# Patient Record
Sex: MALE | Race: WHITE | NOT HISPANIC OR LATINO | Employment: STUDENT | URBAN - METROPOLITAN AREA
[De-identification: names, ages, dates, MRNs, and addresses within clinical notes are randomized per-mention and may not be internally consistent; named-entity substitution may affect disease eponyms.]

---
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
## 2018-01-09 ENCOUNTER — ALLSCRIPTS OFFICE VISIT (OUTPATIENT)
Dept: OTHER | Facility: OTHER | Age: 18
End: 2018-01-09

## 2018-01-09 DIAGNOSIS — E83.110 HEREDITARY HEMOCHROMATOSIS (HCC): ICD-10-CM

## 2018-01-09 DIAGNOSIS — F98.8 OTHER SPECIFIED BEHAVIORAL AND EMOTIONAL DISORDERS WITH ONSET USUALLY OCCURRING IN CHILDHOOD AND ADOLESCENCE: ICD-10-CM

## 2018-01-09 DIAGNOSIS — E66.01 MORBID (SEVERE) OBESITY DUE TO EXCESS CALORIES (HCC): ICD-10-CM

## 2018-01-09 DIAGNOSIS — R53.83 OTHER FATIGUE: ICD-10-CM

## 2018-01-09 DIAGNOSIS — J06.9 ACUTE UPPER RESPIRATORY INFECTION: ICD-10-CM

## 2018-01-10 NOTE — PROGRESS NOTES
Assessment   1  Morbid obesity due to excess calories (278 01) (E66 01)   2  Acute upper respiratory infection (465 9) (J06 9)   3  Attention deficit disorder (ADD) in adult (314 00) (F98 8)   4  Fatigue, unspecified type (780 79) (R53 83)   5  Hereditary hemochromatosis (275 01) (E83 110)    Plan   Acute upper respiratory infection, Attention deficit disorder (ADD) in adult, Fatigue,    unspecified type, Hereditary hemochromatosis, Morbid obesity due to excess calories    · (1) CBC/PLT/DIFF; Status:Active; Requested LXI:[id number];    · (1) COMPREHENSIVE METABOLIC PANEL; Status:Active; Requested XFF:64DZU9869;    · (1) IRON PANEL; Status:Active; Requested YKZ:55RKE9793;    · (1) LIPID PANEL FASTING W DIRECT LDL REFLEX; Status:Active; Requested    JBD:08PVT8703;    · (1) MUMPS  ANTIBODIES, IGG; Status:Active; Requested MCW:17VBV4126; Attention deficit disorder (ADD) in adult    · Methylphenidate HCl ER 18 MG Oral Tablet Extended Release (Concerta); TAKE    1 TABLET DAILY  Attention deficit disorder (ADD) in adult, Fatigue, unspecified type    · (1) TSH WITH FT4 REFLEX; Status:Active; Requested DAF:48LSD3360; Discussion/Summary      1  URI appearing to be viral in nature discussed supportive measures ADD will start Concerta and will call with an update of his symptoms in the next two weeks discussed how to take medication and common side effects  Fatigue and hemochromatosis in family will update labs to evaluate  Possible side effects of new medications were reviewed with the patient/guardian today  The treatment plan was reviewed with the patient/guardian  The patient/guardian understands and agrees with the treatment plan      Chief Complaint   Check up      History of Present Illness   Patient here today for his check up and going to school and doing well with school and here for his check up and a having some cold symptoms for the past week taking some OTC medications and feeling improvement   Patient has troubles with focus and has fatigue in the afternoon  Hard to focus and concentration hard to keep on one train of thought and making excuses prior to doing his work  Patient feeling that the Bryan Liu is not working this has been an issue for him his whole life  Focus is not improving even though his sleep has improved  Patient also requesting to have his iron studies checked as he was told that he has a family history of excess iron in the blood  Review of Systems        Constitutional: as noted in HPI  Eyes: No complaints of eye pain, no red eyes, no discharge from eyes, no itchy eyes  ENT: as noted in HPI  Cardiovascular: No complaints of slow heart rate, no fast heart rate, no chest pain, no palpitations, no leg claudication, no lower extremity  Respiratory: as noted in HPI  Gastrointestinal: No complaints of abdominal pain, no constipation, no nausea or vomiting, no diarrhea or bloody stools  Genitourinary: No complaints of dysuria, no incontinence, no hesitancy, no nocturia, no genital lesion, no testicular pain  Musculoskeletal: No complaints of arthralgia, no myalgias, no joint swelling or stiffness, no limb pain or swelling  Integumentary: No complaints of skin rash or skin lesions, no itching, no skin wound, no dry skin  Neurological: No compliants of headache, no confusion, no convulsions, no numbness or tingling, no dizziness or fainting, no limb weakness, no difficulty walking  Psychiatric: Is not suicidal, no sleep disturbances, no anxiety or depression, no change in personality, no emotional problems  Endocrine: No complaints of proptosis, no hot flashes, no muscle weakness, no erectile dysfunction, no deepening of the voice, no feelings of weakness  Hematologic/Lymphatic: No complaints of swollen glands, no swollen glands in the neck, does not bleed easily, no easy bruising  ROS reviewed  Active Problems   1   Morbid obesity due to excess calories (278 01) (E66 01)    Past Medical History   1  History of Flu-like symptoms (780 99) (R68 89)   2  History of backache (V13 59) (Z87 39)   3  History of influenza (V12 09) (Z87 09)   4  History of influenza vaccination (V49 89) (Z92 29)   5  History of viral infection (V12 09) (Z86 19)   6  History of Hypersomnia (780 54) (G47 10)   7  History of Lightheadedness (780 4) (R42)   8  History of Need for HPV vaccination (V04 89) (Z23)   9  Positive depression screening (796 4) (Z13 89)     The active problems and past medical history were reviewed and updated today  Surgical History   1  History of Inguinal Hernia Repair   2  History of Tonsillectomy     The surgical history was reviewed and updated today  Family History   Mother    1  Family history of chronic obstructive pulmonary disease (V17 6) (Z82 5)   2  Family history of Hypertension, benign  Father    3  Family history of Hypertension, benign     The family history was reviewed and updated today  Social History    · Never a smoker  The social history was reviewed and updated today  Current Meds    1  Armodafinil 150 MG Oral Tablet; TAKE 1 TABLET EVERY MORNING; Therapy: 04ANP1700 to ([id number])  Requested for: 07GXH1736; Last     Rx:14Bbk0955 Ordered   2  Sulfamethoxazole-Trimethoprim 800-160 MG Oral Tablet; TAKE 1 TABLET TWICE DAILY     UNTIL FINISHED; Therapy: 32GCB8015 to (Evaluate:90Cya1891)  Requested for: 52Xdw3554; Last     Rx:47Lqh6194 Ordered     The medication list was reviewed and updated today  Allergies   1  No Known Drug Allergies    Vitals   Vital Signs    Recorded: 59NQJ4468 11:43AM   Temperature 98 2 F   Heart Rate 78   Systolic 064   Diastolic 84   Height 6 ft 3 in   Weight 309 lb 2 08 oz   BMI Calculated 38 64   BSA Calculated 2 65   O2 Saturation 98     Physical Exam        Constitutional      General appearance: No acute distress, well appearing and well nourished         Eyes Conjunctiva and lids: No swelling, erythema, or discharge  -- wearing glasses  Pupils and irises: Equal, round and reactive to light  Ears, Nose, Mouth, and Throat      External inspection of ears and nose: Normal        Otoscopic examination: Tympanic membrance translucent with normal light reflex  Canals patent without erythema  Nasal mucosa, septum, and turbinates: Normal without edema or erythema  Oropharynx: Normal with no erythema, edema, exudate or lesions  Pulmonary      Respiratory effort: No increased work of breathing or signs of respiratory distress  Auscultation of lungs: Clear to auscultation, equal breath sounds bilaterally, no wheezes, no rales, no rhonci  Cardiovascular      Auscultation of heart: Normal rate and rhythm, normal S1 and S2, without murmurs  Examination of extremities for edema and/or varicosities: Normal        Abdomen      Abdomen: Non-tender, no masses  Liver and spleen: No hepatomegaly or splenomegaly  Lymphatic      Palpation of lymph nodes in neck: No lymphadenopathy  Musculoskeletal      Gait and station: Normal        Skin      Skin and subcutaneous tissue: Normal without rashes or lesions  Psychiatric      Orientation to person, place and time: Normal        Mood and affect: Normal           Results/Data   PHQ-9 Adult Depression Screening 17MFQ4057 12:07PM User, [employer]      Test Name Result Flag Reference   PHQ-9 Adult Depression Score 3     Over the last two weeks, how often have you been bothered by any of the following problems?      Little interest or pleasure in doing things: Not at all - 0     Feeling down, depressed, or hopeless: Not at all - 0     Trouble falling or staying asleep, or sleeping too much: Several days - 1     Feeling tired or having little energy: Several days - 1     Poor appetite or over eating: Several days - 1     Feeling bad about yourself - or that you are a failure or have let yourself or your family down: Not at all - 0     Trouble concentrating on things, such as reading the newspaper or watching television: Not at all - 0     Moving or speaking so slowly that other people could have noticed   Or the opposite -  being so fidgety or restless that you have been moving around a lot more than usual: Not at all - 0     Thoughts that you would be better off dead, or of hurting yourself in some way: Not at all - 0   PHQ-9 Adult Depression Screening Negative     PHQ-9 Difficulty Level Somewhat difficult     PHQ-9 Severity Minimal Depression          Signatures    Electronically signed by : YAO Mueller; Jan 9 2018 12:39PM EST                       (Author)     Electronically signed by : Salma Gunter MD; Jan 9 2018  2:42PM EST                       (Co-author)

## 2018-01-12 NOTE — PROGRESS NOTES
Assessment    1  UTI symptoms (788 99) (R39 9)    Plan  UTI symptoms    · (1) URINE CULTURE; Source:Urine, Clean Catch; Status:Active; Requested  for:71Quq5472;    · (Q) URINALYSIS, SCREEN; Status:Active; Requested for:21Yqb3574;    · Urine Dip Automated- POC; Status:Complete;   Done: 81WBX6855 03:14PM    Discussion/Summary    Urinalysis indicate dehydration  Please increase water conumption   During really hot weather days, you should be drinking at least 1 liter a day of water  History of Present Illness  HPI: Pt reports that over the past few days, he felt symptoms of urgency  He was getting up during the night to use the restroom  Some back pain post void  Today, he has had no symptms  The weather was very hot and humid over the past few days  Review of Systems    Constitutional: no fever or chills, feels well, no tiredness, no recent weight loss or weight gain  Genitourinary: urgency, but as noted in HPI  Active Problems    1  Depression (311) (F32 9)   2  Morbid obesity due to excess calories (278 01) (E66 01)    Past Medical History    1  History of Flu-like symptoms (780 99) (R68 89)   2  History of backache (V13 59) (Z87 39)   3  History of fatigue (V13 89) (Z87 898)   4  History of influenza (V12 09) (Z87 09)   5  History of influenza vaccination (V49 89) (Z92 29)   6  History of viral infection (V12 09) (Z86 19)   7  History of Hypersomnia (780 54) (G47 10)   8  History of Lightheadedness (780 4) (R42)   9  History of Need for HPV vaccination (V04 89) (Z23)   10  Positive depression screening (796 4) (Z13 89)  Active Problems And Past Medical History Reviewed: The active problems and past medical history were reviewed and updated today  Family History  Mother    1  Family history of chronic obstructive pulmonary disease (V17 6) (Z82 5)   2  Family history of Hypertension, benign  Father    3  Family history of Hypertension, benign  Family History Reviewed:    The family history was reviewed and updated today  Social History    · Never a smoker  The social history was reviewed and updated today  The social history was reviewed and is unchanged  Surgical History    1  History of Inguinal Hernia Repair   2  History of Tonsillectomy  Surgical History Reviewed: The surgical history was reviewed and updated today  Current Meds   1  Armodafinil 150 MG Oral Tablet; TAKE 1 TABLET EVERY MORNING; Therapy: 16ODE0896 to (Evaluate:08Jun2017)  Requested for: 54AKN9846; Last   KD:39LLC9753; Status: ACTIVE - Renewal Denied Ordered    Allergies    1  No Known Drug Allergies    Vitals   Recorded: 00Twm3571 03:11PM   Temperature 98 5 F   Heart Rate 64   Systolic 689   Diastolic 94   Height 6 ft 3 in   Weight 295 lb    BMI Calculated 36 87   BSA Calculated 2 59   O2 Saturation 98     Physical Exam    Constitutional   General appearance: No acute distress, well appearing and well nourished  Eyes   Conjunctiva and lids: No swelling, erythema, or discharge  Pupils and irises: Equal, round and reactive to light  Abdomen   Abdomen: Non-tender, no masses  Liver and spleen: No hepatomegaly or splenomegaly  Lymphatic   Palpation of lymph nodes in neck: No lymphadenopathy      Musculoskeletal   Gait and station: Normal     Psychiatric   Orientation to person, place and time: Normal     Mood and affect: Normal          Results/Data  Urine Dip Automated- POC 13VVF8929 03:14PM Steve Leonardony     Test Name Result Flag Reference   Leukocytes NEG     Nitrite NEG     Blood NEG     Bilirubin NEG     Urobilinogen 1 0     Protein 100 A    Ph 6 0     Specific Gravity 1 025     Ketone TRACE A    Glucose NEG         Signatures   Electronically signed by : Sunshine Fishman NP; Jul 24 2017  3:37PM EST                       (Author)    Electronically signed by : Sue Marti MD; Jul 24 2017  5:06PM EST                       (Co-author)

## 2018-01-12 NOTE — PROGRESS NOTES
Assessment    1  Depression (311) (F32 9)   2  Positive depression screening (796 4) (R68 89)   3  Acute bronchitis (466 0) (J20 9)   4  Cough (786 2) (R05)    Plan  Acute bronchitis    · Give your child 4 glasses of clear liquid a day ; Status:Complete;   Done: 10LSS6802   · There are several ways to treat your child's fever:; Status:Complete;   Done: 56JKI8625  Acute bronchitis, Cough    · Azithromycin 250 MG Oral Tablet; TAKE 2 TABLETS ON DAY 1 THEN TAKE 1  TABLET A DAY FOR 4 DAYS  Cough    · MethylPREDNISolone (Yahir) 4 MG Oral Tablet (Medrol (Yahir)); Take as directed on  pack  Depression    · Sertraline HCl - 50 MG Oral Tablet; Take 1 tablet daily   · Continue with our present treatment plan ; Status:Complete;   Done: 11FUI3437   · Decreasing the stress in your life may help your condition improve ; Status:Complete;    Done: 22Jan2016   · Please bring all medicines, vitamins, and herbal supplements with you when you come  to the office ; Status:Complete;   Done: 92RNV7082   · Regular aerobic exercise can help reduce stress ; Status:Complete;   Done: 57GOZ9630   · Call [phone] if: The symptoms are not better in 7 days ; Status:Complete;   Done:  70GQC6909   · Call [phone] if: The symptoms seem worse ; Status:Complete;   Done:  17TGB6659   · Call [phone] if: You are having difficulty sleeping (insomnia) ; Status:Complete;    Done: 47NXK0700   · Call [phone] if: You are unable to eat a normal amount of food and you do not  feel hungry ; Status:Complete;   Done: 66DSC4885   · Call [phone] if: You or your family members notice any confusion or difficulty with  memory ; Status:Complete;   Done: 89VFR0202   · Call [phone] if: Your depression is worse ; Status:Complete;   Done: 77APV8758   · Call [phone] if: Your moods often change suddenly for no reason ;  Status:Complete;   Done: 09HCG3082   · Call [phone] if: Your symptoms return during treatment  ; Status:Complete;   Done:  90UTH4001  Need for HPV vaccination    · Gardasil Intramuscular Suspension  Positive depression screening    · *VB-Depression Screening; Status:Complete;   Done: 77JFS6745 02:37PM    Discussion/Summary    1  Cough with the pertussis going around school will cover with the azithromycin with the length of time symptoms present and the barking hacking cough he is having   2  Depression will start the Zoloft and will come back in three weeks for a recheck on his symptoms  Patient aware of ER precautions for any SI/HI  Possible side effects of new medications were reviewed with the patient/guardian today  The treatment plan was reviewed with the patient/guardian  The patient/guardian understands and agrees with the treatment plan      Chief Complaint  Patient is here for a cough  He also fell behind on the Gardasil Series  History of Present Illness  HPI: Patient is here today with complaints that started with a head cold about two weeks ago and as that started getting better he started with a constant cough barking dry hacking cough  Symptoms present for the past week and having lots of soreness in his chest and can only take shallow breaths  Patient is also very stressed with school and trying to manage hectic schedule having fatigue hard to complete his work  Patient is sleeping on the weekends for at least 20 hours and he is putting lots of pressure on himself  Lots of stress with learning to drive  Review of Systems    Constitutional: as noted in HPI  Eyes: No complaints of eye pain, no discharge from eyes, no eyesight problems, eyes do not itch, no red or dry eyes  ENT: as noted in HPI  Cardiovascular: No complaints of chest pain, no palpitations, normal heart rate, no leg claudication or lower leg edema  Respiratory: as noted in HPI  Gastrointestinal: No complaints of abdominal pain, no nausea or vomiting, no constipation, no diarrhea or bloody stools  Musculoskeletal: No complaints of joint stiffness or swelling, no myalgias, no limb pain or swelling  Integumentary: No complaints of skin rash, no skin lesions or wounds, no itching, no dry skin  Neurological: No complaints of headache, no numbness or tingling, no dizziness or fainting, no confusion, no convulsions, no limb weakness or difficulty walking  Psychiatric: as noted in HPI  ROS reported by the patient  Active Problems    1  Fatigue (780 79) (R53 83)   2  Morbid obesity due to excess calories (278 01) (E66 01)   3  Need for influenza vaccination (V04 81) (Z23)   4  Snoring (786 09) (R06 83)    Past Medical History    1  History of backache (V13 59) (Z87 39)   2  History of influenza (V12 09) (Z87 09)  Active Problems And Past Medical History Reviewed: The active problems and past medical history were reviewed and updated today  Family History    1  Family history of chronic obstructive pulmonary disease (V17 6) (Z82 5)   2  Family history of Hypertension, benign    3  Family history of Hypertension, benign  Family History Reviewed: The family history was reviewed and updated today  Social History    · Never A Smoker  The social history was reviewed and updated today  Surgical History    1  History of Inguinal Hernia Repair   2  History of Tonsillectomy  Surgical History Reviewed: The surgical history was reviewed and updated today  Current Meds   1  No Reported Medications Recorded    The medication list was reviewed and updated today  Allergies    1  No Known Drug Allergies    Vitals   Recorded: 37NRD0508 02:26PM   Temperature 97 5 F   Heart Rate 64   Systolic 034   Diastolic 887   Height 6 ft 4 in   Weight 296 lb    BMI Calculated 36 03   BSA Calculated 2 61   O2 Saturation 98     Physical Exam    Constitutional - General appearance: No acute distress, well appearing and well nourished  acutely ill and morbidly obese     Head and Face - Face and sinuses: Normal, no sinus tenderness  Eyes - Conjunctiva and lids: No injection, edema or discharge  wearing glasses  Pupils and irises: Equal, round, reactive to light bilaterally  Ears, Nose, Mouth, and Throat - External inspection of ears and nose: Normal without deformities or discharge  Otoscopic examination: Tympanic membranes gray, translucent with good bony landmarks and light reflex  Canals patent without erythema  Nasal mucosa, septum, and turbinates: Normal, no edema or discharge  Oropharynx: Moist mucosa, normal tongue and tonsils without lesions  Neck - Neck: Supple, symmetric, no masses  Pulmonary - Respiratory effort: Normal respiratory rate and rhythm, no increased work of breathing  Auscultation of lungs: Abnormal  scattered rhonchi increasing constant barking cough  Cardiovascular - Auscultation of heart: Regular rate and rhythm, normal S1 and S2, no murmur  Abdomen - Abdomen: Normal bowel sounds, soft, non-tender, no masses  Liver and spleen: No hepatomegaly or splenomegaly  Lymphatic - Palpation of lymph nodes in neck: No anterior or posterior cervical lymphadenopathy     Psychiatric - Orientation to person, place, and time: Normal  Mood and affect: Normal       Results/Data  *VB-Depression Screening 50DMG9641 02:37PM Lucian Edwards     Test Name Result Flag Reference   Depression Scale Result      Depression Screen - Positive Findings     PHQ-9 Adult Depression Screening 22Jan2016 02:36PM Savannah Cast     Test Name Result Flag Reference   PHQ-9 Adult Depression Score 13     Q1: 3, Q2: 2, Q3: 3, Q4: 3, Q5: 0, Q6: 1, Q7: 1, Q8: 0, Q9: 0   PHQ-9 Adult Depression Screening Positive     PHQ-9 Difficulty Level Very difficult     PHQ-9 Severity Moderate Depression         Future Appointments    Date/Time Provider Specialty Site   02/08/2016 02:40 PM Lucian Edwards, [address]     Signatures   Electronically signed by : YAO Reed; Jan 22 2016 3:06PM EST                       (Author)    Electronically signed by : Aletha Mccain MD; Jan 22 2016  3:39PM EST                       (Co-author)

## 2018-01-13 NOTE — RESULT NOTES
Verified Results  * XR CHEST PA & LATERAL 81IZO0394 04:29PM Johan Apt Order Number: [id number]     Test Name Result Flag Reference   XR CHEST PA & LATERAL (Report)     CHEST      INDICATION: Shortness of breath for one week     COMPARISON: None     VIEWS: Frontal and lateral projections; 3 images     FINDINGS:        Cardiomediastinal silhouette appears unremarkable  The lungs are clear  No pneumothorax or pleural effusion  Visualized osseous structures appear within normal limits for the patient's age  IMPRESSION:     No active pulmonary disease       Signed by:   Joan Angeles MD   1/27/16       Discussion/Summary   normal chest x-ray suspect that he has a bronchitis

## 2018-01-14 VITALS
SYSTOLIC BLOOD PRESSURE: 142 MMHG | HEIGHT: 75 IN | WEIGHT: 295 LBS | TEMPERATURE: 98.5 F | BODY MASS INDEX: 36.68 KG/M2 | OXYGEN SATURATION: 98 % | DIASTOLIC BLOOD PRESSURE: 94 MMHG | HEART RATE: 64 BPM

## 2018-01-14 VITALS
HEIGHT: 75 IN | DIASTOLIC BLOOD PRESSURE: 86 MMHG | BODY MASS INDEX: 35.84 KG/M2 | SYSTOLIC BLOOD PRESSURE: 130 MMHG | HEART RATE: 91 BPM | OXYGEN SATURATION: 97 % | TEMPERATURE: 100 F | WEIGHT: 288.25 LBS

## 2018-01-18 NOTE — MISCELLANEOUS
Adele Smallwood is a patient of [address]  He is being currently evaluated for his extreme fatigue         Electronically signed by:Monica Elils  May 16 2016  3:06PM EST

## 2018-01-18 NOTE — RESULT NOTES
Verified Results  * MRI KNEE LEFT  WO CONTRAST 94ETY3543 12:28PM Jimmey Bumpers     Test Name Result Flag Reference   MRI KNEE LEFT  WO CONTRAST (Report)     MRI LEFT KNEE     INDICATION: Left knee pain  Patient injured 2 weeks ago  COMPARISON: None  TECHNIQUE:  MR sequences were obtained of the left knee including: Localizer, axial T2 fat sat, coronal T1/T2 fat sat, sagittal PD/T2 fat sat  Images were acquired on a 1 5 Laura unit  Gadolinium was not used  FINDINGS:     SUBCUTANEOUS TISSUES: Normal     JOINT EFFUSION: None  BAKER'S CYST: None  MENISCI: Intact  CRUCIATE LIGAMENTS: Intact  EXTENSOR APPARATUS: Intact  COLLATERAL LIGAMENTS: Intact  ARTICULAR SURFACES: There is a small nondisplaced osteochondral fracture in the posterior most aspect of the medial tibial plateau with surrounding marrow edema  The fracture measures 1 1 cm left to right, and 0 7 cm anterior posterior  (Series 4    images 16 and series 6 image 7 )     BONE MARROW: No additional marrow abnormalities  MUSCULATURE: Intact  IMPRESSION:     There is a small nondisplaced osteochondral fracture in the posterior most aspect of the medial tibial plateau  (Series 4 images 16 and series 6 image 7 )       ##imslh##imslh       Workstation performed: [id number]     Signed by:   Magi Kelly MD   2/12/16       Discussion/Summary   Please call patient and let him know that he has a small nondisplaced fracture of his knee   Will need f/u with orthopedics I will put in the order

## 2018-01-23 VITALS
BODY MASS INDEX: 38.44 KG/M2 | DIASTOLIC BLOOD PRESSURE: 84 MMHG | WEIGHT: 309.13 LBS | OXYGEN SATURATION: 98 % | SYSTOLIC BLOOD PRESSURE: 128 MMHG | HEIGHT: 75 IN | TEMPERATURE: 98.2 F | HEART RATE: 78 BPM

## 2018-12-20 ENCOUNTER — OFFICE VISIT (OUTPATIENT)
Dept: FAMILY MEDICINE CLINIC | Facility: CLINIC | Age: 18
End: 2018-12-20
Payer: COMMERCIAL

## 2018-12-20 VITALS
WEIGHT: 270.8 LBS | DIASTOLIC BLOOD PRESSURE: 78 MMHG | OXYGEN SATURATION: 97 % | HEART RATE: 75 BPM | TEMPERATURE: 97.1 F | SYSTOLIC BLOOD PRESSURE: 130 MMHG | HEIGHT: 76 IN | BODY MASS INDEX: 32.98 KG/M2

## 2018-12-20 DIAGNOSIS — F33.2 SEVERE EPISODE OF RECURRENT MAJOR DEPRESSIVE DISORDER, WITHOUT PSYCHOTIC FEATURES (HCC): Primary | ICD-10-CM

## 2018-12-20 DIAGNOSIS — F98.8 ATTENTION DEFICIT DISORDER (ADD) IN ADULT: ICD-10-CM

## 2018-12-20 DIAGNOSIS — Z23 FLU VACCINE NEED: ICD-10-CM

## 2018-12-20 PROBLEM — E83.110 HEREDITARY HEMOCHROMATOSIS (HCC): Status: ACTIVE | Noted: 2018-01-09

## 2018-12-20 PROCEDURE — 1036F TOBACCO NON-USER: CPT | Performed by: NURSE PRACTITIONER

## 2018-12-20 PROCEDURE — 99214 OFFICE O/P EST MOD 30 MIN: CPT | Performed by: NURSE PRACTITIONER

## 2018-12-20 PROCEDURE — 90686 IIV4 VACC NO PRSV 0.5 ML IM: CPT

## 2018-12-20 PROCEDURE — 3008F BODY MASS INDEX DOCD: CPT | Performed by: NURSE PRACTITIONER

## 2018-12-20 PROCEDURE — 90471 IMMUNIZATION ADMIN: CPT

## 2018-12-20 RX ORDER — VENLAFAXINE HYDROCHLORIDE 37.5 MG/1
37.5 CAPSULE, EXTENDED RELEASE ORAL DAILY
Qty: 14 CAPSULE | Refills: 0 | Status: SHIPPED | OUTPATIENT
Start: 2018-12-20 | End: 2018-12-27 | Stop reason: SDUPTHER

## 2018-12-20 RX ORDER — METHYLPHENIDATE HYDROCHLORIDE 27 MG/1
27 TABLET ORAL DAILY
Qty: 15 TABLET | Refills: 0 | Status: SHIPPED | OUTPATIENT
Start: 2018-12-20 | End: 2019-01-02 | Stop reason: SDUPTHER

## 2018-12-20 NOTE — PROGRESS NOTES
Assessment/Plan:    No problem-specific Assessment & Plan notes found for this encounter  Diagnoses and all orders for this visit:    Severe episode of recurrent major depressive disorder, without psychotic features ([facility] [address] )  Comments:  ED precautions reviewed with patient will start Effexor daily and Concerta and follow up in 1 week also therpist scheduled with patient   Orders:  -     venlafaxine (EFFEXOR-XR) 37 5 mg 24 hr capsule; Take 1 capsule (37 5 mg total) by mouth daily for 14 days    Flu vaccine need  -     SYRINGE/SINGLE-DOSE VIAL: influenza vaccine, [id number], quadrivalent, 0 5 mL, preservative-free (AFLURIA, FLUARIX, FLULAVAL, FLUZONE)    Attention deficit disorder (ADD) in adult  -     venlafaxine (EFFEXOR-XR) 37 5 mg 24 hr capsule; Take 1 capsule (37 5 mg total) by mouth daily for 14 days  -     methylphenidate (CONCERTA) 27 MG ER tablet; Take 1 tablet (27 mg total) by mouth daily for 15 days Max Daily Amount: 27 mg          Subjective:      Patient ID: Cece Fofana is a 19 y o  male  Patient here and reports that he is having problems at college and has problems with depression and being unmotivated and procrastinate and broke up with his girlfriend last summer  Patient at times is sleep all day and lack of motivation and much worse than ever before  Patient is feeling very defeated and just difficult to get motivated to do his work  Patient very low mood and depressed  Patient is currently in [place], [place]  Patient realizes being away from home realizes how much he is isolating himself  Patient deals with his parents who are hoarders and having problems with being isolated and feeling like he is falling behind  Patient feeling like he is the same person he was and not feeling like he doesn't want to keep going and feeling lots of anger and sadness patient saw a counselor for a few weeks and saw a few times   Patient struggles with having problems with his focus and concentration and was helping with the Concerta for the ADHD symptoms  Patient in the past had tried the sertraline in the past and was only mildly helpful  Patient is open to following up with therapist and getting back on medication for his depression and anxiety and ADHD  Patient is aware of ED precautions for any plans for harming himself or others  The following portions of the patient's history were reviewed and updated as appropriate:   He  has no past medical history on file  He   Patient Active Problem List    Diagnosis Date Noted    Attention deficit disorder (ADD) in adult 01/09/2018    Hereditary hemochromatosis ([facility] [address] ) 01/09/2018    Depression 01/22/2016    Morbid obesity due to excess calories ([facility] ) 09/14/2015    Fatigue 12/18/2014     He  has a past surgical history that includes Hernia repair and Tonsillectomy  His family history includes COPD in his mother; Hypertension in his father and mother  He  reports that he has never smoked  He has never used smokeless tobacco  His alcohol and drug histories are not on file  He has No Known Allergies       Review of Systems   Constitutional: Positive for fatigue  HENT: Negative  Eyes: Negative  Respiratory: Negative  Cardiovascular: Negative  Gastrointestinal: Negative  Endocrine: Negative  Genitourinary: Negative  Musculoskeletal: Negative  Skin: Negative  Allergic/Immunologic: Negative  Neurological: Negative  Psychiatric/Behavioral: Positive for decreased concentration, dysphoric mood, sleep disturbance and suicidal ideas  The patient is nervous/anxious  Objective:      /78 (Cuff Size: Adult)   Pulse 75   Temp (!) 97 1 °F (36 2 °C) (Tympanic)   Ht 6' 3 5" (1 918 m)   Wt 123 kg (270 lb 12 8 oz)   SpO2 97%   BMI 33 40 kg/m²          Physical Exam   Constitutional: He is oriented to person, place, and time  Vital signs are normal  He appears well-developed and well-nourished  No distress     HENT:   Head: Normocephalic and atraumatic  Eyes: Pupils are equal, round, and reactive to light  Neck: Normal range of motion  No thyromegaly present  Cardiovascular: Normal rate, regular rhythm, normal heart sounds and intact distal pulses  No murmur heard  Pulmonary/Chest: Effort normal and breath sounds normal  No respiratory distress  He has no wheezes  Abdominal: Soft  Bowel sounds are normal    Musculoskeletal: Normal range of motion  Neurological: He is alert and oriented to person, place, and time  Skin: Skin is warm and dry  Psychiatric: His behavior is normal  Judgment and thought content normal  His mood appears anxious  His speech is rapid and/or pressured  Cognition and memory are normal  He exhibits a depressed mood  PHQ-9 score is 23   Nursing note and vitals reviewed

## 2018-12-27 ENCOUNTER — OFFICE VISIT (OUTPATIENT)
Dept: BEHAVIORAL/MENTAL HEALTH CLINIC | Facility: CLINIC | Age: 18
End: 2018-12-27
Payer: COMMERCIAL

## 2018-12-27 ENCOUNTER — OFFICE VISIT (OUTPATIENT)
Dept: FAMILY MEDICINE CLINIC | Facility: CLINIC | Age: 18
End: 2018-12-27
Payer: COMMERCIAL

## 2018-12-27 VITALS
TEMPERATURE: 97.9 F | HEART RATE: 82 BPM | SYSTOLIC BLOOD PRESSURE: 130 MMHG | HEIGHT: 76 IN | DIASTOLIC BLOOD PRESSURE: 74 MMHG | BODY MASS INDEX: 32.98 KG/M2 | WEIGHT: 270.8 LBS | OXYGEN SATURATION: 97 %

## 2018-12-27 DIAGNOSIS — F98.8 ATTENTION DEFICIT DISORDER (ADD) IN ADULT: ICD-10-CM

## 2018-12-27 DIAGNOSIS — F33.2 SEVERE EPISODE OF RECURRENT MAJOR DEPRESSIVE DISORDER, WITHOUT PSYCHOTIC FEATURES (HCC): Primary | ICD-10-CM

## 2018-12-27 DIAGNOSIS — F33.2 SEVERE EPISODE OF RECURRENT MAJOR DEPRESSIVE DISORDER, WITHOUT PSYCHOTIC FEATURES (HCC): ICD-10-CM

## 2018-12-27 DIAGNOSIS — E66.01 MORBID OBESITY DUE TO EXCESS CALORIES (HCC): ICD-10-CM

## 2018-12-27 PROCEDURE — 1036F TOBACCO NON-USER: CPT | Performed by: NURSE PRACTITIONER

## 2018-12-27 PROCEDURE — 99213 OFFICE O/P EST LOW 20 MIN: CPT | Performed by: NURSE PRACTITIONER

## 2018-12-27 PROCEDURE — 90834 PSYTX W PT 45 MINUTES: CPT | Performed by: SOCIAL WORKER

## 2018-12-27 RX ORDER — VENLAFAXINE HYDROCHLORIDE 75 MG/1
75 CAPSULE, EXTENDED RELEASE ORAL DAILY
Qty: 30 CAPSULE | Refills: 2 | Status: SHIPPED | OUTPATIENT
Start: 2018-12-27 | End: 2019-01-10 | Stop reason: SDUPTHER

## 2018-12-27 NOTE — PROGRESS NOTES
Assessment/Plan:    No problem-specific Assessment & Plan notes found for this encounter  Diagnoses and all orders for this visit:    Severe episode of recurrent major depressive disorder, without psychotic features (HCC)  -     venlafaxine (EFFEXOR-XR) 75 mg 24 hr capsule; Take 1 capsule (75 mg total) by mouth daily for 90 days    Attention deficit disorder (ADD) in adult  -     venlafaxine (EFFEXOR-XR) 75 mg 24 hr capsule; Take 1 capsule (75 mg total) by mouth daily for 90 days    Morbid obesity due to excess calories (HCC)    Severe episode of recurrent major depressive disorder, without psychotic features ([facility] [address] )  Comments:  ED precautions reviewed with patient will increase  Effexor 75 mg  daily and Concerta and follow up in 1 week also therpist scheduled with patient   Orders:  -     venlafaxine (EFFEXOR-XR) 75 mg 24 hr capsule; Take 1 capsule (75 mg total) by mouth daily for 90 days          Subjective:      Patient ID: Juvenal Lowe is a 19 y o  male  Last OV  Patient here and reports that he is having problems at college and has problems with depression and being unmotivated and procrastinate and broke up with his girlfriend last summer  Patient at times is sleep all day and lack of motivation and much worse than ever before  Patient is feeling very defeated and just difficult to get motivated to do his work  Patient very low mood and depressed  Patient is currently in [place], [place]  Patient realizes being away from home realizes how much he is isolating himself  Patient deals with his parents who are hoarders and having problems with being isolated and feeling like he is falling behind  Patient feeling like he is the same person he was and not feeling like he doesn't want to keep going and feeling lots of anger and sadness patient saw a counselor for a few weeks and saw a few times   Patient struggles with having problems with his focus and concentration and was helping with the Concerta for the ADHD symptoms  Patient in the past had tried the sertraline in the past and was only mildly helpful  Patient is open to following up with therapist and getting back on medication for his depression and anxiety and ADHD  Patient is aware of ED precautions for any plans for harming himself or others       Patient here today for recheck on his symptoms and reports that he is feeling well on the Effexor 37 5 daily and the Concerta 27 mg and feeling that he is having more energy and his mood is improving and he has been motivated with his depression Patient denies any side effects from the medication        The following portions of the patient's history were reviewed and updated as appropriate:   He  has no past medical history on file  He   Patient Active Problem List    Diagnosis Date Noted    Attention deficit disorder (ADD) in adult 01/09/2018    Hereditary hemochromatosis ([facility] ) 01/09/2018    Depression 01/22/2016    Morbid obesity due to excess calories ([facility] ) 09/14/2015     He  has a past surgical history that includes Hernia repair and Tonsillectomy  His family history includes COPD in his mother; Hypertension in his father and mother  He  reports that he has never smoked  He has never used smokeless tobacco  His alcohol and drug histories are not on file  He is allergic to scopolamine       Review of Systems   Constitutional: Negative for activity change, appetite change, chills, diaphoresis, fatigue, fever and unexpected weight change  HENT: Negative for congestion, ear pain, hearing loss, postnasal drip, sinus pain, sinus pressure, sneezing and sore throat  Eyes: Negative for pain, redness and visual disturbance  Respiratory: Negative for cough and shortness of breath  Cardiovascular: Negative for chest pain and leg swelling  Gastrointestinal: Negative for abdominal pain, diarrhea, nausea and vomiting  Musculoskeletal: Negative for arthralgias     Neurological: Negative for dizziness and light-headedness  Psychiatric/Behavioral: Negative for behavioral problems and dysphoric mood  The patient is not nervous/anxious  Objective:      /74   Pulse 82   Temp 97 9 °F (36 6 °C) (Tympanic)   Ht 6' 3 5" (1 918 m)   Wt 123 kg (270 lb 12 8 oz)   SpO2 97%   BMI 33 40 kg/m²          Physical Exam   Constitutional: He is oriented to person, place, and time  Vital signs are normal  He appears well-developed and well-nourished  No distress  HENT:   Head: Normocephalic and atraumatic  Eyes: Pupils are equal, round, and reactive to light  Neck: Normal range of motion  No thyromegaly present  Cardiovascular: Normal rate, regular rhythm, normal heart sounds and intact distal pulses  No murmur heard  Pulmonary/Chest: Effort normal and breath sounds normal  No respiratory distress  He has no wheezes  Abdominal: Soft  Bowel sounds are normal    Musculoskeletal: Normal range of motion  Neurological: He is alert and oriented to person, place, and time  Skin: Skin is warm and dry  Psychiatric: He has a normal mood and affect  Nursing note and vitals reviewed

## 2018-12-31 NOTE — PSYCH
Assessment/Plan:      There are no diagnoses linked to this encounter  Subjective:     Patient ID: Rachael Barreto is a 19 y o  male  Provider met with patient for an initial appointment  Provider discussed confidentiality with patient  Patient shared that he has been struggling with depression  Patient shared that he and his girlfriend recently broke up and he has been struggling at college  Patient shared that he did not do well his last semester before break  Patient shared about his family  Patient shared that his mother and father are hoarders and that they recently lost their home and he had to help them find another place to live and move them  Patient shared that he does have an older brother but he lives on his own and he does not help with his parents  Patient shared that he has been helping his parents since he was a kid and he would make sure that they had the things they needed and he would try to keep the house as clean as he could  Patient shared that he has no support  Patient shared that he does not have any friends because he had been with his girlfriend for the past 6 years and her friends had been his friends  Patient shared that when he was at college he did not socialize with anyone except his girlfriend  Patient shared that he was going to classes and returning from classes and would go to sleep for hours and not do his work  Patient shared that this is one of the reasons he did not do well this past semester  Patient shared that he is feeling somewhat better since he started an anti-depressant  Discussed with patient possible ways that he could connect with people at school and develop some friendships and a support system  Patient shared that there are a few study groups he may be able to look in to  Encouraged patient to do this in order to develop a support system and not isolate  Patient is agreeable  Patient will follow up with provider until he returns to college   Provider encouraged patient to follow up with a therapist at college as well  Review of Systems   Psychiatric/Behavioral: Positive for dysphoric mood  Objective:     Physical Exam   Psychiatric: He has a normal mood and affect  His behavior is normal  Judgment and thought content normal    Patient reports that he has been having thoughts but no plan or means, SI and no HI  Patient reports decrease in these thoughts since starting an antidepressant medication

## 2019-01-02 DIAGNOSIS — F98.8 ATTENTION DEFICIT DISORDER (ADD) IN ADULT: ICD-10-CM

## 2019-01-02 RX ORDER — METHYLPHENIDATE HYDROCHLORIDE 27 MG/1
27 TABLET ORAL DAILY
Qty: 15 TABLET | Refills: 0 | Status: SHIPPED | OUTPATIENT
Start: 2019-01-02 | End: 2019-01-10 | Stop reason: SDUPTHER

## 2019-01-10 ENCOUNTER — OFFICE VISIT (OUTPATIENT)
Dept: FAMILY MEDICINE CLINIC | Facility: CLINIC | Age: 19
End: 2019-01-10
Payer: COMMERCIAL

## 2019-01-10 ENCOUNTER — OFFICE VISIT (OUTPATIENT)
Dept: BEHAVIORAL/MENTAL HEALTH CLINIC | Facility: CLINIC | Age: 19
End: 2019-01-10
Payer: COMMERCIAL

## 2019-01-10 VITALS
HEART RATE: 82 BPM | DIASTOLIC BLOOD PRESSURE: 84 MMHG | SYSTOLIC BLOOD PRESSURE: 142 MMHG | BODY MASS INDEX: 34.46 KG/M2 | WEIGHT: 283 LBS | OXYGEN SATURATION: 98 % | TEMPERATURE: 98.8 F | HEIGHT: 76 IN

## 2019-01-10 DIAGNOSIS — F33.2 SEVERE EPISODE OF RECURRENT MAJOR DEPRESSIVE DISORDER, WITHOUT PSYCHOTIC FEATURES (HCC): ICD-10-CM

## 2019-01-10 DIAGNOSIS — F98.8 ATTENTION DEFICIT DISORDER (ADD) IN ADULT: Primary | ICD-10-CM

## 2019-01-10 DIAGNOSIS — F98.8 ATTENTION DEFICIT DISORDER (ADD) IN ADULT: ICD-10-CM

## 2019-01-10 DIAGNOSIS — F33.2 SEVERE EPISODE OF RECURRENT MAJOR DEPRESSIVE DISORDER, WITHOUT PSYCHOTIC FEATURES (HCC): Primary | ICD-10-CM

## 2019-01-10 PROCEDURE — 99213 OFFICE O/P EST LOW 20 MIN: CPT | Performed by: NURSE PRACTITIONER

## 2019-01-10 PROCEDURE — 90834 PSYTX W PT 45 MINUTES: CPT | Performed by: SOCIAL WORKER

## 2019-01-10 RX ORDER — VENLAFAXINE HYDROCHLORIDE 150 MG/1
150 CAPSULE, EXTENDED RELEASE ORAL DAILY
Qty: 30 CAPSULE | Refills: 2 | Status: SHIPPED | OUTPATIENT
Start: 2019-01-10 | End: 2019-01-17 | Stop reason: SDUPTHER

## 2019-01-10 RX ORDER — METHYLPHENIDATE HYDROCHLORIDE 36 MG/1
36 TABLET ORAL DAILY
Qty: 15 TABLET | Refills: 0 | Status: SHIPPED | OUTPATIENT
Start: 2019-01-10 | End: 2019-01-17 | Stop reason: SDUPTHER

## 2019-01-10 NOTE — PROGRESS NOTES
Assessment/Plan:    No problem-specific Assessment & Plan notes found for this encounter  Diagnoses and all orders for this visit:    Attention deficit disorder (ADD) in adult  -     venlafaxine (EFFEXOR-XR) 150 mg 24 hr capsule; Take 1 capsule (150 mg total) by mouth daily for 90 days  -     methylphenidate (CONCERTA) 36 MG ER tablet; Take 1 tablet (36 mg total) by mouth daily for 15 days Max Daily Amount: 36 mg    Severe episode of recurrent major depressive disorder, without psychotic features ([facility])  -     venlafaxine (EFFEXOR-XR) 150 mg 24 hr capsule; Take 1 capsule (150 mg total) by mouth daily for 90 days    Severe episode of recurrent major depressive disorder, without psychotic features ([facility] [address] )  Comments:  ED precautions reviewed with patient will increase  Effexor 75 mg  daily and Concerta and follow up in 1 week also therpist scheduled with patient   Orders:  -     venlafaxine (EFFEXOR-XR) 150 mg 24 hr capsule; Take 1 capsule (150 mg total) by mouth daily for 90 days          Subjective:      Patient ID: Patsy Amato is a 19 y o  male  Last OV  Patient here and reports that he is having problems at college and has problems with depression and being unmotivated and procrastinate and broke up with his girlfriend last summer  Patient at times is sleep all day and lack of motivation and much worse than ever before  Patient is feeling very defeated and just difficult to get motivated to do his work  Patient very low mood and depressed  Patient is currently in [place], [place]  Patient realizes being away from home realizes how much he is isolating himself  Patient deals with his parents who are hoarders and having problems with being isolated and feeling like he is falling behind  Patient feeling like he is the same person he was and not feeling like he doesn't want to keep going and feeling lots of anger and sadness patient saw a counselor for a few weeks and saw a few times   Patient struggles with having problems with his focus and concentration and was helping with the Concerta for the ADHD symptoms  Patient in the past had tried the sertraline in the past and was only mildly helpful  Patient is open to following up with therapist and getting back on medication for his depression and anxiety and ADHD  Patient is aware of ED precautions for any plans for harming himself or others       Patient here today for recheck on his symptoms and reports that he is feeling well on the Effexor 37 5 daily and the Concerta 27 mg and feeling that he is having more energy and his mood is improving and he has been motivated with his depression Patient denies any side effects from the medication     Patient here for recheck on his symptoms and is taking the Effexor at 75 mg daily and reports that he is feeling fatigue at the end of the day and his concentration is less later in the day             The following portions of the patient's history were reviewed and updated as appropriate:   He  has no past medical history on file  He   Patient Active Problem List    Diagnosis Date Noted    Attention deficit disorder (ADD) in adult 01/09/2018    Hereditary hemochromatosis ([facility] ) 01/09/2018    Depression 01/22/2016    Morbid obesity due to excess calories ([facility] ) 09/14/2015     He  has a past surgical history that includes Hernia repair and Tonsillectomy  His family history includes COPD in his mother; Hypertension in his father and mother  He  reports that he has never smoked  He has never used smokeless tobacco  His alcohol and drug histories are not on file  He is allergic to scopolamine       Review of Systems   Constitutional: Negative for activity change, appetite change, chills, diaphoresis, fatigue, fever and unexpected weight change  HENT: Negative for congestion, ear pain, hearing loss, postnasal drip, sinus pain, sinus pressure, sneezing and sore throat  Eyes: Negative for pain, redness and visual disturbance  Respiratory: Negative for cough and shortness of breath  Cardiovascular: Negative for chest pain and leg swelling  Gastrointestinal: Negative for abdominal pain, diarrhea, nausea and vomiting  Musculoskeletal: Negative for arthralgias  Neurological: Negative for dizziness and light-headedness  Psychiatric/Behavioral: Negative for behavioral problems and dysphoric mood  Objective:      /84   Pulse 82   Temp 98 8 °F (37 1 °C)   Ht 6' 3 5" (1 918 m)   Wt 128 kg (283 lb)   SpO2 98%   BMI 34 91 kg/m²          Physical Exam   Constitutional: He is oriented to person, place, and time  Vital signs are normal  He appears well-developed and well-nourished  No distress  HENT:   Head: Normocephalic and atraumatic  Eyes: Pupils are equal, round, and reactive to light  Neck: Normal range of motion  No thyromegaly present  Cardiovascular: Normal rate, regular rhythm, normal heart sounds and intact distal pulses  No murmur heard  Pulmonary/Chest: Effort normal and breath sounds normal  No respiratory distress  He has no wheezes  Abdominal: Soft  Bowel sounds are normal    Musculoskeletal: Normal range of motion  Neurological: He is alert and oriented to person, place, and time  Skin: Skin is warm and dry  Psychiatric: He has a normal mood and affect     PHQ-9 6 and KORY-7 scoring 2

## 2019-01-14 NOTE — PSYCH
Assessment/Plan:      There are no diagnoses linked to this encounter  Subjective:     Patient ID: Mireille Echevarria is a 22 y o  male  Provider met with patient for a follow up appointment on this day  Patient shared that he has been "feeling a little better with the medication and he thinks that it has started working more for him"  Patient shared that he is trying to do more but with limited income it is difficult  Patient shared that he does sleep up to 15 hours a day on some days  Discussed patient sleeping as well as patient excessive sleep, patient does report feeling less depressed but does continue to struggle with some motivation  Patient shared that he picked up the last few things he had at his ex-girlfriends and shared that it was difficult but he does feel as if he has some closure now  Patient also shared that he is looking forward to going back to college and that he is going to look into some study clubs  Discussed with patient the importance of him following up with counseling while at school and he agreed that he would  Patient shared that his birthday is in a few days and that he is going out with his family and he is also planning a trip to [place] with his parents  Patient will follow up with provider in 1 week  Review of Systems   Psychiatric/Behavioral: Positive for dysphoric mood  Objective:     Physical Exam   Psychiatric: He has a normal mood and affect  His behavior is normal  Judgment and thought content normal    Patient reports no SI or HI

## 2019-01-17 ENCOUNTER — OFFICE VISIT (OUTPATIENT)
Dept: BEHAVIORAL/MENTAL HEALTH CLINIC | Facility: CLINIC | Age: 19
End: 2019-01-17
Payer: COMMERCIAL

## 2019-01-17 ENCOUNTER — OFFICE VISIT (OUTPATIENT)
Dept: FAMILY MEDICINE CLINIC | Facility: CLINIC | Age: 19
End: 2019-01-17
Payer: COMMERCIAL

## 2019-01-17 VITALS
OXYGEN SATURATION: 98 % | SYSTOLIC BLOOD PRESSURE: 146 MMHG | WEIGHT: 279 LBS | BODY MASS INDEX: 33.97 KG/M2 | HEART RATE: 72 BPM | DIASTOLIC BLOOD PRESSURE: 92 MMHG | TEMPERATURE: 98.3 F | HEIGHT: 76 IN

## 2019-01-17 DIAGNOSIS — F33.2 SEVERE EPISODE OF RECURRENT MAJOR DEPRESSIVE DISORDER, WITHOUT PSYCHOTIC FEATURES (HCC): ICD-10-CM

## 2019-01-17 DIAGNOSIS — F98.8 ATTENTION DEFICIT DISORDER (ADD) IN ADULT: ICD-10-CM

## 2019-01-17 DIAGNOSIS — F33.2 SEVERE EPISODE OF RECURRENT MAJOR DEPRESSIVE DISORDER, WITHOUT PSYCHOTIC FEATURES (HCC): Primary | ICD-10-CM

## 2019-01-17 DIAGNOSIS — F98.8 ATTENTION DEFICIT DISORDER (ADD) IN ADULT: Primary | ICD-10-CM

## 2019-01-17 PROCEDURE — 1036F TOBACCO NON-USER: CPT | Performed by: NURSE PRACTITIONER

## 2019-01-17 PROCEDURE — 99214 OFFICE O/P EST MOD 30 MIN: CPT | Performed by: NURSE PRACTITIONER

## 2019-01-17 PROCEDURE — 3008F BODY MASS INDEX DOCD: CPT | Performed by: NURSE PRACTITIONER

## 2019-01-17 PROCEDURE — 90834 PSYTX W PT 45 MINUTES: CPT | Performed by: SOCIAL WORKER

## 2019-01-17 RX ORDER — METHYLPHENIDATE HYDROCHLORIDE 36 MG/1
36 TABLET ORAL DAILY
Qty: 30 TABLET | Refills: 0 | Status: SHIPPED | OUTPATIENT
Start: 2019-01-17 | End: 2019-01-29 | Stop reason: SDUPTHER

## 2019-01-17 RX ORDER — VENLAFAXINE HYDROCHLORIDE 150 MG/1
150 CAPSULE, EXTENDED RELEASE ORAL DAILY
Qty: 30 CAPSULE | Refills: 5 | Status: SHIPPED | OUTPATIENT
Start: 2019-01-17 | End: 2019-03-12 | Stop reason: SDUPTHER

## 2019-01-17 NOTE — PROGRESS NOTES
Assessment/Plan:    No problem-specific Assessment & Plan notes found for this encounter  Diagnoses and all orders for this visit:    Attention deficit disorder (ADD) in adult  Comments:  DW patient will continue with Concerta daily   Orders:  -     venlafaxine (EFFEXOR-XR) 150 mg 24 hr capsule; Take 1 capsule (150 mg total) by mouth daily for 180 days  -     methylphenidate (CONCERTA) 36 MG ER tablet; Take 1 tablet (36 mg total) by mouth daily for 30 days Max Daily Amount: 36 mg    Severe episode of recurrent major depressive disorder, without psychotic features ([facility] [address] )  Comments:  Effexor 150 mg will continue with current medications   Orders:  -     venlafaxine (EFFEXOR-XR) 150 mg 24 hr capsule; Take 1 capsule (150 mg total) by mouth daily for 180 days    Attention deficit disorder (ADD) in adult  -     venlafaxine (EFFEXOR-XR) 150 mg 24 hr capsule; Take 1 capsule (150 mg total) by mouth daily for 180 days  -     methylphenidate (CONCERTA) 36 MG ER tablet; Take 1 tablet (36 mg total) by mouth daily for 30 days Max Daily Amount: 36 mg    Severe episode of recurrent major depressive disorder, without psychotic features ([facility])  -     venlafaxine (EFFEXOR-XR) 150 mg 24 hr capsule; Take 1 capsule (150 mg total) by mouth daily for 180 days          Subjective:      Patient ID: Kaley Wright is a 22 y o  male  Last OV  Patient here and reports that he is having problems at college and has problems with depression and being unmotivated and procrastinate and broke up with his girlfriend last summer  Patient at times is sleep all day and lack of motivation and much worse than ever before  Patient is feeling very defeated and just difficult to get motivated to do his work  Patient very low mood and depressed  Patient is currently in [place], [place]  Patient realizes being away from home realizes how much he is isolating himself   Patient deals with his parents who are hoarders and having problems with being isolated and feeling like he is falling behind  Patient feeling like he is the same person he was and not feeling like he doesn't want to keep going and feeling lots of anger and sadness patient saw a counselor for a few weeks and saw a few times  Patient struggles with having problems with his focus and concentration and was helping with the Concerta for the ADHD symptoms  Patient in the past had tried the sertraline in the past and was only mildly helpful  Patient is open to following up with therapist and getting back on medication for his depression and anxiety and ADHD  Patient is aware of ED precautions for any plans for harming himself or others       Patient here today for recheck on his symptoms and reports that he is feeling well on the Effexor 37 5 daily and the Concerta 27 mg and feeling that he is having more energy and his mood is improving and he has been motivated with his depression Patient denies any side effects from the medication     Patient here for recheck on his symptoms and is taking the Effexor at 75 mg daily and reports that he is feeling fatigue at the end of the day and his concentration is less later in the day  Patient here today for follow up and resorts that he is having more vivid dreams and lasting the whole night and remembering things like when he was awake  Ast times dreams are nightmare type dreams  Patient having dreams about being shot at or home being broken into and dreams about getting back with girlfriend and is frustrating  Patient is taking his medications later in the day and waking up about 3 PM and taking his medication  Patient plans to set his alarm and take his medications before 12 PM          The following portions of the patient's history were reviewed and updated as appropriate:   He  has no past medical history on file    He   Patient Active Problem List    Diagnosis Date Noted    Attention deficit disorder (ADD) in adult 01/09/2018    Hereditary hemochromatosis ([facility] ) 01/09/2018    Depression 01/22/2016    Morbid obesity due to excess calories ([facility] ) 09/14/2015     He  has a past surgical history that includes Hernia repair and Tonsillectomy  His family history includes COPD in his mother; Hypertension in his father and mother  He  reports that he has never smoked  He has never used smokeless tobacco  His alcohol and drug histories are not on file  He is allergic to scopolamine       Review of Systems   Constitutional: Negative for activity change, appetite change, chills, diaphoresis, fatigue, fever and unexpected weight change  HENT: Negative for congestion, ear pain, hearing loss, postnasal drip, sinus pain, sinus pressure, sneezing and sore throat  Eyes: Negative for pain, redness and visual disturbance  Respiratory: Negative for cough and shortness of breath  Cardiovascular: Negative for chest pain and leg swelling  Gastrointestinal: Negative for abdominal pain, diarrhea, nausea and vomiting  Musculoskeletal: Negative for arthralgias  Neurological: Negative for dizziness and light-headedness  Psychiatric/Behavioral: Negative for behavioral problems and dysphoric mood  Having vivid dreams          Objective:      /92   Pulse 72   Temp 98 3 °F (36 8 °C)   Ht 6' 3 5" (1 918 m)   Wt 127 kg (279 lb)   SpO2 98%   BMI 34 41 kg/m²          Physical Exam   Constitutional: He is oriented to person, place, and time  Vital signs are normal  He appears well-developed and well-nourished  No distress  HENT:   Head: Normocephalic and atraumatic  Eyes: Pupils are equal, round, and reactive to light  Neck: Normal range of motion  No thyromegaly present  Cardiovascular: Normal rate, regular rhythm, normal heart sounds and intact distal pulses  No murmur heard  Pulmonary/Chest: Effort normal and breath sounds normal  No respiratory distress  He has no wheezes  Abdominal: Soft   Bowel sounds are normal    Musculoskeletal: Normal range of motion  Neurological: He is alert and oriented to person, place, and time  Skin: Skin is warm and dry  Psychiatric: He has a normal mood and affect  His speech is normal and behavior is normal  Judgment and thought content normal  Cognition and memory are normal    Nursing note and vitals reviewed

## 2019-01-21 NOTE — PSYCH
Assessment/Plan:      There are no diagnoses linked to this encounter  Subjective:     Patient ID: Wanetta Severs is a 22 y o  male  Provider met with patient for a follow up appointment on this day  Patient shared that he has "been feeling better"  He shared that he had a nice birthday and that his family took him out to eat for his birthday  Patient also shared that he was able to get the last of his things from his ex-girlfriends house  He shared that he wanted to talk to her because he wanted closure but she had her new boyfriend with her and he just took his things and left  He shared that he realized that the break up has caused a lot of grief for him  He shared that he had been a part of her family for many years so when they broke up he not only lost her but he lost her family as well  Patient shared that understanding this has helped him to begin healing and moving on with his life  Patient shared that he is looking forward to returning to classes next week and he is planning on pulling his grades up and focusing on what he needs to do for himself  Patient shared that he is having some issues with his financial aid but he feels it will work out  Discussed patient following up with a therapist at college as well as continuing his medications  Patient agreed he would do this  Provider gave patient information on Mindfulness to assist him with reducing his depression  Patient will follow up as needed  Review of Systems   Psychiatric/Behavioral: Positive for dysphoric mood  Objective:     Physical Exam   Psychiatric: He has a normal mood and affect  His behavior is normal  Judgment and thought content normal    Patient reports no SI or HI

## 2019-01-29 DIAGNOSIS — F98.8 ATTENTION DEFICIT DISORDER (ADD) IN ADULT: ICD-10-CM

## 2019-01-29 RX ORDER — METHYLPHENIDATE HYDROCHLORIDE 36 MG/1
36 TABLET ORAL DAILY
Qty: 30 TABLET | Refills: 0 | Status: SHIPPED | OUTPATIENT
Start: 2019-01-29 | End: 2019-03-19 | Stop reason: SDUPTHER

## 2019-03-12 DIAGNOSIS — F33.2 SEVERE EPISODE OF RECURRENT MAJOR DEPRESSIVE DISORDER, WITHOUT PSYCHOTIC FEATURES (HCC): ICD-10-CM

## 2019-03-12 DIAGNOSIS — F98.8 ATTENTION DEFICIT DISORDER (ADD) IN ADULT: ICD-10-CM

## 2019-03-12 RX ORDER — VENLAFAXINE HYDROCHLORIDE 150 MG/1
150 CAPSULE, EXTENDED RELEASE ORAL DAILY
Qty: 5 CAPSULE | Refills: 0 | Status: SHIPPED | OUTPATIENT
Start: 2019-03-12 | End: 2019-03-21 | Stop reason: SDUPTHER

## 2019-03-19 DIAGNOSIS — F98.8 ATTENTION DEFICIT DISORDER (ADD) IN ADULT: ICD-10-CM

## 2019-03-19 RX ORDER — METHYLPHENIDATE HYDROCHLORIDE 36 MG/1
36 TABLET ORAL DAILY
Qty: 30 TABLET | Refills: 0 | Status: SHIPPED | OUTPATIENT
Start: 2019-03-19 | End: 2019-05-24 | Stop reason: SDUPTHER

## 2019-03-21 ENCOUNTER — OFFICE VISIT (OUTPATIENT)
Dept: FAMILY MEDICINE CLINIC | Facility: CLINIC | Age: 19
End: 2019-03-21
Payer: COMMERCIAL

## 2019-03-21 VITALS
HEART RATE: 94 BPM | BODY MASS INDEX: 34.41 KG/M2 | SYSTOLIC BLOOD PRESSURE: 134 MMHG | OXYGEN SATURATION: 97 % | TEMPERATURE: 98.3 F | DIASTOLIC BLOOD PRESSURE: 88 MMHG | WEIGHT: 282.6 LBS | HEIGHT: 76 IN

## 2019-03-21 DIAGNOSIS — E66.01 MORBID OBESITY DUE TO EXCESS CALORIES (HCC): ICD-10-CM

## 2019-03-21 DIAGNOSIS — F33.2 SEVERE EPISODE OF RECURRENT MAJOR DEPRESSIVE DISORDER, WITHOUT PSYCHOTIC FEATURES (HCC): ICD-10-CM

## 2019-03-21 DIAGNOSIS — F98.8 ATTENTION DEFICIT DISORDER (ADD) IN ADULT: ICD-10-CM

## 2019-03-21 DIAGNOSIS — G25.81 RESTLESS LEG SYNDROME: ICD-10-CM

## 2019-03-21 DIAGNOSIS — E83.110 HEREDITARY HEMOCHROMATOSIS (HCC): Primary | ICD-10-CM

## 2019-03-21 PROCEDURE — 99214 OFFICE O/P EST MOD 30 MIN: CPT | Performed by: NURSE PRACTITIONER

## 2019-03-21 PROCEDURE — 3008F BODY MASS INDEX DOCD: CPT | Performed by: NURSE PRACTITIONER

## 2019-03-21 PROCEDURE — 1036F TOBACCO NON-USER: CPT | Performed by: NURSE PRACTITIONER

## 2019-03-21 RX ORDER — VENLAFAXINE HYDROCHLORIDE 75 MG/1
225 CAPSULE, EXTENDED RELEASE ORAL DAILY
Qty: 90 CAPSULE | Refills: 2 | Status: SHIPPED | OUTPATIENT
Start: 2019-03-21 | End: 2019-04-15 | Stop reason: SDUPTHER

## 2019-03-21 NOTE — PROGRESS NOTES
Assessment/Plan:    No problem-specific Assessment & Plan notes found for this encounter  Diagnoses and all orders for this visit:    Hereditary hemochromatosis [facility])  Comments:  will update labs   Orders:  -     Comprehensive metabolic panel; Future  -     CBC and differential; Future  -     Iron; Future  -     TSH, 3rd generation with Free T4 reflex; Future    Attention deficit disorder (ADD) in adult  Comments:  will continue with Concerta and follow up with psychiatry for follow up   Orders:  -     venlafaxine (EFFEXOR-XR) 75 mg 24 hr capsule; Take 3 capsules (225 mg total) by mouth daily for 90 days  -     Comprehensive metabolic panel; Future  -     CBC and differential; Future  -     Iron; Future  -     TSH, 3rd generation with Free T4 reflex; Future    Severe episode of recurrent major depressive disorder, without psychotic features ([email or web address] )  Comments: Will increase the Effexor to 225 mg and also follow up with psychiatry   Orders:  -     venlafaxine (EFFEXOR-XR) 75 mg 24 hr capsule; Take 3 capsules (225 mg total) by mouth daily for 90 days  -     Comprehensive metabolic panel; Future  -     CBC and differential; Future  -     Iron; Future  -     TSH, 3rd generation with Free T4 reflex; Future    Morbid obesity due to excess calories ([facility])    Restless leg syndrome  -     Comprehensive metabolic panel; Future  -     CBC and differential; Future  -     Iron; Future  -     TSH, 3rd generation with Free T4 reflex; Future          Subjective:      Patient ID: Anny Candelaria is a 22 y o  male  Last OV  Patient here and reports that he is having problems at college and has problems with depression and being unmotivated and procrastinate and broke up with his girlfriend last summer  Patient at times is sleep all day and lack of motivation and much worse than ever before  Patient is feeling very defeated and just difficult to get motivated to do his work  Patient very low mood and depressed   Patient is currently in [place], [place]  Patient realizes being away from home realizes how much he is isolating himself  Patient deals with his parents who are hoarders and having problems with being isolated and feeling like he is falling behind  Patient feeling like he is the same person he was and not feeling like he doesn't want to keep going and feeling lots of anger and sadness patient saw a counselor for a few weeks and saw a few times  Patient struggles with having problems with his focus and concentration and was helping with the Concerta for the ADHD symptoms  Patient in the past had tried the sertraline in the past and was only mildly helpful  Patient is open to following up with therapist and getting back on medication for his depression and anxiety and ADHD  Patient is aware of ED precautions for any plans for harming himself or others       Patient here today for recheck on his symptoms and reports that he is feeling well on the Effexor 37 5 daily and the Concerta 27 mg and feeling that he is having more energy and his mood is improving and he has been motivated with his depression Patient denies any side effects from the medication     Patient here for recheck on his symptoms and is taking the Effexor at 75 mg daily and reports that he is feeling fatigue at the end of the day and his concentration is less later in the day       Patient here today for follow up and resorts that he is having more vivid dreams and lasting the whole night and remembering things like when he was awake  Ast times dreams are nightmare type dreams  Patient having dreams about being shot at or home being broken into and dreams about getting back with girlfriend and is frustrating  Patient is taking his medications later in the day and waking up about 3 PM and taking his medication   Patient plans to set his alarm and take his medications before 12 PM       3/21/19  Patient here today for follow up and patient reports that he slept for 20 hours yesterday and reports that he just feels exhausted and not getting good rest  Patient has had sleep studies in the past showing in 2015 mild sleep apnea and periodic limb movement and moderate sleep fragmentation  Patient has returned from college had to drop out the semester in relation to his problems with his sleep and his emotional state  Patient has not had a psychiatric evaluation and really wants to have an evaluation  Patient had seen Justyna Laurent a few times and has upcoming appointment scheduled with therapist  Patient had seen neurology in the past as well  Patient KORY-7 score 15 and PHQ-9 score 19  Patient is currently taking the Concerta 36 mg  and Effexor 150 mg  The following portions of the patient's history were reviewed and updated as appropriate:   He  has no past medical history on file  He   Patient Active Problem List    Diagnosis Date Noted    Restless leg syndrome 03/21/2019    Attention deficit disorder (ADD) in adult 01/09/2018    Hereditary hemochromatosis ([facility] [address] ) 01/09/2018    Depression 01/22/2016    Morbid obesity due to excess calories ([facility] ) 09/14/2015     He  has a past surgical history that includes Hernia repair and Tonsillectomy  His family history includes COPD in his mother; Hypertension in his father and mother  He  reports that he has never smoked  He has never used smokeless tobacco  His alcohol and drug histories are not on file  He is allergic to scopolamine       Review of Systems   Constitutional: Positive for fatigue  Negative for activity change, appetite change, chills, diaphoresis, fever and unexpected weight change  HENT: Negative for congestion, ear pain, hearing loss, postnasal drip, sinus pressure, sinus pain, sneezing and sore throat  Eyes: Negative for pain, redness and visual disturbance  Respiratory: Negative for cough and shortness of breath  Cardiovascular: Negative for chest pain and leg swelling     Gastrointestinal: Negative for abdominal pain, diarrhea, nausea and vomiting  Endocrine: Negative  Genitourinary: Negative  Musculoskeletal: Negative for arthralgias  Skin: Negative  Allergic/Immunologic: Negative  Neurological: Negative for dizziness and light-headedness  Psychiatric/Behavioral: Positive for decreased concentration and sleep disturbance  Negative for behavioral problems  Objective:      /88   Pulse 94   Temp 98 3 °F (36 8 °C) (Tympanic)   Ht 6' 3 5" (1 918 m)   Wt 128 kg (282 lb 9 6 oz)   SpO2 97%   BMI 34 86 kg/m²          Physical Exam   Constitutional: He is oriented to person, place, and time  Vital signs are normal  He appears well-developed and well-nourished  No distress  HENT:   Head: Normocephalic and atraumatic  Eyes: Pupils are equal, round, and reactive to light  Neck: Normal range of motion  No thyromegaly present  Cardiovascular: Normal rate, regular rhythm, normal heart sounds and intact distal pulses  No murmur heard  Pulmonary/Chest: Effort normal and breath sounds normal  No respiratory distress  He has no wheezes  Abdominal: Soft  Bowel sounds are normal    Musculoskeletal: Normal range of motion  Neurological: He is alert and oriented to person, place, and time  Skin: Skin is warm and dry  Psychiatric: His speech is normal and behavior is normal  Judgment and thought content normal  His mood appears anxious  Cognition and memory are normal    Nursing note and vitals reviewed

## 2019-04-04 ENCOUNTER — OFFICE VISIT (OUTPATIENT)
Dept: BEHAVIORAL/MENTAL HEALTH CLINIC | Facility: CLINIC | Age: 19
End: 2019-04-04
Payer: COMMERCIAL

## 2019-04-04 DIAGNOSIS — F41.1 GENERALIZED ANXIETY DISORDER: ICD-10-CM

## 2019-04-04 DIAGNOSIS — F98.8 ATTENTION DEFICIT DISORDER (ADD) IN ADULT: ICD-10-CM

## 2019-04-04 DIAGNOSIS — F32.3 CURRENT SEVERE EPISODE OF MAJOR DEPRESSIVE DISORDER WITH PSYCHOTIC FEATURES, UNSPECIFIED WHETHER RECURRENT (HCC): Primary | ICD-10-CM

## 2019-04-04 PROCEDURE — 90834 PSYTX W PT 45 MINUTES: CPT | Performed by: SOCIAL WORKER

## 2019-04-11 ENCOUNTER — TELEPHONE (OUTPATIENT)
Dept: PSYCHIATRY | Facility: CLINIC | Age: 19
End: 2019-04-11

## 2019-04-15 DIAGNOSIS — F98.8 ATTENTION DEFICIT DISORDER (ADD) IN ADULT: ICD-10-CM

## 2019-04-15 DIAGNOSIS — F33.2 SEVERE EPISODE OF RECURRENT MAJOR DEPRESSIVE DISORDER, WITHOUT PSYCHOTIC FEATURES (HCC): ICD-10-CM

## 2019-04-15 RX ORDER — VENLAFAXINE HYDROCHLORIDE 75 MG/1
225 CAPSULE, EXTENDED RELEASE ORAL DAILY
Qty: 90 CAPSULE | Refills: 3 | Status: SHIPPED | OUTPATIENT
Start: 2019-04-15 | End: 2019-07-18 | Stop reason: SDUPTHER

## 2019-04-16 ENCOUNTER — APPOINTMENT (OUTPATIENT)
Dept: LAB | Facility: CLINIC | Age: 19
End: 2019-04-16
Payer: COMMERCIAL

## 2019-04-16 DIAGNOSIS — E83.110 HEREDITARY HEMOCHROMATOSIS (HCC): ICD-10-CM

## 2019-04-16 DIAGNOSIS — F33.2 SEVERE EPISODE OF RECURRENT MAJOR DEPRESSIVE DISORDER, WITHOUT PSYCHOTIC FEATURES (HCC): ICD-10-CM

## 2019-04-16 DIAGNOSIS — F98.8 ATTENTION DEFICIT DISORDER (ADD) IN ADULT: ICD-10-CM

## 2019-04-16 DIAGNOSIS — G25.81 RESTLESS LEG SYNDROME: ICD-10-CM

## 2019-04-16 LAB
ALBUMIN SERPL BCP-MCNC: 4.1 G/DL (ref 3.5–5)
ALP SERPL-CCNC: 109 U/L (ref 46–116)
ALT SERPL W P-5'-P-CCNC: 36 U/L (ref 12–78)
ANION GAP SERPL CALCULATED.3IONS-SCNC: 7 MMOL/L (ref 4–13)
AST SERPL W P-5'-P-CCNC: 13 U/L (ref 5–45)
BASOPHILS # BLD AUTO: 0.05 THOUSANDS/ΜL (ref 0–0.1)
BASOPHILS NFR BLD AUTO: 1 % (ref 0–1)
BILIRUB SERPL-MCNC: 0.66 MG/DL (ref 0.2–1)
BUN SERPL-MCNC: 15 MG/DL (ref 5–25)
CALCIUM SERPL-MCNC: 9.3 MG/DL (ref 8.3–10.1)
CHLORIDE SERPL-SCNC: 106 MMOL/L (ref 100–108)
CO2 SERPL-SCNC: 28 MMOL/L (ref 21–32)
CREAT SERPL-MCNC: 0.89 MG/DL (ref 0.6–1.3)
EOSINOPHIL # BLD AUTO: 0.16 THOUSAND/ΜL (ref 0–0.61)
EOSINOPHIL NFR BLD AUTO: 2 % (ref 0–6)
ERYTHROCYTE [DISTWIDTH] IN BLOOD BY AUTOMATED COUNT: 12.9 % (ref 11.6–15.1)
GFR SERPL CREATININE-BSD FRML MDRD: 122 ML/MIN/1.73SQ M
GLUCOSE P FAST SERPL-MCNC: 78 MG/DL (ref 65–99)
HCT VFR BLD AUTO: 46.7 % (ref 36.5–49.3)
HGB BLD-MCNC: 16.1 G/DL (ref 12–17)
IMM GRANULOCYTES # BLD AUTO: 0.03 THOUSAND/UL (ref 0–0.2)
IMM GRANULOCYTES NFR BLD AUTO: 0 % (ref 0–2)
IRON SERPL-MCNC: 119 UG/DL (ref 65–175)
LYMPHOCYTES # BLD AUTO: 2.62 THOUSANDS/ΜL (ref 0.6–4.47)
LYMPHOCYTES NFR BLD AUTO: 30 % (ref 14–44)
MCH RBC QN AUTO: 29.7 PG (ref 26.8–34.3)
MCHC RBC AUTO-ENTMCNC: 34.5 G/DL (ref 31.4–37.4)
MCV RBC AUTO: 86 FL (ref 82–98)
MONOCYTES # BLD AUTO: 0.77 THOUSAND/ΜL (ref 0.17–1.22)
MONOCYTES NFR BLD AUTO: 9 % (ref 4–12)
NEUTROPHILS # BLD AUTO: 5.06 THOUSANDS/ΜL (ref 1.85–7.62)
NEUTS SEG NFR BLD AUTO: 58 % (ref 43–75)
NRBC BLD AUTO-RTO: 0 /100 WBCS
PLATELET # BLD AUTO: 231 THOUSANDS/UL (ref 149–390)
PMV BLD AUTO: 10.5 FL (ref 8.9–12.7)
POTASSIUM SERPL-SCNC: 4.2 MMOL/L (ref 3.5–5.3)
PROT SERPL-MCNC: 7.1 G/DL (ref 6.4–8.2)
RBC # BLD AUTO: 5.43 MILLION/UL (ref 3.88–5.62)
SODIUM SERPL-SCNC: 141 MMOL/L (ref 136–145)
TSH SERPL DL<=0.05 MIU/L-ACNC: 1.65 UIU/ML (ref 0.36–3.74)
WBC # BLD AUTO: 8.69 THOUSAND/UL (ref 4.31–10.16)

## 2019-04-16 PROCEDURE — 80053 COMPREHEN METABOLIC PANEL: CPT

## 2019-04-16 PROCEDURE — 83540 ASSAY OF IRON: CPT

## 2019-04-16 PROCEDURE — 36415 COLL VENOUS BLD VENIPUNCTURE: CPT

## 2019-04-16 PROCEDURE — 85025 COMPLETE CBC W/AUTO DIFF WBC: CPT

## 2019-04-16 PROCEDURE — 84443 ASSAY THYROID STIM HORMONE: CPT

## 2019-05-23 ENCOUNTER — OFFICE VISIT (OUTPATIENT)
Dept: URGENT CARE | Facility: CLINIC | Age: 19
End: 2019-05-23
Payer: COMMERCIAL

## 2019-05-23 ENCOUNTER — APPOINTMENT (OUTPATIENT)
Dept: RADIOLOGY | Facility: CLINIC | Age: 19
End: 2019-05-23
Payer: COMMERCIAL

## 2019-05-23 VITALS
HEART RATE: 65 BPM | RESPIRATION RATE: 18 BRPM | SYSTOLIC BLOOD PRESSURE: 150 MMHG | OXYGEN SATURATION: 100 % | TEMPERATURE: 96.7 F | DIASTOLIC BLOOD PRESSURE: 100 MMHG | HEIGHT: 76 IN | BODY MASS INDEX: 32.88 KG/M2 | WEIGHT: 270 LBS

## 2019-05-23 DIAGNOSIS — M79.89 SOFT TISSUE MASS: Primary | ICD-10-CM

## 2019-05-23 DIAGNOSIS — M79.89 SOFT TISSUE MASS: ICD-10-CM

## 2019-05-23 PROCEDURE — 72110 X-RAY EXAM L-2 SPINE 4/>VWS: CPT

## 2019-05-23 PROCEDURE — 99213 OFFICE O/P EST LOW 20 MIN: CPT | Performed by: PHYSICIAN ASSISTANT

## 2019-05-24 ENCOUNTER — OFFICE VISIT (OUTPATIENT)
Dept: FAMILY MEDICINE CLINIC | Facility: CLINIC | Age: 19
End: 2019-05-24
Payer: COMMERCIAL

## 2019-05-24 VITALS
HEART RATE: 86 BPM | OXYGEN SATURATION: 99 % | WEIGHT: 271 LBS | DIASTOLIC BLOOD PRESSURE: 98 MMHG | HEIGHT: 76 IN | SYSTOLIC BLOOD PRESSURE: 136 MMHG | BODY MASS INDEX: 33 KG/M2 | TEMPERATURE: 98.1 F

## 2019-05-24 DIAGNOSIS — I10 ESSENTIAL HYPERTENSION: ICD-10-CM

## 2019-05-24 DIAGNOSIS — F98.8 ATTENTION DEFICIT DISORDER (ADD) IN ADULT: ICD-10-CM

## 2019-05-24 DIAGNOSIS — M79.89 SOFT TISSUE MASS: Primary | ICD-10-CM

## 2019-05-24 PROCEDURE — 99214 OFFICE O/P EST MOD 30 MIN: CPT | Performed by: FAMILY MEDICINE

## 2019-05-24 RX ORDER — METHYLPHENIDATE HYDROCHLORIDE 36 MG/1
36 TABLET ORAL DAILY
Qty: 30 TABLET | Refills: 0 | Status: SHIPPED | OUTPATIENT
Start: 2019-05-24 | End: 2019-06-17 | Stop reason: SDUPTHER

## 2019-05-29 ENCOUNTER — HOSPITAL ENCOUNTER (OUTPATIENT)
Dept: ULTRASOUND IMAGING | Facility: HOSPITAL | Age: 19
Discharge: HOME | End: 2019-05-29
Payer: COMMERCIAL

## 2019-05-29 DIAGNOSIS — M79.89 SOFT TISSUE MASS: ICD-10-CM

## 2019-05-29 PROCEDURE — 76705 ECHO EXAM OF ABDOMEN: CPT

## 2019-06-03 DIAGNOSIS — M79.89 SOFT TISSUE MASS: Primary | ICD-10-CM

## 2019-06-04 ENCOUNTER — OFFICE VISIT (OUTPATIENT)
Dept: FAMILY MEDICINE CLINIC | Facility: CLINIC | Age: 19
End: 2019-06-04
Payer: COMMERCIAL

## 2019-06-04 VITALS
WEIGHT: 278.4 LBS | BODY MASS INDEX: 33.9 KG/M2 | TEMPERATURE: 97.5 F | SYSTOLIC BLOOD PRESSURE: 142 MMHG | DIASTOLIC BLOOD PRESSURE: 88 MMHG | HEART RATE: 74 BPM | OXYGEN SATURATION: 98 % | RESPIRATION RATE: 18 BRPM | HEIGHT: 76 IN

## 2019-06-04 DIAGNOSIS — F98.8 ATTENTION DEFICIT DISORDER (ADD) IN ADULT: ICD-10-CM

## 2019-06-04 DIAGNOSIS — M79.89 SOFT TISSUE MASS: ICD-10-CM

## 2019-06-04 DIAGNOSIS — G47.11 HYPERSOMNIA WITH LONG SLEEP TIME, IDIOPATHIC: Primary | ICD-10-CM

## 2019-06-04 DIAGNOSIS — F32.3 CURRENT SEVERE EPISODE OF MAJOR DEPRESSIVE DISORDER WITH PSYCHOTIC FEATURES, UNSPECIFIED WHETHER RECURRENT (HCC): ICD-10-CM

## 2019-06-04 PROCEDURE — 99214 OFFICE O/P EST MOD 30 MIN: CPT | Performed by: NURSE PRACTITIONER

## 2019-06-05 ENCOUNTER — OFFICE VISIT (OUTPATIENT)
Dept: SLEEP CENTER | Facility: CLINIC | Age: 19
End: 2019-06-05
Payer: COMMERCIAL

## 2019-06-05 VITALS
HEART RATE: 80 BPM | HEIGHT: 76 IN | DIASTOLIC BLOOD PRESSURE: 88 MMHG | SYSTOLIC BLOOD PRESSURE: 138 MMHG | WEIGHT: 281.6 LBS | BODY MASS INDEX: 34.29 KG/M2

## 2019-06-05 DIAGNOSIS — G25.81 RESTLESS LEG SYNDROME: ICD-10-CM

## 2019-06-05 DIAGNOSIS — R44.2 HYPNOPOMPIC HALLUCINATION: ICD-10-CM

## 2019-06-05 DIAGNOSIS — E66.9 OBESITY (BMI 30.0-34.9): ICD-10-CM

## 2019-06-05 DIAGNOSIS — G47.11 HYPERSOMNIA WITH LONG SLEEP TIME, IDIOPATHIC: Primary | ICD-10-CM

## 2019-06-05 DIAGNOSIS — G47.8 SLEEP PARALYSIS: ICD-10-CM

## 2019-06-05 PROCEDURE — 99204 OFFICE O/P NEW MOD 45 MIN: CPT | Performed by: NURSE PRACTITIONER

## 2019-06-13 ENCOUNTER — HOSPITAL ENCOUNTER (OUTPATIENT)
Dept: SLEEP CENTER | Facility: CLINIC | Age: 19
Discharge: HOME | End: 2019-06-13
Payer: COMMERCIAL

## 2019-06-13 DIAGNOSIS — G47.8 SLEEP PARALYSIS: ICD-10-CM

## 2019-06-13 DIAGNOSIS — R44.2 HYPNOPOMPIC HALLUCINATION: ICD-10-CM

## 2019-06-13 DIAGNOSIS — G47.11 HYPERSOMNIA WITH LONG SLEEP TIME, IDIOPATHIC: ICD-10-CM

## 2019-06-13 PROCEDURE — 95810 POLYSOM 6/> YRS 4/> PARAM: CPT | Performed by: PSYCHIATRY & NEUROLOGY

## 2019-06-13 PROCEDURE — 95810 POLYSOM 6/> YRS 4/> PARAM: CPT

## 2019-06-17 ENCOUNTER — OFFICE VISIT (OUTPATIENT)
Dept: PSYCHIATRY | Facility: CLINIC | Age: 19
End: 2019-06-17
Payer: COMMERCIAL

## 2019-06-17 VITALS — BODY MASS INDEX: 33.96 KG/M2 | WEIGHT: 279 LBS

## 2019-06-17 DIAGNOSIS — F41.1 GAD (GENERALIZED ANXIETY DISORDER): ICD-10-CM

## 2019-06-17 DIAGNOSIS — F98.8 ATTENTION DEFICIT DISORDER (ADD) IN ADULT: ICD-10-CM

## 2019-06-17 DIAGNOSIS — F32.2 CURRENT SEVERE EPISODE OF MAJOR DEPRESSIVE DISORDER WITHOUT PSYCHOTIC FEATURES WITHOUT PRIOR EPISODE (HCC): Primary | ICD-10-CM

## 2019-06-17 PROCEDURE — 90792 PSYCH DIAG EVAL W/MED SRVCS: CPT | Performed by: PSYCHIATRY & NEUROLOGY

## 2019-06-17 RX ORDER — BUSPIRONE HYDROCHLORIDE 10 MG/1
TABLET ORAL
Qty: 90 TABLET | Refills: 1 | Status: SHIPPED | OUTPATIENT
Start: 2019-06-17 | End: 2019-07-18 | Stop reason: SDUPTHER

## 2019-06-17 RX ORDER — METHYLPHENIDATE HYDROCHLORIDE 36 MG/1
36 TABLET ORAL DAILY
Qty: 30 TABLET | Refills: 0 | Status: SHIPPED | OUTPATIENT
Start: 2019-06-17 | End: 2019-07-18 | Stop reason: SDUPTHER

## 2019-06-18 ENCOUNTER — TELEPHONE (OUTPATIENT)
Dept: SLEEP CENTER | Facility: CLINIC | Age: 19
End: 2019-06-18

## 2019-06-20 ENCOUNTER — HOSPITAL ENCOUNTER (OUTPATIENT)
Dept: MRI IMAGING | Facility: HOSPITAL | Age: 19
Discharge: HOME | End: 2019-06-20
Payer: COMMERCIAL

## 2019-06-20 DIAGNOSIS — M79.89 SOFT TISSUE MASS: ICD-10-CM

## 2019-06-20 PROCEDURE — 72158 MRI LUMBAR SPINE W/O & W/DYE: CPT

## 2019-06-20 PROCEDURE — A9585 GADOBUTROL INJECTION: HCPCS | Performed by: FAMILY MEDICINE

## 2019-06-20 RX ADMIN — GADOBUTROL 12 ML: 604.72 INJECTION INTRAVENOUS at 17:37

## 2019-06-24 ENCOUNTER — OFFICE VISIT (OUTPATIENT)
Dept: SLEEP CENTER | Facility: CLINIC | Age: 19
End: 2019-06-24
Payer: COMMERCIAL

## 2019-06-24 VITALS
SYSTOLIC BLOOD PRESSURE: 144 MMHG | BODY MASS INDEX: 33.61 KG/M2 | HEIGHT: 76 IN | DIASTOLIC BLOOD PRESSURE: 90 MMHG | WEIGHT: 276 LBS

## 2019-06-24 DIAGNOSIS — G25.81 RESTLESS LEG SYNDROME: ICD-10-CM

## 2019-06-24 DIAGNOSIS — G47.8 NON-RESTORATIVE SLEEP: ICD-10-CM

## 2019-06-24 DIAGNOSIS — E66.9 OBESITY (BMI 30.0-34.9): ICD-10-CM

## 2019-06-24 DIAGNOSIS — G47.11 HYPERSOMNIA WITH LONG SLEEP TIME, IDIOPATHIC: Primary | ICD-10-CM

## 2019-06-24 PROCEDURE — 99214 OFFICE O/P EST MOD 30 MIN: CPT | Performed by: NURSE PRACTITIONER

## 2019-06-24 RX ORDER — GABAPENTIN 100 MG/1
CAPSULE ORAL
Qty: 90 CAPSULE | Refills: 5 | Status: SHIPPED | OUTPATIENT
Start: 2019-06-24 | End: 2019-07-30 | Stop reason: SDUPTHER

## 2019-06-25 DIAGNOSIS — M79.89 SOFT TISSUE MASS: Primary | ICD-10-CM

## 2019-06-25 DIAGNOSIS — M54.5 LOW BACK PAIN, UNSPECIFIED BACK PAIN LATERALITY, UNSPECIFIED CHRONICITY, WITH SCIATICA PRESENCE UNSPECIFIED: ICD-10-CM

## 2019-07-02 ENCOUNTER — OFFICE VISIT (OUTPATIENT)
Dept: FAMILY MEDICINE CLINIC | Facility: CLINIC | Age: 19
End: 2019-07-02
Payer: COMMERCIAL

## 2019-07-02 VITALS
RESPIRATION RATE: 18 BRPM | HEIGHT: 76 IN | WEIGHT: 278.8 LBS | SYSTOLIC BLOOD PRESSURE: 150 MMHG | HEART RATE: 76 BPM | TEMPERATURE: 98.6 F | DIASTOLIC BLOOD PRESSURE: 90 MMHG | BODY MASS INDEX: 33.95 KG/M2 | OXYGEN SATURATION: 100 %

## 2019-07-02 DIAGNOSIS — G25.81 RESTLESS LEG SYNDROME: ICD-10-CM

## 2019-07-02 DIAGNOSIS — E66.9 OBESITY (BMI 30.0-34.9): ICD-10-CM

## 2019-07-02 DIAGNOSIS — F98.8 ATTENTION DEFICIT DISORDER (ADD) IN ADULT: Primary | ICD-10-CM

## 2019-07-02 DIAGNOSIS — F41.1 GAD (GENERALIZED ANXIETY DISORDER): ICD-10-CM

## 2019-07-02 DIAGNOSIS — G47.11 HYPERSOMNIA WITH LONG SLEEP TIME, IDIOPATHIC: ICD-10-CM

## 2019-07-02 DIAGNOSIS — F32.2 CURRENT SEVERE EPISODE OF MAJOR DEPRESSIVE DISORDER WITHOUT PSYCHOTIC FEATURES WITHOUT PRIOR EPISODE (HCC): ICD-10-CM

## 2019-07-02 DIAGNOSIS — I10 ESSENTIAL HYPERTENSION: ICD-10-CM

## 2019-07-02 PROCEDURE — 99213 OFFICE O/P EST LOW 20 MIN: CPT | Performed by: NURSE PRACTITIONER

## 2019-07-02 PROCEDURE — 1036F TOBACCO NON-USER: CPT | Performed by: NURSE PRACTITIONER

## 2019-07-02 RX ORDER — HYDROCHLOROTHIAZIDE 25 MG/1
25 TABLET ORAL DAILY
Qty: 30 TABLET | Refills: 5 | Status: SHIPPED | OUTPATIENT
Start: 2019-07-02 | End: 2019-07-16 | Stop reason: ALTCHOICE

## 2019-07-02 NOTE — PROGRESS NOTES
Assessment/Plan:    No problem-specific Assessment & Plan notes found for this encounter  Diagnoses and all orders for this visit:    Attention deficit disorder (ADD) in adult  Comments:  Concerta, states he is not having trouble with concentration at this time    Current severe episode of major depressive disorder without psychotic features without prior episode ([address] )  Comments:  Doing well on effexor     Obesity (BMI 30 0-34  9)    Restless leg syndrome    KORY (generalized anxiety disorder)  Comments:  effexor, buspar  States he is doing well  Main stressor is care for parents     Hypersomnia with long sleep time, idiopathic  Comments: Following with sleep specialist for possible narcolepsy  On stimulant concerta     Essential hypertension  Comments:  150/90 today  Start HCTZ 25mg   Discuss diet and exercise to lower BP  Pt with high anxiety on stimulant medication   Orders:  -     hydrochlorothiazide (HYDRODIURIL) 25 mg tablet; Take 1 tablet (25 mg total) by mouth daily for 30 days  -     Lipid Panel with Direct LDL reflex; Future          Subjective:      Patient ID: Karl Benavidez is a 22 y o  male  Pt here for follow up on his health  Pt has a preliminary hx of narcolepsy  Pt was started on gapapentin for RLS as well  Pt keeps sleep journal      Pt with hx of depression, on effexor  Doing well  Follows with psychiatry  Was started on Buspar, and he notes some positive effect from this  PT with hx of ADD, on concerta  The following portions of the patient's history were reviewed and updated as appropriate:   He  has no past medical history on file    He   Patient Active Problem List    Diagnosis Date Noted    KORY (generalized anxiety disorder) 06/17/2019    Sleep paralysis 06/05/2019    Hypnopompic hallucination 06/05/2019    Hypersomnia with long sleep time, idiopathic 06/04/2019    Essential hypertension 05/24/2019    Soft tissue mass 05/24/2019    Restless leg syndrome 03/21/2019  Attention deficit disorder (ADD) in adult 01/09/2018    Hereditary hemochromatosis ([email or web address] ) 01/09/2018    Current severe episode of major depressive disorder without psychotic features without prior episode ([email or web address] ) 01/22/2016    Obesity (BMI 30 0-34 9) 09/14/2015     He  has a past surgical history that includes Hernia repair and Tonsillectomy  His family history includes COPD in his mother; Hypertension in his father and mother  He  reports that he has never smoked  He has never used smokeless tobacco  He reports that he drinks alcohol  He reports that he has current or past drug history  Drug: Marijuana  He is allergic to scopolamine       Review of Systems   Constitutional: Negative for activity change, appetite change, chills, diaphoresis, fatigue, fever and unexpected weight change  HENT: Negative for congestion, ear pain, hearing loss, postnasal drip, sinus pressure, sinus pain, sneezing and sore throat  Eyes: Negative for pain, redness and visual disturbance  Respiratory: Negative for cough and shortness of breath  Cardiovascular: Negative for chest pain and leg swelling  Gastrointestinal: Negative for abdominal pain, diarrhea, nausea and vomiting  Endocrine: Negative  Genitourinary: Negative  Musculoskeletal: Negative for arthralgias  Skin: Negative  Neurological: Negative for dizziness and light-headedness  Psychiatric/Behavioral: Negative for behavioral problems and dysphoric mood  Objective:      /90 (BP Location: Left arm, Patient Position: Sitting, Cuff Size: Adult)   Pulse 76   Temp 98 6 °F (37 °C) (Tympanic)   Resp 18   Ht 6' 4" (1 93 m)   Wt 126 kg (278 lb 12 8 oz)   SpO2 100%   BMI 33 94 kg/m²          Physical Exam   Constitutional: He is oriented to person, place, and time  Vital signs are normal  He appears well-developed and well-nourished  No distress  HENT:   Head: Normocephalic and atraumatic     Eyes: Pupils are equal, round, and reactive to light  Neck: Normal range of motion  No thyromegaly present  Cardiovascular: Normal rate, regular rhythm, normal heart sounds and intact distal pulses  No murmur heard  Pulmonary/Chest: Effort normal and breath sounds normal  No respiratory distress  He has no wheezes  Abdominal: Soft  Bowel sounds are normal    Musculoskeletal: Normal range of motion  Neurological: He is alert and oriented to person, place, and time  Skin: Skin is warm and dry  Psychiatric: He has a normal mood and affect  Nursing note and vitals reviewed

## 2019-07-16 ENCOUNTER — OFFICE VISIT (OUTPATIENT)
Dept: FAMILY MEDICINE CLINIC | Facility: CLINIC | Age: 19
End: 2019-07-16
Payer: COMMERCIAL

## 2019-07-16 VITALS
RESPIRATION RATE: 18 BRPM | BODY MASS INDEX: 33.42 KG/M2 | DIASTOLIC BLOOD PRESSURE: 88 MMHG | SYSTOLIC BLOOD PRESSURE: 138 MMHG | HEIGHT: 76 IN | HEART RATE: 106 BPM | WEIGHT: 274.4 LBS | TEMPERATURE: 98.2 F | OXYGEN SATURATION: 95 %

## 2019-07-16 DIAGNOSIS — I10 ESSENTIAL HYPERTENSION: Primary | ICD-10-CM

## 2019-07-16 PROCEDURE — 99213 OFFICE O/P EST LOW 20 MIN: CPT | Performed by: NURSE PRACTITIONER

## 2019-07-16 RX ORDER — LOSARTAN POTASSIUM AND HYDROCHLOROTHIAZIDE 25; 100 MG/1; MG/1
1 TABLET ORAL DAILY
Qty: 90 TABLET | Refills: 1 | Status: SHIPPED | OUTPATIENT
Start: 2019-07-16 | End: 2019-08-28 | Stop reason: SDUPTHER

## 2019-07-16 NOTE — PROGRESS NOTES
Assessment/Plan:    No problem-specific Assessment & Plan notes found for this encounter  Diagnoses and all orders for this visit:    Essential hypertension  Comments: Will start losartan-HCTZ 100-25mg  DC hctz  Orders:  -     losartan-hydrochlorothiazide (HYZAAR) 100-25 MG per tablet; Take 1 tablet by mouth daily for 180 days          Subjective:      Patient ID: Ashley Alonzo is a 22 y o  male  Pt here for blood pressure check  States his pressures have been in the high 140's/80's at home  He reports intermittent headaches  Today his BP is 138/88 in the office  States over the past few days he has been having some abdominal cramping and an episode of diarrhea  He states it seems to be resolving  There has been no vomiting or fevers  He has a more regular sleeping schedule and has been feeling more awake since our last visit  He states the heat has been getting to him recently and that his showers have been bothering him and having to lay down  This is often coupled with the headaches  The following portions of the patient's history were reviewed and updated as appropriate:   He  has no past medical history on file  He   Patient Active Problem List    Diagnosis Date Noted    KORY (generalized anxiety disorder) 06/17/2019    Sleep paralysis 06/05/2019    Hypnopompic hallucination 06/05/2019    Hypersomnia with long sleep time, idiopathic 06/04/2019    Essential hypertension 05/24/2019    Soft tissue mass 05/24/2019    Restless leg syndrome 03/21/2019    Attention deficit disorder (ADD) in adult 01/09/2018    Hereditary hemochromatosis ([facility] [address] ) 01/09/2018    Current severe episode of major depressive disorder without psychotic features without prior episode ([facility] [address] ) 01/22/2016    Obesity (BMI 30 0-34 9) 09/14/2015     He  has a past surgical history that includes Hernia repair and Tonsillectomy    His family history includes COPD in his mother; Hypertension in his father and mother  He  reports that he has never smoked  He has never used smokeless tobacco  He reports that he drinks alcohol  He reports that he has current or past drug history  Drug: Marijuana  Current Outpatient Medications on File Prior to Visit   Medication Sig    busPIRone (BUSPAR) 10 mg tablet Take 5mg TID  Increase total daily dose by 5mg every 4 days, until taking 10mg TID   gabapentin (NEURONTIN) 100 mg capsule Begin taking one capsule at bedtime for 3 days, then take 2 caps for 3 days then take 3 caps at bedtime   methylphenidate (CONCERTA) 36 MG ER tablet Take 1 tablet (36 mg total) by mouth daily for 30 daysMax Daily Amount: 36 mg    venlafaxine (EFFEXOR-XR) 75 mg 24 hr capsule Take 3 capsules (225 mg total) by mouth daily for 90 days    [DISCONTINUED] hydrochlorothiazide (HYDRODIURIL) 25 mg tablet Take 1 tablet (25 mg total) by mouth daily for 30 days     No current facility-administered medications on file prior to visit  He is allergic to scopolamine       Review of Systems   Constitutional: Positive for fatigue  Negative for activity change, appetite change, chills, diaphoresis, fever and unexpected weight change  HENT: Negative for congestion, ear pain, hearing loss, postnasal drip, sinus pressure, sinus pain, sneezing and sore throat  Eyes: Negative for pain, redness and visual disturbance  Respiratory: Negative for cough and shortness of breath  Cardiovascular: Negative for chest pain and leg swelling  Gastrointestinal: Negative for abdominal pain, diarrhea, nausea and vomiting  Endocrine: Positive for heat intolerance  Genitourinary: Negative  Musculoskeletal: Negative for arthralgias  Neurological: Positive for headaches  Negative for dizziness and light-headedness  Psychiatric/Behavioral: Negative for behavioral problems and dysphoric mood  The patient is nervous/anxious            Objective:      /88 (BP Location: Left arm, Patient Position: Sitting, Cuff Size: Adult)   Pulse (!) 106   Temp 98 2 °F (36 8 °C) (Tympanic)   Resp 18   Ht 6' 4" (1 93 m)   Wt 124 kg (274 lb 6 4 oz)   SpO2 95%   BMI 33 40 kg/m²          Physical Exam   Constitutional: He is oriented to person, place, and time  Vital signs are normal  He appears well-developed and well-nourished  No distress  HENT:   Head: Normocephalic and atraumatic  Eyes: Pupils are equal, round, and reactive to light  Neck: Normal range of motion  No thyromegaly present  Cardiovascular: Normal rate, regular rhythm, normal heart sounds and intact distal pulses  No murmur heard  Pulmonary/Chest: Effort normal and breath sounds normal  No respiratory distress  He has no wheezes  Abdominal: Soft  Bowel sounds are normal    Musculoskeletal: Normal range of motion  Neurological: He is alert and oriented to person, place, and time  Skin: Skin is warm and dry  Psychiatric: He has a normal mood and affect  Nursing note and vitals reviewed

## 2019-07-17 NOTE — PSYCH
MEDICATION MANAGEMENT NOTE        [facility]      Name and Date of Birth:  Nany Davis 19 y o  2000    Date of Visit: July 18, 2019    SUBJECTIVE:  CC: Stu Recinos presents today for follow up on ADD, Depression, anxiety, "it is going"  CHRISTINA arrives 23min late  Discussed showing up on time  Stu Recinos still lays in bed, is amotivated  BP has been high at times (has been a historic issue as well), and had BP med increase recently  Will get dizziness, some chest pressure at times  He notes he is not physically active  No CP, SOB  Has had some issues like this in the past ([facility])  He understands potential risks with concerta and effexor  He denies any acute issue, denies other cardiovascular history  Noticed buspar helped with his anxiety, "and things I did not realize I had an issue with", worry less  "In a rut", but feels medications have helped  Just a lot of amotivation  Stu Recinos denies any side effects from medications unless noted above    Since our last visit, overall symptoms have been unchanged (he says meds help, but numbers suggest slightly worse)       HPI ROS:             ('was' notes: recent => remote)  Medication Side Effects:  no     Depression (10 worst): 8 (Was 6-7)   Anxiety (10 worst): 3-4 (Was 1-2)   Safety concerns (SI, HI, etc): No SI, HI, but thoughts that "I am not worth it" in a long term sense  (Was passive better off dead thoughts)   Sleep: (NM = Nightmares) hypersomnia (Was hypersomnia ("escape"))   Energy: low (Was low)   Appetite: low (Was lower lately)   Weight Change: No change      Stu Recinos denies any side effects from medications unless noted above    Review Of Systems as noted above   In addition:     Constitutional negative   ENT negative   Cardiovascular negative   Respiratory negative   Gastrointestinal negative   Genitourinary negative   Musculoskeletal negative   Integumentary negative   Neurological negative Endocrine negative   Other Symptoms none     Pain none   Pain Scale 0     History Review: The following portions of the patient's history were reviewed and updated as appropriate: allergies, current medications, past family history, past medical history, past social history, past surgical history and problem list      Lab Review: No new labs or no relevant labs needing review with patient today      OBJECTIVE:     MENTAL STATUS EXAM  Appearance:  age appropriate   Behavior:  pleasant, cooperative, with good eye contact   Speech:  Normal volume, regular rate and rhythm   Mood:  depressed and anxious   Affect:  constricted   Language: intact and appropriate for age   Thought Process:  Linear and goal directed   Associations: intact associations   Thought Content:  normal and appropriate   Perceptual Disturbances: no auditory or visual hallcunations   Risk Potential / Abnormal Thoughts: Suicidal ideation - Yes, deathwish but would not hasten death or pursue suicidal behavior  Homicidal ideation - None  Potential for aggression - No       Consciousness:  Alert & Awake   Sensorium:  Fully oriented to person, place, time/date   Attention: attention span and concentration are age appropriate       Fund of Knowledge:  Memory: awareness of current events: yes  recent and remote memory grossly intact   Insight:  fair   Judgment: fair   Muscle Strength Muscle Tone: normal  normal   Gait/Station: normal gait/station with good balance   Motor Activity: no abnormal movements       Risks, Benefits And Possible Side Effects Of Medications:    AGREE: Risks, benefits, and possible side effects of medications explained to Padilla Mackay and he (or legal representative) verbalizes understanding and agreement for treatment      Controlled Medication Discussion:     Padilal Mackay has been filling controlled prescriptions on time as prescribed according to [facility] program      Recent labs:  No visits with results within 1 Month(s) from this visit  Latest known visit with results is:   Appointment on 04/16/2019   Component Date Value    Sodium 04/16/2019 141     Potassium 04/16/2019 4 2     Chloride 04/16/2019 106     CO2 04/16/2019 28     ANION GAP 04/16/2019 7     BUN 04/16/2019 15     Creatinine 04/16/2019 0 89     Glucose, Fasting 04/16/2019 78     Calcium 04/16/2019 9 3     AST 04/16/2019 13     ALT 04/16/2019 36     Alkaline Phosphatase 04/16/2019 109     Total Protein 04/16/2019 7 1     Albumin 04/16/2019 4 1     Total Bilirubin 04/16/2019 0 66     eGFR 04/16/2019 122     WBC 04/16/2019 8 69     RBC 04/16/2019 5 43     Hemoglobin 04/16/2019 16 1     Hematocrit 04/16/2019 46 7     MCV 04/16/2019 86     MCH 04/16/2019 29 7     MCHC 04/16/2019 34 5     RDW 04/16/2019 12 9     MPV 04/16/2019 10 5     Platelets 97/61/3464 231     nRBC 04/16/2019 0     Neutrophils Relative 04/16/2019 58     Immat GRANS % 04/16/2019 0     Lymphocytes Relative 04/16/2019 30     Monocytes Relative 04/16/2019 9     Eosinophils Relative 04/16/2019 2     Basophils Relative 04/16/2019 1     Neutrophils Absolute 04/16/2019 5 06     Immature Grans Absolute 04/16/2019 0 03     Lymphocytes Absolute 04/16/2019 2 62     Monocytes Absolute 04/16/2019 0 77     Eosinophils Absolute 04/16/2019 0 16     Basophils Absolute 04/16/2019 0 05     Iron 04/16/2019 119     TSH 3RD GENERATON 04/16/2019 1 650      Past Psychiatric History  Previous diagnoses include depression, anxiety, ADD  Prior outpatient psychiatric treatment: no  Prior therapy: yes  Prior inpatient psychiatric treatment: no  Prior suicide attempts: no  Prior self harm: hit self at times (side of head)  Prior violence or aggression: no    Social History:  The patient grew up in [place], [place]  Childhood was described as challenging      During childhood, parents were together  They have 0 sister(s) and 1 brother(s)   Patient is younger in birth order     Abuse/neglect: none     As far as the patient (or present family member) is aware, overall childhood development: Patient does ascribe to normal developmental milestones such as walking, talking, potty training and making childhood friends      Education level: good grades, HS Cum Laude  Repeat 6th grade ("I had a mental break down"   Kidney stone lodged in ureter at 13yo  "Instead of just acknowledging that I had anxiety I decided I had some neurologic issue", he had a lot of work ups, all negative  Mom , 'a hypochondraic' fed into)   Current occupation: no  Marital status: single  Children: no  Current Living Situation: the patient currently lives with parents   Social support: some with family     Yarsanism Affiliation: [facility]   experience: no  Legal history: no  Access to Guns: yes (father's)     Substance use and treatment:  Tobacco use: no  Caffeine Use: yes  ETOH use: beers most days  Other substance use: marijuana off and on ('messes with anxiety')  Endorses previous experimentation with: LSD, mushrooms, marijuana     Longest clean time: not applicable  History of Inpatient/Outpatient rehabilitation program: no        Traumatic History:   Abuse: none  Other Traumatic Events: none      Family Psychiatric History:      Psychiatric Illness:      Hoarding (parents), Depression - parents, aunts, uncles, anxiety - parents  Substance Abuse: EtOH- Dad  Suicide Attempts:        no family history of suicide attempts     Denies bipolar disorder, schizophrenia    Family Psychiatric History:   Family History   Problem Relation Age of Onset   [place] COPD Mother     Hypertension Mother     Hypertension Father          Medical / Surgical History:    No past medical history on file    Past Surgical History:   Procedure Laterality Date    HERNIA REPAIR      TONSILLECTOMY         Assessment/Plan:        Diagnoses and all orders for this visit:    KORY (generalized anxiety disorder)  -     busPIRone (BUSPAR) 10 mg tablet; Take 10mg TID  Increase total daily dose by 5mg every week, until taking 15mg TID  Attention deficit disorder (ADD) in adult  -     methylphenidate (CONCERTA) 36 MG ER tablet; Take 1 tablet (36 mg total) by mouth daily for 30 daysMax Daily Amount: 36 mg  -     venlafaxine (EFFEXOR-XR) 75 mg 24 hr capsule; Take 3 capsules (225 mg total) by mouth daily for 92 days    Attention deficit disorder (ADD) in adult  Comments:  will continue with Concerta and follow up with psychiatry for follow up   Orders:  -     methylphenidate (CONCERTA) 36 MG ER tablet; Take 1 tablet (36 mg total) by mouth daily for 30 daysMax Daily Amount: 36 mg  -     venlafaxine (EFFEXOR-XR) 75 mg 24 hr capsule; Take 3 capsules (225 mg total) by mouth daily for 92 days    Severe episode of recurrent major depressive disorder, without psychotic features ([facility] [address] )  Comments: Will increase the Effexor to 225 mg and also follow up with psychiatry   Orders:  -     venlafaxine (EFFEXOR-XR) 75 mg 24 hr capsule; Take 3 capsules (225 mg total) by mouth daily for 92 days        ______________________________________________________________________      MDD  KORY  ADD  ---  MDD - not at goal  KORY - not at goal  ADD - not at goal    I do not believe the patient has bipolar disorder or a psychotic process or hoarding       Blood pressure better today, is working on this with other providers, and saw cardiologist in the past  May need to go away from effexor (and stimulants?) if BP or BP symptoms an ongoing concern  BP med change recently may be helping  Depending upon BP and cardiac symptoms, will weight effexor change, increase/change (or decrease if BP concerns) of stimulant, increase buspar   Likely if no improvement on increased buspar (or if BP concerns) will need to go away from effexor toward another (he has not been on many, so SSRI could be good starting place)       ADD likely, but he is not helped much by concerta (too low dose vs ineffective), and since he has depression and anxiety too, will send for neuropsych evaluation for ADD  CHARLES signed  Gave him neuropsych contact information today     Prior self harm: hit self at times (side of head)  Access to Guns: yes (father's) "But I never would use that  I never think ever about that or ways to kill myself"  ETOH use: beers most days "But I don't get drunk almost never"  Other substance use: marijuana off and on ('messes with anxiety') USES CBD Now (helps some with anxiety)  - Break up 1yr ago was major trigger for depression (6yr relationship)  - DEYSI (obstructive sleep apnea) - Sleep study results pending (had h/o sleep study that did not suggest need for CPAP)  - Medication Compliant? Misses 1-2x/wk (due to oversleeping)     Suicide / Homicide / Safety risk assessment: see above  safety risk low overall upon consideration of protective and risk factors  Confidential Assessment:  Previous psychotropic medication trials:   effexor (mid 9142)  Concerta (~1426)  armodafinil (helped a little bit)      History of Seizures: no  History of Head injury-LOC-Concussion: 4th grade, minor concussion (Basketball to head), no PCS      Scales:  ADD (without hyperactivity) scale - Positive           Treatment Plan:        Patient has been educated about their diagnosis and treatment modalities  They voiced understanding and agreement with the following plan:    1) MEDS:        Discussed medications and if treatment adjustment was needed/desired  - Concerta 92RS daily     - Effexor XR 225mg    - INCREASE Buspar 10mg TID to 15mg TID (5mg per day q week)  PARQ revisited, especially serotonin syndrome and drug interactions       2) Labs: PRN     3) Therapy:    - [facility]) - undefined term     - Will consider other therapists closer to where he lives (closer to [facility])    => will have staff call to discuss our new office, therapy potential in [facility] area (August LOYA notified)      4) Medical:    - Pt will f/u with other providers as needed     5) Other: Support as needed   - End of August back to [employer] in [address] [place] [place]   - lives at home   - had 6yr relationship that ended 2018, break up traumatizing   - 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

## 2019-07-18 ENCOUNTER — OFFICE VISIT (OUTPATIENT)
Dept: PSYCHIATRY | Facility: CLINIC | Age: 19
End: 2019-07-18
Payer: COMMERCIAL

## 2019-07-18 VITALS — HEART RATE: 66 BPM | SYSTOLIC BLOOD PRESSURE: 100 MMHG | DIASTOLIC BLOOD PRESSURE: 69 MMHG

## 2019-07-18 DIAGNOSIS — F33.2 SEVERE EPISODE OF RECURRENT MAJOR DEPRESSIVE DISORDER, WITHOUT PSYCHOTIC FEATURES (HCC): ICD-10-CM

## 2019-07-18 DIAGNOSIS — F98.8 ATTENTION DEFICIT DISORDER (ADD) IN ADULT: ICD-10-CM

## 2019-07-18 DIAGNOSIS — F41.1 GAD (GENERALIZED ANXIETY DISORDER): ICD-10-CM

## 2019-07-18 PROCEDURE — 99214 OFFICE O/P EST MOD 30 MIN: CPT | Performed by: PSYCHIATRY & NEUROLOGY

## 2019-07-18 RX ORDER — BUSPIRONE HYDROCHLORIDE 10 MG/1
TABLET ORAL
Qty: 135 TABLET | Refills: 1 | Status: SHIPPED | OUTPATIENT
Start: 2019-07-18 | End: 2019-12-30 | Stop reason: SDUPTHER

## 2019-07-18 RX ORDER — VENLAFAXINE HYDROCHLORIDE 75 MG/1
225 CAPSULE, EXTENDED RELEASE ORAL DAILY
Qty: 90 CAPSULE | Refills: 3 | Status: SHIPPED | OUTPATIENT
Start: 2019-07-18 | End: 2020-01-13

## 2019-07-18 RX ORDER — METHYLPHENIDATE HYDROCHLORIDE 36 MG/1
36 TABLET ORAL DAILY
Qty: 30 TABLET | Refills: 0 | Status: SHIPPED | OUTPATIENT
Start: 2019-07-18 | End: 2019-07-30

## 2019-07-30 ENCOUNTER — TELEPHONE (OUTPATIENT)
Dept: FAMILY MEDICINE CLINIC | Facility: CLINIC | Age: 19
End: 2019-07-30

## 2019-07-30 ENCOUNTER — TELEPHONE (OUTPATIENT)
Dept: PSYCHIATRY | Facility: CLINIC | Age: 19
End: 2019-07-30

## 2019-07-30 ENCOUNTER — OFFICE VISIT (OUTPATIENT)
Dept: FAMILY MEDICINE CLINIC | Facility: CLINIC | Age: 19
End: 2019-07-30
Payer: COMMERCIAL

## 2019-07-30 ENCOUNTER — TELEPHONE (OUTPATIENT)
Dept: SLEEP CENTER | Facility: CLINIC | Age: 19
End: 2019-07-30

## 2019-07-30 VITALS
HEIGHT: 76 IN | HEART RATE: 112 BPM | RESPIRATION RATE: 18 BRPM | OXYGEN SATURATION: 97 % | BODY MASS INDEX: 34.07 KG/M2 | SYSTOLIC BLOOD PRESSURE: 132 MMHG | WEIGHT: 279.8 LBS | TEMPERATURE: 99.1 F | DIASTOLIC BLOOD PRESSURE: 78 MMHG

## 2019-07-30 DIAGNOSIS — F41.1 GAD (GENERALIZED ANXIETY DISORDER): ICD-10-CM

## 2019-07-30 DIAGNOSIS — F32.2 CURRENT SEVERE EPISODE OF MAJOR DEPRESSIVE DISORDER WITHOUT PSYCHOTIC FEATURES WITHOUT PRIOR EPISODE (HCC): ICD-10-CM

## 2019-07-30 DIAGNOSIS — F98.8 ATTENTION DEFICIT DISORDER (ADD) IN ADULT: Primary | ICD-10-CM

## 2019-07-30 DIAGNOSIS — G47.11 HYPERSOMNIA WITH LONG SLEEP TIME, IDIOPATHIC: ICD-10-CM

## 2019-07-30 DIAGNOSIS — G47.10 HYPERSOMNIA: Primary | ICD-10-CM

## 2019-07-30 DIAGNOSIS — I10 ESSENTIAL HYPERTENSION: Primary | ICD-10-CM

## 2019-07-30 DIAGNOSIS — F98.8 ATTENTION DEFICIT DISORDER (ADD) IN ADULT: ICD-10-CM

## 2019-07-30 DIAGNOSIS — G25.81 RESTLESS LEG SYNDROME: ICD-10-CM

## 2019-07-30 DIAGNOSIS — G47.8 SLEEP PARALYSIS: ICD-10-CM

## 2019-07-30 DIAGNOSIS — G47.8 NON-RESTORATIVE SLEEP: ICD-10-CM

## 2019-07-30 PROCEDURE — 3075F SYST BP GE 130 - 139MM HG: CPT | Performed by: NURSE PRACTITIONER

## 2019-07-30 PROCEDURE — 99213 OFFICE O/P EST LOW 20 MIN: CPT | Performed by: NURSE PRACTITIONER

## 2019-07-30 RX ORDER — GABAPENTIN 100 MG/1
CAPSULE ORAL
Qty: 30 CAPSULE | Refills: 3 | Status: SHIPPED | OUTPATIENT
Start: 2019-07-30 | End: 2019-08-19 | Stop reason: DRUGHIGH

## 2019-07-30 RX ORDER — METHYLPHENIDATE HYDROCHLORIDE 10 MG/1
5-10 TABLET ORAL
Qty: 60 TABLET | Refills: 0 | Status: SHIPPED | OUTPATIENT
Start: 2019-07-30 | End: 2019-08-19

## 2019-07-30 RX ORDER — GABAPENTIN 300 MG/1
CAPSULE ORAL
Qty: 30 CAPSULE | Refills: 3 | Status: SHIPPED | OUTPATIENT
Start: 2019-07-30 | End: 2020-05-14 | Stop reason: ALTCHOICE

## 2019-07-30 NOTE — TELEPHONE ENCOUNTER
Patient called with an update- he is taking 300 mg gabapentin nightly now and does not see any improvement in his sleep quality  He still feels like he can sleep for 20 hours straight  He also recently saw his psychiatrist who increased his Buspar to 15 mg 3x/day  He did ask the psychiatrist about instant release stimulant medication but per the patient, the psychiatrist isn't sure about mixing  instant release and extended release stimulants with the possible diagnosis of narcolepsy  The patient is wondering if you have any recommendations for which instant release stimulant might be best with  possible narcolepsy, or should he just leave that up to the psychiatrist?  He's also wondering about an MSLT, as he would like to get better sleep quality with college starting up again    Please advise

## 2019-07-30 NOTE — TELEPHONE ENCOUNTER
His insurance needs PCP referral for neurocognitive testing  He talked to neuropsychologist, and they told him to see a neurologist (and need PCP referral for that)  He would like Immediate acting methylphenidate (see notes)  PARQ for ritalin      P:   stop concerta 32RY  START Ritalin 10mg in AM and 5mg mid day (Will give 5-10mg BID as script so he can adjust up/down as needed)   He will f/u on neurocog testing pursuit  He will call if issues or concerns, understands I will be out of office a few days, but office still open if he needs us

## 2019-07-30 NOTE — PROGRESS NOTES
Assessment/Plan:    No problem-specific Assessment & Plan notes found for this encounter  Diagnoses and all orders for this visit:    Essential hypertension  Comments:  Better today 132/78  Continue hyzaar    Attention deficit disorder (ADD) in adult  Comments: On concerta, will continue     Hypersomnia with long sleep time, idiopathic  Comments: Follows with sleep medicine  To contact neurology for neurocognitive eval      KORY (generalized anxiety disorder)  Comments:  continue effexor and buspar     Current severe episode of major depressive disorder without psychotic features without prior episode ([facility] [address] )  Comments:  continue buspar and effexor   Will continue with psychiatry   Encourage therapy sessions           Subjective:      Patient ID: Ashley Alonzo is a 22 y o  male  Pt here today for follow up  States he is doing well  Is not experiencing as much fatigue, headaches or heat intolerance  His pressures are better today  It is 132/78  His mood has improved, he follows with psychiatry monthly  His buspar was increased to 15mg TID  He is still sleeping excessively and follows with sleep medicine  The following portions of the patient's history were reviewed and updated as appropriate:   He  has no past medical history on file    He   Patient Active Problem List    Diagnosis Date Noted    KORY (generalized anxiety disorder) 06/17/2019    Sleep paralysis 06/05/2019    Hypnopompic hallucination 06/05/2019    Hypersomnia with long sleep time, idiopathic 06/04/2019    Essential hypertension 05/24/2019    Soft tissue mass 05/24/2019    Restless leg syndrome 03/21/2019    Attention deficit disorder (ADD) in adult 01/09/2018    Hereditary hemochromatosis ([facility] [address] ) 01/09/2018    Current severe episode of major depressive disorder without psychotic features without prior episode ([facility] [address] ) 01/22/2016    Obesity (BMI 30 0-34 9) 09/14/2015     He  has a past surgical history that includes Hernia repair and Tonsillectomy  His family history includes COPD in his mother; Hypertension in his father and mother  He  reports that he has never smoked  He has never used smokeless tobacco  He reports that he drinks alcohol  He reports that he has current or past drug history  Drug: Marijuana  Current Outpatient Medications   Medication Sig Dispense Refill    busPIRone (BUSPAR) 10 mg tablet Take 10mg TID  Increase total daily dose by 5mg every week, until taking 15mg TID  135 tablet 1    gabapentin (NEURONTIN) 100 mg capsule Begin taking one capsule at bedtime for 3 days, then take 2 caps for 3 days then take 3 caps at bedtime  90 capsule 5    losartan-hydrochlorothiazide (HYZAAR) 100-25 MG per tablet Take 1 tablet by mouth daily for 180 days 90 tablet 1    methylphenidate (CONCERTA) 36 MG ER tablet Take 1 tablet (36 mg total) by mouth daily for 30 daysMax Daily Amount: 36 mg 30 tablet 0    venlafaxine (EFFEXOR-XR) 75 mg 24 hr capsule Take 3 capsules (225 mg total) by mouth daily for 92 days 90 capsule 3     No current facility-administered medications for this visit  He is allergic to scopolamine       Review of Systems   Constitutional: Negative for activity change, appetite change, chills, diaphoresis, fatigue, fever and unexpected weight change  HENT: Negative for congestion, ear pain, hearing loss, postnasal drip, sinus pressure, sinus pain, sneezing and sore throat  Eyes: Negative for pain, redness and visual disturbance  Respiratory: Negative for cough and shortness of breath  Cardiovascular: Negative for chest pain and leg swelling  Gastrointestinal: Negative for abdominal pain, diarrhea, nausea and vomiting  Endocrine: Negative  Genitourinary: Negative  Musculoskeletal: Negative for arthralgias  Neurological: Negative for dizziness and light-headedness  Psychiatric/Behavioral: Negative for behavioral problems and dysphoric mood           Objective:      /78 (BP Location: Left arm, Patient Position: Sitting, Cuff Size: Adult)   Pulse (!) 112   Temp 99 1 °F (37 3 °C) (Tympanic)   Resp 18   Ht 6' 4" (1 93 m)   Wt 127 kg (279 lb 12 8 oz)   SpO2 97%   BMI 34 06 kg/m²          Physical Exam   Constitutional: He is oriented to person, place, and time  Vital signs are normal  He appears well-developed and well-nourished  No distress  HENT:   Head: Normocephalic and atraumatic  Eyes: Pupils are equal, round, and reactive to light  Neck: Normal range of motion  No thyromegaly present  Cardiovascular: Normal rate, regular rhythm, normal heart sounds and intact distal pulses  No murmur heard  Pulmonary/Chest: Effort normal and breath sounds normal  No respiratory distress  He has no wheezes  Abdominal: Soft  Bowel sounds are normal    Musculoskeletal: Normal range of motion  Neurological: He is alert and oriented to person, place, and time  Skin: Skin is warm and dry  Psychiatric: He has a normal mood and affect

## 2019-07-30 NOTE — TELEPHONE ENCOUNTER
Pt requesting a neurology referral in [facility] dx  Hypersomnia/sleeps far to much  please call pt when done  [phone]

## 2019-07-30 NOTE — TELEPHONE ENCOUNTER
Increase gabapentin to 400mg at bedtime  New prescription for 300mg capsules to add to 100mg has been sent to his pharmacy  Did he complete the sleep diary and if not, complete and bring to upcoming visit  We discussed getting out of bed at the same time daily and working to change his wake up time to the time that will work with classes  Has he been successful with that? If his sleep is completely unchanged, we can't do the diagnostic with MSLT  If he has noticed some improvement in leg movements or restlessness, we can repeat the testing  He could use immediate release methylphenidate with the extended release methylphenidate - Concerta if concerta is not providing wakefulness  We use this combination or Adderall XR and immediate release frequently with narcolepsy  Other stimulants, such as modafinil or armodafinil will not be covered by insurance without the narcolepsy diagnosis

## 2019-07-30 NOTE — TELEPHONE ENCOUNTER
Initial message received with follow up call made  Jody Ahumada III, DO 23 minutes ago (2:42 PM)      I received a call at our [facility] office from Brielle Cárdenas  He would like to speak with you regarding some concerns and questions regarding his medications  He can be reached at [phone]  His last appt was on 7/18/2019 and is scheduled for an appt on 8/19/2019 with Marva Dumont  Thank you    Routing comment       Rufus Albrightton [phone]  Darren Lopez 29 minutes ago (2:36 PM)        I spoke with Brielle Cárdenas  He said YAO Giron with [facility] has talked with him about changing Concerta to an immediate release medication to take twice a day to help with hypersomnia (possible Narcolepsy as per Jose Raymond)  He said initially it took him about a month to adjust to Concerta and he's concerned if he waits until his appointment with Brayan Lee on 08/19/19, he will be going back to school soon after and he wouldn't have adjusted to a medication change  He said he's doing good and offered thanks for the care he is getting  He doesn't feel this is urgent, he just doesn't want to wait too long to try and address this  He is okay talking about this via phone, but would come to see Marva Dumont sooner - whatever Dr Nell Vazquez would like  Will call him with direction as needed

## 2019-08-01 NOTE — TELEPHONE ENCOUNTER
Asked mom to have patient call me- she states he has been sleeping constantly and that's probaly why he hasn't returned my calls  She will attempt to wake him & have him call when she gets back home today

## 2019-08-02 NOTE — TELEPHONE ENCOUNTER
Reviewed with patient increased dosing instructions for Gabapentin  Patient demonstrates verbal understanding  Also discussed immediate release methylphenidate with extended release  Also Adderall immediate release and XR  Patient will call his psychiatrist with this information  Patient reports he is filling out his sleep diary  He is not very successful at waking at the same time every am but he is trying  Still sleeping 30-40 hours    Patient will bring his diary to his appointment on 8/19/2019

## 2019-08-05 NOTE — TELEPHONE ENCOUNTER
LM for patient to see if he is able to tell if gabapentin is improving leg movements - ie - sheets/blankets not pulled out  Requested that he call back with this update  If leg movements seem to be improved, we could get diagnostic with MSLT scheduled ASAP

## 2019-08-08 NOTE — TELEPHONE ENCOUNTER
Patient returned my call  States the Gabapentin does help him fall asleep and does help with the leg movements when he is falling asleep  He is not sure if it is helping during the night  But states the quality of sleep is still the same, he is still sleeping for 20 hours and wakes up not feeling refreshed  I advised Kena Kahn was thinking about diagnostic with MSLT, patient would be willing to come home from school if we are not able to get him in this month, if that is how Kena Kahn would like to proceed  Advised I will discuss with Kena Kahn and call back

## 2019-08-09 DIAGNOSIS — F41.1 GAD (GENERALIZED ANXIETY DISORDER): ICD-10-CM

## 2019-08-09 RX ORDER — BUSPIRONE HYDROCHLORIDE 10 MG/1
TABLET ORAL
Qty: 90 TABLET | Refills: 1 | OUTPATIENT
Start: 2019-08-09

## 2019-08-09 NOTE — TELEPHONE ENCOUNTER
I spoke with patient, advised above  Advised patient scheduled for diagnostic/MSLT 9/23/19 into 9/24/19   Will place on wait list     He is keeping his appointment with Esha Bauman for 8/19/19

## 2019-08-09 NOTE — TELEPHONE ENCOUNTER
I placed an order for a repeat diagnostic with MSLT  He should take gabapentin on the night of the sleep study  I spoke with Dmitriy Locke and she is going to try to get him in ASAP  He may need to be willing to go to any available location

## 2019-08-12 ENCOUNTER — HOSPITAL ENCOUNTER (OUTPATIENT)
Dept: SLEEP CENTER | Facility: CLINIC | Age: 19
Discharge: HOME | End: 2019-08-12
Payer: COMMERCIAL

## 2019-08-12 DIAGNOSIS — G47.11 HYPERSOMNIA WITH LONG SLEEP TIME, IDIOPATHIC: ICD-10-CM

## 2019-08-12 DIAGNOSIS — G47.8 NON-RESTORATIVE SLEEP: ICD-10-CM

## 2019-08-12 DIAGNOSIS — G47.8 SLEEP PARALYSIS: ICD-10-CM

## 2019-08-12 PROCEDURE — 95810 POLYSOM 6/> YRS 4/> PARAM: CPT

## 2019-08-12 PROCEDURE — 95810 POLYSOM 6/> YRS 4/> PARAM: CPT | Performed by: PSYCHIATRY & NEUROLOGY

## 2019-08-13 ENCOUNTER — HOSPITAL ENCOUNTER (OUTPATIENT)
Dept: SLEEP CENTER | Facility: CLINIC | Age: 19
Discharge: HOME | End: 2019-08-13
Payer: COMMERCIAL

## 2019-08-13 PROCEDURE — 95805 MULTIPLE SLEEP LATENCY TEST: CPT | Performed by: PSYCHIATRY & NEUROLOGY

## 2019-08-13 PROCEDURE — 80307 DRUG TEST PRSMV CHEM ANLYZR: CPT | Performed by: PSYCHIATRY & NEUROLOGY

## 2019-08-13 PROCEDURE — 95805 MULTIPLE SLEEP LATENCY TEST: CPT

## 2019-08-13 NOTE — PROGRESS NOTES
Pre-Sleep Study       Sleep testing procedure explained to patient:YES    Urine drug screen performed: Yes    Study Documentation    Patient reports:    Nap: 1: Sleep yes Dream no    Nap 2:  Sleep no Dream no    Nap 3:  Sleep no Dream no    Nap 4: Sleep yes Dream no    Nap 5:  Sleep no Dream no    EKG abnormalities: no     EEG abnormalities: no    Naps completed: 5     A two week diary was not completed

## 2019-08-13 NOTE — PROGRESS NOTES
Sleep Study Documentation    Pre-Sleep Study       Sleep testing procedure explained to patient:YES    Patient napped prior to study:NO    Caffeine:Dayshift worker after 12PM   Caffeine use:NO    Alcohol:Dayshift workers after 5PM: Alcohol use:NO    Typical day for patient:no       Study Documentation    Sleep Study Indications: ECG WAS UNREMARKABLE, UNEXPLAINED AROUSALS, ALL STAGES OF SLEEP ACHIEVED EXCEPT FOR REM     Sleep Study: Diagnostic   Snore: Moderate  Supplemental O2: no    Minimum SaO2 92  Baseline SaO2 96            EKG abnormalities: no     EEG abnormalities: no    Sleep Study Recorded < 2 hours: N/A    Sleep Study Recorded > 2 hours but incomplete study: N/A    Sleep Study Recorded 6 hours but no sleep obtained: NO    Patient classification: student       Post-Sleep Study    Medication used at bedtime or during sleep study:YES prescription sleep aid    Patient reports time it took to fall asleep:30 to 60 minutes    Patient reports waking up during study:3 or more times  Patient reports returning to sleep in 10 to 30 minutes  Patient reports sleeping 2 to 4 hours without dreaming  Patient reports sleep during study:worse than usual    Patient rated sleepiness: Somewhat sleepy or tired    PAP treatment:no

## 2019-08-16 ENCOUNTER — TELEPHONE (OUTPATIENT)
Dept: SLEEP CENTER | Facility: CLINIC | Age: 19
End: 2019-08-16

## 2019-08-19 ENCOUNTER — OFFICE VISIT (OUTPATIENT)
Dept: PSYCHIATRY | Facility: CLINIC | Age: 19
End: 2019-08-19
Payer: COMMERCIAL

## 2019-08-19 ENCOUNTER — OFFICE VISIT (OUTPATIENT)
Dept: SLEEP CENTER | Facility: CLINIC | Age: 19
End: 2019-08-19
Payer: COMMERCIAL

## 2019-08-19 VITALS
SYSTOLIC BLOOD PRESSURE: 116 MMHG | WEIGHT: 288.4 LBS | HEART RATE: 72 BPM | BODY MASS INDEX: 35.12 KG/M2 | DIASTOLIC BLOOD PRESSURE: 68 MMHG | HEIGHT: 76 IN

## 2019-08-19 VITALS
BODY MASS INDEX: 35.39 KG/M2 | WEIGHT: 290.6 LBS | HEIGHT: 76 IN | RESPIRATION RATE: 16 BRPM | DIASTOLIC BLOOD PRESSURE: 75 MMHG | HEART RATE: 94 BPM | SYSTOLIC BLOOD PRESSURE: 124 MMHG

## 2019-08-19 DIAGNOSIS — G25.81 RESTLESS LEG SYNDROME: ICD-10-CM

## 2019-08-19 DIAGNOSIS — G47.11 HYPERSOMNIA WITH LONG SLEEP TIME, IDIOPATHIC: Primary | ICD-10-CM

## 2019-08-19 DIAGNOSIS — G47.11 HYPERSOMNIA WITH LONG SLEEP TIME, IDIOPATHIC: ICD-10-CM

## 2019-08-19 DIAGNOSIS — G47.8 NON-RESTORATIVE SLEEP: ICD-10-CM

## 2019-08-19 DIAGNOSIS — F41.1 GAD (GENERALIZED ANXIETY DISORDER): ICD-10-CM

## 2019-08-19 DIAGNOSIS — G47.419 NARCOLEPSY WITHOUT CATAPLEXY: ICD-10-CM

## 2019-08-19 DIAGNOSIS — F32.2 CURRENT SEVERE EPISODE OF MAJOR DEPRESSIVE DISORDER WITHOUT PSYCHOTIC FEATURES WITHOUT PRIOR EPISODE (HCC): ICD-10-CM

## 2019-08-19 DIAGNOSIS — F98.8 ATTENTION DEFICIT DISORDER (ADD) IN ADULT: Primary | ICD-10-CM

## 2019-08-19 PROCEDURE — 3725F SCREEN DEPRESSION PERFORMED: CPT | Performed by: NURSE PRACTITIONER

## 2019-08-19 PROCEDURE — 96127 BRIEF EMOTIONAL/BEHAV ASSMT: CPT | Performed by: NURSE PRACTITIONER

## 2019-08-19 PROCEDURE — 99214 OFFICE O/P EST MOD 30 MIN: CPT | Performed by: NURSE PRACTITIONER

## 2019-08-19 RX ORDER — GABAPENTIN 100 MG/1
CAPSULE ORAL
Qty: 180 CAPSULE | Refills: 1 | Status: SHIPPED | OUTPATIENT
Start: 2019-08-19 | End: 2019-08-19 | Stop reason: DRUGHIGH

## 2019-08-19 RX ORDER — GABAPENTIN 100 MG/1
CAPSULE ORAL
Qty: 180 CAPSULE | Refills: 1 | Status: SHIPPED | OUTPATIENT
Start: 2019-08-19 | End: 2020-05-14 | Stop reason: ALTCHOICE

## 2019-08-19 RX ORDER — METHYLPHENIDATE HYDROCHLORIDE 10 MG/1
TABLET ORAL
Qty: 30 TABLET | Refills: 0
Start: 2019-08-19 | End: 2019-12-30 | Stop reason: SDUPTHER

## 2019-08-19 RX ORDER — METHYLPHENIDATE HYDROCHLORIDE 36 MG/1
36 TABLET ORAL DAILY
Qty: 30 TABLET | Refills: 0 | Status: SHIPPED | OUTPATIENT
Start: 2019-08-19 | End: 2019-12-30 | Stop reason: SDUPTHER

## 2019-08-19 NOTE — PATIENT INSTRUCTIONS
1   Increase gabapentin to 300mg and 2 capsules of 100mg for a total of 500mg at bedtime  2  Begin melatonin 5mg tablet 2-3 hours before planned bedtime  May increase by 1/2 tablet over a week, up to a max of 15mg  3  Set a planned wake up time and keep the wake up time consistent every day  4   Continue Concerta, as prescribed by psychiatry as well as methylphenidate immediate release, using sparingly and avoid taking it late in the day or you will have difficulty falling asleep  5   Schedule follow up visit over your break  Nursing Support:  When: Monday through Friday 7A-5PM except holidays  Where: Our direct line is [phone]  If you are having a true emergency please call 911  In the event that the line is busy or it is after hours please leave a voice message and we will return your call  Please speak clearly, leaving your full name, birth date, best number to reach you and the reason for your call  Medication refills: We will need the name of the medication, the dosage, the ordering provider, whether you get a 30 or 90 day refill, and the pharmacy name and address  Medications will be ordered by the provider only  Nurses cannot call in prescriptions  Please allow 7 days for medication refills  Physician requested updates: If your provider requested that you call with an update after starting medication, please be ready to provide us the medication and dosage, what time you take your medication, the time you attempt to fall asleep, time you fall asleep, when you wake up, and what time you get out of bed  Sleep Study Results: We will contact you with sleep study results and/or next steps after the physician has reviewed your testing

## 2019-08-19 NOTE — BH TREATMENT PLAN
TREATMENT PLAN (Medication Management Only)        [facility]    Name and Date of Birth:  Yaya Hawley 19 y o  2000  Date of Treatment Plan: August 19, 2019  Diagnosis/Diagnoses:    1  Attention deficit disorder (ADD) in adult    2  Current severe episode of major depressive disorder without psychotic features without prior episode ([facility] [address] )    3  KORY (generalized anxiety disorder)    4  Hypersomnia with long sleep time, idiopathic    5  Narcolepsy without cataplexy      Strengths/Personal Resources for Self-Care: ability to reason, ability to understand psychiatric illness, motivation for treatment  Area/Areas of need (in own words): "follow through with treatment plans"  1  Long Term Goal: decreas number of hours of sleep  Target Date: 2 months - 10/19/2019  Person/Persons responsible for completion of goal: Marko  2  Short Term Objective (s) - How will we reach this goal?:   A  Provider new recommended medication/dosage changes and/or continue medication(s): Medication changes: I have changed Marko WADDELL  Weiler's methylphenidate  I am also having him start on methylphenidate  Additionally, I am having him maintain his losartan-hydrochlorothiazide, busPIRone, venlafaxine, gabapentin, and gabapentin     B  setting alarm at same time every day to maintain sleep pattern  C  clean up room 1 X weekly and orgranize daily   Target Date: 3 months - 11/19/2019  Person/Persons Responsible for Completion of Goal: Marko  Progress Towards Goals: continuing treatment  Treatment Modality: medication management every 2 months  Review due 90 to 120 days from date of this plan: 3 months - 11/19/2019  Expected length of service: ongoing treatment  My Physician/PA/NP and I have developed this plan together and I agree to work on the goals and objectives  I understand the treatment goals that were developed for my treatment

## 2019-08-19 NOTE — PSYCH
MEDICATION MANAGEMENT NOTE        [facility]      Name and Date of Birth:  Vamsi Lucas 19 y o  2000 MRN: [id number]    Date of Visit: August 19, 2019    SUBJECTIVE:    Delbert Curtis is seen today for a follow up for Major Depressive Disorder, Generalized Anxiety Disorder and ADHD  Patient states he had to take leave from school last semester due to getting his mental health in order but he does not feel this worked to his advantage because living in his home environment is not good for him  He states when he returns to [employer] [[email or web address]] this semester he will be living in a house with 3 roommates, he is going to be "realistic" with his schedule so he can be on time for his classes  He did not schedule classes for early morning, he is only taking 3 classes this semester (possibly 4)  He is concerned about leaving his parents at home as he states his mother is no longer able to car for herself and she counts on him for cooking, shopping and appointments  He states his 32year old brother moved back into the family home so he is hoping he will take over caring for his parents while he is at school  Parents are hoarders and he states he can not live in this environment any longer  He denies auditory hallucinations, denies visual hallucinations, denies delusional thinking      HPI ROS:           ('was' notes: recent => remote)  Medication Side Effects:  denies  Denies   Depression (10 worst): 8 (Was 8)   Anxiety (10 worst): 4 (Was 3-4)   Safety concerns (SI, HI, etc): Passive SI without plan or intent "sometimes I think I would be better off dead" (Was no SI, HI but thoughts that I am not worth it and a long term sense)   Sleep: Hypersomnia-10-12 hrs per night (Was hypersomnia)   Energy: Energy low, motivation low (Was low)   Appetite: normal (Was low)   Weight Change: 290 6 lbs  No change       Review Of Systems:      Constitutional feeling tired, low energy and as noted in HPI   ENT negative   Cardiovascular negative   Respiratory negative   Gastrointestinal negative   Genitourinary negative   Musculoskeletal back pain   Integumentary negative   Neurological negative   Endocrine negative   Other Symptoms none       The following portions of the patient's history were reviewed and updated as appropriate: past family history, past medical history, past social history, past surgical history and problem list     Past psychiatric history, family psychiatric history, traumatic history, social history, substance abuse history copied from Dr Flori Young note dated July 18, 2019  Past Psychiatric History  Previous diagnoses include depression, anxiety, ADD  Prior outpatient psychiatric treatment: no  Prior therapy: yes  Prior inpatient psychiatric treatment: no  Prior suicide attempts: no  Prior self harm: hit self at times (side of head)  Prior violence or aggression: no     Social History:  The patient grew up in [place], [place]   Childhood was described as challenging      During childhood, parents were together  They have 0 sister(s) and 1 brother(s)  Patient is younger in birth order     Abuse/neglect: none     As far as the patient (or present family member) is aware, overall childhood development: Patient does ascribe to normal developmental milestones such as walking, talking, potty training and making childhood friends      Education level: good grades, HS Cum Laude  Repeat 6th grade ("I had a mental break down"   Kidney stone lodged in ureter at 13yo  "Instead of just acknowledging that I had anxiety I decided I had some neurologic issue", he had a lot of work ups, all negative   Mom , 'a hypochondraic' fed into)   Current occupation: no  Marital status: single  Children: no  Current Living Situation: the patient currently lives with parents    Social support: some with family     Hoahaoism Affiliation: Atheist   experience: no  Legal history: no  Access to Guns: yes (father's)     Substance use and treatment:  Tobacco use: no  Caffeine Use: yes  ETOH use: beers most days  Other substance use: marijuana off and on ('messes with anxiety')     Endorses previous experimentation with: LSD, mushrooms, marijuana     Longest clean time: not applicable  History of Inpatient/Outpatient rehabilitation program: no        Traumatic History:   Abuse: none  Other Traumatic Events: none      Family Psychiatric History:      Psychiatric Illness:      Hoarding (parents), Depression - parents, aunts, uncles, anxiety - parents  Substance Abuse:       EtOH- Dad  Suicide Attempts:        no family history of suicide attempts     Denies bipolar disorder, schizophrenia    Past Medical History:    Past Medical History:   Diagnosis Date    ADHD (attention deficit hyperactivity disorder)     Anxiety     Chronic kidney disease     kidney stones    Concussion     Depression     Self-injurious behavior     Sleep difficulties     history of sleep apnea  and getting workup for narcolepsy as of 8/19     Past Medical History Pertinent Negatives:   Diagnosis Date Noted    Disease of thyroid gland 08/19/2019    Eating disorder 08/19/2019    Liver disease 08/19/2019    Obsessive-compulsive disorder 08/19/2019    Violence, history of 08/19/2019     Past Surgical History:   Procedure Laterality Date    HERNIA REPAIR      TONSILLECTOMY       Allergies   Allergen Reactions    Scopolamine        Substance Abuse History:    Social History     Substance and Sexual Activity   Alcohol Use Yes    Frequency: 2-4 times a month    Drinks per session: 1 or 2    Comment: socially     Social History     Substance and Sexual Activity   Drug Use Yes    Types: Marijuana    Comment: ocasionally       Social History:    Social History     Socioeconomic History    Marital status: Single     Spouse name: Not on file    Number of children: 0    Years of education: Not on file    Highest education level: Some college, no degree   Occupational History    Occupation: student    Social Needs    Financial resource strain: Not on file    Food insecurity:     Worry: Not on file     Inability: Not on file   Mayi Zhaopin needs:     Medical: Not on file     Non-medical: Not on file   Tobacco Use    Smoking status: Never Smoker    Smokeless tobacco: Never Used   Substance and Sexual Activity    Alcohol use: Yes     Frequency: 2-4 times a month     Drinks per session: 1 or 2     Comment: socially    Drug use: Yes     Types: Marijuana     Comment: ocasionally    Sexual activity: Not on file   Lifestyle    Physical activity:     Days per week: Not on file     Minutes per session: Not on file    Stress: Not on file   Relationships    Social connections:     Talks on phone: Not on file     Gets together: Not on file     Attends Oriental orthodox service: Not on file     Active member of club or organization: Not on file     Attends meetings of clubs or organizations: Not on file     Relationship status: Not on file    Intimate partner violence:     Fear of current or ex partner: Not on file     Emotionally abused: Not on file     Physically abused: Not on file     Forced sexual activity: Not on file   Other Topics Concern    Not on file   Social History Narrative    Not on file       Family Psychiatric History:     Family History   Problem Relation Age of Onset    COPD Mother     Hypertension Mother     Depression Mother     Hypertension Father     Depression Father     Anxiety disorder Father     Alcohol abuse Father     Anxiety disorder Brother     Depression Brother     Hypertension Brother     Alcohol abuse Paternal Grandfather        History Review:  The following portions of the patient's history were reviewed and updated as appropriate: allergies, current medications, past family history, past medical history, past social history, past surgical history and problem list          OBJECTIVE:     Vital signs in last 24 hours:    Vitals:    08/19/19 1216   BP: 124/75   BP Location: Left arm   Patient Position: Sitting   Pulse: 94   Resp: 16   Weight: 132 kg (290 lb 9 6 oz)   Height: 6' 4" (1 93 m)       Mental Status Evaluation:    Appearance age appropriate, casually dressed   Behavior cooperative, calm, good eye contact   Speech normal rate, normal volume, normal pitch   Mood depressed, anxious   Affect constricted   Thought Processes organized, goal directed, linear   Associations intact associations   Thought Content no overt delusions   Perceptual Disturbances: no auditory hallucinations, no visual hallucinations   Abnormal Thoughts  Risk Potential Suicidal ideation - Yes, fleeting suicidal thoughts, contracts for safety, would not harm self, would got to Emergency Room if feeling unsafe, would seek inpatient admission if not feeling safe, No plan and no intent  Homicidal ideation - None  Potential for aggression - No   Orientation oriented to person, place, time/date and situation   Memory recent and remote memory grossly intact   Consciousness alert and awake   Attention Span Concentration Span attention span and concentration are age appropriate   Insight fair   Judgement fair   Muscle Strength and  Gait normal muscle strength and normal muscle tone, normal gait and normal balance   Motor activity no abnormal movements   Pain mild   Pain Scale 2       Laboratory Results:   I have personally reviewed all pertinent laboratory/tests results    Most Recent Labs:   Lab Results   Component Value Date    WBC 8 69 04/16/2019    RBC 5 43 04/16/2019    HGB 16 1 04/16/2019    HCT 46 7 04/16/2019     04/16/2019    RDW 12 9 04/16/2019    NEUTROABS 5 06 04/16/2019     12/30/2014    K 4 2 04/16/2019     04/16/2019    CO2 28 04/16/2019    BUN 15 04/16/2019    CREATININE 0 89 04/16/2019    GLUCOSE 86 12/30/2014    CALCIUM 9 3 04/16/2019    AST 13 04/16/2019    ALT 36 04/16/2019    ALKPHOS 109 04/16/2019    PROT 7 0 12/30/2014    BILITOT 0 40 12/30/2014    [id number] 1 650 04/16/2019     CBC:   Lab Results   Component Value Date    WBC 8 69 04/16/2019    RBC 5 43 04/16/2019    HGB 16 1 04/16/2019    HCT 46 7 04/16/2019    MCV 86 04/16/2019     04/16/2019    MCH 29 7 04/16/2019    MCHC 34 5 04/16/2019    RDW 12 9 04/16/2019    MPV 10 5 04/16/2019    NRBC 0 04/16/2019    NEUTROABS 5 06 04/16/2019     BMP:   Lab Results   Component Value Date     12/30/2014    K 4 2 04/16/2019     04/16/2019    CO2 28 04/16/2019    ANIONGAP 5 12/30/2014    BUN 15 04/16/2019    CREATININE 0 89 04/16/2019    GLUCOSE 86 12/30/2014    CALCIUM 9 3 04/16/2019    EGFR 122 04/16/2019     CMP:   Lab Results   Component Value Date     12/30/2014    K 4 2 04/16/2019     04/16/2019    CO2 28 04/16/2019    ANIONGAP 5 12/30/2014    BUN 15 04/16/2019    CREATININE 0 89 04/16/2019    GLUCOSE 86 12/30/2014    CALCIUM 9 3 04/16/2019    AST 13 04/16/2019    ALT 36 04/16/2019    ALKPHOS 109 04/16/2019    PROT 7 0 12/30/2014    BILITOT 0 40 12/30/2014    EGFR 122 04/16/2019     Lipid Profile: No results found for: CHOL, HDL, TRIG, LDLCALC  Thyroid Studies:   Lab Results   Component Value Date    [id number] 1 650 04/16/2019     RPR: No results found for: RPR  Hemoglobin A1C/EST AVG Glucose No results found for: HGBA1C, EAG  EKG No results found for: VENTRATE, ATRIALRATE, PRINT, QRSDINT, QTINT, PAXIS, QRSAXIS, TWAVEAXIS    No results found for this or any previous visit        Confidential Assessment:    Confidential assessment copied from Dr Katharine Mauricio note July 18, 2019    Previous psychotropic medication trials:   effexor (mid 5420)  Concerta (~1722)  armodafinil (helped a little bit)        History of Seizures: no  History of Head injury-LOC-Concussion: 4th grade, minor concussion (Basketball to head), no PCS    ADD without hyperactivity scale positive    Scales:    PHQ-9=18    KORY-7=6         Assessment/Plan:       Diagnoses and all orders for this visit:    Attention deficit disorder (ADD) in adult  -     methylphenidate (CONCERTA) 36 MG ER tablet; Take 1 tablet (36 mg total) by mouth daily for 30 daysMax Daily Amount: 36 mg  -     methylphenidate (RITALIN) 10 mg tablet; Take 0 5 to 1 tablet (5 mg or 10 mg) after lunch daily as needed for a maximum daily dose of 10 mg (patient is taking Concerta ER 36 mg in AM)    Current severe episode of major depressive disorder without psychotic features without prior episode (HCC)    KORY (generalized anxiety disorder)    Hypersomnia with long sleep time, idiopathic  -     methylphenidate (CONCERTA) 36 MG ER tablet; Take 1 tablet (36 mg total) by mouth daily for 30 daysMax Daily Amount: 36 mg    Narcolepsy without cataplexy          Treatment Recommendations/Precautions/Plan:    Patient has been educated about their diagnosis and treatment modalities  They voiced understanding and agreement with the following plan:    -Continue Effexor  mg p o  Daily to Improve depression and anxiety symptoms     -Continue Buspar 15 mg p o  T i d  to improve anxiety symptoms    -Decrease Ritalin To 5-10 mg in the afternoon only for a maximum dose of 10 mg (discontinue a m  Ritalin dose completely as he will now be on Concerta in a m )    -Restart Concerta 36 mg p o  Q a m  to improve attention and concentration-patient was struggling with regular Ritalin dosing in a m  And afternoon, was doing better with Concerta in a m  And will keep a regular Ritalin 5-10mg dose in afternoon while at college    -Medication management every 2 months as patient is returning to [employer] for college    -Follows with family physician for medical issues, blood pressure monitoring p r n     -Referral for neuropsychological assessment-spoke with [facility] for patient while he was in my office as he was originally told that he needs a neurology consult prior to seeing them    This was incorrect information, the patient is allowed to call the office to set up an appointment  Patient will be getting testing prior to next visit in our office     -Medication regimen discussed in detail today for 15 minutes with Marko  Dosing schedule reviewed    -patient has not yet started psychotherapy, was originally going to start at [facility] but his father's car broke down, he is now returning back to college at [address] and will not be able to initiate therapy at this time  Encouraged patient to see therapist on campus     -Patient following sleep medicine for review of potential narcolepsy/sleep apnea    -Discussed self monitoring of symptoms, and symptom monitoring tools     -Patient has been informed of 24 hours and weekend coverage for urgent situations accessed by calling the main clinic phone number      -Completed and signed during the session: Yes - with Marko    Risks/Benefits      Risks, Benefits And Possible Side Effects Of Medications:    Risks, benefits, and possible side effects of medications explained to [facility] and he verbalizes understanding and agreement for treatment  Patient education on Concerta and Ritalin completed including elevated heart rate, elevated bp, seizures, anxiety/irritability, activation/induction of christina, abuse potential, interactions with other medications, risk of sudden death, appetite suppression/weight loss discussed  Controlled Medication Discussion:     [facility] has been filling controlled prescriptions on time as prescribed according to Gunnar Escobedo 17      Psychotherapy Provided:     Individual psychotherapy provided: Yes  Counseling was provided during the session today for 15 minutes  Medications, treatment progress and treatment plan reviewed with [facility]  Medication changes discussed with Marko  Medication education provided to [facility]    Goals discussed during in session: decrease anxiety, decrease depression, continue improvement in ADHD symptoms and improve self-care  Recent stressor including family issues, medical illness in family, mother's illness, school stress, health issues, limited support, everyday stressors, ongoing anxiety, chronic mental illness and Family hoarding issues discussed with Fouzia Robles  Coping strategies including contacting a therapist, cooking, deep/slow breathing, getting into a good routine, increasing energy, increasing motivation, maintain heathy sleeping hygiene, organizing tasks at home and Setting multiple alarms reviewed with Fouzai Robles  Educated on importance of medication and treatment compliance  Importance of follow up with family physician for medical issues reviewed with Fouzia Robles  Reassurance and supportive therapy provided  Crisis/safety plan discussed with YAO Nelson 08/19/19

## 2019-08-19 NOTE — PROGRESS NOTES
Progress Note - Avril Saldana [age] y o  male   :2000, MRN: [id number]  2019          Follow Up Evaluation / Problem:     Idiopathic hypersomnia with long sleep time  Restless leg syndrome  Major depression      Thank you for the opportunity of participating in the evaluation and care of this patient in the Sleep Clinic at [facility]  HPI: Karl Benavidez is a 22y o  year old male  He presents for follow up to review results of his recent diagnostic sleep study with multiple sleep latency testing  He complained of hypersomnia with long periods of sleep, up to 30 hours at a time  He was also having excessive dreaming and sleep paralysis  He has a long history of excessive sleep, due to depression  He has begun medications for depression and feels as though they are helping  He is also treated with methylphenidate for ADHD, which improves wakefulness  He completed a diagnostic sleep study in  with a planned MSLT to follow, however, he was found to have a significant number of periodic limb movements that interrupted his sleep  He began treatment with gabapentin at bedtime  He did not notice improvement in daytime wakefulness, however, he felt that symptoms had improved  A repeat diagnostic with MSLT was completed on 19  Review of Systems      Genitourinary need to urinate more than twice a night   Cardiology none   Gastrointestinal none   Neurology awaken with headache   Constitutional none   Integumentary none   Psychiatry anxiety, depression and aggressiveness or irritability   Musculoskeletal back pain   Pulmonary none   ENT none   Endocrine none   Hematological none       Current Outpatient Medications:     busPIRone (BUSPAR) 10 mg tablet, Take 10mg TID   Increase total daily dose by 5mg every week, until taking 15mg TID , Disp: 135 tablet, Rfl: 1    gabapentin (NEURONTIN) 100 mg capsule, Take two 100mg capsules at bedtime, in addition to 300mg capsule, Disp: 180 capsule, Rfl: 1    gabapentin (NEURONTIN) 300 mg capsule, Take 300mg capsule in addition to 100mg capsule for a total of 400mg at bedtime, Disp: 30 capsule, Rfl: 3    losartan-hydrochlorothiazide (HYZAAR) 100-25 MG per tablet, Take 1 tablet by mouth daily for 180 days, Disp: 90 tablet, Rfl: 1    methylphenidate (CONCERTA) 36 MG ER tablet, Take 1 tablet (36 mg total) by mouth daily for 30 daysMax Daily Amount: 36 mg, Disp: 30 tablet, Rfl: 0    methylphenidate (RITALIN) 10 mg tablet, Take 0 5 to 1 tablet (5 mg or 10 mg) after lunch daily as needed for a maximum daily dose of 10 mg (patient is taking Concerta ER 36 mg in AM), Disp: 30 tablet, Rfl: 0    venlafaxine (EFFEXOR-XR) 75 mg 24 hr capsule, Take 3 capsules (225 mg total) by mouth daily for 92 days, Disp: 90 capsule, Rfl: 3    [place] Sleepiness Scale  Sitting and reading: Moderate chance of dozing  Watching TV: Moderate chance of dozing  Sitting, inactive in a public place (e g  a theatre or a meeting): Would never doze  As a passenger in a car for an hour without a break: Slight chance of dozing  Lying down to rest in the afternoon when circumstances permit: High chance of dozing  Sitting and talking to someone: Would never doze  Sitting quietly after a lunch without alcohol: Slight chance of dozing  In a car, while stopped for a few minutes in traffic: Would never doze  Total score: 9              Vitals:    08/19/19 1400   BP: 116/68   Pulse: 72   Weight: 131 kg (288 lb 6 4 oz)   Height: 6' 4" (1 93 m)       Body mass index is 35 11 kg/m²  Neck Circumference: 17 5       [place]WORTH SLEEPINESS SCORE  Total score: 9      Past History Since Last Sleep Center Visit:   He began the use of gabapentin 300mg at bedtime  Medication was increased to 400mg when he did not notice improvement in daytime wakefulness    He reports that it is difficult to assess his leg movements because he is not aware that he was having them  He felt that they may have improved, due to bed covers not being as displaced  He continues to have a difficult time awakening in the morning and after briefly awakening, he returns to sleep and can sleep through the entire day  Results of diagnostic sleep study, completed on 8/12/19, are as follows:  Sleep latency was 12 minutes and 30 seconds  REM sleep was not identified  Sleep efficiency was 92 7%  Only 3  abnormal breathing events were identified  The AHI was 0 5 events per hour  Lowest measured oxygen saturation  was 92%  The PLMI was 2 3 events per hour  The arousal index was 8 3 events per hour  Sleep architecture was  mildly to moderately fragmented throughout  No other abnormalities were seen  This diagnostic study will serve as  an adequate baseline prior to multiple sleep latency testing  Results of the MSLT, completed on 8/13/19, are as follows:   MSLT DATA:  Sleep Latency REM Latency  Nap 1 16:30 N/A  Nap 2 20:00 N/A  Nap 3 20:00 N/A  Nap 4 13:30 N/A  Nap 5 20:00 N/A  Average across all Naps 18:00 N/A  *Time formats are in min:sec  Note: report will return default time = 20 min for Sleep Latency if no sleep occurs during nap  IMPRESSION:  The patient slept for to of 5 naps  Sleep was identified on nap 1 and nap 4  No REM sleep was seen on either of  those naps  The remainder of nap attempts demonstrated no evidence of sleep  Mean sleep latency was 18 minutes  These findings are not consistent with a diagnosis of narcolepsy  Clinical correlation is strongly recommended in  interpreting these results      The review of systems and following portions of the patient's history were reviewed and updated as appropriate: allergies, current medications, past  family history, past medical history, past social history, past surgical history, and problem list         OBJECTIVE    Physical Exam:     General Appearance:   Alert, cooperative, no distress, appears stated age, obese     Head:   Normocephalic, without obvious abnormality, atraumatic     Eyes:   PERRL, conjunctiva/corneas clear, EOM's intact          Nose:  Nares normal, septum midline, no drainage or sinus tenderness           Throat:  Lips, teeth and gums normal; tongue normal size and  shape and midline      Neck:  Supple, symmetrical, trachea midline, no adenopathy; Thyroid: No enlargement, tenderness or nodules; no carotid bruit or JVD     Lungs:      Clear to auscultation bilaterally, respirations unlabored     Heart:   Regular rate and rhythm, S1 and S2 normal, no murmur, rub or gallop       Extremities:  Extremities normal, atraumatic, no cyanosis or edema       Skin:  Skin color, texture, turgor normal, no rashes or lesions       Neurologic:  No focal deficits noted  Normal strength, sensation throughout       ASSESSMENT / PLAN    1  Restless leg syndrome  gabapentin (NEURONTIN) 100 mg capsule    DISCONTINUED: gabapentin (NEURONTIN) 100 mg capsule   2  Non-restorative sleep  gabapentin (NEURONTIN) 100 mg capsule    DISCONTINUED: gabapentin (NEURONTIN) 100 mg capsule           Counseling / Coordination of Care  Total clinic time spent today 30 minutes  Greater than 50% of total time was spent with the patient and / or family counseling and / or coordination of care  A description of the counseling / coordination of care:     Impressions, Diagnostic results, Prognosis, Instructions for management, Risks and benefits of treatment, Patient and family education, Risk factor reductions and Importance of compliance with treatment    Today, we discussed the results of his recent diagnostic sleep study with multiple sleep latency testing  His diagnostic study showed an improvement in periodic limb movements in sleep  Prior periodic limb movement index was 32 9  He will continue gabapentin and increase dose to 500mg at bedtime    Sleep was less fragmented, however, he did not have any REM sleep at all throughout the diagnostic portion of the testing, as well as during the MSLT portion  This may be explained by his large dose of SSRI, which can inhibit REM sleep  Neither hypersomnia nor narcolepsy were  demonstrated during the MSLT portion of the testing  We discussed that his long history of long sleep time and depression are likely the cause for his current patterns of sleep  His depression has improved with the use of medications  An antidepressant such as wellbutrin may be effective in improving wakefulness and may be considered in the future  He has noticed an improvement in daytime sleepiness with the use of long acting methylphenidate supplemented with immediate release methylphenidate, using lowest effective dose and only when needed  This will be helpful when he returns to college in the upcoming week  We discussed the importance of setting a consistent wake up time and getting up each day at that time  He will begin melatonin 5mg 2-3 hours prior to anticipated bedtime, which may help to improve his circadian rhythm  He may slowly increase by 1/2 tablet up to a total of 15mg max  He will schedule a follow up visit over his holiday college break  He was encouraged to call with any questions or concerns prior to his next visit, as needed  The following instructions have been given to the patient today:    Patient Instructions   1  Increase gabapentin to 300mg and 2 capsules of 100mg for a total of 500mg at bedtime  2  Begin melatonin 5mg tablet 2-3 hours before planned bedtime  May increase by 1/2 tablet over a week, up to a max of 15mg  3  Set a planned wake up time and keep the wake up time consistent every day  4   Continue Concerta, as prescribed by psychiatry as well as methylphenidate immediate release, using sparingly and avoid taking it late in the day or you will have difficulty falling asleep  5   Schedule follow up visit over your break      Nursing Support:  When: Monday through Friday 7A-5PM except holidays  Where: Our direct line is [phone]  If you are having a true emergency please call 911  In the event that the line is busy or it is after hours please leave a voice message and we will return your call  Please speak clearly, leaving your full name, birth date, best number to reach you and the reason for your call  Medication refills: We will need the name of the medication, the dosage, the ordering provider, whether you get a 30 or 90 day refill, and the pharmacy name and address  Medications will be ordered by the provider only  Nurses cannot call in prescriptions  Please allow 7 days for medication refills  Physician requested updates: If your provider requested that you call with an update after starting medication, please be ready to provide us the medication and dosage, what time you take your medication, the time you attempt to fall asleep, time you fall asleep, when you wake up, and what time you get out of bed  Sleep Study Results: We will contact you with sleep study results and/or next steps after the physician has reviewed your testing          Krishna Gudino, [address] [place] [address]

## 2019-08-23 LAB
AMPHETAMINES UR QL SCN: NEGATIVE NG/ML
BARBITURATES UR QL SCN: NEGATIVE NG/ML
BENZODIAZ UR QL: NEGATIVE NG/ML
BZE UR QL: NEGATIVE NG/ML
CANNABINOIDS UR QL SCN: POSITIVE
METHADONE UR QL SCN: NEGATIVE NG/ML
OPIATES UR QL: NEGATIVE NG/ML
PCP UR QL: NEGATIVE NG/ML
PROPOXYPH UR QL SCN: NEGATIVE NG/ML

## 2019-08-28 DIAGNOSIS — I10 ESSENTIAL HYPERTENSION: ICD-10-CM

## 2019-08-28 RX ORDER — LOSARTAN POTASSIUM AND HYDROCHLOROTHIAZIDE 25; 100 MG/1; MG/1
1 TABLET ORAL DAILY
Qty: 90 TABLET | Refills: 3 | Status: SHIPPED | OUTPATIENT
Start: 2019-08-28 | End: 2021-01-05 | Stop reason: ALTCHOICE

## 2019-12-30 DIAGNOSIS — G47.8 NON-RESTORATIVE SLEEP: ICD-10-CM

## 2019-12-30 DIAGNOSIS — F98.8 ATTENTION DEFICIT DISORDER (ADD) IN ADULT: ICD-10-CM

## 2019-12-30 DIAGNOSIS — F41.1 GAD (GENERALIZED ANXIETY DISORDER): ICD-10-CM

## 2019-12-30 DIAGNOSIS — G25.81 RESTLESS LEG SYNDROME: ICD-10-CM

## 2019-12-30 DIAGNOSIS — G47.11 HYPERSOMNIA WITH LONG SLEEP TIME, IDIOPATHIC: ICD-10-CM

## 2019-12-30 RX ORDER — METHYLPHENIDATE HYDROCHLORIDE 36 MG/1
36 TABLET ORAL DAILY
Qty: 30 TABLET | Refills: 0 | Status: SHIPPED | OUTPATIENT
Start: 2019-12-30 | End: 2020-01-13 | Stop reason: SDUPTHER

## 2019-12-30 RX ORDER — METHYLPHENIDATE HYDROCHLORIDE 10 MG/1
TABLET ORAL
Qty: 30 TABLET | Refills: 0 | Status: SHIPPED | OUTPATIENT
Start: 2019-12-30 | End: 2020-01-13 | Stop reason: SDUPTHER

## 2019-12-30 RX ORDER — BUSPIRONE HYDROCHLORIDE 10 MG/1
TABLET ORAL
Qty: 135 TABLET | Refills: 0 | Status: SHIPPED | OUTPATIENT
Start: 2019-12-30 | End: 2020-01-13 | Stop reason: SDUPTHER

## 2019-12-31 DIAGNOSIS — F41.1 GAD (GENERALIZED ANXIETY DISORDER): ICD-10-CM

## 2020-01-06 ENCOUNTER — TELEPHONE (OUTPATIENT)
Dept: PSYCHIATRY | Facility: CLINIC | Age: 20
End: 2020-01-06

## 2020-01-06 NOTE — TELEPHONE ENCOUNTER
Received a fax from [address] for a renewal of buspirone 10 mg tabs, 90 day supply  Buspirone was renewed 12/30/19 with retail pharmacy  GLORIA for [place]:  Mail order/ [address] requested a medication refill; is this a current pharmacy for him and does he need a refill with mail order; gave my number to return the call

## 2020-01-10 NOTE — TELEPHONE ENCOUNTER
Received a faxed request from [address] [place], [place] [address] for a 90 day supply of buspirone 10 mg tabs

## 2020-01-10 NOTE — TELEPHONE ENCOUNTER
This is the same request as noted in Encounter on 01/06/20  Left an additional message for Marko:  [facility] has requested a 90 day supply of a medicaiton; this is not a current pharmacy in his preferred list; gave nursing number to call if he needs assistance

## 2020-01-13 ENCOUNTER — OFFICE VISIT (OUTPATIENT)
Dept: PSYCHIATRY | Facility: CLINIC | Age: 20
End: 2020-01-13
Payer: COMMERCIAL

## 2020-01-13 DIAGNOSIS — Z79.899 HIGH RISK MEDICATION USE: ICD-10-CM

## 2020-01-13 DIAGNOSIS — F32.2 CURRENT SEVERE EPISODE OF MAJOR DEPRESSIVE DISORDER WITHOUT PSYCHOTIC FEATURES WITHOUT PRIOR EPISODE (HCC): Primary | ICD-10-CM

## 2020-01-13 DIAGNOSIS — F98.8 ATTENTION DEFICIT DISORDER (ADD) IN ADULT: ICD-10-CM

## 2020-01-13 DIAGNOSIS — F41.1 GAD (GENERALIZED ANXIETY DISORDER): ICD-10-CM

## 2020-01-13 DIAGNOSIS — G47.11 HYPERSOMNIA WITH LONG SLEEP TIME, IDIOPATHIC: ICD-10-CM

## 2020-01-13 PROCEDURE — 99214 OFFICE O/P EST MOD 30 MIN: CPT | Performed by: PSYCHIATRY & NEUROLOGY

## 2020-01-13 PROCEDURE — 90833 PSYTX W PT W E/M 30 MIN: CPT | Performed by: PSYCHIATRY & NEUROLOGY

## 2020-01-13 RX ORDER — SERTRALINE HYDROCHLORIDE 100 MG/1
TABLET, FILM COATED ORAL
Qty: 30 TABLET | Refills: 3 | Status: SHIPPED | OUTPATIENT
Start: 2020-01-13 | End: 2020-04-06 | Stop reason: SDUPTHER

## 2020-01-13 RX ORDER — BUSPIRONE HYDROCHLORIDE 10 MG/1
TABLET ORAL
Qty: 135 TABLET | Refills: 3 | Status: SHIPPED | OUTPATIENT
Start: 2020-01-13 | End: 2020-02-13 | Stop reason: SDUPTHER

## 2020-01-13 RX ORDER — ARIPIPRAZOLE 5 MG/1
5 TABLET ORAL DAILY
Qty: 30 TABLET | Refills: 3 | Status: SHIPPED | OUTPATIENT
Start: 2020-01-13 | End: 2020-01-14 | Stop reason: SDUPTHER

## 2020-01-13 RX ORDER — METHYLPHENIDATE HYDROCHLORIDE 10 MG/1
TABLET ORAL
Qty: 30 TABLET | Refills: 0 | Status: SHIPPED | OUTPATIENT
Start: 2020-01-27 | End: 2020-02-13 | Stop reason: SDUPTHER

## 2020-01-13 RX ORDER — METHYLPHENIDATE HYDROCHLORIDE 36 MG/1
36 TABLET ORAL DAILY
Qty: 30 TABLET | Refills: 0 | Status: SHIPPED | OUTPATIENT
Start: 2020-01-27 | End: 2020-02-13 | Stop reason: SDUPTHER

## 2020-01-13 NOTE — BH TREATMENT PLAN
TREATMENT PLAN (Medication Management Only)        [facility]    Name/Date of Birth/MRN:  Mora Dunn 23 y o  2000 MRN: [id number]  Date of Treatment Plan: January 13, 2020  Diagnosis/Diagnoses:   No diagnosis found  Strengths/Personal Resources for Self-Care: logical, critical analsis, motivated  Area/Areas of need (in own words): I am rather numb emotionally, and I need help processing the trauma resulting from a poor childhood growing up in a hoarders house, isolate  1  Long Term Goal: "I want to get myself on a track where I am beginning to learn how to be more active"   Target Date: 180 days from treatment plan  Person/Persons responsible for completion of goal: Dr Natasha Osuna and Self  2  Short Term Objective (s) - How will we reach this goal?:   A  Provider new recommended medication/dosage changes and/or continue medication(s): as noted  B  Follow up with therapy  C  Take meds as prescrbed, work on healthy lifestyle to improve blood pressure  Target Date: 6 months from treatment plan unless noted otherwise  Person/Persons Responsible for Completion of Goal: Dr Natasha Osuna and Self   Progress Towards Goals: starting treatment   Treatment Modality: Medication management and therapy PRN  Review due 180 days from date of this plan: Approximately 6 months from today    Expected length of service: ongoing treatment  My Physician/PA/NP and I have developed this plan together and I agree to work on the goals and objectives  I understand the treatment goals that were developed for my treatment    Signature:       Date and time:  Signature of parent/guardian if under age of 15 years: Date and time:  Signature of provider:      Date and time:  Signature of Supervising Physician:    Date and time: 1/13/2020      Zaid Black III,

## 2020-01-13 NOTE — PSYCH
MEDICATION MANAGEMENT NOTE        [place] [id number] [facility]      Name and Date of Birth:  Ling Moralez 20 y o  2000    Date of Visit: January 13, 2020    SUBJECTIVE:  CC: Sheryn Fabry presents today for follow up on ADD, Depression, anxiety, "really not well"  Sheryn Fabry noted things got more tough  Had stopped going to classes, sleeing more  And "I am really bad at helping myself"  Did not find doctors, therapists out at school  "I know where I learned it, my parents  They are complacent", but he is trying to change his behavior  He was off his medications for 2months, back on  He is back on meds for a little under 1 mo  Depression much worse without meds, sleep worse too  Dad came of fentanyl, has had health concerns, mom had GI bleed, had to go to [facility]  "I don't know what to do, they are not helping themselves", feels he has to help them  Feels they rely on him signficiantly, so he can't rely on them  Another challenge is that they are Hoarders  Does have a good friend at school, good support  Procrastination, expectations, bad dreams, failing aspirations are triggering for bad days  Does not seem chemical or organic to have depression/mood changes    - Sleep med- neeeds to f/u  They feel something else going on, but no narcolepsy, DEYSI   - did not f/u neuropsych  Given number, he will f/u again  - will ge therapist at school when returns  -requested letter stating symptoms interfered with w/d from school (of which I agree)  I will compose and send  - Smoking marijuana still    - Drinking "Just about stopped" (had gotten almost black out drunk once, and really took him aback)     No CP, SOB or other physical issues unless noted today  Since our last visit, overall symptoms have been unchanged        Med Compliance: no    HPI ROS:             ('was' notes: recent => remote)  Medication Side Effects:  no     Depression (10 worst):  6-7 (now back on meds) (Was 8)   Anxiety (10 worst):  5 (Was 3-4)   Safety concerns (SI, HI, etc):  *better on meds, 3/10 deathwish, no plan or intent  Protective features, would not do anything and would seek help (Was no, but thoughts that "I am not worth it" in a long term sense)   Sleep: (NM = Nightmares)  increased (Was hypersomnia)   Energy:  low (Was low)   Appetite:  low (Was low)   Weight Change:  no      Ridge Arthur denies any side effects from medications unless noted above    Review Of Systems as noted above  In addition:     Constitutional negative   ENT negative   Cardiovascular negative   Respiratory negative   Gastrointestinal negative   Genitourinary negative   Musculoskeletal negative   Integumentary negative   Neurological negative   Endocrine negative   Other Symptoms all other systems are negative     Pain none   Pain Scale 0     History Review:  The following portions of the patient's history were reviewed and documented: allergies, current medications, past family history, past medical history, past social history and problem list      Lab Review: Labs were reviewed and discussed with patient      OBJECTIVE:     MENTAL STATUS EXAM  Appearance:  age appropriate   Behavior:  pleasant, cooperative, with good eye contact   Speech:  Normal volume, regular rate and rhythm, hyperverbal but not pressured   Mood:  depressed and anxious   Affect:  constricted   Language: intact and appropriate for age   Thought Process:  Linear and goal directed   Associations: intact associations   Thought Content:  normal and appropriate, negative thinking and cognitive distortions   Perceptual Disturbances: no auditory or visual hallcunations   Risk Potential / Abnormal Thoughts: Suicidal ideation - None  Homicidal ideation - None  Potential for aggression - No       Consciousness:  Alert & Awake   Sensorium:  Fully oriented to person, place, time/date   Attention: attention span and concentration are age appropriate       Fund of Knowledge:  Memory: awareness of current events: yes  recent and remote memory grossly intact   Insight:  good   Judgment: good   Muscle Strength Muscle Tone: normal  normal   Gait/Station: normal gait/station with good balance   Motor Activity: no abnormal movements       Risks, Benefits And Possible Side Effects Of Medications:    AGREE: Risks, benefits, and possible side effects of medications explained to Michael Salazar and he (or legal representative) verbalizes understanding and agreement for treatment  Controlled Medication Discussion:     Michael Salazar has been filling controlled prescriptions on time as prescribed according to [facility] program    ______________________________________________________________    Recent labs:  No visits with results within 1 Month(s) from this visit  Latest known visit with results is:   Hospital Outpatient Visit on 08/13/2019   Component Date Value    Amphetamine Screen, Ur 08/13/2019 Negative     Barbiturate Screen, Ur 08/13/2019 Negative     Cannabinoid Scrn, Ur 08/13/2019 Positive*    Methadone Screen, Urine 08/13/2019 Negative     Opiate Scrn, Ur 08/13/2019 Negative     Phencyclidine (PCP), Yao Phillip* 08/13/2019 Negative     Benzodiazepines 08/13/2019 Negative     Cocaine (Metab ) Urine 08/13/2019 Negative     Propoxyphene Screen, Julissa Amour* 08/13/2019 Negative      Past Psychiatric History  Previous diagnoses include depression, anxiety, ADD  Prior outpatient psychiatric treatment: no  Prior therapy: yes  Prior inpatient psychiatric treatment: no  Prior suicide attempts: no  Prior self harm: hit self at times (side of head)  Prior violence or aggression: no    Social History:  The patient grew up in [place], [place]  Childhood was described as challenging      During childhood, parents were together  They have 0 sister(s) and 1 brother(s)   Patient is younger in birth order     Abuse/neglect: none     As far as the patient (or present family member) is aware, overall childhood development: Patient does ascribe to normal developmental milestones such as walking, talking, potty training and making childhood friends      Education level: good grades, HS Cum Laude  Repeat 6th grade ("I had a mental break down"   Kidney stone lodged in ureter at 13yo  "Instead of just acknowledging that I had anxiety I decided I had some neurologic issue", he had a lot of work ups, all negative  Mom , 'a hypochondraic' fed into)   Current occupation: no  Marital status: single  Children: no  Current Living Situation: the patient currently lives with parents   Social support: some with family     Zoroastrianism Affiliation: [facility]   experience: no  Legal history: no  Access to Guns: yes (father's)     Substance use and treatment:  Tobacco use: no  Caffeine Use: yes  ETOH use: beers most days  Other substance use: marijuana off and on ('messes with anxiety')  Endorses previous experimentation with: LSD, mushrooms, marijuana     Longest clean time: not applicable  History of Inpatient/Outpatient rehabilitation program: no        Traumatic History:   Abuse: none  Other Traumatic Events: none      Family Psychiatric History:      Psychiatric Illness:      Hoarding (parents), Depression - parents, aunts, uncles, anxiety - parents  Substance Abuse:        EtOH- Dad  Suicide Attempts:        no family history of suicide attempts     Denies bipolar disorder, schizophrenia    Family Psychiatric History:   Family History   Problem Relation Age of Onset   Chaka Olinda COPD Mother     Hypertension Mother     Depression Mother     Hypertension Father     Depression Father     Anxiety disorder Father     Alcohol abuse Father     Anxiety disorder Brother     Depression Brother     Hypertension Brother     Alcohol abuse Paternal Grandfather          Medical / Surgical History:    Past Medical History:   Diagnosis Date    ADHD (attention deficit hyperactivity disorder)     Anxiety     Chronic kidney disease     kidney stones    Concussion     Depression     Self-injurious behavior     Sleep difficulties     history of sleep apnea  and getting workup for narcolepsy as of 8/19     Past Surgical History:   Procedure Laterality Date    HERNIA REPAIR      TONSILLECTOMY         Assessment/Plan:        Diagnoses and all orders for this visit:    Current severe episode of major depressive disorder without psychotic features without prior episode (HCC)  -     ARIPiprazole (ABILIFY) 5 mg tablet; Take 1 tablet (5 mg total) by mouth daily  -     sertraline (ZOLOFT) 100 mg tablet; Take 50mg daily (once effexor down to 75mg daily), then after 1 week increase to 100mg daily  High risk medication use  -     Lipid Panel with Direct LDL reflex; Future  -     HEMOGLOBIN A1C W/ EAG ESTIMATION; Future    KORY (generalized anxiety disorder)  -     sertraline (ZOLOFT) 100 mg tablet; Take 50mg daily (once effexor down to 75mg daily), then after 1 week increase to 100mg daily  -     busPIRone (BUSPAR) 10 mg tablet; Take 15mg TID    Attention deficit disorder (ADD) in adult  -     methylphenidate (RITALIN) 10 mg tablet; Take 0 5 to 1 tablet (5 mg or 10 mg) after lunch daily as needed, max daily dose of 10 mg (patient is taking Concerta ER 36 mg in AM)  -     methylphenidate (CONCERTA) 36 MG ER tablet; Take 1 tablet (36 mg total) by mouth dailyMax Daily Amount: 36 mg    Hypersomnia with long sleep time, idiopathic  -     methylphenidate (CONCERTA) 36 MG ER tablet;  Take 1 tablet (36 mg total) by mouth dailyMax Daily Amount: 36 mg        ______________________________________________________________________      MDD  KORY  ADD  ---  MDD - not at goal  KORY - not at goal  ADD - not at goal    - Does NOT have DEYSI (obstructive sleep apnea), HOWEVER, was told to come in and have f/u (has not done)- noted 1/13/2020  - ADD likely, but he is not helped much by concerta (too low dose vs ineffective), and since he has depression and anxiety too, he intends to f/u for neuropsych evaluation for ADD  CHARLES signed  Gave him neuropsych contact information today again  - Medication Compliance an issue     At our second visit noticed buspar helped with his anxiety, "and things I did not realize I had an issue with", worry less  I am concerned with HTN, stimulants are beneficial so will try to remove SNRI first, reassess  Asymptomatic  Due to severity of symptoms, he and I agreed increasing med load (and side effect risk) with abilify is warranted and appropriate        Prior self harm: hit self at times (side of head)  Access to Guns: yes (father's) "But I never would use that  I never think ever about that or ways to kill myself"  Other substance use: marijuana off and on ('messes with anxiety') USES CBD Now (helps some with anxiety), ETOH  - Break up 1yr ago was major trigger for depression (6yr relationship)       Suicide / Homicide / Safety risk assessment: see above  safety risk low overall upon consideration of protective and risk factors  Confidential Assessment:  Previous psychotropic medication trials:   effexor (mid 0442)  Concerta (~1929)  armodafinil (helped a little bit)  buspar  zoloft  abilify    History of Seizures: no  History of Head injury-LOC-Concussion: 4th grade, minor concussion (Basketball to head), no PCS      Scales:  ADD (without hyperactivity) scale - Positive           Treatment Plan:        Patient has been educated about their diagnosis and treatment modalities  They voiced understanding and agreement with the following plan:    1) MEDS:        Discussed medications and if treatment adjustment was needed/desired  - Concerta 27CS daily     - Ritalin 5-10mg mid day as needed   - STOP - Effexor XR 150mg daily x1wk, 75mg daily x1wk then stop (did fine overall with w/d and only back on for ~3-4wk)   - Buspar 15mg TID   - START Zoloft (when effexor 75mg) at 50mg daily x1wk, then 100mg daily   PARQ completed including serotonin syndrome, SIADH, worsening depression, suicidality, induction of christina (I don't think he has), GI upset, headaches, activation, sexual side effects, sedation, potential drug interactions, and others    - START Abilify 2 5mg daily x1wk, then 5mg daily  PARQ completed including dizziness, sedation, GI distress, metabolic syndrome, NMS, TD, EPS, Seizures, and others    2) Labs: PRN     3) Therapy:    - will establish at school  (was seeing Laure Peralta ([facility]))    => [facility] if therapist pursued local     4) Medical:    - Pt will f/u with other providers as needed     5) Other: Support as needed   - Jorge [place] in [id number]   -  - had 6yr relationship that ended 2018, break up traumatizing   - 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 [place]     6) Follow up:   - Follow up in 2mo  (at school)   - Patient will call if issues or concerns      7) Treatment Plan:    - Enacted 6/17/2019 (electronic), 1/13/2020       Discussed self monitoring of symptoms, and symptom monitoring tools  Patient has been informed of 24 hours and weekend coverage for urgent situations accessed by calling the main clinic phone number  Psychotherapy in session:  Time spent performing psychotherapy: 18 Minutes supportive therapy, self doubt, determination, 'no examples' in parents of 'success', overcoming amotivation, using self awarenss as strength, struggles in school, coping skills

## 2020-01-14 ENCOUNTER — APPOINTMENT (OUTPATIENT)
Dept: LAB | Facility: CLINIC | Age: 20
End: 2020-01-14
Payer: COMMERCIAL

## 2020-01-14 ENCOUNTER — OFFICE VISIT (OUTPATIENT)
Dept: FAMILY MEDICINE CLINIC | Facility: CLINIC | Age: 20
End: 2020-01-14
Payer: COMMERCIAL

## 2020-01-14 VITALS
HEIGHT: 76 IN | WEIGHT: 281.4 LBS | BODY MASS INDEX: 34.27 KG/M2 | RESPIRATION RATE: 18 BRPM | OXYGEN SATURATION: 98 % | DIASTOLIC BLOOD PRESSURE: 88 MMHG | HEART RATE: 74 BPM | TEMPERATURE: 97.6 F | SYSTOLIC BLOOD PRESSURE: 130 MMHG

## 2020-01-14 DIAGNOSIS — I10 ESSENTIAL HYPERTENSION: ICD-10-CM

## 2020-01-14 DIAGNOSIS — F32.2 CURRENT SEVERE EPISODE OF MAJOR DEPRESSIVE DISORDER WITHOUT PSYCHOTIC FEATURES WITHOUT PRIOR EPISODE (HCC): ICD-10-CM

## 2020-01-14 DIAGNOSIS — Z11.4 SCREENING FOR HIV (HUMAN IMMUNODEFICIENCY VIRUS): ICD-10-CM

## 2020-01-14 DIAGNOSIS — Z79.899 HIGH RISK MEDICATION USE: ICD-10-CM

## 2020-01-14 DIAGNOSIS — Z23 NEED FOR INFLUENZA VACCINATION: Primary | ICD-10-CM

## 2020-01-14 DIAGNOSIS — E83.110 HEREDITARY HEMOCHROMATOSIS (HCC): ICD-10-CM

## 2020-01-14 LAB
ALBUMIN SERPL BCP-MCNC: 3.9 G/DL (ref 3.5–5)
ALP SERPL-CCNC: 96 U/L (ref 46–116)
ALT SERPL W P-5'-P-CCNC: 33 U/L (ref 12–78)
ANION GAP SERPL CALCULATED.3IONS-SCNC: 3 MMOL/L (ref 4–13)
AST SERPL W P-5'-P-CCNC: 10 U/L (ref 5–45)
BASOPHILS # BLD AUTO: 0.06 THOUSANDS/ΜL (ref 0–0.1)
BASOPHILS NFR BLD AUTO: 1 % (ref 0–1)
BILIRUB SERPL-MCNC: 0.37 MG/DL (ref 0.2–1)
BUN SERPL-MCNC: 12 MG/DL (ref 5–25)
CALCIUM SERPL-MCNC: 9.4 MG/DL (ref 8.3–10.1)
CHLORIDE SERPL-SCNC: 109 MMOL/L (ref 100–108)
CHOLEST SERPL-MCNC: 161 MG/DL (ref 50–200)
CO2 SERPL-SCNC: 30 MMOL/L (ref 21–32)
CREAT SERPL-MCNC: 0.94 MG/DL (ref 0.6–1.3)
EOSINOPHIL # BLD AUTO: 0.28 THOUSAND/ΜL (ref 0–0.61)
EOSINOPHIL NFR BLD AUTO: 3 % (ref 0–6)
ERYTHROCYTE [DISTWIDTH] IN BLOOD BY AUTOMATED COUNT: 12.9 % (ref 11.6–15.1)
EST. AVERAGE GLUCOSE BLD GHB EST-MCNC: 94 MG/DL
GFR SERPL CREATININE-BSD FRML MDRD: 115 ML/MIN/1.73SQ M
GLUCOSE P FAST SERPL-MCNC: 96 MG/DL (ref 65–99)
HBA1C MFR BLD: 4.9 % (ref 4.2–6.3)
HCT VFR BLD AUTO: 49.2 % (ref 36.5–49.3)
HDLC SERPL-MCNC: 33 MG/DL
HGB BLD-MCNC: 16.4 G/DL (ref 12–17)
IMM GRANULOCYTES # BLD AUTO: 0.04 THOUSAND/UL (ref 0–0.2)
IMM GRANULOCYTES NFR BLD AUTO: 0 % (ref 0–2)
LDLC SERPL CALC-MCNC: 99 MG/DL (ref 0–100)
LYMPHOCYTES # BLD AUTO: 2.34 THOUSANDS/ΜL (ref 0.6–4.47)
LYMPHOCYTES NFR BLD AUTO: 25 % (ref 14–44)
MCH RBC QN AUTO: 29.5 PG (ref 26.8–34.3)
MCHC RBC AUTO-ENTMCNC: 33.3 G/DL (ref 31.4–37.4)
MCV RBC AUTO: 89 FL (ref 82–98)
MONOCYTES # BLD AUTO: 0.84 THOUSAND/ΜL (ref 0.17–1.22)
MONOCYTES NFR BLD AUTO: 9 % (ref 4–12)
NEUTROPHILS # BLD AUTO: 5.65 THOUSANDS/ΜL (ref 1.85–7.62)
NEUTS SEG NFR BLD AUTO: 62 % (ref 43–75)
NRBC BLD AUTO-RTO: 0 /100 WBCS
PLATELET # BLD AUTO: 223 THOUSANDS/UL (ref 149–390)
PMV BLD AUTO: 10.6 FL (ref 8.9–12.7)
POTASSIUM SERPL-SCNC: 4.7 MMOL/L (ref 3.5–5.3)
PROT SERPL-MCNC: 7.2 G/DL (ref 6.4–8.2)
RBC # BLD AUTO: 5.55 MILLION/UL (ref 3.88–5.62)
SODIUM SERPL-SCNC: 142 MMOL/L (ref 136–145)
TRIGL SERPL-MCNC: 144 MG/DL
TSH SERPL DL<=0.05 MIU/L-ACNC: 1.84 UIU/ML (ref 0.36–3.74)
WBC # BLD AUTO: 9.21 THOUSAND/UL (ref 4.31–10.16)

## 2020-01-14 PROCEDURE — 36415 COLL VENOUS BLD VENIPUNCTURE: CPT

## 2020-01-14 PROCEDURE — 90471 IMMUNIZATION ADMIN: CPT

## 2020-01-14 PROCEDURE — 3079F DIAST BP 80-89 MM HG: CPT | Performed by: NURSE PRACTITIONER

## 2020-01-14 PROCEDURE — 83036 HEMOGLOBIN GLYCOSYLATED A1C: CPT

## 2020-01-14 PROCEDURE — 80053 COMPREHEN METABOLIC PANEL: CPT

## 2020-01-14 PROCEDURE — 99214 OFFICE O/P EST MOD 30 MIN: CPT | Performed by: NURSE PRACTITIONER

## 2020-01-14 PROCEDURE — 87389 HIV-1 AG W/HIV-1&-2 AB AG IA: CPT

## 2020-01-14 PROCEDURE — 3075F SYST BP GE 130 - 139MM HG: CPT | Performed by: NURSE PRACTITIONER

## 2020-01-14 PROCEDURE — 80061 LIPID PANEL: CPT

## 2020-01-14 PROCEDURE — 3008F BODY MASS INDEX DOCD: CPT | Performed by: NURSE PRACTITIONER

## 2020-01-14 PROCEDURE — 84443 ASSAY THYROID STIM HORMONE: CPT

## 2020-01-14 PROCEDURE — 90686 IIV4 VACC NO PRSV 0.5 ML IM: CPT

## 2020-01-14 PROCEDURE — 1036F TOBACCO NON-USER: CPT | Performed by: NURSE PRACTITIONER

## 2020-01-14 PROCEDURE — 85025 COMPLETE CBC W/AUTO DIFF WBC: CPT

## 2020-01-14 RX ORDER — ARIPIPRAZOLE 5 MG/1
5 TABLET ORAL DAILY
Qty: 30 TABLET | Refills: 3
Start: 2020-01-14 | End: 2020-04-06 | Stop reason: SDUPTHER

## 2020-01-14 NOTE — PROGRESS NOTES
Assessment/Plan:    No problem-specific Assessment & Plan notes found for this encounter  Diagnoses and all orders for this visit:    Need for influenza vaccination  -     influenza vaccine, 3768-6296, quadrivalent, 0 5 mL, preservative-free, for adult and pediatric patients 6 mos+ (AFLURIA, FLUARIX, FLULAVAL, FLUZONE)    Current severe episode of major depressive disorder without psychotic features without prior episode (HCC)  -     ARIPiprazole (ABILIFY) 5 mg tablet; Take 1 tablet (5 mg total) by mouth daily  -     Comprehensive metabolic panel; Future  -     CBC and differential; Future  -     Lipid Panel with Direct LDL reflex; Future  -     TSH, 3rd generation with Free T4 reflex; Future    Essential hypertension  -     Comprehensive metabolic panel; Future  -     CBC and differential; Future  -     Lipid Panel with Direct LDL reflex; Future  -     TSH, 3rd generation with Free T4 reflex; Future    Hereditary hemochromatosis ([facility] [address] )  -     CBC and differential; Future    Screening for HIV (human immunodeficiency virus)  -     HIV 1/2 AG-AB combo; Future          Subjective:      Patient ID: Lokesh Le is a 23 y o  male  Patient here today for follow up and reports that he was having problems with his mental health at school and was not following with counseling and disability services and reports that he has hypersomnia and did withdrawal from school and doing retroactive withdrawal  Patient is following with Dr Aurelia Snow psychiatry and saw yesterday and his medications were changed to Zoloft and Abilify treating depression and anxiety  Patient has to schedule with sleep specialist  Patient also has follow up with Dr Aurelia Snow  Patient has to get his disability paperwork completed  Patient is returning to [place] and getting back to his schedule   Patient has no physical complaints       The following portions of the patient's history were reviewed and updated as appropriate: He  has a past medical history of ADHD (attention deficit hyperactivity disorder), Anxiety, Chronic kidney disease, Concussion, Depression, Self-injurious behavior, and Sleep difficulties  He   Patient Active Problem List    Diagnosis Date Noted    KORY (generalized anxiety disorder) 06/17/2019    Sleep paralysis 06/05/2019    Hypnopompic hallucination 06/05/2019    Hypersomnia with long sleep time, idiopathic 06/04/2019    Essential hypertension 05/24/2019    Soft tissue mass 05/24/2019    Restless leg syndrome 03/21/2019    Attention deficit disorder (ADD) in adult 01/09/2018    [facility]red[facility] hemochromatosis ([facility] ) 01/09/2018    Current severe episode of major depressive disorder without psychotic features without prior episode ([facility] ) 01/22/2016    Obesity (BMI 30 0-34 9) 09/14/2015     He  has a past surgical history that includes Hernia repair and Tonsillectomy  His family history includes Alcohol abuse in his father and paternal grandfather; Anxiety disorder in his brother and father; COPD in his mother; Depression in his brother, father, and mother; Hypertension in his brother, father, and mother  He  reports that he has never smoked  He has never used smokeless tobacco  He reports that he drinks alcohol  He reports that he has current or past drug history  Drug: Marijuana  He is allergic to scopolamine       Review of Systems   Constitutional: Negative for activity change, appetite change, chills, diaphoresis, fatigue, fever and unexpected weight change  HENT: Negative for congestion, ear pain, hearing loss, postnasal drip, sinus pressure, sinus pain, sneezing and sore throat  Eyes: Negative for pain, redness and visual disturbance  Respiratory: Negative for cough and shortness of breath  Cardiovascular: Negative for chest pain and leg swelling  Gastrointestinal: Negative for abdominal pain, diarrhea, nausea and vomiting  Musculoskeletal: Negative for arthralgias     Neurological: Negative for dizziness and light-headedness  Psychiatric/Behavioral: Negative for behavioral problems and dysphoric mood  Objective:      /88 (BP Location: Left arm, Patient Position: Sitting, Cuff Size: Adult)   Pulse 74   Temp 97 6 °F (36 4 °C) (Tympanic)   Resp 18   Ht 6' 4" (1 93 m)   Wt 128 kg (281 lb 6 4 oz)   SpO2 98%   BMI 34 25 kg/m²          Physical Exam   Constitutional: He is oriented to person, place, and time  Vital signs are normal  He appears well-developed and well-nourished  No distress  HENT:   Head: Normocephalic and atraumatic  Eyes: Pupils are equal, round, and reactive to light  Neck: Normal range of motion  No thyromegaly present  Cardiovascular: Normal rate, regular rhythm, normal heart sounds and intact distal pulses  No murmur heard  Pulmonary/Chest: Effort normal and breath sounds normal  No respiratory distress  He has no wheezes  Abdominal: Soft  Bowel sounds are normal    Musculoskeletal: Normal range of motion  Neurological: He is alert and oriented to person, place, and time  Skin: Skin is warm and dry  Psychiatric: He has a normal mood and affect  Nursing note and vitals reviewed

## 2020-01-15 LAB — HIV 1+2 AB+HIV1 P24 AG SERPL QL IA: NORMAL

## 2020-02-04 ENCOUNTER — TELEPHONE (OUTPATIENT)
Dept: SLEEP CENTER | Facility: CLINIC | Age: 20
End: 2020-02-04

## 2020-02-04 NOTE — TELEPHONE ENCOUNTER
Patient is struggling at school with grades, wakefulness  He failed classes last semester  He is hoping he may qualify for accommodations through disability services His psychiatrist states it is the hypersomnia that would allow him to qualify so sleep medicine should complete the forms  He will fax forms to office tomorrow

## 2020-02-06 NOTE — TELEPHONE ENCOUNTER
Received Documentation of disability form for patient  Left message for patient to call back to clarify exactly what accommodations he is looking for- will forward to Jyotsna Loredo after he responds

## 2020-02-12 NOTE — TELEPHONE ENCOUNTER
Patient left message, states he is looking for attendance accommodations, that missing class will not be held against him and that he thinks he qualifies for a   That is another student that is in the same class, that can share their notes

## 2020-02-13 DIAGNOSIS — G47.11 HYPERSOMNIA WITH LONG SLEEP TIME, IDIOPATHIC: ICD-10-CM

## 2020-02-13 DIAGNOSIS — F41.1 GAD (GENERALIZED ANXIETY DISORDER): ICD-10-CM

## 2020-02-13 DIAGNOSIS — F98.8 ATTENTION DEFICIT DISORDER (ADD) IN ADULT: ICD-10-CM

## 2020-02-13 RX ORDER — METHYLPHENIDATE HYDROCHLORIDE 10 MG/1
TABLET ORAL
Qty: 30 TABLET | Refills: 0 | Status: SHIPPED | OUTPATIENT
Start: 2020-02-13 | End: 2020-04-06 | Stop reason: SDUPTHER

## 2020-02-13 RX ORDER — METHYLPHENIDATE HYDROCHLORIDE 36 MG/1
36 TABLET ORAL DAILY
Qty: 30 TABLET | Refills: 0 | Status: SHIPPED | OUTPATIENT
Start: 2020-02-13 | End: 2020-04-06 | Stop reason: SDUPTHER

## 2020-02-13 RX ORDER — BUSPIRONE HYDROCHLORIDE 10 MG/1
TABLET ORAL
Qty: 135 TABLET | Refills: 3 | Status: SHIPPED | OUTPATIENT
Start: 2020-02-13 | End: 2020-08-27

## 2020-02-13 NOTE — TELEPHONE ENCOUNTER
Forms completed and will fax back to you  He needs a follow up visit  See if he can be scheduled during his spring break  If not, then in May after completing the semester  He did not actually show narcolepsy or even hypersomnia for his MSLT, but was being treated with gabapentin for severe restless legs and non-restorative sleep

## 2020-03-22 DIAGNOSIS — F98.8 ATTENTION DEFICIT DISORDER (ADD) IN ADULT: ICD-10-CM

## 2020-03-22 DIAGNOSIS — F33.2 SEVERE EPISODE OF RECURRENT MAJOR DEPRESSIVE DISORDER, WITHOUT PSYCHOTIC FEATURES (HCC): ICD-10-CM

## 2020-03-23 RX ORDER — VENLAFAXINE HYDROCHLORIDE 75 MG/1
CAPSULE, EXTENDED RELEASE ORAL
Qty: 270 CAPSULE | Refills: 3 | Status: SHIPPED | OUTPATIENT
Start: 2020-03-23 | End: 2020-05-14 | Stop reason: ALTCHOICE

## 2020-04-06 DIAGNOSIS — G47.11 HYPERSOMNIA WITH LONG SLEEP TIME, IDIOPATHIC: ICD-10-CM

## 2020-04-06 DIAGNOSIS — F32.2 CURRENT SEVERE EPISODE OF MAJOR DEPRESSIVE DISORDER WITHOUT PSYCHOTIC FEATURES WITHOUT PRIOR EPISODE (HCC): ICD-10-CM

## 2020-04-06 DIAGNOSIS — F98.8 ATTENTION DEFICIT DISORDER (ADD) IN ADULT: ICD-10-CM

## 2020-04-06 DIAGNOSIS — F41.1 GAD (GENERALIZED ANXIETY DISORDER): ICD-10-CM

## 2020-04-06 RX ORDER — METHYLPHENIDATE HYDROCHLORIDE 36 MG/1
36 TABLET ORAL DAILY
Qty: 30 TABLET | Refills: 0 | Status: SHIPPED | OUTPATIENT
Start: 2020-04-06 | End: 2020-08-27

## 2020-04-06 RX ORDER — SERTRALINE HYDROCHLORIDE 100 MG/1
TABLET, FILM COATED ORAL
Qty: 90 TABLET | Refills: 0 | Status: SHIPPED | OUTPATIENT
Start: 2020-04-06 | End: 2020-08-27 | Stop reason: SDUPTHER

## 2020-04-06 RX ORDER — ARIPIPRAZOLE 5 MG/1
5 TABLET ORAL DAILY
Qty: 90 TABLET | Refills: 0 | Status: SHIPPED | OUTPATIENT
Start: 2020-04-06 | End: 2020-08-27

## 2020-04-06 RX ORDER — METHYLPHENIDATE HYDROCHLORIDE 10 MG/1
TABLET ORAL
Qty: 30 TABLET | Refills: 0 | Status: SHIPPED | OUTPATIENT
Start: 2020-04-06 | End: 2020-08-27

## 2020-05-14 ENCOUNTER — OFFICE VISIT (OUTPATIENT)
Dept: SLEEP CENTER | Facility: CLINIC | Age: 20
End: 2020-05-14
Payer: COMMERCIAL

## 2020-05-14 VITALS
DIASTOLIC BLOOD PRESSURE: 78 MMHG | BODY MASS INDEX: 33.99 KG/M2 | WEIGHT: 273.4 LBS | HEIGHT: 75 IN | SYSTOLIC BLOOD PRESSURE: 114 MMHG | HEART RATE: 60 BPM

## 2020-05-14 DIAGNOSIS — R53.82 CHRONIC FATIGUE: ICD-10-CM

## 2020-05-14 DIAGNOSIS — G25.81 RESTLESS LEG SYNDROME: Primary | ICD-10-CM

## 2020-05-14 DIAGNOSIS — G47.8 NON-RESTORATIVE SLEEP: ICD-10-CM

## 2020-05-14 PROCEDURE — 3078F DIAST BP <80 MM HG: CPT | Performed by: NURSE PRACTITIONER

## 2020-05-14 PROCEDURE — 3008F BODY MASS INDEX DOCD: CPT | Performed by: NURSE PRACTITIONER

## 2020-05-14 PROCEDURE — 3074F SYST BP LT 130 MM HG: CPT | Performed by: NURSE PRACTITIONER

## 2020-05-14 PROCEDURE — 1036F TOBACCO NON-USER: CPT | Performed by: NURSE PRACTITIONER

## 2020-05-14 PROCEDURE — 99214 OFFICE O/P EST MOD 30 MIN: CPT | Performed by: NURSE PRACTITIONER

## 2020-05-14 RX ORDER — PRAMIPEXOLE DIHYDROCHLORIDE 0.5 MG/1
TABLET ORAL
Qty: 90 TABLET | Refills: 1 | Status: SHIPPED | OUTPATIENT
Start: 2020-05-14 | End: 2020-08-14 | Stop reason: ALTCHOICE

## 2020-06-01 ENCOUNTER — APPOINTMENT (OUTPATIENT)
Dept: LAB | Facility: CLINIC | Age: 20
End: 2020-06-01
Payer: COMMERCIAL

## 2020-06-01 DIAGNOSIS — R53.82 CHRONIC FATIGUE: ICD-10-CM

## 2020-06-01 LAB
FERRITIN SERPL-MCNC: 166 NG/ML (ref 8–388)
IRON SATN MFR SERPL: 23 %
IRON SERPL-MCNC: 80 UG/DL (ref 65–175)
MAGNESIUM SERPL-MCNC: 2.5 MG/DL (ref 1.6–2.6)
TIBC SERPL-MCNC: 343 UG/DL (ref 250–450)

## 2020-06-01 PROCEDURE — 82728 ASSAY OF FERRITIN: CPT

## 2020-06-01 PROCEDURE — 36415 COLL VENOUS BLD VENIPUNCTURE: CPT

## 2020-06-01 PROCEDURE — 83540 ASSAY OF IRON: CPT

## 2020-06-01 PROCEDURE — 83735 ASSAY OF MAGNESIUM: CPT

## 2020-06-01 PROCEDURE — 83550 IRON BINDING TEST: CPT

## 2020-06-12 ENCOUNTER — OFFICE VISIT (OUTPATIENT)
Dept: FAMILY MEDICINE CLINIC | Facility: CLINIC | Age: 20
End: 2020-06-12
Payer: COMMERCIAL

## 2020-06-12 VITALS
HEART RATE: 90 BPM | OXYGEN SATURATION: 98 % | BODY MASS INDEX: 34.54 KG/M2 | SYSTOLIC BLOOD PRESSURE: 118 MMHG | TEMPERATURE: 97.8 F | HEIGHT: 75 IN | WEIGHT: 277.8 LBS | DIASTOLIC BLOOD PRESSURE: 80 MMHG

## 2020-06-12 DIAGNOSIS — F98.8 ATTENTION DEFICIT DISORDER (ADD) IN ADULT: ICD-10-CM

## 2020-06-12 DIAGNOSIS — F32.A DEPRESSION, UNSPECIFIED DEPRESSION TYPE: Primary | ICD-10-CM

## 2020-06-12 PROCEDURE — 3074F SYST BP LT 130 MM HG: CPT | Performed by: NURSE PRACTITIONER

## 2020-06-12 PROCEDURE — 3008F BODY MASS INDEX DOCD: CPT | Performed by: NURSE PRACTITIONER

## 2020-06-12 PROCEDURE — 3079F DIAST BP 80-89 MM HG: CPT | Performed by: NURSE PRACTITIONER

## 2020-06-12 PROCEDURE — 1036F TOBACCO NON-USER: CPT | Performed by: NURSE PRACTITIONER

## 2020-06-12 PROCEDURE — 99213 OFFICE O/P EST LOW 20 MIN: CPT | Performed by: NURSE PRACTITIONER

## 2020-06-12 RX ORDER — BUPROPION HYDROCHLORIDE 150 MG/1
150 TABLET ORAL EVERY MORNING
Qty: 30 TABLET | Refills: 5 | Status: SHIPPED | OUTPATIENT
Start: 2020-06-12 | End: 2020-08-27 | Stop reason: SDUPTHER

## 2020-06-18 ENCOUNTER — SOCIAL WORK (OUTPATIENT)
Dept: BEHAVIORAL/MENTAL HEALTH CLINIC | Facility: CLINIC | Age: 20
End: 2020-06-18
Payer: COMMERCIAL

## 2020-06-18 DIAGNOSIS — F32.A ANXIETY AND DEPRESSION: Primary | ICD-10-CM

## 2020-06-18 DIAGNOSIS — F41.9 ANXIETY AND DEPRESSION: Primary | ICD-10-CM

## 2020-06-18 PROCEDURE — 1036F TOBACCO NON-USER: CPT | Performed by: SOCIAL WORKER

## 2020-06-18 PROCEDURE — 90834 PSYTX W PT 45 MINUTES: CPT | Performed by: SOCIAL WORKER

## 2020-08-14 ENCOUNTER — TELEMEDICINE (OUTPATIENT)
Dept: SLEEP CENTER | Facility: CLINIC | Age: 20
End: 2020-08-14
Payer: COMMERCIAL

## 2020-08-14 ENCOUNTER — DOCUMENTATION (OUTPATIENT)
Dept: PSYCHIATRY | Facility: CLINIC | Age: 20
End: 2020-08-14

## 2020-08-14 VITALS — BODY MASS INDEX: 34.44 KG/M2 | WEIGHT: 277 LBS | HEIGHT: 75 IN

## 2020-08-14 DIAGNOSIS — F32.2 CURRENT SEVERE EPISODE OF MAJOR DEPRESSIVE DISORDER WITHOUT PSYCHOTIC FEATURES WITHOUT PRIOR EPISODE (HCC): ICD-10-CM

## 2020-08-14 DIAGNOSIS — G25.81 RESTLESS LEG SYNDROME: ICD-10-CM

## 2020-08-14 DIAGNOSIS — G47.11 HYPERSOMNIA WITH LONG SLEEP TIME, IDIOPATHIC: Primary | ICD-10-CM

## 2020-08-14 PROCEDURE — 99214 OFFICE O/P EST MOD 30 MIN: CPT | Performed by: NURSE PRACTITIONER

## 2020-08-14 PROCEDURE — 1036F TOBACCO NON-USER: CPT | Performed by: NURSE PRACTITIONER

## 2020-08-14 NOTE — PROGRESS NOTES
Virtual Regular Visit      Assessment/Plan:    Problem List Items Addressed This Visit     Current severe episode of major depressive disorder without psychotic features without prior episode ([facility] [address] )    Hypersomnia with long sleep time, idiopathic - Primary               Reason for visit is hypersomnia with long sleep time, restless leg syndrome    Encounter provider YAO Case    Provider located at [address] [place] [address]  [phone]      Recent Visits  No visits were found meeting these conditions  Showing recent visits within past 7 days and meeting all other requirements     Today's Visits  Date Type Provider Dept   08/14/20 [facility] YAO [facility] [place]   Showing today's visits and meeting all other requirements     Future Appointments  No visits were found meeting these conditions  Showing future appointments within next 150 days and meeting all other requirements        The patient was identified by name and date of birth  Joshua Balderas was informed that this is a telemedicine visit and that the visit is being conducted [place] [facility] and patient was informed that this is a secure, HIPAA-compliant platform  He agrees to proceed     My office door was closed  No one else was in the room  He acknowledged consent and understanding of privacy and security of the video platform  The patient has agreed to participate and understands they can discontinue the visit at any time  Patient is aware this is a billable service  Darnell Huggins is a 23 y o  male presents for a video visit via telemedicine amid the Covid-19 pandemic emergency      He presents for follow up of restless legs and hypersomnia with long sleep time  He complained of hypersomnia with long periods of sleep, up to 30 hours at a time  He was also having excessive dreaming and sleep paralysis    He has a long history of excessive sleep, due to depression  He continues to see psychiatry and was taking several medications for major depression and is unsure if they are helping  He is also treated with methylphenidate for ADHD, which improves wakefulness  He completed a diagnostic sleep study in June 2019 with a planned MSLT to follow, however, he was found to have a significant number of periodic limb movements that interrupted his sleep  He began treatment with gabapentin at bedtime  He did not notice improvement in daytime wakefulness, however, he felt that symptoms had improved  A repeat diagnostic with MSLT was completed on 8/12/19  Testing was not confirmative for idiopathic hypersomnia or narcolepsy  Average sleep latency was 18 minutes           Past Medical History:   Diagnosis Date    ADHD (attention deficit hyperactivity disorder)     Anxiety     Chronic kidney disease     kidney stones    Concussion     Depression     Self-injurious behavior     Sleep difficulties     history of sleep apnea  and getting workup for narcolepsy as of 8/19       Past Surgical History:   Procedure Laterality Date    HERNIA REPAIR      TONSILLECTOMY         Current Outpatient Medications   Medication Sig Dispense Refill    ARIPiprazole (ABILIFY) 5 mg tablet Take 1 tablet (5 mg total) by mouth daily (Patient not taking: Reported on 8/14/2020) 90 tablet 0    buPROPion (WELLBUTRIN XL) 150 mg 24 hr tablet Take 1 tablet (150 mg total) by mouth every morning (Patient not taking: Reported on 8/14/2020) 30 tablet 5    busPIRone (BUSPAR) 10 mg tablet Take 15mg TID (Patient not taking: Reported on 8/14/2020) 135 tablet 3    losartan-hydrochlorothiazide (HYZAAR) 100-25 MG per tablet Take 1 tablet by mouth daily for 180 days (Patient not taking: Reported on 6/12/2020) 90 tablet 3    methylphenidate (CONCERTA) 36 MG ER tablet Take 1 tablet (36 mg total) by mouth dailyMax Daily Amount: 36 mg (Patient not taking: Reported on 6/12/2020) 30 tablet 0    methylphenidate (RITALIN) 10 mg tablet Take 0 5 to 1 tablet (5 mg or 10 mg) after lunch daily as needed, max daily dose of 10 mg (patient is taking Concerta ER 36 mg in AM) (Patient not taking: Reported on 6/12/2020) 30 tablet 0    sertraline (ZOLOFT) 100 mg tablet Take 50mg daily (once effexor down to 75mg daily), then after 1 week increase to 100mg daily  (Patient not taking: Reported on 6/12/2020) 90 tablet 0     No current facility-administered medications for this visit  Allergies   Allergen Reactions    Scopolamine        Video Exam    Vitals:    08/14/20 1604   Weight: 126 kg (277 lb)   Height: 6' 3" (1 905 m)       EPWORTH SLEEPINESS SCORE: 9      Past History Since Last Sleep Center Visit:   At his last visit, he continued to experience daytime sleepiness and excessive periods of sleep  He had stopped the use of gabapentin for periodic limb movements of sleep, as well as urges to move his legs when relaxing before going to bed  He felt that it wasn't helping him  Pramipexole was prescribed  He doesn't know if it helped because he decided to stop taking all of his medications  He doesn't feel like anything is helping and he just needs to wait out his unfortunate situation  He feels he "sleeps to escape from reality "  He feels that there is nothing for him to do all day, due to no transportation, no job and no relationships except for his parents and his brother      The review of systems and following portions of the patient's history were reviewed and updated as appropriate: allergies, current medications, past family history, past medical history, past social history, past surgical history, and problem list       Review of Systems      Genitourinary none   Cardiology none   Gastrointestinal none   Neurology need to move extremities, forgetfulness and poor concentration or confusion,    Constitutional fatigue   Integumentary none   Psychiatry anxiety and depression   Musculoskeletal none Pulmonary none   ENT none   Endocrine none   Hematological none       OBJECTIVE    Physical Exam:     General Appearance:   Alert, cooperative, no distress, appears stated age, obese     Head:   Normocephalic, without obvious abnormality     Eyes:   EOM's grossly intact          Nose:  Nares appear normal, septum appears midline, no mucosal lesions, drainage or sinus tenderness reported           Throat:  Lips, teeth and gums reportedly normal; no reported lesions of tongue     Neck:    Lungs:   Circumference appears to be normal, no obvious JVD     Respirations appear to be unlabored     Heart:      No reported palpitations or chest pain       Extremities:  Pain, cyanosis and edema denied     Skin:  Skin color appears to be normal, no rashes or lesions reported       Neurologic:  CNII-XII appear to be intact  Normal strength, sensation reportedly normal   throughout       ASSESSMENT / PLAN    1  Hypersomnia with long sleep time, idiopathic     2  Current severe episode of major depressive disorder without psychotic features without prior episode ([facility]             Counseling / Coordination of Care    A description of the counseling / coordination of care:     Impressions, Diagnostic results, Prognosis, Instructions for management, Risks and benefits of treatment, Patient and family education, Risk factor reductions and Importance of compliance with treatment    Today, we discussed his current sleep schedule  At this point in time, he is sleeping all day long  He reports that he falls asleep around 7:00am and wakes up around 10:00pm   He also may fall asleep between 1:00am and 3:00am   He states that he rarely leaves his room in the basement  There are no windows and he really doesn't see daylight to give him a sense of day and night  He was hiking earlier in the summer, but the family car no longer has inspection and needs some junk yard parts    He states that he is just waiting for his life to change and has nothing else to do but sleep  He has stopped taking all of his medications because he feels they aren't helping  He reports that he follows with psychiatry every 6 months  We have discussed the importance of maintaining a sleep wake cycle, however, he feels he has no motivation to work on this until he is able to get a job  He states that he is planning to have an interview in the future and is waiting for this  There is a component of restless legs syndrome, however, his excessive sleep is the result of his severe depression and social situation  He does not wish to take any medication regarding his restless legs and will not schedule a follow up visit at this time, but I have asked him to call with any questions or difficulty he may have regarding his sleep  The following instructions have been given to the patient today:    There are no Patient Instructions on file for this visit  Francesca Singh, [address] [place] [address]    I spent 20 minutes with patient today in which greater than 50% of the time was spent in counseling/coordination of care regarding restless legs and hypersomnia      VIRTUAL VISIT [address] acknowledges that he has consented to an online visit or consultation  He understands that the online visit is based solely on information provided by him, and that, in the absence of a face-to-face physical evaluation by the physician, the diagnosis he receives is both limited and provisional in terms of accuracy and completeness  This is not intended to replace a full medical face-to-face evaluation by the physician  Jeana Jaffe understands and accepts these terms

## 2020-08-14 NOTE — PATIENT INSTRUCTIONS
1   Try to go outside during daytime hours every day  Increase physical activity, which will help you feel better and sleep better  2   Continue to work with behavioral health regarding your depression, which is affecting your desire to sleep and sleep schedule  3  If depression is worsening or you feel you want to harm yourself, contact Katelyn Kimble 24 hour hotline [phone]  If number has changed, the [facility] is [phone] and they can help you reach someone locally  4   If you need any help with sleep, restless legs or working to adjust your sleep schedule, please call the nurse line below or send me a message on [employer]  We can schedule an appointment, if needed  Nursing Support:  When: Monday through Friday 7A-5PM except holidays  Where: Our direct line is [phone]  If you are having a true emergency please call 911  In the event that the line is busy or it is after hours please leave a voice message and we will return your call  Please speak clearly, leaving your full name, birth date, best number to reach you and the reason for your call  Medication refills: We will need the name of the medication, the dosage, the ordering provider, whether you get a 30 or 90 day refill, and the pharmacy name and address  Medications will be ordered by the provider only  Nurses cannot call in prescriptions  Please allow 7 days for medication refills  Physician requested updates: If your provider requested that you call with an update after starting medication, please be ready to provide us the medication and dosage, what time you take your medication, the time you attempt to fall asleep, time you fall asleep, when you wake up, and what time you get out of bed  Sleep Study Results: We will contact you with sleep study results and/or next steps after the physician has reviewed your testing

## 2020-08-24 ENCOUNTER — TELEPHONE (OUTPATIENT)
Dept: PSYCHIATRY | Facility: CLINIC | Age: 20
End: 2020-08-24

## 2020-08-24 NOTE — TELEPHONE ENCOUNTER
Evie Talley called and left a message  He was seeing someone in [facility] at the [facility]  He believes there names was Dr Crowe  He was having a log of issues getting medications sent to the right pharmacy so he left their practice  His PCP did prescribe him Wellbutrin but he is currently not taking any of his psychiatric medications

## 2020-08-24 NOTE — TELEPHONE ENCOUNTER
Matty Hernandez called and left a message that he got a call from Sarah Caldwell' nurse asking about his medications  He said that stopped taking all of his medications two months ago  He was seeing a different provider and he was prescribed Wellbutrin and Abilify  He says he isnt taking them currently

## 2020-08-24 NOTE — TELEPHONE ENCOUNTER
Is he talking about a different provider in this office Maricruz Kraft) or is he under care someplace else? I presume the former, remind him of upcoming appointment  They will need to discuss meds at that time       ((If he sees someone elsewhere, that is fine, but advise him to f/u with them))    Thanks,  DANIELE

## 2020-08-24 NOTE — TELEPHONE ENCOUNTER
Called and left another message for Roxanne Medina to call me back regarding his medications  LM that I wanted to make sure he is following up with prescribing doctor and requesting he call me back with this information  Also LM reminding him of upcoming appointment with Mark Perez

## 2020-08-25 NOTE — TELEPHONE ENCOUNTER
Good morning Daphney Bland,    Looks like a matter to discuss/clarify at your upcoming appointment       Didi Hardwick

## 2020-08-26 ENCOUNTER — DOCUMENTATION (OUTPATIENT)
Dept: PSYCHIATRY | Facility: CLINIC | Age: 20
End: 2020-08-26

## 2020-08-26 ENCOUNTER — TELEPHONE (OUTPATIENT)
Dept: PSYCHIATRY | Facility: CLINIC | Age: 20
End: 2020-08-26

## 2020-08-26 NOTE — TELEPHONE ENCOUNTER
Taryn Renteria called to confirm his appointment tomorrow morning at 8 am with Indiana Barragan       He would like the appointment to be virtual    [phone]

## 2020-08-27 ENCOUNTER — TELEMEDICINE (OUTPATIENT)
Dept: PSYCHIATRY | Facility: CLINIC | Age: 20
End: 2020-08-27
Payer: COMMERCIAL

## 2020-08-27 VITALS — HEIGHT: 75 IN | WEIGHT: 280 LBS | BODY MASS INDEX: 34.82 KG/M2

## 2020-08-27 DIAGNOSIS — I10 ESSENTIAL HYPERTENSION: ICD-10-CM

## 2020-08-27 DIAGNOSIS — G47.8 SLEEP PARALYSIS: ICD-10-CM

## 2020-08-27 DIAGNOSIS — R44.2 HYPNOPOMPIC HALLUCINATION: ICD-10-CM

## 2020-08-27 DIAGNOSIS — G47.11 HYPERSOMNIA WITH LONG SLEEP TIME, IDIOPATHIC: ICD-10-CM

## 2020-08-27 DIAGNOSIS — R53.82 CHRONIC FATIGUE: ICD-10-CM

## 2020-08-27 DIAGNOSIS — F98.8 ATTENTION DEFICIT DISORDER (ADD) IN ADULT: ICD-10-CM

## 2020-08-27 DIAGNOSIS — F32.A DEPRESSION, UNSPECIFIED DEPRESSION TYPE: ICD-10-CM

## 2020-08-27 DIAGNOSIS — F32.2 CURRENT SEVERE EPISODE OF MAJOR DEPRESSIVE DISORDER WITHOUT PSYCHOTIC FEATURES WITHOUT PRIOR EPISODE (HCC): Primary | ICD-10-CM

## 2020-08-27 DIAGNOSIS — F41.1 GAD (GENERALIZED ANXIETY DISORDER): ICD-10-CM

## 2020-08-27 DIAGNOSIS — G25.81 RESTLESS LEG SYNDROME: ICD-10-CM

## 2020-08-27 PROCEDURE — 90833 PSYTX W PT W E/M 30 MIN: CPT | Performed by: NURSE PRACTITIONER

## 2020-08-27 PROCEDURE — 99214 OFFICE O/P EST MOD 30 MIN: CPT | Performed by: NURSE PRACTITIONER

## 2020-08-27 PROCEDURE — 3008F BODY MASS INDEX DOCD: CPT | Performed by: NURSE PRACTITIONER

## 2020-08-27 RX ORDER — SERTRALINE HYDROCHLORIDE 100 MG/1
TABLET, FILM COATED ORAL
Qty: 26 TABLET | Refills: 1 | Status: SHIPPED | OUTPATIENT
Start: 2020-08-27 | End: 2020-09-24 | Stop reason: SDUPTHER

## 2020-08-27 RX ORDER — BUPROPION HYDROCHLORIDE 150 MG/1
150 TABLET ORAL EVERY MORNING
Qty: 30 TABLET | Refills: 1 | Status: SHIPPED | OUTPATIENT
Start: 2020-08-27 | End: 2020-10-15 | Stop reason: SDUPTHER

## 2020-08-27 NOTE — BH TREATMENT PLAN
TREATMENT PLAN (Medication Management Only)        [facility]    Name and Date of Birth:  Damian Fatima 20 y o  2000  Date of Treatment Plan: August 27, 2020  Diagnosis/Diagnoses:    1  Current severe episode of major depressive disorder without psychotic features without prior episode ([facility] [address] )    2  KORY (generalized anxiety disorder)    3  Attention deficit disorder (ADD) in adult    4  Chronic fatigue    5  Restless leg syndrome    6  Essential hypertension    7  Hypersomnia with long sleep time, idiopathic    8  Sleep paralysis    9  Hypnopompic hallucination      Strengths/Personal Resources for Self-Care: "my ability to see something through and resiliancy"  Area/Areas of need (in own words): "improve on my daily responsibility and self care"  1  Long Term Goal: improve self-care  Target Date: 2 months - 10/27/2020  Person/Persons responsible for completion of goal: Marko  2  Short Term Objective (s) - How will we reach this goal?:   A  Provider new recommended medication/dosage changes and/or continue medication(s): re start Wellbutrin and zoloft  B  create and maintain sleep schedule and daily schedule with white board in room (and phone calendar)  C  exercise 30 min 4 x per week  Target Date: 6 months - 2/27/2021  Person/Persons Responsible for Completion of Goal: Marko  Progress Towards Goals: continuing treatment  Treatment Modality: medication management every 4 weeks  Review due 90 to 120 days from date of this plan: 6 months  Expected length of service: ongoing treatment  My Physician/PA/NP and I have developed this plan together and I agree to work on the goals and objectives  I understand the treatment goals that were developed for my treatment    Treatment Plan done but not signed at time of office visit due to:  Plan reviewed by phone or in person  and verbal consent given due to Precious social ruben

## 2020-08-27 NOTE — PSYCH
Virtual Regular Visit    Name and Date of Birth:  Bharathi Butler 20 y o  2000 MRN: [id number]    Date of Visit: August 27, 2020    Assessment/Plan:    Problem List Items Addressed This Visit        Cardiovascular and Mediastinum    Essential hypertension       Other    Attention deficit disorder (ADD) in adult    Relevant Medications    buPROPion (WELLBUTRIN XL) 150 mg 24 hr tablet    sertraline (ZOLOFT) 100 mg tablet    Current severe episode of major depressive disorder without psychotic features without prior episode ([facility] [address] ) - Primary    Relevant Medications    buPROPion (WELLBUTRIN XL) 150 mg 24 hr tablet    sertraline (ZOLOFT) 100 mg tablet    Chronic fatigue    Restless leg syndrome    Hypersomnia with long sleep time, idiopathic    Sleep paralysis    Hypnopompic hallucination    KORY (generalized anxiety disorder)    Relevant Medications    buPROPion (WELLBUTRIN XL) 150 mg 24 hr tablet    sertraline (ZOLOFT) 100 mg tablet    Depression    Relevant Medications    buPROPion (WELLBUTRIN XL) 150 mg 24 hr tablet    sertraline (ZOLOFT) 100 mg tablet          Reason for visit is   Chief Complaint   Patient presents with    Virtual Regular Visit    Anxiety    Depression    ADHD    Follow-up        [facility]    Provider located at [address] [address]      Recent Visits  Date Type Provider Dept   08/26/20 Telephone Britany Aranda recent visits within past 7 days and meeting all other requirements     Today's Visits  Date Type Provider Dept   08/27/20 Telemedicine Britany Estrella today's visits and meeting all other requirements     Future Appointments  No visits were found meeting these conditions     Showing future appointments within next 150 days and meeting all other requirements        The patient was identified by name and date of birth  Lang Howard was informed that this is a telemedicine visit and that the visit is being conducted through [employer]  My office door was closed  No one else was in the room  He acknowledged consent and understanding of privacy and security of the video platform  The patient has agreed to participate and understands they can discontinue the visit at any time  Patient is aware this is a billable service  Past Medical History:   Diagnosis Date    ADHD (attention deficit hyperactivity disorder)     Anxiety     Chronic kidney disease     kidney stones    Concussion     Depression     Self-injurious behavior     Sleep difficulties     history of sleep apnea  and getting workup for narcolepsy as of 8/19       Past Surgical History:   Procedure Laterality Date    HERNIA REPAIR      TONSILLECTOMY         Current Outpatient Medications   Medication Sig Dispense Refill    buPROPion (WELLBUTRIN XL) 150 mg 24 hr tablet Take 1 tablet (150 mg total) by mouth every morning 30 tablet 1    losartan-hydrochlorothiazide (HYZAAR) 100-25 MG per tablet Take 1 tablet by mouth daily for 180 days (Patient not taking: Reported on 6/12/2020) 90 tablet 3    sertraline (ZOLOFT) 100 mg tablet Take 0 5 tablets (50 mg total) by mouth daily for 8 days, THEN 1 tablet (100 mg total) daily for 22 days  and continue 100mg daily thereafter until next appointment    26 tablet 1     No current facility-administered medications for this visit  Allergies   Allergen Reactions    Scopolamine            Vitals:    08/27/20 0918   Weight: 127 kg (280 lb)   Height: 6' 3" (1 905 m)       VIRTUAL VISIT DISCLAIMER    Lang Howard acknowledges that he has consented to an online visit or consultation   He understands that the online visit is based solely on information provided by him, and that, in the absence of a face-to-face physical evaluation by the physician, the diagnosis he receives is both limited and provisional in terms of accuracy and completeness  This is not intended to replace a full medical face-to-face evaluation by the physician  Garret Mcallister understands and accepts these terms  SUBJECTIVE:    Roxanne Medina is seen today for a follow up for Major Depressive Disorder, Generalized Anxiety Disorder and ADHD  Since Marko's last visit with Dr Jacky Lawton on January 13, 2020 he reports his overall symptoms have been gradually worsening  Roxanne Medina missed his appointment with this provider on 03/10/2020 and reports he stopped taking his psychiatric medications approximately 2 months ago  He states he was getting them from Dr Martha Menendez at [facility]  Roxanne Medina states Dr Martha Menendez continued the Abilify, Wellbutrin and she had increased the buspar however he developed nausea and body aches (stopped late May) "almost like flu like symptoms about 1/2 hour after I would take it "  I went on vacation about 2 months ago (Mid July) and I decided to stop all of my medications "I just couldn't tell If they were helping or not "  He states since I stopped the medications (when I was on I felt alright like I could go for a hike but I would have sad days, when I was on my energy and motivation was marginally better but I was sad)  Roxanne Medina states as his living situation changes his mood changes so he is currently living at home with his parents in [facility], he states he completed his 3rd year of college but has decided to stay home from school  He states he had to withdrawal from school as he was sleeping a lot and he owes the school money so he is unable to sign up for classes until he pays the money he owes  Roxanne Medina states he is in a better place than he was than 1 year ago however he states he hit a snag        States he smokes canabis 1 X monthly and 1 alcoholic beverage per week "if that "     HPI ROS:             ('was' notes: recent => remote)  Medication Side Effects: Buspar-dizzy, body aches  (Was no)   Depression (10 worst): 6 (Was 6-7 now back on meds)   Anxiety (10 worst): 3 (Was 5)   Safety concerns (SI, HI, etc): Denies, states he has not had any thoughts like this in a very long time  "I do have brief intrusive thoughts about death but not about my own demise or about harming anyone " Denies active SI/HI  (Was better on meds, 3/10 death wish, no plan or intent  Protective features, would not do anything and would seek help)   Hallucinations/Delusions denies (Was denies)   Sleep: 12-15 hours per night, denies NM (Was increased)   Energy: Energy and motivation "close to none" (Was low)   Appetite: "Normal for me-1 big meal per day" (Was low)   Height 6 ft 3 in (Was -)   Weight Change: 280 lbs (Was no)     Lilianshante Christina denies any side effects from medications unless noted above    Review Of Systems:      Constitutional negative   ENT negative   Cardiovascular negative   Respiratory negative   Gastrointestinal negative   Genitourinary negative   Musculoskeletal negative   Integumentary negative   Neurological negative   Endocrine negative   Other Symptoms none, all other systems are negative     History Review:  The following portions of the patient's history were reviewed and documented: allergies, current medications, past family history, past medical history, past social history and problem list      Lab Review: Labs were reviewed and discussed with patient  Laboratory Results:   Most Recent Labs:   Lab Results   Component Value Date    WBC 9 21 01/14/2020    RBC 5 55 01/14/2020    HGB 16 4 01/14/2020    HCT 49 2 01/14/2020     01/14/2020    RDW 12 9 01/14/2020    NEUTROABS 5 65 01/14/2020     12/30/2014    K 4 7 01/14/2020     (H) 01/14/2020    CO2 30 01/14/2020    BUN 12 01/14/2020    CREATININE 0 94 01/14/2020    GLUCOSE 86 12/30/2014    CALCIUM 9 4 01/14/2020    AST 10 01/14/2020    ALT 33 01/14/2020    ALKPHOS 96 01/14/2020    PROT 7 0 12/30/2014 BILITOT 0 40 12/30/2014    HDL 33 (L) 01/14/2020    TRIG 144 01/14/2020    LDLCALC 99 01/14/2020    MEC1MRBYALFL 1 840 01/14/2020     I have personally reviewed all pertinent laboratory/tests results        Video Exam    OBJECTIVE:     Vital signs in last 24 hours:    Vitals:    08/27/20 0918   Weight: 127 kg (280 lb)   Height: 6' 3" (1 905 m)       Mental Status Evaluation:    Appearance age appropriate, casually dressed   Behavior cooperative, appears anxious, good eye contact   Speech normal rate, normal volume, normal pitch, hypertalkative   Mood depressed, anxious   Affect constricted   Thought Processes goal directed, linear   Associations intact associations   Thought Content no overt delusions, negative thinking, intrusive thoughts   Perceptual Disturbances: no auditory hallucinations, no visual hallucinations   Abnormal Thoughts  Risk Potential Suicidal ideation - None  Homicidal ideation - None  Potential for aggression - No   Orientation oriented to person, place, time/date and situation   Memory recent and remote memory grossly intact   Consciousness alert and awake   Attention Span Concentration Span attention span and concentration are age appropriate   Intellect appears to be of average intelligence   Insight intact and good   Judgement intact and good   Muscle Strength and  Gait unable to assess today due to virtual visit   Motor activity unable to assess today due to virtual visit   Language no difficulty naming common objects, no difficulty repeating a phrase, unable to assess writing today due to virtual visit   Fund of Knowledge adequate knowledge of current events  adequate fund of knowledge regarding past history  adequate fund of knowledge regarding vocabulary    Pain none   Pain Scale 0       Risks, Benefits And Possible Side Effects Of Medications:    AGREE: Risks, benefits, and possible side effects of medications explained to [facility] and he (or legal representative) verbalizes understanding and agreement for treatment  Controlled Medication Discussion:     Not applicable  ______________________________________________________________    The following portions of the patient's history were reviewed and updated as appropriate: past family history, past medical history, past social history, past surgical history and problem list     Past psychiatric history, family psychiatric history, traumatic history, social history, substance abuse history copied from Dr Lzu Pierre note dated July 18, 2019  Past Psychiatric History  Previous diagnoses include depression, anxiety, ADD  Prior outpatient psychiatric treatment: no  Prior therapy: yes  Prior inpatient psychiatric treatment: no  Prior suicide attempts: no  Prior self harm: hit self at times (side of head)  Prior violence or aggression: no     Social History:  The patient grew up in [place], [place]   Childhood was described as challenging      During childhood, parents were together  They have 0 sister(s) and 1 brother(s)  Patient is younger in birth order     Abuse/neglect: none     As far as the patient (or present family member) is aware, overall childhood development: Patient does ascribe to normal developmental milestones such as walking, talking, potty training and making childhood friends      Education level: good grades, HS Cum Laude  Repeat 6th grade ("I had a mental break down"   Kidney stone lodged in ureter at 15yo  "Instead of just acknowledging that I had anxiety I decided I had some neurologic issue", he had a lot of work ups, all negative   Mom , 'a hypochondraic' fed into)   Current occupation: no  Marital status: single  Children: no  Current Living Situation: the patient currently lives with parents    Social support: some with family     Church Affiliation: Atheist   experience: no  Legal history: no  Access to Guns: yes (father's)     Substance use and treatment:  Tobacco use: no  Caffeine Use: yes  ETOH use: beers most days  Other substance use: marijuana off and on ('messes with anxiety')     Endorses previous experimentation with: LSD, mushrooms, marijuana     Longest clean time: not applicable  History of Inpatient/Outpatient rehabilitation program: no        Traumatic History:   Abuse: none  Other Traumatic Events: none      Family Psychiatric History:      Psychiatric Illness:      Hoarding (parents), Depression - parents, aunts, uncles, anxiety - parents  Substance Abuse:       EtOH- Dad  Suicide Attempts:        no family history of suicide attempts     Denies bipolar disorder, schizophrenia    Past Medical History:    Past Medical History:   Diagnosis Date    ADHD (attention deficit hyperactivity disorder)     Anxiety     Chronic kidney disease     kidney stones    Concussion     Depression     Self-injurious behavior     Sleep difficulties     history of sleep apnea  and getting workup for narcolepsy as of 8/19     Past Medical History Pertinent Negatives:   Diagnosis Date Noted    Disease of thyroid gland 08/19/2019    Eating disorder 08/19/2019    Liver disease 08/19/2019    Obsessive-compulsive disorder 08/19/2019    Violence, history of 08/19/2019     Past Surgical History:   Procedure Laterality Date    HERNIA REPAIR      TONSILLECTOMY       Allergies   Allergen Reactions    Scopolamine        Substance Abuse History:    Social History     Substance and Sexual Activity   Alcohol Use Yes    Frequency: Monthly or less    Drinks per session: 1 or 2    Comment: socially     Social History     Substance and Sexual Activity   Drug Use Yes    Types: Marijuana    Comment: ocasionally       Social History:    Social History     Socioeconomic History    Marital status: Single     Spouse name: Not on file    Number of children: 0    Years of education: Not on file    Highest education level: Some college, no degree   Occupational History    Occupation: student    Social Needs    Financial resource strain: Not on file    Food insecurity     Worry: Not on file     Inability: Not on file    Transportation needs     Medical: Not on file     Non-medical: Not on file   Tobacco Use    Smoking status: Never Smoker    Smokeless tobacco: Never Used   Substance and Sexual Activity    Alcohol use: Yes     Frequency: Monthly or less     Drinks per session: 1 or 2     Comment: socially    Drug use: Yes     Types: Marijuana     Comment: ocasionally    Sexual activity: Not on file   Lifestyle    Physical activity     Days per week: Not on file     Minutes per session: Not on file    Stress: Not on file   Relationships    Social connections     Talks on phone: Not on file     Gets together: Not on file     Attends Baptism service: Not on file     Active member of club or organization: Not on file     Attends meetings of clubs or organizations: Not on file     Relationship status: Not on file    Intimate partner violence     Fear of current or ex partner: Not on file     Emotionally abused: Not on file     Physically abused: Not on file     Forced sexual activity: Not on file   Other Topics Concern    Not on file   Social History Narrative    Not on file       Family Psychiatric History:     Family History   Problem Relation Age of Onset    COPD Mother     Hypertension Mother     Depression Mother     Hypertension Father     Depression Father     Anxiety disorder Father     Alcohol abuse Father     Anxiety disorder Brother     Depression Brother     Hypertension Brother     Alcohol abuse Paternal Grandfather        Confidential Assessment:    Confidential assessment copied from Dr Neena Devlin note July 18, 2019    Previous psychotropic medication trials:   effexor (mid 5031)  Concerta (~2467)  armodafinil (helped a little bit)        History of Seizures: no  History of Head injury-LOC-Concussion: 4th grade, minor concussion (Basketball to head), no PCS    ADD without hyperactivity scale positive    Scales:    PHQ-9= 14  KORY-7=8         Assessment/Plan:       Diagnoses and all orders for this visit:    Current severe episode of major depressive disorder without psychotic features without prior episode (HCC)  -     sertraline (ZOLOFT) 100 mg tablet; Take 0 5 tablets (50 mg total) by mouth daily for 8 days, THEN 1 tablet (100 mg total) daily for 22 days  and continue 100mg daily thereafter until next appointment  Gerhardt Sep KORY (generalized anxiety disorder)  -     sertraline (ZOLOFT) 100 mg tablet; Take 0 5 tablets (50 mg total) by mouth daily for 8 days, THEN 1 tablet (100 mg total) daily for 22 days  and continue 100mg daily thereafter until next appointment       Attention deficit disorder (ADD) in adult  -     buPROPion (WELLBUTRIN XL) 150 mg 24 hr tablet; Take 1 tablet (150 mg total) by mouth every morning    Chronic fatigue    Restless leg syndrome    Essential hypertension    Hypersomnia with long sleep time, idiopathic    Sleep paralysis    Hypnopompic hallucination    Depression, unspecified depression type  -     buPROPion (WELLBUTRIN XL) 150 mg 24 hr tablet; Take 1 tablet (150 mg total) by mouth every morning          Treatment Recommendations/Precautions/Plan:    Babs depression and anxiety are not at goal his sleep is still extremely disjointed and not at goal   He denies suicidal or homicidal ideation, denies delusional thinking  He has been off of his psychiatric medications for at least 2 months  We discussed multiple options however he is agreeable to restart sertraline/Zoloft and Wellbutrin  mg p o  Q a m     We did discuss sleep hygiene at length today  We are going to hold off on restarting stimulants as Wellbutrin will help with focus and attention as well as motivation and energy and we want to be cautious with too many agents that are interrupting sleep cycle    This provider spent a significant amount of time on education with Marko related to sleep cycle importance of adherence to medication, giving medication a chance and taking medication as prescribed at a set time each day  Aysha Bee admits he was taking medication sporadically and not on a regular basis prior to discontinuation 2 months ago  He reports that he was not taking his medication appropriately and he believes this is why he did not see good results  He will continue to follow with sleep medicine, Family Medicine for blood pressure monitoring, restless leg syndrome management and with Psychiatry for mental health medication management  Patient has been educated about their diagnosis and treatment modalities  They voiced understanding and agreement with the following plan:    -restart sertraline/Zoloft 50 mg p o  Daily x1 week then increase to 100 mg p o  Daily thereafter until seeing this provider again to improve symptoms of depression and anxiety  Patient Education for Zoloft completed including serotonin syndrome, SIADH, worsening depression, suicidality, induction of christina, GI upset, headaches, activation, sexual side effects, sedation, potential drug interactions, and others     -restart Wellbutrin/bupropion 150 mg p o  Daily in the morning to improve symptoms of depression, improve energy and motivation as well as improve focus and attention  Patient does not have seizure history  Patient Education for Wellbutrin completed including induction of christina, decreased seizure threshold and risk with alcohol or electrolyte disturbances, headaches, hypertension and cardiovascular effects, GI distress, weight loss, agitation/activation, dizziness, tremor, anxiety, potential for drug interactions, and others        -patient self discontinued BuSpar t i d , he reports psychiatrist in [place] increased and he started having flu-like symptoms, body aches dizziness with increased dosing  He is hesitant to restart this medication at this time    Will hold off for now       -patient self discontinued Ritalin approximately 2 months ago or maybe longer he is unsure  At this time do not tend to restart      -patient self discontinued Concerta 36 mg approximately 2 months ago, at this time will hold off on restarting  At this time patient is not returning to college  May consider restarting in the future     -Continue seeing private therapist from [place], [id number] (may consider changing to a therapist at [facility] however at this time he has been working with this therapist in [address] [facility], however he may need to change due to insurance when [place] pandemic rules change for insurance)    -followup with this provider on 09/24/2020 at 11:30 a m  And again on 10/15/2020 at 2:00 p m     -Follows with family physician Dr Zachary Mccall and YAO Leblanc for medical issues, routine lab work and blood pressure monitoring p r n      -Referral for neuropsychological assessment-spoke with [facility] for patient while he was in my office as he was originally told that he needs a neurology consult prior to seeing them  This was incorrect information, the patient is allowed to call the office to set up an appointment  Patient will be getting testing prior to next visit in our office     -Medication regimen discussed in detail today for 15 minutes with Marko  Dosing schedule reviewed    Linda Florian did see sleep specialist Denis Gudino at [address] [place] [address] (follow up restless legs) 2 weeks ago and he states they explained he has to get himself regulated back to a normal sleep cycle  From notes it appears that Mirapex/prior Paxil was ordered and gabapentin/Neurontin was stopped however Deena Stevenson states he did not initiate this treatment with pramipexole as his sleep schedule has been so disjointed  Today we discussed sleep hygiene at length    He was given web site to goal for sleep hygiene and follow specific wake up time and go to sleep time, he was educated on avoiding his bed during waking daytime hours and only uses bed for sleep no other activities such as playing on the computer or telephone etc   We did discuss Wellbutrin at length due to wakefulness properties however he was educated at length about taking early morning upon waking with alarm and again following sleep hygiene protocols carefully  We set specific treatment plan today with these goals in place he verbalizes understanding he is also going to increase his activity during the day and include exercise  At this time he appears and verbalizes motivation  -patient has not yet started psychotherapy, was originally going to start at [facility] but his father's car broke down, he is now [facility] [address] [facility] [facility] and will not be able to initiate therapy at this time  Encouraged patient to see therapist on campus     -Patient following sleep medicine for review of potential narcolepsy/sleep apnea    -Discussed self monitoring of symptoms, and symptom monitoring tools     -Patient has been informed of 24 hours and weekend coverage for urgent situations accessed by calling the main clinic phone number      -Completed and signed during the session: Yes - with Tarynkristyn Gouldjaida 08/27/2020 electronically and verbal consent obtained due to COVID-19 pandemic virtual visit  Risks/Benefits      Risks, Benefits And Possible Side Effects Of Medications:    Risks, benefits, and possible side effects of medications explained to Taryn Renteria and he verbalizes understanding and agreement for treatment  Patient education on Concerta and Ritalin completed including elevated heart rate, elevated bp, seizures, anxiety/irritability, activation/induction of christina, abuse potential, interactions with other medications, risk of sudden death, appetite suppression/weight loss discussed        Controlled Medication Discussion:     Tarynkristyn Renteria has been filling controlled prescriptions on time as prescribed according to [address]      Psychotherapy Provided:     Individual psychotherapy provided: Yes  Counseling was provided during the session today for 30 minutes  Medications, treatment progress and treatment plan reviewed with [facility]  Medication changes discussed with Marko  Medication education provided to [facility]  Goals discussed during in session: decrease anxiety, decrease depression, improve sleep and improve self-care  Recent stressor including COVID-19 issues, financial problems, school stress, recent move, everyday stressors, ongoing anxiety, chronic mental illness and noncompliance with treatment discussed with Marko  Coping strategies including compliance with medications, contacting a hot line, contacting a therapist, deep/slow breathing, eliminating avoidance, exercising, getting into a good routine, increasing energy, increasing interest in usual activities, increasing motivation, keeping busy at home, maintain healthy diet, maintain heathy sleeping hygiene, making a list of future goals, organizing tasks at home, reaching to other people for help if needed, reducing fatigue and taking walks reviewed with [facility]  Importance of medication and treatment compliance reviewed with Marko  Importance of follow up with family physician for medical issues reviewed with [facility]  Discussed with [facility] acceptance of mental illness diagnosis and need for ongoing psychiatric treatment  Reassurance and supportive therapy provided  Crisis/safety plan discussed with [facility]       I spent 60 minutes directly with the patient during this visit    Kathleen Bassett 08/27/20

## 2020-09-24 ENCOUNTER — TELEMEDICINE (OUTPATIENT)
Dept: PSYCHIATRY | Facility: CLINIC | Age: 20
End: 2020-09-24
Payer: COMMERCIAL

## 2020-09-24 VITALS — HEIGHT: 75 IN | BODY MASS INDEX: 34.82 KG/M2 | WEIGHT: 280 LBS

## 2020-09-24 DIAGNOSIS — F32.2 CURRENT SEVERE EPISODE OF MAJOR DEPRESSIVE DISORDER WITHOUT PSYCHOTIC FEATURES WITHOUT PRIOR EPISODE (HCC): Primary | ICD-10-CM

## 2020-09-24 DIAGNOSIS — F98.8 ATTENTION DEFICIT DISORDER (ADD) IN ADULT: ICD-10-CM

## 2020-09-24 DIAGNOSIS — R53.82 CHRONIC FATIGUE: ICD-10-CM

## 2020-09-24 DIAGNOSIS — G47.11 HYPERSOMNIA WITH LONG SLEEP TIME, IDIOPATHIC: ICD-10-CM

## 2020-09-24 DIAGNOSIS — F32.A DEPRESSION, UNSPECIFIED DEPRESSION TYPE: ICD-10-CM

## 2020-09-24 DIAGNOSIS — F41.1 GAD (GENERALIZED ANXIETY DISORDER): ICD-10-CM

## 2020-09-24 PROCEDURE — 90833 PSYTX W PT W E/M 30 MIN: CPT | Performed by: NURSE PRACTITIONER

## 2020-09-24 PROCEDURE — 99214 OFFICE O/P EST MOD 30 MIN: CPT | Performed by: NURSE PRACTITIONER

## 2020-09-24 RX ORDER — SERTRALINE HYDROCHLORIDE 100 MG/1
150 TABLET, FILM COATED ORAL DAILY
Qty: 45 TABLET | Refills: 0 | Status: SHIPPED | OUTPATIENT
Start: 2020-09-24 | End: 2020-10-15 | Stop reason: SDUPTHER

## 2020-09-24 RX ORDER — IBUPROFEN 600 MG/1
TABLET ORAL
COMMUNITY
Start: 2020-09-21 | End: 2020-10-19

## 2020-09-24 RX ORDER — AMOXICILLIN 500 MG/1
CAPSULE ORAL
COMMUNITY
Start: 2020-09-21 | End: 2021-01-05 | Stop reason: ALTCHOICE

## 2020-09-24 NOTE — PSYCH
Virtual Regular Visit    Name and Date of Birth:  Rachael Barreto 20 y o  2000 MRN: [id number]    Date of Visit:  September 24, 2020    Assessment/Plan:    Problem List Items Addressed This Visit        Other    Attention deficit disorder (ADD) in adult    Relevant Medications    sertraline (ZOLOFT) 100 mg tablet    Current severe episode of major depressive disorder without psychotic features without prior episode ([facility] [address] ) - Primary    Relevant Medications    sertraline (ZOLOFT) 100 mg tablet    Chronic fatigue    Hypersomnia with long sleep time, idiopathic    KROY (generalized anxiety disorder)    Relevant Medications    sertraline (ZOLOFT) 100 mg tablet    Depression    Relevant Medications    sertraline (ZOLOFT) 100 mg tablet          Reason for visit is   Chief Complaint   Patient presents with    Virtual Regular Visit    ADHD    Anxiety    Depression        Encounter provider Jack Lancaster, [facility]    Provider located at [address] [place] [id number]  [place] [address] [place] [address] [address] [id number]      Recent Visits  No visits were found meeting these conditions  Showing recent visits within past 7 days and meeting all other requirements     Today's Visits  Date Type Provider Dept   09/24/20 [address] [facility] 426 today's visits and meeting all other requirements     Future Appointments  No visits were found meeting these conditions  Showing future appointments within next 150 days and meeting all other requirements        The patient was identified by name and date of birth  Rachael Barreto was informed that this is a telemedicine visit and that the visit is being conducted through [employer]  My office door was closed  No one else was in the room  He acknowledged consent and understanding of privacy and security of the video platform   The patient has agreed to participate and understands they can discontinue the visit at any time  Patient is aware this is a billable service  Past Medical History:   Diagnosis Date    ADHD (attention deficit hyperactivity disorder)     Anxiety     Chronic kidney disease     kidney stones    Concussion     Depression     Self-injurious behavior     Sleep difficulties     history of sleep apnea  and getting workup for narcolepsy as of 8/19       Past Surgical History:   Procedure Laterality Date    HERNIA REPAIR      TONSILLECTOMY      TOOTH EXTRACTION Right 09/21/2020    R lower molar extraction       Current Outpatient Medications   Medication Sig Dispense Refill    amoxicillin (AMOXIL) 500 mg capsule       buPROPion (WELLBUTRIN XL) 150 mg 24 hr tablet Take 1 tablet (150 mg total) by mouth every morning 30 tablet 1    ibuprofen (MOTRIN) 600 mg tablet       sertraline (ZOLOFT) 100 mg tablet Take 1 5 tablets (150 mg total) by mouth daily 45 tablet 0    losartan-hydrochlorothiazide (HYZAAR) 100-25 MG per tablet Take 1 tablet by mouth daily for 180 days (Patient not taking: Reported on 6/12/2020) 90 tablet 3     No current facility-administered medications for this visit  Allergies   Allergen Reactions    Scopolamine        Vitals:    09/24/20 1141   Weight: 127 kg (280 lb)   Height: 6' 3" (1 905 m)       VIRTUAL VISIT DISCLAIMER    Brit Cedeno acknowledges that he has consented to an online visit or consultation  He understands that the online visit is based solely on information provided by him, and that, in the absence of a face-to-face physical evaluation by the physician, the diagnosis he receives is both limited and provisional in terms of accuracy and completeness  This is not intended to replace a full medical face-to-face evaluation by the physician  Brit Cedeno understands and accepts these terms  SUBJECTIVE:    Taryn Renteria is seen today for a follow up for Major Depressive Disorder, Generalized Anxiety Disorder and ADHD       Since our last visit, overall symptoms have been Improving in some areas slightly    At times a look forward to waking up in the morning or I am not feeling bad about myself all of the time  I will have times where I don't feel bad about doing things around the house "       Feeling like energy is still a struggle but not napping during the day  On a more organized sleep schedule sleeping through the night and waking by 9 or 10:00 a m  Every day  College currently on hold for the next 1-3 years Ana Ceballos I get my mental health in order    Full-time job at [employer] pending as  3-11 p m  Shift  States he is looking forward to this as he owes [employer] over 9000 dollars  Denies SI/HI, denies hallucinations, denies delusional thinking  Insight good, continue psychotherapy with [facility] therapist      HPI ROS:             ('was' notes: recent => remote)  Medication Side Effects:  decreased appetite  (Was Buspar-dizzy, body aches)   Depression (10 worst): 7 (Was 6)   Anxiety (10 worst): 3 (Was 3)   Safety concerns (SI, HI, etc): denies (Was Denies, states he has not had any thoughts like this in a very long time    "I do have brief intrusive thoughts about death but not about my own demise or about harming anyone " Denies active SI/HI  )   Hallucinations/Delusions denies (Was denies)   Sleep: 12 hours per night; has not been taking naps, now has better sleep pattern and wakes between 9 am and 11am  (Was 12-15 hours per night, denies NM   Energy: Slightly decreased energy level, low motivation (Was Energy and motivation "close to none")   Appetite: decreased (Was "Normal for me-1 big meal per day")   Height 6 ft 3 in  (Was 6 ft 3 in)   Weight Change: 280 lbs patient reported (Was 280 lbs)     Brielle Cárdenas denies any side effects from medications unless noted above    Review Of Systems:      Constitutional negative   ENT negative   Cardiovascular negative   Respiratory negative   Gastrointestinal negative Genitourinary negative   Musculoskeletal negative   Integumentary negative   Neurological negative   Endocrine negative   Other Symptoms none, all other systems are negative     History Review:  The following portions of the patient's history were reviewed and documented: allergies, current medications, past family history, past medical history, past social history and problem list      Lab Review: No new labs or no relevant labs needing review with patient today    Video Exam    OBJECTIVE:     Vital signs in last 24 hours:    Vitals:    09/24/20 1141   Weight: 127 kg (280 lb)   Height: 6' 3" (1 905 m)       Mental Status Evaluation:    Appearance age appropriate, casually dressed   Behavior cooperative, mildly anxious, good eye contact   Speech normal rate, normal volume, normal pitch   Mood slightly less anxious, slightly less depressed   Affect slightly less constricted   Thought Processes organized, goal directed, linear   Associations intact associations   Thought Content no overt delusions, negative thinking   Perceptual Disturbances: no auditory hallucinations, no visual hallucinations   Abnormal Thoughts  Risk Potential Suicidal ideation - None  Homicidal ideation - None  Potential for aggression - No   Orientation oriented to person, place, time/date and situation   Memory recent and remote memory grossly intact   Consciousness alert and awake   Attention Span Concentration Span attention span and concentration are age appropriate   Intellect appears to be of average intelligence   Insight intact and good   Judgement intact and good   Muscle Strength and  Gait unable to assess today due to virtual visit   Motor activity unable to assess today due to virtual visit   Language no difficulty naming common objects, no difficulty repeating a phrase, unable to assess writing today due to virtual visit   Fund of Knowledge adequate knowledge of current events  adequate fund of knowledge regarding past history  adequate fund of knowledge regarding vocabulary    Pain none   Pain Scale 0       Risks, Benefits And Possible Side Effects Of Medications:    AGREE: Risks, benefits, and possible side effects of medications explained to [facility] and he (or legal representative) verbalizes understanding and agreement for treatment  Controlled Medication Discussion:     Not applicable  ______________________________________________________________    The following portions of the patient's history were reviewed and updated as appropriate: past family history, past medical history, past social history, past surgical history and problem list     Past psychiatric history, family psychiatric history, traumatic history, social history, substance abuse history copied from Dr [facility] [place] note dated 2019  Past Psychiatric History  Previous diagnoses include depression, anxiety, ADD  Prior outpatient psychiatric treatment: no  Prior therapy: yes  Prior inpatient psychiatric treatment: no  Prior suicide attempts: no  Prior self harm: hit self at times (side of head)  Prior violence or aggression: no     Social History:  The patient grew up in [place], [place]   Childhood was described as challenging      During childhood, parents were together  They have 0 sister(s) and 1 brother(s)  Patient is younger in birth order     Abuse/neglect: none     As far as the patient (or present family member) is aware, overall childhood development: Patient does ascribe to normal developmental milestones such as walking, talking, potty training and making childhood friends      Education level: good grades, HS Cum Laude  Repeat 6th grade ("I had a mental break down"   Kidney stone lodged in ureter at 13yo  "Instead of just acknowledging that I had anxiety I decided I had some neurologic issue", he had a lot of work ups, all negative   Mom , 'a hypochondraic' fed into)   Current occupation: no  Marital status: single  Children: no  Current Living Situation: the patient currently lives with parents    Social support: some with family     Catholic Affiliation: [facility]   experience: no  Legal history: no  Access to Guns: yes (father's)     Substance use and treatment:  Tobacco use: no  Caffeine Use: yes  ETOH use: beers most days  Other substance use: marijuana off and on ('messes with anxiety')     Endorses previous experimentation with: LSD, mushrooms, marijuana     Longest clean time: not applicable  History of Inpatient/Outpatient rehabilitation program: no        Traumatic History:   Abuse: none  Other Traumatic Events: none      Family Psychiatric History:      Psychiatric Illness:      Hoarding (parents), Depression - parents, aunts, uncles, anxiety - parents  Substance Abuse:       EtOH- Dad  Suicide Attempts:        no family history of suicide attempts     Denies bipolar disorder, schizophrenia    Past Medical History:    Past Medical History:   Diagnosis Date    ADHD (attention deficit hyperactivity disorder)     Anxiety     Chronic kidney disease     kidney stones    Concussion     Depression     Self-injurious behavior     Sleep difficulties     history of sleep apnea  and getting workup for narcolepsy as of 8/19     Past Medical History Pertinent Negatives:   Diagnosis Date Noted    Disease of thyroid gland 08/19/2019    Eating disorder 08/19/2019    Liver disease 08/19/2019    Obsessive-compulsive disorder 08/19/2019    Violence, history of 08/19/2019     Past Surgical History:   Procedure Laterality Date    HERNIA REPAIR      TONSILLECTOMY      TOOTH EXTRACTION Right 09/21/2020    R lower molar extraction     Allergies   Allergen Reactions    Scopolamine        Substance Abuse History:    Social History     Substance and Sexual Activity   Alcohol Use Yes    Frequency: Monthly or less    Drinks per session: 1 or 2    Comment: socially     Social History     Substance and Sexual Activity   Drug Use Yes    Types: Marijuana    Comment: ocasionally       Social History:    Social History     Socioeconomic History    Marital status: Single     Spouse name: Not on file    Number of children: 0    Years of education: Not on file    Highest education level: Some college, no degree   Occupational History    Occupation: student     Occupation: [address]:  3p-11p   Social Needs    Financial resource strain: Not on file    Food insecurity     Worry: Not on file     Inability: Not on file   [employer] needs     Medical: Not on file     Non-medical: Not on file   Tobacco Use    Smoking status: Never Smoker    Smokeless tobacco: Never Used   Substance and Sexual Activity    Alcohol use: Yes     Frequency: Monthly or less     Drinks per session: 1 or 2     Comment: socially    Drug use: Yes     Types: Marijuana     Comment: ocasionally    Sexual activity: Not on file   Lifestyle    Physical activity     Days per week: Not on file     Minutes per session: Not on file    Stress: Not on file   Relationships    Social connections     Talks on phone: Not on file     Gets together: Not on file     Attends Christian service: Not on file     Active member of club or organization: Not on file     Attends meetings of clubs or organizations: Not on file     Relationship status: Not on file    Intimate partner violence     Fear of current or ex partner: Not on file     Emotionally abused: Not on file     Physically abused: Not on file     Forced sexual activity: Not on file   Other Topics Concern    Not on file   Social History Narrative    Not on file       Family Psychiatric History:     Family History   Problem Relation Age of Onset    COPD Mother     Hypertension Mother     Depression Mother     Hypertension Father     Depression Father     Anxiety disorder Father     Alcohol abuse Father     Anxiety disorder Brother     Depression Brother     Hypertension Brother    [facility] Alcohol abuse Paternal Grandfather        Confidential Assessment:    Confidential assessment copied from Dr Marion Alejandre note July 18, 2019    Previous psychotropic medication trials:   effexor (mid 0593)  Concerta (~0915)  armodafinil (helped a little bit)        History of Seizures: no  History of Head injury-LOC-Concussion: 4th grade, minor concussion (Basketball to head), no PCS    ADD without hyperactivity scale positive    Scales:    PHQ-9= 14  KORY-7=8         Assessment/Plan:       Diagnoses and all orders for this visit:    Current severe episode of major depressive disorder without psychotic features without prior episode (HCC)  -     sertraline (ZOLOFT) 100 mg tablet; Take 1 5 tablets (150 mg total) by mouth daily    KORY (generalized anxiety disorder)  -     sertraline (ZOLOFT) 100 mg tablet; Take 1 5 tablets (150 mg total) by mouth daily    Attention deficit disorder (ADD) in adult    Chronic fatigue    Depression, unspecified depression type    Hypersomnia with long sleep time, idiopathic    Other orders  -     amoxicillin (AMOXIL) 500 mg capsule  -     ibuprofen (MOTRIN) 600 mg tablet          Treatment Recommendations/Precautions/Plan:    Narda Tavera reports slight improvement in depressive symptoms however minimal   He states he will have days where he goes to sleep an may look forward to waking up in the morning which he states he has not done in a very long time  He states he is engaging in activities around the house more often, he has a sleep schedule more on track than previously and he is sleeping only 12 hours and during normal hours, I am getting out of bed between 9 and 10:00 a m  Every day    He states he is not having to set an alarm so he feels as though he is more motivated to get up and complete tasks  Narda Tavera is waiting for response back from [facility] as he will be starting a job with them on the 3:00 p m  To 11:00 p m   Shift as a  and he is looking forward to this  He states that college is currently on hold as he owes them 9000 dollars and finances are extremely tight  He states that he knows that he wants to work and obtain a job in the field of Science however he is not currently confident that he wants to do his previously chosen field  He states he wants to get his mental health in order prior to returning to college  I might be going back in 1-3 years but I am just not sure    He continues to struggle with energy however he is not napping during the day  He continues to struggle with depression however he states I do not feel quite as bad about myself, I am not doing as much negative self talk and I just do not hate myself anymore as much as I did before    He is continuing to see psychotherapist out at college however he states insurance may discontinue this by the end of the calendar year and he does have a list of therapists in our area as he wants to continue psychotherapy  His insight is good  His appetite remains somewhat low however he states he is not losing weight he remains steady 280 lb  He denies suicidal homicidal ideation, denies delusional thinking  Patient has been educated about their diagnosis and treatment modalities  They voiced understanding and agreement with the following plan:    -increase sertraline/Zoloft from 100 mg to 150 mg p o  Daily for improvement in symptoms of anxiety and depression  Patient re-education for Zoloft completed including serotonin syndrome, SIADH, worsening depression, suicidality, induction of christina, GI upset, headaches, activation, sexual side effects, sedation, potential drug interactions, and others     -continue bupropion/Wellbutrin 150 mg p o   Daily for improvement in energy, motivation, focus, attention as well as improvement in depression      -if patient continues to struggle significantly with motivation energy may consider restarting Concerta in the future however at this time will hold off and see how he does with the combination of sertraline and Wellbutrin     -Continue seeing private therapist from [place], [address] (may consider changing to a therapist at [facility] however at this time he has been working with this therapist in [address] [facility] [facility], however he may need to change due to insurance when [place] pandemic rules change for insurance)    -followup with this provider on 10/15/2020 at 2:00 p m and on December 10, 2020 11:30 a m     -Follows with family physician Dr Essie Zaldivar and YAO Barrera for medical issues, routine lab work and blood pressure monitoring p r n      -check with patient and next appointment if he had neuro psychological testing completed  -Patient following sleep medicine for review of potential narcolepsy/sleep apnea-patient continues to follow p r n     -Discussed self monitoring of symptoms, and symptom monitoring tools     -Patient has been informed of 24 hours and weekend coverage for urgent situations accessed by calling the main clinic phone number      -Completed and signed during the session: Yes - with Connie Pendleton 08/27/2020 electronically and verbal consent obtained due to COVID-19 pandemic virtual visit  Risks/Benefits      Risks, Benefits And Possible Side Effects Of Medications:    Risks, benefits, and possible side effects of medications explained to Connie Pendleton and he verbalizes understanding and agreement for treatment  Patient education on Concerta and Ritalin completed including elevated heart rate, elevated bp, seizures, anxiety/irritability, activation/induction of christina, abuse potential, interactions with other medications, risk of sudden death, appetite suppression/weight loss discussed        Controlled Medication Discussion:     Stamping Groundshante Pendleton has been filling controlled prescriptions on time as prescribed according to [address]      Psychotherapy Provided:     Individual psychotherapy provided: Yes  Counseling was provided during the session today for 18 minutes  Medications, treatment progress and treatment plan reviewed with Rik Nava  Medication changes discussed with Marko  Medication education provided to Rik Nava  Recent stressor including COVID-19 issues, family issues, chronic mental illness and Financial issues, no longer being in college and a wing the college over 9000 dollars, not sure when he can return to college, starting a new job at [employer] and stressors of living back in the home with his parents who are hoarders discussed with Rik Nava  Coping strategies reviewed with Marko  Importance of medication and treatment compliance reviewed with Marko  Importance of follow up with family physician for medical issues reviewed with Rik Nava  Reassurance and supportive therapy provided  Crisis/safety plan discussed with Rik Nava       I spent 30 minutes directly with the patient during this visit    Kathleen Estrella 09/24/20

## 2020-10-15 ENCOUNTER — TELEMEDICINE (OUTPATIENT)
Dept: PSYCHIATRY | Facility: CLINIC | Age: 20
End: 2020-10-15
Payer: COMMERCIAL

## 2020-10-15 VITALS — BODY MASS INDEX: 34.82 KG/M2 | WEIGHT: 280 LBS | HEIGHT: 75 IN

## 2020-10-15 DIAGNOSIS — G25.81 RESTLESS LEG SYNDROME: ICD-10-CM

## 2020-10-15 DIAGNOSIS — I10 ESSENTIAL HYPERTENSION: ICD-10-CM

## 2020-10-15 DIAGNOSIS — F98.8 ATTENTION DEFICIT DISORDER (ADD) IN ADULT: ICD-10-CM

## 2020-10-15 DIAGNOSIS — F41.1 GAD (GENERALIZED ANXIETY DISORDER): ICD-10-CM

## 2020-10-15 DIAGNOSIS — G47.11 HYPERSOMNIA WITH LONG SLEEP TIME, IDIOPATHIC: ICD-10-CM

## 2020-10-15 DIAGNOSIS — F32.2 CURRENT SEVERE EPISODE OF MAJOR DEPRESSIVE DISORDER WITHOUT PSYCHOTIC FEATURES WITHOUT PRIOR EPISODE (HCC): Primary | ICD-10-CM

## 2020-10-15 DIAGNOSIS — E66.9 OBESITY (BMI 30.0-34.9): ICD-10-CM

## 2020-10-15 DIAGNOSIS — R53.82 CHRONIC FATIGUE: ICD-10-CM

## 2020-10-15 DIAGNOSIS — F32.A DEPRESSION, UNSPECIFIED DEPRESSION TYPE: ICD-10-CM

## 2020-10-15 PROCEDURE — 99214 OFFICE O/P EST MOD 30 MIN: CPT | Performed by: NURSE PRACTITIONER

## 2020-10-15 PROCEDURE — 90833 PSYTX W PT W E/M 30 MIN: CPT | Performed by: NURSE PRACTITIONER

## 2020-10-15 RX ORDER — BUPROPION HYDROCHLORIDE 150 MG/1
150 TABLET ORAL EVERY MORNING
Qty: 90 TABLET | Refills: 0 | Status: SHIPPED | OUTPATIENT
Start: 2020-10-15 | End: 2020-12-28

## 2020-10-15 RX ORDER — SERTRALINE HYDROCHLORIDE 100 MG/1
150 TABLET, FILM COATED ORAL DAILY
Qty: 45 TABLET | Refills: 0 | Status: SHIPPED | OUTPATIENT
Start: 2020-10-15 | End: 2020-10-19

## 2020-10-19 ENCOUNTER — TELEPHONE (OUTPATIENT)
Dept: PSYCHIATRY | Facility: CLINIC | Age: 20
End: 2020-10-19

## 2020-10-19 DIAGNOSIS — F32.2 CURRENT SEVERE EPISODE OF MAJOR DEPRESSIVE DISORDER WITHOUT PSYCHOTIC FEATURES WITHOUT PRIOR EPISODE (HCC): Primary | ICD-10-CM

## 2020-10-19 DIAGNOSIS — F41.1 GAD (GENERALIZED ANXIETY DISORDER): ICD-10-CM

## 2020-10-19 DIAGNOSIS — R53.82 CHRONIC FATIGUE: ICD-10-CM

## 2020-10-19 DIAGNOSIS — G47.11 HYPERSOMNIA WITH LONG SLEEP TIME, IDIOPATHIC: ICD-10-CM

## 2020-10-19 RX ORDER — SERTRALINE HYDROCHLORIDE 25 MG/1
25 TABLET, FILM COATED ORAL DAILY
Qty: 7 TABLET | Refills: 0 | Status: SHIPPED | OUTPATIENT
Start: 2020-10-19 | End: 2020-12-10

## 2020-12-10 ENCOUNTER — APPOINTMENT (OUTPATIENT)
Dept: LAB | Age: 20
End: 2020-12-10
Payer: COMMERCIAL

## 2020-12-10 ENCOUNTER — TELEPHONE (OUTPATIENT)
Dept: PSYCHIATRY | Facility: CLINIC | Age: 20
End: 2020-12-10

## 2020-12-10 ENCOUNTER — OFFICE VISIT (OUTPATIENT)
Dept: LAB | Age: 20
End: 2020-12-10
Payer: COMMERCIAL

## 2020-12-10 ENCOUNTER — OFFICE VISIT (OUTPATIENT)
Dept: PSYCHIATRY | Facility: CLINIC | Age: 20
End: 2020-12-10
Payer: COMMERCIAL

## 2020-12-10 VITALS
HEART RATE: 68 BPM | HEIGHT: 75 IN | SYSTOLIC BLOOD PRESSURE: 134 MMHG | RESPIRATION RATE: 16 BRPM | DIASTOLIC BLOOD PRESSURE: 85 MMHG | BODY MASS INDEX: 36.06 KG/M2 | WEIGHT: 290 LBS

## 2020-12-10 DIAGNOSIS — F41.1 GAD (GENERALIZED ANXIETY DISORDER): ICD-10-CM

## 2020-12-10 DIAGNOSIS — G25.81 RESTLESS LEG SYNDROME: ICD-10-CM

## 2020-12-10 DIAGNOSIS — F98.8 ATTENTION DEFICIT DISORDER (ADD) IN ADULT: ICD-10-CM

## 2020-12-10 DIAGNOSIS — G47.11 HYPERSOMNIA WITH LONG SLEEP TIME, IDIOPATHIC: ICD-10-CM

## 2020-12-10 DIAGNOSIS — I10 ESSENTIAL HYPERTENSION: ICD-10-CM

## 2020-12-10 DIAGNOSIS — R53.82 CHRONIC FATIGUE: ICD-10-CM

## 2020-12-10 DIAGNOSIS — F32.2 CURRENT SEVERE EPISODE OF MAJOR DEPRESSIVE DISORDER WITHOUT PSYCHOTIC FEATURES WITHOUT PRIOR EPISODE (HCC): ICD-10-CM

## 2020-12-10 DIAGNOSIS — F32.2 CURRENT SEVERE EPISODE OF MAJOR DEPRESSIVE DISORDER WITHOUT PSYCHOTIC FEATURES WITHOUT PRIOR EPISODE (HCC): Primary | ICD-10-CM

## 2020-12-10 LAB
25(OH)D3 SERPL-MCNC: 28.8 NG/ML (ref 30–100)
ALBUMIN SERPL BCP-MCNC: 4.2 G/DL (ref 3.5–5)
ALP SERPL-CCNC: 109 U/L (ref 46–116)
ALT SERPL W P-5'-P-CCNC: 62 U/L (ref 12–78)
ANION GAP SERPL CALCULATED.3IONS-SCNC: 6 MMOL/L (ref 4–13)
AST SERPL W P-5'-P-CCNC: 20 U/L (ref 5–45)
ATRIAL RATE: 49 BPM
BASOPHILS # BLD AUTO: 0.04 THOUSANDS/ΜL (ref 0–0.1)
BASOPHILS NFR BLD AUTO: 1 % (ref 0–1)
BILIRUB SERPL-MCNC: 0.59 MG/DL (ref 0.2–1)
BUN SERPL-MCNC: 12 MG/DL (ref 5–25)
CALCIUM SERPL-MCNC: 10.3 MG/DL (ref 8.3–10.1)
CHLORIDE SERPL-SCNC: 106 MMOL/L (ref 100–108)
CO2 SERPL-SCNC: 29 MMOL/L (ref 21–32)
CREAT SERPL-MCNC: 0.88 MG/DL (ref 0.6–1.3)
EOSINOPHIL # BLD AUTO: 0.17 THOUSAND/ΜL (ref 0–0.61)
EOSINOPHIL NFR BLD AUTO: 2 % (ref 0–6)
ERYTHROCYTE [DISTWIDTH] IN BLOOD BY AUTOMATED COUNT: 13.1 % (ref 11.6–15.1)
GFR SERPL CREATININE-BSD FRML MDRD: 122 ML/MIN/1.73SQ M
GLUCOSE P FAST SERPL-MCNC: 91 MG/DL (ref 65–99)
HCT VFR BLD AUTO: 49.7 % (ref 36.5–49.3)
HGB BLD-MCNC: 16.3 G/DL (ref 12–17)
IMM GRANULOCYTES # BLD AUTO: 0.01 THOUSAND/UL (ref 0–0.2)
IMM GRANULOCYTES NFR BLD AUTO: 0 % (ref 0–2)
LYMPHOCYTES # BLD AUTO: 1.8 THOUSANDS/ΜL (ref 0.6–4.47)
LYMPHOCYTES NFR BLD AUTO: 24 % (ref 14–44)
MCH RBC QN AUTO: 28.4 PG (ref 26.8–34.3)
MCHC RBC AUTO-ENTMCNC: 32.8 G/DL (ref 31.4–37.4)
MCV RBC AUTO: 87 FL (ref 82–98)
MONOCYTES # BLD AUTO: 0.68 THOUSAND/ΜL (ref 0.17–1.22)
MONOCYTES NFR BLD AUTO: 9 % (ref 4–12)
NEUTROPHILS # BLD AUTO: 4.71 THOUSANDS/ΜL (ref 1.85–7.62)
NEUTS SEG NFR BLD AUTO: 64 % (ref 43–75)
NRBC BLD AUTO-RTO: 0 /100 WBCS
P AXIS: 31 DEGREES
PLATELET # BLD AUTO: 209 THOUSANDS/UL (ref 149–390)
PMV BLD AUTO: 10.7 FL (ref 8.9–12.7)
POTASSIUM SERPL-SCNC: 4.2 MMOL/L (ref 3.5–5.3)
PR INTERVAL: 150 MS
PROT SERPL-MCNC: 7.5 G/DL (ref 6.4–8.2)
QRS AXIS: 55 DEGREES
QRSD INTERVAL: 96 MS
QT INTERVAL: 436 MS
QTC INTERVAL: 393 MS
RBC # BLD AUTO: 5.73 MILLION/UL (ref 3.88–5.62)
SODIUM SERPL-SCNC: 141 MMOL/L (ref 136–145)
T WAVE AXIS: 26 DEGREES
TSH SERPL DL<=0.05 MIU/L-ACNC: 1.43 UIU/ML (ref 0.36–3.74)
VENTRICULAR RATE: 49 BPM
WBC # BLD AUTO: 7.41 THOUSAND/UL (ref 4.31–10.16)

## 2020-12-10 PROCEDURE — 82306 VITAMIN D 25 HYDROXY: CPT

## 2020-12-10 PROCEDURE — 93005 ELECTROCARDIOGRAM TRACING: CPT

## 2020-12-10 PROCEDURE — 85025 COMPLETE CBC W/AUTO DIFF WBC: CPT

## 2020-12-10 PROCEDURE — 80053 COMPREHEN METABOLIC PANEL: CPT

## 2020-12-10 PROCEDURE — 84443 ASSAY THYROID STIM HORMONE: CPT

## 2020-12-10 PROCEDURE — 93010 ELECTROCARDIOGRAM REPORT: CPT | Performed by: INTERNAL MEDICINE

## 2020-12-10 PROCEDURE — 36415 COLL VENOUS BLD VENIPUNCTURE: CPT

## 2020-12-10 PROCEDURE — 99214 OFFICE O/P EST MOD 30 MIN: CPT | Performed by: NURSE PRACTITIONER

## 2020-12-15 ENCOUNTER — TELEPHONE (OUTPATIENT)
Dept: PSYCHIATRY | Facility: CLINIC | Age: 20
End: 2020-12-15

## 2020-12-26 DIAGNOSIS — F98.8 ATTENTION DEFICIT DISORDER (ADD) IN ADULT: ICD-10-CM

## 2020-12-26 DIAGNOSIS — F32.A DEPRESSION, UNSPECIFIED DEPRESSION TYPE: ICD-10-CM

## 2020-12-28 RX ORDER — BUPROPION HYDROCHLORIDE 150 MG/1
TABLET ORAL
Qty: 90 TABLET | Refills: 3 | Status: SHIPPED | OUTPATIENT
Start: 2020-12-28 | End: 2022-02-03

## 2021-01-05 ENCOUNTER — OFFICE VISIT (OUTPATIENT)
Dept: FAMILY MEDICINE CLINIC | Facility: CLINIC | Age: 21
End: 2021-01-05
Payer: COMMERCIAL

## 2021-01-05 VITALS
TEMPERATURE: 97.5 F | DIASTOLIC BLOOD PRESSURE: 84 MMHG | BODY MASS INDEX: 37.3 KG/M2 | WEIGHT: 300 LBS | HEART RATE: 80 BPM | SYSTOLIC BLOOD PRESSURE: 132 MMHG | HEIGHT: 75 IN | OXYGEN SATURATION: 97 %

## 2021-01-05 DIAGNOSIS — E83.52 HYPERCALCEMIA: ICD-10-CM

## 2021-01-05 DIAGNOSIS — R00.1 SINUS BRADYCARDIA BY ELECTROCARDIOGRAM: ICD-10-CM

## 2021-01-05 DIAGNOSIS — E55.9 VITAMIN D DEFICIENCY: Primary | ICD-10-CM

## 2021-01-05 PROCEDURE — 3075F SYST BP GE 130 - 139MM HG: CPT | Performed by: FAMILY MEDICINE

## 2021-01-05 PROCEDURE — 99214 OFFICE O/P EST MOD 30 MIN: CPT | Performed by: FAMILY MEDICINE

## 2021-01-05 PROCEDURE — 1036F TOBACCO NON-USER: CPT | Performed by: FAMILY MEDICINE

## 2021-01-05 PROCEDURE — 3079F DIAST BP 80-89 MM HG: CPT | Performed by: FAMILY MEDICINE

## 2021-01-05 RX ORDER — ACETAMINOPHEN 160 MG
4000 TABLET,DISINTEGRATING ORAL DAILY
Qty: 180 CAPSULE | Refills: 1 | Status: SHIPPED | OUTPATIENT
Start: 2021-01-05 | End: 2022-03-10

## 2021-01-05 NOTE — PROGRESS NOTES
Assessment/Plan:    No problem-specific Assessment & Plan notes found for this encounter  Diagnoses and all orders for this visit:    Vitamin D deficiency  -     Cholecalciferol (Vitamin D3) 50 MCG (2000 UT) capsule; Take 2 capsules (4,000 Units total) by mouth daily    Sinus bradycardia by electrocardiogram  No symptoms  -     Ambulatory referral to Cardiology; Future    Hypercalcemia  -     Calcium, ionized; Future  -     Comprehensive metabolic panel; Future      Follow up as needed    Subjective:      Patient ID: Jones Segura is a 23 y o  male  Patient is here because he had blood work done by his psychiatrist which showed low Vitamin D  He has been taking OTC Vitamin D  Also he was found to have sinus bradycardia via EKG  He denies any SOB, palpitations or chest pain  He does have a history of heart disease in his family  He was found to have an elevated calcium per blood work denies any symptoms related to this  The following portions of the patient's history were reviewed and updated as appropriate:   He  has a past medical history of ADHD (attention deficit hyperactivity disorder), Anxiety, Chronic kidney disease, Concussion, Depression, Self-injurious behavior, and Sleep difficulties    He   Patient Active Problem List    Diagnosis Date Noted    Vitamin D deficiency 01/05/2021    Sinus bradycardia by electrocardiogram 01/05/2021    Hypercalcemia 01/05/2021    Non-restorative sleep 05/14/2020    KORY (generalized anxiety disorder) 06/17/2019    Sleep paralysis 06/05/2019    Hypnopompic hallucination 06/05/2019    Hypersomnia with long sleep time, idiopathic 06/04/2019    Essential hypertension 05/24/2019    Soft tissue mass 05/24/2019    Restless leg syndrome 03/21/2019    Attention deficit disorder (ADD) in adult 01/09/2018    [facility] 01/09/2018    Current severe episode of major depressive disorder without psychotic features without prior episode ([facility] ) 01/22/2016    Obesity (BMI 30 0-34 9) 09/14/2015    Chronic fatigue 12/18/2014     He  has a past surgical history that includes Hernia repair; Tonsillectomy; and Tooth extraction (Right, 09/21/2020)  His family history includes Alcohol abuse in his father and paternal grandfather; Anxiety disorder in his brother and father; COPD in his mother; Depression in his brother, father, and mother; Hypertension in his brother, father, and mother  He  reports that he has never smoked  He has never used smokeless tobacco  He reports current alcohol use  He reports current drug use  Drug: Marijuana  Current Outpatient Medications   Medication Sig Dispense Refill    buPROPion (WELLBUTRIN XL) 150 mg 24 hr tablet TAKE 1 TABLET EVERY MORNING 90 tablet 3    vortioxetine (TRINTELLIX) 10 MG tablet Take 1 tablet (10 mg total) by mouth daily 90 tablet 0    Cholecalciferol (Vitamin D3) 50 MCG (2000 UT) capsule Take 2 capsules (4,000 Units total) by mouth daily 180 capsule 1     No current facility-administered medications for this visit  Current Outpatient Medications on File Prior to Visit   Medication Sig    buPROPion (WELLBUTRIN XL) 150 mg 24 hr tablet TAKE 1 TABLET EVERY MORNING    vortioxetine (TRINTELLIX) 10 MG tablet Take 1 tablet (10 mg total) by mouth daily    [DISCONTINUED] amoxicillin (AMOXIL) 500 mg capsule     [DISCONTINUED] losartan-hydrochlorothiazide (HYZAAR) 100-25 MG per tablet Take 1 tablet by mouth daily for 180 days (Patient not taking: Reported on 6/12/2020)     No current facility-administered medications on file prior to visit  He is allergic to scopolamine       Review of Systems   Constitutional: Negative for activity change, appetite change, fatigue and fever  HENT: Negative for congestion and ear discharge  Respiratory: Negative for cough and shortness of breath  Cardiovascular: Negative for chest pain and palpitations  Gastrointestinal: Negative for diarrhea and nausea  Musculoskeletal: Negative for arthralgias and back pain  Skin: Negative for color change and rash  Neurological: Negative for dizziness and headaches  Psychiatric/Behavioral: Negative for agitation and behavioral problems  Objective:      /84   Pulse 80   Temp 97 5 °F (36 4 °C) (Temporal)   Ht 6' 3" (1 905 m)   Wt 136 kg (300 lb)   SpO2 97%   BMI 37 50 kg/m²          Physical Exam  Constitutional:       General: He is not in acute distress  Appearance: He is well-developed  He is not diaphoretic  Eyes:      General: No scleral icterus  Pupils: Pupils are equal, round, and reactive to light  Cardiovascular:      Rate and Rhythm: Normal rate and regular rhythm  Heart sounds: Normal heart sounds  No murmur  Pulmonary:      Effort: Pulmonary effort is normal  No respiratory distress  Breath sounds: Normal breath sounds  No wheezing  Abdominal:      General: Bowel sounds are normal  There is no distension  Palpations: Abdomen is soft  Tenderness: There is no abdominal tenderness  Skin:     General: Skin is warm and dry  Findings: No rash  Neurological:      Mental Status: He is alert and oriented to person, place, and time

## 2021-01-05 NOTE — PROGRESS NOTES
BMI Counseling: Body mass index is 37 5 kg/m²  The BMI is above normal  Nutrition recommendations include decreasing overall calorie intake, 3-5 servings of fruits/vegetables daily and reducing fast food intake  Exercise recommendations include exercising 3-5 times per week

## 2021-01-07 ENCOUNTER — TELEMEDICINE (OUTPATIENT)
Dept: PSYCHIATRY | Facility: CLINIC | Age: 21
End: 2021-01-07
Payer: COMMERCIAL

## 2021-01-07 VITALS — WEIGHT: 300 LBS | HEIGHT: 75 IN | BODY MASS INDEX: 37.3 KG/M2

## 2021-01-07 DIAGNOSIS — F32.2 CURRENT SEVERE EPISODE OF MAJOR DEPRESSIVE DISORDER WITHOUT PSYCHOTIC FEATURES WITHOUT PRIOR EPISODE (HCC): Primary | ICD-10-CM

## 2021-01-07 DIAGNOSIS — R53.82 CHRONIC FATIGUE: ICD-10-CM

## 2021-01-07 DIAGNOSIS — G47.11 HYPERSOMNIA WITH LONG SLEEP TIME, IDIOPATHIC: ICD-10-CM

## 2021-01-07 DIAGNOSIS — G25.81 RESTLESS LEG SYNDROME: ICD-10-CM

## 2021-01-07 DIAGNOSIS — F41.1 GAD (GENERALIZED ANXIETY DISORDER): ICD-10-CM

## 2021-01-07 DIAGNOSIS — F98.8 ATTENTION DEFICIT DISORDER (ADD) IN ADULT: ICD-10-CM

## 2021-01-07 DIAGNOSIS — I10 ESSENTIAL HYPERTENSION: ICD-10-CM

## 2021-01-07 PROCEDURE — 99214 OFFICE O/P EST MOD 30 MIN: CPT | Performed by: NURSE PRACTITIONER

## 2021-01-07 PROCEDURE — 3008F BODY MASS INDEX DOCD: CPT | Performed by: FAMILY MEDICINE

## 2021-01-07 PROCEDURE — 90833 PSYTX W PT W E/M 30 MIN: CPT | Performed by: NURSE PRACTITIONER

## 2021-01-07 RX ORDER — PHENOL 1.4 %
5 AEROSOL, SPRAY (ML) MUCOUS MEMBRANE
COMMUNITY

## 2021-01-07 RX ORDER — ATOMOXETINE 40 MG/1
40 CAPSULE ORAL DAILY
Qty: 30 CAPSULE | Refills: 2 | Status: SHIPPED | OUTPATIENT
Start: 2021-01-07 | End: 2021-02-10

## 2021-01-07 RX ORDER — SODIUM FLUORIDE 1.1 G/100G
GEL, DENTIFRICE ORAL
COMMUNITY
Start: 2020-12-30

## 2021-01-07 NOTE — PSYCH
Virtual Regular Visit    Name and Date of Birth:  Polly Harris 20 y o  2000 MRN: [id number]    Date of Visit: January 7, 2021    Assessment/Plan:    Problem List Items Addressed This Visit        Cardiovascular and Mediastinum    Essential hypertension       Other    Attention deficit disorder (ADD) in adult    Relevant Medications    atoMOXetine (STRATTERA) 40 mg capsule    vortioxetine (TRINTELLIX) 20 MG tablet    Current severe episode of major depressive disorder without psychotic features without prior episode ([facility] ) - Primary    Relevant Medications    atoMOXetine (STRATTERA) 40 mg capsule    vortioxetine (TRINTELLIX) 20 MG tablet    Chronic fatigue    Relevant Medications    atoMOXetine (STRATTERA) 40 mg capsule    Restless leg syndrome    Hypersomnia with long sleep time, idiopathic    Relevant Medications    atoMOXetine (STRATTERA) 40 mg capsule    vortioxetine (TRINTELLIX) 20 MG tablet    KORY (generalized anxiety disorder)    Relevant Medications    atoMOXetine (STRATTERA) 40 mg capsule    vortioxetine (TRINTELLIX) 20 MG tablet          Reason for visit is   Chief Complaint   Patient presents with    Virtual Regular Visit    Anxiety    Depression    ADHD    Follow-up    Sleeping Problem        Encounter provider Lorena Monge, [facility]    Provider located at [address] [address]      Recent Visits  No visits were found meeting these conditions  Showing recent visits within past 7 days and meeting all other requirements     Today's Visits  Date Type Provider Dept   01/07/21 [address] [place] [address] today's visits and meeting all other requirements     Future Appointments  No visits were found meeting these conditions  Showing future appointments within next 150 days and meeting all other requirements        The patient was identified by name and date of birth  Lexironey Silva was informed that this is a telemedicine visit and that the visit is being conducted through [employer] and patient was informed that this is a secure, HIPAA-compliant platform  He agrees to proceed     My office door was closed  No one else was in the room  He acknowledged consent and understanding of privacy and security of the video platform  The patient has agreed to participate and understands they can discontinue the visit at any time  Patient is aware this is a billable service  Past Medical History:   Diagnosis Date    ADHD (attention deficit hyperactivity disorder)     Anxiety     Chronic kidney disease     kidney stones    Concussion     Depression     Self-injurious behavior     Sleep difficulties     history of sleep apnea  and getting workup for narcolepsy as of 8/19       Past Surgical History:   Procedure Laterality Date    HERNIA REPAIR      TONSILLECTOMY      TOOTH EXTRACTION Right 09/21/2020    R lower molar extraction       Current Outpatient Medications   Medication Sig Dispense Refill    buPROPion (WELLBUTRIN XL) 150 mg 24 hr tablet TAKE 1 TABLET EVERY MORNING 90 tablet 3    Cholecalciferol (Vitamin D3) 50 MCG (2000 UT) capsule Take 2 capsules (4,000 Units total) by mouth daily 180 capsule 1    Denta 5000 Plus 1 1 % CREA       Melatonin 10 MG TABS Take by mouth      vortioxetine (TRINTELLIX) 20 MG tablet Take 1 tablet (20 mg total) by mouth daily 90 tablet 0    atoMOXetine (STRATTERA) 40 mg capsule Take 1 capsule (40 mg total) by mouth daily 30 capsule 2     No current facility-administered medications for this visit           Allergies   Allergen Reactions    Scopolamine          Video Exam    Vitals:    01/07/21 0901   Weight: 136 kg (300 lb)   Height: 6' 3" (1 905 m)         I spent 30 minutes directly with the patient during this visit      [address] [place] [address] Rachel Kimble acknowledges that he has consented to an online visit or consultation  He understands that the online visit is based solely on information provided by him, and that, in the absence of a face-to-face physical evaluation by the physician, the diagnosis he receives is both limited and provisional in terms of accuracy and completeness  This is not intended to replace a full medical face-to-face evaluation by the physician  Veto Rao understands and accepts these terms  SUBJECTIVE:    Ridge Arthur is seen today for a follow up for Major Depressive Disorder, Generalized Anxiety Disorder and ADHD  Since our last visit, overall symptoms have been "I am making progress in therapy so I have a better outlook but I don't feel a lot better on a day to day basis "  He states he feels ok but has no energy and motivation  He states he continues to struggle with figuring out how he feels "it is tough to say if my depression is better "   Continues to struggle with looking for job and struggles with sleep patterns  Denies SI/HI, denies auditory visual hallucinations, denies delusional thinking  HPI ROS:             ('was' notes: recent => remote)  Medication Side Effects: Some nausea lasting 2 hours in AM after meds/vitamins  (Was denies)   Depression (10 worst): 4 (Was 8)   Anxiety (10 worst): 2 (Was 4)   Safety concerns (SI, HI, etc): denies (Was Denies active SI/HI "I have the thought that my life is worthless at times, it is like a defense mechanism against the universe, it is like I am standing up to the universe letting it know I was born into an unfair situation  ")   Hallucinations/Delusions denies (Was denies)   Sleep: "par for the course, my sleep schedule is slipping about  Slowly getting better with figuring out sleep pattern "  Getting 10 hours per day   (Was variable between 10-15)   Energy: "in the toilet, a couple of days where I have had energy and other days exhausted" (Was no energy and motivation)   Appetite: "fine and I am overeating, I stopped caring about what I was eating " (Was "low but when I smoke cannabis it is high")   Height 6ft 3 in (Was 6 ft 3 in)   Weight Change: 300 lbs pt reported (Was 290 0 lbs)     Duane Lacrosse denies any side effects from medications unless noted above    Review Of Systems:      Constitutional low energy   ENT negative   Cardiovascular negative   Respiratory negative   Gastrointestinal nausea   Genitourinary negative   Musculoskeletal negative   Integumentary negative   Neurological negative   Endocrine negative   Other Symptoms none, all other systems are negative     History Review:  The following portions of the patient's history were reviewed and documented: allergies, current medications, past family history, past medical history, past social history and problem list      Lab Review: Labs were reviewed and discussed with patient  Laboratory Results:   Most Recent Labs:   Lab Results   Component Value Date    WBC 7 41 12/10/2020    RBC 5 73 (H) 12/10/2020    HGB 16 3 12/10/2020    HCT 49 7 (H) 12/10/2020     12/10/2020    RDW 13 1 12/10/2020    NEUTROABS 4 71 12/10/2020     12/30/2014    K 4 2 12/10/2020     12/10/2020    CO2 29 12/10/2020    BUN 12 12/10/2020    CREATININE 0 88 12/10/2020    GLUCOSE 86 12/30/2014    CALCIUM 10 3 (H) 12/10/2020    AST 20 12/10/2020    ALT 62 12/10/2020    ALKPHOS 109 12/10/2020    PROT 7 0 12/30/2014    BILITOT 0 40 12/30/2014    HDL 33 (L) 01/14/2020    TRIG 144 01/14/2020    LDLCALC 99 01/14/2020    DPO2GIATTPST 1 430 12/10/2020     CBC:   Lab Results   Component Value Date    WBC 7 41 12/10/2020    RBC 5 73 (H) 12/10/2020    HGB 16 3 12/10/2020    HCT 49 7 (H) 12/10/2020    MCV 87 12/10/2020     12/10/2020    MCH 28 4 12/10/2020    MCHC 32 8 12/10/2020    RDW 13 1 12/10/2020    MPV 10 7 12/10/2020    NRBC 0 12/10/2020    NEUTROABS 4 71 12/10/2020     CMP:   Lab Results   Component Value Date     12/30/2014    K 4 2 12/10/2020     12/10/2020    CO2 29 12/10/2020    ANIONGAP 5 12/30/2014    BUN 12 12/10/2020    CREATININE 0 88 12/10/2020    GLUCOSE 86 12/30/2014    CALCIUM 10 3 (H) 12/10/2020    AST 20 12/10/2020    ALT 62 12/10/2020    ALKPHOS 109 12/10/2020    PROT 7 0 12/30/2014    BILITOT 0 40 12/30/2014    EGFR 122 12/10/2020     Lipid Profile:   Lab Results   Component Value Date    HDL 33 (L) 01/14/2020    TRIG 144 01/14/2020    [address] 99 01/14/2020     Thyroid Studies:   Lab Results   Component Value Date    RGT8DPRIASOG 1 430 12/10/2020     Hemoglobin A1C/EST AVG Glucose   Lab Results   Component Value Date    HGBA1C 4 9 01/14/2020    EAG 94 01/14/2020     EKG   Lab Results   Component Value Date    VENTRATE 49 12/10/2020    ATRIALRATE 49 12/10/2020    PRINT 150 12/10/2020    QRSDINT 96 12/10/2020    QTINT 436 12/10/2020    PAXIS 31 12/10/2020    QRSAXIS 55 12/10/2020    TWAVEAXIS 26 12/10/2020     I have personally reviewed all pertinent laboratory/tests results      OBJECTIVE:     Vital signs in last 24 hours:    Vitals:    01/07/21 0901   Weight: 136 kg (300 lb)   Height: 6' 3" (1 905 m)       Mental Status Evaluation:    Appearance age appropriate, casually dressed   Behavior cooperative, calm   Speech normal rate, normal volume, normal pitch   Mood depressed, anxious   Affect blunted   Thought Processes organized, goal directed, linear   Associations intact associations   Thought Content no overt delusions   Perceptual Disturbances: no auditory hallucinations, no visual hallucinations   Abnormal Thoughts  Risk Potential Suicidal ideation - None  Homicidal ideation - None  Potential for aggression - No   Orientation oriented to person, place, time/date and situation   Memory recent and remote memory grossly intact   Consciousness alert and awake   Attention Span Concentration Span attention span and concentration are age appropriate   Intellect appears to be of average intelligence   Insight moderate   Judgement moderate   Muscle Strength and  Gait unable to assess today due to virtual visit   Motor activity unable to assess today due to virtual visit   Language no difficulty naming common objects, no difficulty repeating a phrase, unable to assess writing today due to virtual visit   Fund of Knowledge adequate knowledge of current events  adequate fund of knowledge regarding past history  adequate fund of knowledge regarding vocabulary    Pain none   Pain Scale 0       Risks, Benefits And Possible Side Effects Of Medications:    AGREE: Risks, benefits, and possible side effects of medications explained to [facility] and he (or legal representative) verbalizes understanding and agreement for treatment  Controlled Medication Discussion:     Not applicable  ______________________________________________________________          The following portions of the patient's history were reviewed and updated as appropriate: past family history, past medical history, past social history, past surgical history and problem list     Past psychiatric history, family psychiatric history, traumatic history, social history, substance abuse history copied from Dr Param Burciaga note dated July 18, 2019  Past Psychiatric History  Previous diagnoses include depression, anxiety, ADD  Prior outpatient psychiatric treatment: no  Prior therapy: yes  Prior inpatient psychiatric treatment: no  Prior suicide attempts: no  Prior self harm: hit self at times (side of head)  Prior violence or aggression: no     Social History:  The patient grew up in [place], [place]   Childhood was described as challenging      During childhood, parents were together  They have 0 sister(s) and 1 brother(s)   Patient is younger in birth order     Abuse/neglect: none     As far as the patient (or present family member) is aware, overall childhood development: Patient does ascribe to normal developmental milestones such as walking, talking, potty training and making childhood friends      Education level: good grades, HS Cum Laude  Repeat 6th grade ("I had a mental break down"   Kidney stone lodged in ureter at 13yo  "Instead of just acknowledging that I had anxiety I decided I had some neurologic issue", he had a lot of work ups, all negative   Mom , 'a hypochondraic' fed into)   Current occupation: no  Marital status: single  Children: no  Current Living Situation: the patient currently lives with parents    Social support: some with family     Yazdanism Affiliation: Atheist   experience: no  Legal history: no  Access to Guns: yes (father's)     Substance use and treatment:  Tobacco use: no  Caffeine Use: yes  ETOH use: beers most days  Other substance use: marijuana off and on ('messes with anxiety')     Endorses previous experimentation with: LSD, mushrooms, marijuana     Longest clean time: not applicable  History of Inpatient/Outpatient rehabilitation program: no        Traumatic History:   Abuse: none  Other Traumatic Events: none      Family Psychiatric History:      Psychiatric Illness:      Hoarding (parents), Depression - parents, aunts, uncles, anxiety - parents  Substance Abuse:       EtOH- Dad  Suicide Attempts:        no family history of suicide attempts     Denies bipolar disorder, schizophrenia    Past Medical History:    Past Medical History:   Diagnosis Date    ADHD (attention deficit hyperactivity disorder)     Anxiety     Chronic kidney disease     kidney stones    Concussion     Depression     Self-injurious behavior     Sleep difficulties     history of sleep apnea  and getting workup for narcolepsy as of 8/19     Past Medical History Pertinent Negatives:   Diagnosis Date Noted    Disease of thyroid gland 08/19/2019    Eating disorder 08/19/2019    Liver disease 08/19/2019    Obsessive-compulsive disorder 08/19/2019    Violence, history of 08/19/2019     Past Surgical History:   Procedure Laterality Date    HERNIA REPAIR      TONSILLECTOMY      TOOTH EXTRACTION Right 09/21/2020    R lower molar extraction     Allergies   Allergen Reactions    Scopolamine        Substance Abuse History:    Social History     Substance and Sexual Activity   Alcohol Use Yes    Frequency: Monthly or less    Drinks per session: 1 or 2    Comment: socially     Social History     Substance and Sexual Activity   Drug Use Yes    Types: Marijuana    Comment: ocasionally       Social History:    Social History     Socioeconomic History    Marital status: Single     Spouse name: Not on file    Number of children: 0    Years of education: Not on file    Highest education level: Some college, no degree   Occupational History    Occupation: unemployed   Social Needs    Financial resource strain: Not on file    Food insecurity     Worry: Not on file     Inability: Not on file   [employer] needs     Medical: Not on file     Non-medical: Not on file   Tobacco Use    Smoking status: Never Smoker    Smokeless tobacco: Never Used   Substance and Sexual Activity    Alcohol use: Yes     Frequency: Monthly or less     Drinks per session: 1 or 2     Comment: socially    Drug use: Yes     Types: Marijuana     Comment: ocasionally    Sexual activity: Not on file   Lifestyle    Physical activity     Days per week: Not on file     Minutes per session: Not on file    Stress: Not on file   Relationships    Social connections     Talks on phone: Not on file     Gets together: Not on file     Attends Jew service: Not on file     Active member of club or organization: Not on file     Attends meetings of clubs or organizations: Not on file     Relationship status: Not on file    Intimate partner violence     Fear of current or ex partner: Not on file     Emotionally abused: Not on file     Physically abused: Not on file     Forced sexual activity: Not on file   Other Topics Concern    Not on file   Social History Narrative    Not on file       Family Psychiatric History:     Family History Problem Relation Age of Onset    COPD Mother     Hypertension Mother     Depression Mother     Hypertension Father     Depression Father     Anxiety disorder Father     Alcohol abuse Father     Anxiety disorder Brother     Depression Brother     Hypertension Brother     Alcohol abuse Paternal Grandfather        History Review: The following portions of the patient's history were reviewed and updated as appropriate: allergies, current medications, past family history, past medical history, past social history, past surgical history and problem list          OBJECTIVE:     Vital signs in last 24 hours:    Vitals:    01/07/21 0901   Weight: 136 kg (300 lb)   Height: 6' 3" (1 905 m)             Confidential Assessment:    Confidential assessment copied from Dr Carmel Jerry note July 18, 2019    Previous psychotropic medication trials:   effexor xr (mid 0443)  Concerta ER 36 FP(~1069)--FMJLUUO pupils challenges with significant glare with driving  armodafinil (helped a little bit)  Ritalin-10 mg regular release in afternoon   Zoloft  Abilify  Buspar  Gabapentin  Pramipexole for restless legs        History of Seizures: no  History of Head injury-LOC-Concussion: 4th grade, minor concussion (Basketball to head), no PCS    ADD without hyperactivity scale positive    Scales:    PHQ-9 Follow-up    Little interest or pleasure in doing things: 3 - nearly every day  Feeling down, depressed, or hopeless: 3 - nearly every day  Trouble falling or staying asleep, or sleeping too much: 3 - nearly every day  Feeling tired or having little energy: 3 - nearly every day  Poor appetite or overeating: 3 - nearly every day  Feeling bad about yourself - or that you are a failure or have let yourself or your family down: 3 - nearly every day  Trouble concentrating on things, such as reading the newspaper or watching television: 3 - nearly every day  Moving or speaking so slowly that other people could have noticed   Or the opposite - being so fidgety or restless that you have been moving around a lot more than usual: 0 - not at all  Thoughts that you would be better off dead, or of hurting yourself in some way: 0 - not at all  PHQ-2 Score: 6  PHQ-9 Score: 21       KORY-7 Flowsheet Screening      Most Recent Value   Over the last two weeks, how often have you been bothered by the following problems? Feeling nervous, anxious, or on edge  2  (Patient-Rptd)    Not being able to stop or control worrying  0  (Patient-Rptd)    Worrying too much about different things  3  (Patient-Rptd)    Trouble relaxing   2  (Patient-Rptd)    Being so restless that it's hard to sit still  0  (Patient-Rptd)    Becoming easily annoyed or irritable   3  (Patient-Rptd)    Feeling afraid as if something awful might happen  0  (Patient-Rptd)    How difficult have these problems made it for you to do your work, take care of things at home, or get along with other people? Extremely difficult  (Patient-Rptd)    KORY Score   10          Assessment/Plan:       Diagnoses and all orders for this visit:    Current severe episode of major depressive disorder without psychotic features without prior episode ([facility] )  -     vortioxetine (TRINTELLIX) 20 MG tablet; Take 1 tablet (20 mg total) by mouth daily    Attention deficit disorder (ADD) in adult  -     atoMOXetine (STRATTERA) 40 mg capsule; Take 1 capsule (40 mg total) by mouth daily    KORY (generalized anxiety disorder)  -     vortioxetine (TRINTELLIX) 20 MG tablet; Take 1 tablet (20 mg total) by mouth daily    Chronic fatigue  -     atoMOXetine (STRATTERA) 40 mg capsule; Take 1 capsule (40 mg total) by mouth daily    Essential hypertension    Hypersomnia with long sleep time, idiopathic  -     atoMOXetine (STRATTERA) 40 mg capsule; Take 1 capsule (40 mg total) by mouth daily  -     vortioxetine (TRINTELLIX) 20 MG tablet;  Take 1 tablet (20 mg total) by mouth daily    Restless leg syndrome    Other orders  -     Denta 5000 Plus 1 1 % CREA  -     Melatonin 10 MG TABS; Take by mouth          Treatment Recommendations/Precautions/Plan:    Tonya Doyle continues to struggle with feelings of depression and anxiety however he states he does not feel quite as helpless or hopeless as he was previously  He no longer is feeling suicidal or having any passive death wish  He continues to struggle with motivation, attention and focus  He continues to struggle with finding a job and he continues to struggle with sleep patterns  Continues working with his psychotherapist 1 time weekly for approximately 45 minute session  He is seeing his primary care physician for labs were drawn by this provider for vitamin-D replacement and evaluation of elevated calcium level  Please see below regarding discussion for potential T MS in future if changes in medication do not improve symptoms of depression  Patient has been educated about their diagnosis and treatment modalities  They voiced understanding and agreement with the following plan:    -start Strattera 40 mg p o  Daily for improvement in symptoms of ADHD, lack of attention and concentration and this will also help with motivation and energy  Thirty day supply +2 refills sent to local pharmacy for patient to trial   Patient education for Strattera was completed and included the following: drug interaction, anticholinergic effects, depression, SI, hostility, mood swings, HTN, hallucinations, hypomania or induction of christina if susceptible, QT prolongation/arrhythmia (including sudden death in patients with preexisting cardiac structural abnormalities/cardiac issues), liver damage (rare), priapism for MALES, nausea, dizziness, libido effects, sweating, sedation/fatigue (mostly in children), insomnia, sexual dysfunction (decreased libido, abnormal orgasm, erectile dysfunction, ejaculatory dysfunction) and others        -increase Trintellix from 10 mg p o  Daily to 20 mg p o   Daily for improvement in symptoms of depression and anxiety  He feels as though Trintellix may be starting to work a little bit he is eat does not have any suicidal or homicidal ideation, he does not feel quite as helpless or hopeless as he did previously  He continues to feel depressed and anxious and he states that he is trying to figure out his depression  Ninety day supply sent to pharmacy 01/07/2021  Trintellix (Vortioxetine) including suicidality and worse depression, manic induction, serotonin syndrome and SIADH, visual affects, bleeding, N/V/C/D, xerostomia, sexual side effects, drug interactions and others      -continue Wellbutrin  mg p o  Daily for improvement in symptoms of attention, focus, lack of energy and as well as improvement in depressive symptoms  -patient reviewed gene site testing and would like to move forward with this he requested that I have nursing contact him  This provider sent a message to Marcelino Hutson in nursing to contact patient again       -discussed potential for treatment with or evaluation with Dr Benjy Arzola for possible [facility] in future on the neuro star if medication changes do not improve significant depressive and motivation symptoms  -follow-up with this provider J February 10th at 11:00 a m , March 10th 3:30 p m  And April 12th 11:30 a m     -EKG and labs reviewed with patient     -Etta Em is following with his primary care physician office, Dr Mike Pereira and Jose Carlos Mccullough, as his calcium level was slightly elevated and vitamin D3 level were low on most recent lab work  He is now on vitamin-D replacement and he was having additional calcium level and CMP reach checked in the near future  Continue to follow with primary care physician office for all other medical concerns and continue monitoring of blood pressure     -continue psychotherapy with private therapist Mirella Tomas from [place], [place]    Patient has been seeing 1 time weekly for approximately 45 minute session and patient feels has been helpful Armond Yost continues to work with his therapist on techniques for improvement in motivation energy etc       -continue work with sleep medicine    -Discussed self monitoring of symptoms, and symptom monitoring tools     -Patient has been informed of 24 hours and weekend coverage for urgent situations accessed by calling the main clinic phone number      -treatment plan completed August 2020 will be due at next visit    Risks/Benefits      Risks, Benefits And Possible Side Effects Of Medications:    Risks, benefits, and possible side effects of medications explained to Armond Yost and he verbalizes understanding and agreement for treatment  Patient education on Concerta and Ritalin completed including elevated heart rate, elevated bp, seizures, anxiety/irritability, activation/induction of christina, abuse potential, interactions with other medications, risk of sudden death, appetite suppression/weight loss discussed  Controlled Medication Discussion:     Armond Yost has been filling controlled prescriptions on time as prescribed according to [address]      Psychotherapy Provided:       Individual psychotherapy provided: Yes  Counseling was provided during the session today for 19 minutes  Medications, treatment progress and treatment plan reviewed with McIntosh Socks  Medication changes discussed with Marko  Medication education provided to Armond Yost  Recent stressors discussed with Armond Yost including COVID-19 issues, health issues, occasional anxiety and chronic mental illness  Coping strategies reviewed with Marko  Importance of medication and treatment compliance reviewed with Marko  Importance of follow up with family physician for medical issues reviewed with Armond Yost  Reassurance and supportive therapy provided  Crisis/safety plan discussed with Armond Yost  This note was shared with patient         YAO Tidwell 01/07/21

## 2021-02-01 DIAGNOSIS — G47.11 HYPERSOMNIA WITH LONG SLEEP TIME, IDIOPATHIC: ICD-10-CM

## 2021-02-01 DIAGNOSIS — F98.8 ATTENTION DEFICIT DISORDER (ADD) IN ADULT: ICD-10-CM

## 2021-02-01 DIAGNOSIS — R53.82 CHRONIC FATIGUE: ICD-10-CM

## 2021-02-01 RX ORDER — ATOMOXETINE 40 MG/1
CAPSULE ORAL
Qty: 30 CAPSULE | Refills: 2 | OUTPATIENT
Start: 2021-02-01

## 2021-02-10 ENCOUNTER — TELEMEDICINE (OUTPATIENT)
Dept: PSYCHIATRY | Facility: CLINIC | Age: 21
End: 2021-02-10
Payer: COMMERCIAL

## 2021-02-10 VITALS — HEIGHT: 75 IN | BODY MASS INDEX: 37.3 KG/M2 | WEIGHT: 300 LBS

## 2021-02-10 DIAGNOSIS — R53.82 CHRONIC FATIGUE: ICD-10-CM

## 2021-02-10 DIAGNOSIS — F98.8 ATTENTION DEFICIT DISORDER (ADD) IN ADULT: ICD-10-CM

## 2021-02-10 DIAGNOSIS — F32.2 CURRENT SEVERE EPISODE OF MAJOR DEPRESSIVE DISORDER WITHOUT PSYCHOTIC FEATURES WITHOUT PRIOR EPISODE (HCC): Primary | ICD-10-CM

## 2021-02-10 DIAGNOSIS — G25.81 RESTLESS LEG SYNDROME: ICD-10-CM

## 2021-02-10 DIAGNOSIS — F41.1 GAD (GENERALIZED ANXIETY DISORDER): ICD-10-CM

## 2021-02-10 PROCEDURE — 90833 PSYTX W PT W E/M 30 MIN: CPT | Performed by: NURSE PRACTITIONER

## 2021-02-10 PROCEDURE — 99214 OFFICE O/P EST MOD 30 MIN: CPT | Performed by: NURSE PRACTITIONER

## 2021-02-10 NOTE — BH TREATMENT PLAN
TREATMENT PLAN (Medication Management Only)        [facility]    Name and Date of Birth:  Tamia Christensen 19 y o  2000  Date of Treatment Plan: February 10, 2021  Diagnosis/Diagnoses:    1  Current severe episode of major depressive disorder without psychotic features without prior episode ([address] )    2  KORY (generalized anxiety disorder)    3  Attention deficit disorder (ADD) in adult    4  Chronic fatigue    5  Restless leg syndrome      Strengths/Personal Resources for Self-Care: ""I have a flexible perspective on things and my perserverence like my fight with my sleep schedule""  Area/Areas of need (in own words): ""discipline with my behavior-I am making progress in my head but I want to be more actively responsible""  1  Long Term Goal: improvesleep  Target Date:6 months - 8/10/2021  Person/Persons responsible for completion of goal: Marko  2  Short Term Objective (s) - How will we reach this goal?:   A  Provider new recommended medication/dosage changes and/or continue medication(s): Medication changes: I have discontinued Logan J Weiler's atoMOXetine  I am also having him start on lisdexamfetamine  Additionally, I am having him maintain his buPROPion, Vitamin D3, Denta 5000 Plus, Melatonin, and Vortioxetine HBr     B  have a daily routine or schedule in place  C  be mindful of my information consumption "stop social media scroll and use educational websites "    Target Date:6 months - 8/10/2021  Person/Persons Responsible for Completion of Goal: Marko  Progress Towards Goals: continuing treatment  Treatment Modality: medication management every 6 weeks  Review due 180 days from date of this plan: 6 months - 8/10/2021  Expected length of service: ongoing treatment  My Physician/PA/NP and I have developed this plan together and I agree to work on the goals and objectives  I understand the treatment goals that were developed for my treatment    Treatment Plan done but not signed at time of office visit due to:  Plan reviewed by phone or in person  and verbal consent given due to [place] social distancing

## 2021-02-10 NOTE — PSYCH
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 episode ([facility] [address] ) - Primary    Relevant Medications    lisdexamfetamine (VYVANSE) 30 MG capsule    Chronic fatigue    Relevant Medications    lisdexamfetamine (VYVANSE) 30 MG capsule    Restless leg syndrome    KORY (generalized anxiety disorder)    Relevant Medications    lisdexamfetamine (VYVANSE) 30 MG capsule          Reason for visit is   Chief Complaint   Patient presents with    Virtual Regular Visit    Anxiety    Depression    Follow-up    ADHD        Encounter provider Kathleen Sheehan [facility]    Provider located at [address] [id number] [phone]      Recent Visits  No visits were found meeting these conditions  Showing recent visits within past 7 days and meeting all other requirements     Today's Visits  Date Type Provider Dept   02/10/21 Telemedicine Sonia Sheehan [facility] [place] 2[place]on 426 today's visits and meeting all other requirements     Future Appointments  No visits were found meeting these conditions  Showing future appointments within next 150 days and meeting all other requirements        The patient was identified by name and date of birth  Manny Stahl was informed that this is a telemedicine visit and that the visit is being conducted through [employer] and patient was informed that this is a secure, HIPAA-compliant platform  He agrees to proceed     My office door was closed  No one else was in the room    He acknowledged consent and understanding of privacy and security of the video platform  The patient has agreed to participate and understands they can discontinue the visit at any time  Patient is aware this is a billable service  Past Medical History:   Diagnosis Date    ADHD (attention deficit hyperactivity disorder)     Anxiety     Chronic kidney disease     kidney stones    Concussion     Depression     Self-injurious behavior     Sleep difficulties     history of sleep apnea  and getting workup for narcolepsy as of 8/19       Past Surgical History:   Procedure Laterality Date    HERNIA REPAIR      TONSILLECTOMY      TOOTH EXTRACTION Right 09/21/2020    R lower molar extraction       Current Outpatient Medications   Medication Sig Dispense Refill    buPROPion (WELLBUTRIN XL) 150 mg 24 hr tablet TAKE 1 TABLET EVERY MORNING 90 tablet 3    Cholecalciferol (Vitamin D3) 50 MCG (2000 UT) capsule Take 2 capsules (4,000 Units total) by mouth daily 180 capsule 1    Denta 5000 Plus 1 1 % CREA       Melatonin 10 MG TABS Take by mouth      vortioxetine (TRINTELLIX) 20 MG tablet Take 1 tablet (20 mg total) by mouth daily 90 tablet 0    lisdexamfetamine (VYVANSE) 30 MG capsule Take 1 capsule (30 mg total) by mouth every morningMax Daily Amount: 30 mg 30 capsule 0     No current facility-administered medications for this visit  Allergies   Allergen Reactions    Scopolamine          Video Exam    Vitals:    02/10/21 1106   Weight: 136 kg (300 lb)   Height: 6' 3" (1 905 m)         I spent 30 minutes directly with the patient during this visit      [address] [place] [address] Rachel Kimble acknowledges that he has consented to an online visit or consultation  He understands that the online visit is based solely on information provided by him, and that, in the absence of a face-to-face physical evaluation by the physician, the diagnosis he receives is both limited and provisional in terms of accuracy and completeness   This is not intended to replace a full medical face-to-face evaluation by the physician  Eva Robles understands and accepts these terms  SUBJECTIVE:    Manuel Canela is seen today for a follow up for Major Depressive Disorder, Generalized Anxiety Disorder and ADHD  Since our last visit, overall symptoms have been gradually improving  Manuel Canela states his biggest struggle is his sleep schedule  He states he will get into cycles of feeling as though     Manuel Canela states he is now working to overcome all of his dental issues and he is much brighter about this  "My parents never took me to the dentist so this is something I am struggling with from my childhood  I am now going to be able to smile and show my teeth "    Manuel Canela states he is working with his counselor on how he is feeling  He states he is no longer feeling helpless or hopeless  I feel like I am approaching the point in my life where I am ready to move forward in my life  He states he feels he is ready to take ownership and have responsibility for his life and actions  HPI ROS:             ('was' notes: recent => remote)  Medication Side Effects:  Straterra caused almost immediate vomiting and severe nausea even taking with food  (Was Some nausea lasting 2 hours in AM after meds/vitamins)   Depression (10 worst): 4 (Was 4)   Anxiety (10 worst): 2 (Was 2)   Safety concerns (SI, HI, etc): denies (Was   Denies)   Hallucinations/Delusions denies (Was  denies)   Sleep: 9 am-midnight awake or at latest 3pm and awake until 3 am (this schedule is about 1/2 of the time) (Was "par for the course, my sleep schedule is slipping about   Slowly getting better with figuring out sleep pattern "  Getting 10 hours per day )   Energy: Low energy and motivation (Was "in the toilet, a couple of days where I have had energy and other days exhausted")   Appetite: High appetite (Was "fine and I am overeating, I stopped caring about what I was eating ")   Height  6 ft 3 in (Was  6 ft 3 in)   Weight Change: 300 lbs pounds (Was  300 lb patient reported)     [place] Socks denies any side effects from medications unless noted above    Review Of Systems:      Constitutional low energy   ENT as noted in HPI and dental discomfort   Cardiovascular negative   Respiratory negative   Gastrointestinal negative   Genitourinary negative   Musculoskeletal negative   Integumentary negative   Neurological negative   Endocrine negative   Other Symptoms none, all other systems are negative     History Review:  The following portions of the patient's history were reviewed and documented: allergies, current medications, past family history, past medical history, past social history and problem list      Lab Review: No new labs or no relevant labs needing review with patient today    OBJECTIVE:     Vital signs in last 24 hours:    Vitals:    02/10/21 1106   Weight: 136 kg (300 lb)   Height: 6' 3" (1 905 m)       Mental Status Evaluation:    Appearance age appropriate, casually dressed   Behavior cooperative, calm, good eye contact   Speech normal rate, normal volume, normal pitch   Mood less anxious, less depressed   Affect normal range and intensity, appropriate   Thought Processes organized, goal directed, linear   Associations intact associations   Thought Content no overt delusions   Perceptual Disturbances: no auditory hallucinations, no visual hallucinations   Abnormal Thoughts  Risk Potential Suicidal ideation - None  Homicidal ideation - None  Potential for aggression - No   Orientation oriented to person, place, time/date and situation   Memory recent and remote memory grossly intact   Consciousness alert and awake   Attention Span Concentration Span attention span and concentration are age appropriate   Intellect appears to be of average intelligence   Insight intact and improving   Judgement intact and improving   Muscle Strength and  Gait unable to assess today due to virtual visit   Motor activity unable to assess today due to virtual visit   Language no difficulty naming common objects, no difficulty repeating a phrase, unable to assess writing today due to virtual visit   Fund of Knowledge adequate knowledge of current events  adequate fund of knowledge regarding past history  adequate fund of knowledge regarding vocabulary    Pain none   Pain Scale 0       Risks, Benefits And Possible Side Effects Of Medications:    AGREE: Risks, benefits, and possible side effects of medications explained to [facility] and he (or legal representative) verbalizes understanding and agreement for treatment  Controlled Medication Discussion:     Not applicable  ______________________________________________________________        The following portions of the patient's history were reviewed and updated as appropriate: past family history, past medical history, past social history, past surgical history and problem list     Past psychiatric history, family psychiatric history, traumatic history, social history, substance abuse history copied from Dr Meredith Wilkerson note dated July 18, 2019  Past Psychiatric History  Previous diagnoses include depression, anxiety, ADD  Prior outpatient psychiatric treatment: no  Prior therapy: yes  Prior inpatient psychiatric treatment: no  Prior suicide attempts: no  Prior self harm: hit self at times (side of head)  Prior violence or aggression: no     Social History:  The patient grew up in [place], [place]   Childhood was described as challenging      During childhood, parents were together  They have 0 sister(s) and 1 brother(s)  Patient is younger in birth order     Abuse/neglect: none     As far as the patient (or present family member) is aware, overall childhood development: Patient does ascribe to normal developmental milestones such as walking, talking, potty training and making childhood friends      Education level: good grades, HS Cum Laude  Repeat 6th grade ("I had a mental break down"   Kidney stone lodged in ureter at 15yo  "Instead of just acknowledging that I had anxiety I decided I had some neurologic issue", he had a lot of work ups, all negative   Mom , 'a hypochondraic' fed into)   Current occupation: no  Marital status: single  Children: no  Current Living Situation: the patient currently lives with parents    Social support: some with family     Scientologist Affiliation: [employer] [employer] [employer] [facility]   experience: no  Legal history: no  Access to Guns: yes (father's)     Substance use and treatment:  Tobacco use: no  Caffeine Use: yes  ETOH use: beers most days  Other substance use: marijuana off and on ('messes with anxiety')     Endorses previous experimentation with: LSD, mushrooms, marijuana     Longest clean time: not applicable  History of Inpatient/Outpatient rehabilitation program: no        Traumatic History:   Abuse: none  Other Traumatic Events: none      Family Psychiatric History:      Psychiatric Illness:      Hoarding (parents), Depression - parents, aunts, uncles, anxiety - parents  Substance Abuse:       EtOH- Dad  Suicide Attempts:        no family history of suicide attempts     Denies bipolar disorder, schizophrenia    Past Medical History:    Past Medical History:   Diagnosis Date    ADHD (attention deficit hyperactivity disorder)     Anxiety     Chronic kidney disease     kidney stones    Concussion     Depression     Self-injurious behavior     Sleep difficulties     history of sleep apnea  and getting workup for narcolepsy as of 8/19     Past Medical History Pertinent Negatives:   Diagnosis Date Noted    Disease of thyroid gland 08/19/2019    Eating disorder 08/19/2019    Liver disease 08/19/2019    Obsessive-compulsive disorder 08/19/2019    Violence, history of 08/19/2019     Past Surgical History:   Procedure Laterality Date    HERNIA REPAIR      TONSILLECTOMY      TOOTH EXTRACTION Right 09/21/2020    R lower molar extraction     Allergies   Allergen Reactions    Scopolamine        Substance Abuse History:    Social History     Substance and Sexual Activity   Alcohol Use Yes    Frequency: Monthly or less    Drinks per session: 1 or 2    Comment: socially     Social History     Substance and Sexual Activity   Drug Use Yes    Types: Marijuana    Comment: ocasionally       Social History:    Social History     Socioeconomic History    Marital status: Single     Spouse name: Not on file    Number of children: 0    Years of education: Not on file    Highest education level: Some college, no degree   Occupational History    Occupation: unemployed   Social Needs    Financial resource strain: Not on file    Food insecurity     Worry: Not on file     Inability: Not on file   [employer] needs     Medical: Not on file     Non-medical: Not on file   Tobacco Use    Smoking status: Never Smoker    Smokeless tobacco: Never Used   Substance and Sexual Activity    Alcohol use: Yes     Frequency: Monthly or less     Drinks per session: 1 or 2     Comment: socially    Drug use: Yes     Types: Marijuana     Comment: ocasionally    Sexual activity: Not on file   Lifestyle    Physical activity     Days per week: Not on file     Minutes per session: Not on file    Stress: Not on file   Relationships    Social connections     Talks on phone: Not on file     Gets together: Not on file     Attends Evangelical service: Not on file     Active member of club or organization: Not on file     Attends meetings of clubs or organizations: Not on file     Relationship status: Not on file    Intimate partner violence     Fear of current or ex partner: Not on file     Emotionally abused: Not on file     Physically abused: Not on file     Forced sexual activity: Not on file   Other Topics Concern    Not on file   Social History Narrative    Not on file       Family Psychiatric History:     Family History   Problem Relation Age of Onset    COPD Mother     Hypertension Mother    Christoph Ayoub Depression Mother     Hypertension Father     Depression Father     Anxiety disorder Father     Alcohol abuse Father     Anxiety disorder Brother     Depression Brother     Hypertension Brother     Alcohol abuse Paternal Grandfather        History Review: The following portions of the patient's history were reviewed and updated as appropriate: allergies, current medications, past family history, past medical history, past social history, past surgical history and problem list          OBJECTIVE:     Vital signs in last 24 hours:    Vitals:    02/10/21 1106   Weight: 136 kg (300 lb)   Height: 6' 3" (1 905 m)             Confidential Assessment:    Confidential assessment copied from Dr Carmel Jerry note July 18, 2019    Previous psychotropic medication trials:   effexor xr (mid 0020)  Concerta ER 36 ZH(~2718)--JQJDSEH pupils challenges with significant glare with driving  armodafinil (helped a little bit)  Ritalin-10 mg regular release in afternoon   Zoloft  Abilify  Buspar  Gabapentin  Pramipexole for restless legs   Strattera -stopped January 2021 after initiating, trialed 2 times and developed severe vomiting even when taking with food         History of Seizures: no  History of Head injury-LOC-Concussion: 4th grade, minor concussion (Basketball to head), no PCS    ADD without hyperactivity scale positive    Scales:    no new scales today    Assessment/Plan:       Diagnoses and all orders for this visit:    Current severe episode of major depressive disorder without psychotic features without prior episode (HCC)    KORY (generalized anxiety disorder)    Attention deficit disorder (ADD) in adult  -     lisdexamfetamine (VYVANSE) 30 MG capsule; Take 1 capsule (30 mg total) by mouth every morningMax Daily Amount: 30 mg    Chronic fatigue  -     lisdexamfetamine (VYVANSE) 30 MG capsule;  Take 1 capsule (30 mg total) by mouth every morningMax Daily Amount: 30 mg    Restless leg syndrome          Treatment Recommendations/Precautions/Plan:    Shaan Poole continues to experience depressive and anxiety symptoms as well as low energy motivation, his biggest complaint is challenges with maintaining a good sleep cycle  He works weekly with his psychotherapist which he feels is most beneficial to continue working on behavioral modification  He continues to feel as though his depression anxiety energy and motivation are slowly improving  His sleep cycle continues to vary  He is continuing to work with his dentist on improving his dental health which is also helping in the long run to work on improvement in his self-esteem  He continues to look for a job and he is continuing to hold off on restarting school until E has is mental health in order  He denies any suicidal or homicidal ideation, denies any auditory visual hallucinations, denies delusional thinking  Patient has been educated about their diagnosis and treatment modalities  They voiced understanding and agreement with the following plan:    - discontinue Strattera, patient developed extreme vomiting after trialing times to even taking with food  -   Start Vyvanse 30 mg p o  daily for improvement in symptoms of ADD and improvement  Of  Energy and motivation  Patient Education for Vyvanse completed including elevated heart rate, elevated bp, seizures, anxiety/irritability, activation/induction of christina, abuse potential, interactions with other medications, risk of sudden death, appetite suppression/weight loss discussed  30 day supply sent to pharmacy 02/10/2021      - continue Trintellix  20 mg for continued improvement in symptoms of depression anxiety  90 day supply sent to pharmacy 01/07/2021    -  Continue Wellbutrin  mg p o  q a m  for continued improvement in attention focus depression as well as improvement in energy       90 day day plus 3 refills sent to pharmacy 12/28/2020    -patient reviewed gene site testing and would like to move forward with this he requested that I have nursing contact him  This provider sent a message to Roula Vaz in nursing to contact patient again       -discussed potential for treatment with or evaluation with Dr Monika Downs for possible [address] [place] in future on the neuro star if medication changes do not improve significant depressive and motivation symptoms  -follow-up with this provider J February 10th at 11:00 a m , March 10th 3:30 p m  And April 12th 11:30 a m     -EKG and labs reviewed with patient     -Estrada Banks is following with his primary care physician office, Dr Anai Muir and Alta Zhang, as his calcium level was slightly elevated and vitamin D3 level were low on most recent lab work  He is now on vitamin-D replacement and he was having additional calcium level and CMP reach checked in the near future  Continue to follow with primary care physician office for all other medical concerns and continue monitoring of blood pressure     -continue psychotherapy with private therapist Klarissa Ortiz from [place], [place]  Patient has been seeing 1 time weekly for approximately 45 minute session and patient feels has been helpful Estrada Banks continues to work with his therapist on techniques for improvement in motivation energy etc       -continue work with sleep medicine    -Discussed self monitoring of symptoms, and symptom monitoring tools     -Patient has been informed of 24 hours and weekend coverage for urgent situations accessed by calling the main clinic phone number      -treatment plan completed   02/10/2021 and verbal consent obtained due virtual format  Risks/Benefits      Risks, Benefits And Possible Side Effects Of Medications:    Risks, benefits, and possible side effects of medications explained to Estrada Banks and he verbalizes understanding and agreement for treatment      Patient education on Concerta and Ritalin completed including elevated heart rate, elevated bp, seizures, anxiety/irritability, activation/induction of christina, abuse potential, interactions with other medications, risk of sudden death, appetite suppression/weight loss discussed  Controlled Medication Discussion:     Matthewashvin Jarvis has been filling controlled prescriptions on time as prescribed according to Gunn[address] Esc[address]      Psychotherapy Provided:     Individual psychotherapy provided: Yes  Counseling was provided during the session today for 30 minutes  Medications, treatment progress and treatment plan reviewed with Ryanne Jarvis  Medication changes discussed with Marko  Medication education provided to Ryanne Jarvis  Goals discussed during in session: improve self-care  Recent stressor including COVID-19 issues, health issues, ongoing anxiety, chronic mental illness and Challenges finding a job discussed with Ryanne Jarvis  Coping strategies reviewed with Marko  Importance of medication and treatment compliance reviewed with Marko  Importance of follow up with family physician for medical issues reviewed with Ryanne Jarvis  Reassurance and supportive therapy provided  Crisis/safety plan discussed with Ryanne Jarvis  Continue to follow with dentist     This note was shared with patient             YAO Burton 02/10/21

## 2021-03-02 ENCOUNTER — CONSULT (OUTPATIENT)
Dept: CARDIOLOGY CLINIC | Facility: CLINIC | Age: 21
End: 2021-03-02
Payer: COMMERCIAL

## 2021-03-02 VITALS
DIASTOLIC BLOOD PRESSURE: 90 MMHG | OXYGEN SATURATION: 98 % | WEIGHT: 305.4 LBS | BODY MASS INDEX: 37.97 KG/M2 | HEIGHT: 75 IN | HEART RATE: 74 BPM | SYSTOLIC BLOOD PRESSURE: 138 MMHG

## 2021-03-02 DIAGNOSIS — R07.89 CHEST PAIN, ATYPICAL: ICD-10-CM

## 2021-03-02 DIAGNOSIS — R00.1 SINUS BRADYCARDIA: Primary | ICD-10-CM

## 2021-03-02 DIAGNOSIS — R03.0 ELEVATED BLOOD-PRESSURE READING, WITHOUT DIAGNOSIS OF HYPERTENSION: ICD-10-CM

## 2021-03-02 PROCEDURE — 3080F DIAST BP >= 90 MM HG: CPT | Performed by: INTERNAL MEDICINE

## 2021-03-02 PROCEDURE — 93000 ELECTROCARDIOGRAM COMPLETE: CPT | Performed by: INTERNAL MEDICINE

## 2021-03-02 PROCEDURE — 99214 OFFICE O/P EST MOD 30 MIN: CPT | Performed by: INTERNAL MEDICINE

## 2021-03-02 PROCEDURE — 3075F SYST BP GE 130 - 139MM HG: CPT | Performed by: INTERNAL MEDICINE

## 2021-03-02 PROCEDURE — 1036F TOBACCO NON-USER: CPT | Performed by: INTERNAL MEDICINE

## 2021-03-02 NOTE — PROGRESS NOTES
Cardiology Consultation     Lexi Silva  [id number]  2000  [facility] [address] [facility] [id number]    1  Sinus bradycardia  Ambulatory referral to Cardiology    Echo complete with contrast if indicated    Stress test only, exercise    POCT ECG   2  Elevated blood-pressure reading, without diagnosis of hypertension  Echo complete with contrast if indicated    Stress test only, exercise    POCT ECG   3  Chest pain, atypical  Echo complete with contrast if indicated    Stress test only, exercise    POCT ECG   4  BMI 38 0-38 9,adult         Chief Complaint:   sinus bradycardia    HPI:   78-year-old male with attention deficit hyperactive disorder, idiopathic hypersomnia, prediabetic, obesity was referred from primary care physician's office after patient was noted to have sinus bradycardia     patient was noted to have sinus bradycardia with heart rate 48 on EKG in December 2020  Patient has history of tachycardia in the past when he was young and used to be on Bystolic which was stopped for last 8 years  Patient was on losartan and hydrochlorothiazide last year  for uncontrolled blood pressure which was thought secondary to  medication induced   patient has occasionally felt atypical chest pain and shortness of breath lasting few seconds without any aggravating or relieving factor  He feels dizzy when he goes for hot shower   he denies chest pain shortness of breath at present time  Denies palpitation,  Nausea, vomiting, orthopnea, paroxysmal nocturnal dyspnea or loss of consciousness     denies any history of congenital heart disease   Never had cardiac test in the past    Social History: Actively smokes marijuana almost daily    Denies smoking or a alcohol intake  Family History: father had abdominal aortic aneurysm including arrhythmia, CVA and pacemaker but no family history of premature coronary artery disease  Denies family history of sudden cardiac death     patient is to be 400 lb was successful to lost lot of weight was 220 but he gained 20 lb due to [place] situation   as per patient home blood pressure runs around 130-150/80-90     Current meds reviewed   Labs from December 2020 reviewed  TSH 1 43  LDL from January 2020 showed 99      Review of Systems:   all review of system negative except as mentioned above    Patient Active Problem List   Diagnosis    Attention deficit disorder (ADD) in adult    [facility] severe [facility] of major depressive disorder without [facility] features [facility]    Obesity (BMI 30 0-34  9)    Hereditary hemochromatosis ([facility]    Chronic fatigue    Restless leg syndrome    Essential hypertension    Soft tissue mass    Hypersomnia with long sleep time, idiopathic    Sleep paralysis    Hypnopompic hallucination    KORY (generalized anxiety disorder)    Non-restorative sleep    Vitamin D deficiency    Sinus bradycardia by electrocardiogram    Hypercalcemia     Past Medical History:   Diagnosis Date    ADHD (attention deficit hyperactivity disorder)     Anxiety     Chronic kidney disease     kidney stones    Concussion     Depression     Self-injurious behavior     Sleep difficulties     history of sleep apnea  and getting workup for narcolepsy as of 8/19     Social History     Socioeconomic History    Marital status: Single     Spouse name: Not on file    Number of children: 0    Years of education: Not on file    Highest education level: Some college, no degree   Occupational History    Occupation: unemployed   Social Needs    Financial resource strain: Not on file    Food insecurity     Worry: Not on file     Inability: Not on file   [employer] needs     Medical: Not on file     Non-medical: Not on file   Tobacco Use    Smoking status: Never Smoker    Smokeless tobacco: Never Used   Substance and Sexual Activity    Alcohol use: Yes     Frequency: Monthly or less     Drinks per session: 1 or 2     Comment: socially    Drug use: Yes     Types: Marijuana     Comment: ocasionally    Sexual activity: Not on file   Lifestyle    Physical activity     Days per week: Not on file     Minutes per session: Not on file    Stress: Not on file   Relationships    Social connections     Talks on phone: Not on file     Gets together: Not on file     Attends [employer] service: Not on file     Active member of club or organization: Not on file     Attends meetings of clubs or organizations: Not on file     Relationship status: Not on file    Intimate partner violence     Fear of current or ex partner: Not on file     Emotionally abused: Not on file     Physically abused: Not on file     Forced sexual activity: Not on file   Other Topics Concern    Not on file   Social History Narrative    Not on file      Family History   Problem Relation Age of Onset    COPD Mother     Hypertension Mother     Depression Mother     Hypertension Father     Depression Father     Anxiety disorder Father     Alcohol abuse Father     Anxiety disorder Brother     Depression Brother     Hypertension Brother     Alcohol abuse Paternal Grandfather      Past Surgical History:   Procedure Laterality Date    HERNIA REPAIR      TONSILLECTOMY      TOOTH EXTRACTION Right 09/21/2020    R lower molar extraction       Current Outpatient Medications:     buPROPion (WELLBUTRIN XL) 150 mg 24 hr tablet, TAKE 1 TABLET EVERY MORNING, Disp: 90 tablet, Rfl: 3    Cholecalciferol (Vitamin D3) 50 MCG (2000 UT) capsule, Take 2 capsules (4,000 Units total) by mouth daily, Disp: 180 capsule, Rfl: 1    Denta 5000 Plus 1 1 % CREA, , Disp: , Rfl:     lisdexamfetamine (VYVANSE) 30 MG capsule, Take 1 capsule (30 mg total) by mouth every morningMax Daily Amount: 30 mg, Disp: 30 capsule, Rfl: 0    Melatonin 10 MG TABS, Take by mouth, Disp: , Rfl:     vortioxetine (TRINTELLIX) 20 MG tablet, Take 1 tablet (20 mg total) by mouth daily, Disp: 90 tablet, Rfl: 0  Allergies   Allergen Reactions    Scopolamine      Vitals:    03/02/21 1338   BP: 138/90   BP Location: Left arm   Patient Position: Sitting   Cuff Size: Large   Pulse: 74   SpO2: 98%   Weight: (!) 139 kg (305 lb 6 4 oz)   Height: 6' 3" (1 905 m)         Labs:  Appointment on 12/10/2020   Component Date Value    TSH 3RD GENERATON 12/10/2020 1 430     Vit D, 25-Hydroxy 12/10/2020 28 8*    WBC 12/10/2020 7 41     RBC 12/10/2020 5 73*    Hemoglobin 12/10/2020 16 3     Hematocrit 12/10/2020 49 7*    MCV 12/10/2020 87     MCH 12/10/2020 28 4     MCHC 12/10/2020 32 8     RDW 12/10/2020 13 1     MPV 12/10/2020 10 7     Platelets 30/73/2965 209     nRBC 12/10/2020 0     Neutrophils Relative 12/10/2020 64     Immat GRANS % 12/10/2020 0     Lymphocytes Relative 12/10/2020 24     Monocytes Relative 12/10/2020 9     Eosinophils Relative 12/10/2020 2     Basophils Relative 12/10/2020 1     Neutrophils Absolute 12/10/2020 4 71     Immature Grans Absolute 12/10/2020 0 01     Lymphocytes Absolute 12/10/2020 1 80     Monocytes Absolute 12/10/2020 0 68     Eosinophils Absolute 12/10/2020 0 17     Basophils Absolute 12/10/2020 0 04     Sodium 12/10/2020 141     Potassium 12/10/2020 4 2     Chloride 12/10/2020 106     CO2 12/10/2020 29     ANION GAP 12/10/2020 6     BUN 12/10/2020 12     Creatinine 12/10/2020 0 88     Glucose, Fasting 12/10/2020 91     Calcium 12/10/2020 10 3*    AST 12/10/2020 20     ALT 12/10/2020 62     Alkaline Phosphatase 12/10/2020 109     Total Protein 12/10/2020 7 5     Albumin 12/10/2020 4 2     Total Bilirubin 12/10/2020 0 59     eGFR 12/10/2020 122    Office Visit on 12/10/2020   Component Date Value    Ventricular Rate 12/10/2020 49     Atrial Rate 12/10/2020 49     ND Interval 12/10/2020 150     QRSD Interval 12/10/2020 96     QT Interval 12/10/2020 436     QTC Interval 12/10/2020 393     P Axis 12/10/2020 31     QRS Axis 12/10/2020 55     T Wave Axis 12/10/2020 26      Imaging: No results found  Physical Exam:  General:   Obese,  awake, alert and oriented x3, not in distress  Neck: supple, no JVD  Eyes: PERRL, conjunctiva normal  Lungs:  Bilateral air entry positive, no wheeze/rhonchi or crackle  Heart:  S1-S2 normal, no murmur  Abdomen:  Soft ,nondistended ,nontender, bowel sounds positive  Extremities:  No leg edema, no deformity, ROM normal  Neuro:  Moving all extremities, speech clear  Skin: warm, no rash    /90 (BP Location: Left arm, Patient Position: Sitting, Cuff Size: Large)   Pulse 74   Ht 6' 3" (1 905 m)   Wt (!) 139 kg (305 lb 6 4 oz)   SpO2 98%   BMI 38 17 kg/m²       Cardiographics :  ECG:  Today sinus rhythm, heart rate 66, normal axis   EKG from December 2020 reviewed personally sinus bradycardia heart rate 49      Assessment:    1  Sinus bradycardia  possibly an incidental finding  Today heart rate is 66  Patient denies dizziness or syncope on exertion    2  Chest pain   atypical in nature    3  Elevated blood pressure reading without diagnosis of hypertension  Patient was noted to have high blood pressure little which was likely secondary to medication induced  Was on losartan and hydrochlorothiazide last year but once medications causing blood pressure was stopped, these his medications were discontinued    4  Obesity  5  ADD/IHS    Recommendations:     2D echocardiogram to evaluate for structural heart rate disease    I would get regular treadmill exercise stress test to evaluate for exercise-induced arrhythmia     patient advised to stop using marijuana   he was educated how to monitor blood pressure at home    He was advised to call us back if it stays more than 130/80     patient was recommended to take low-salt, low-fat/low-cholesterol diet and try to lose weight   at present time the elevated blood pressure reading seems to be secondary to his current medication including marijuana  If his blood pressure stays persistently elevated or echocardiogram shows LVH then he needs antihypertensive     return to clinic in 1 year or early if needed   above all discussed with patient    Patient understands and agrees

## 2021-03-10 ENCOUNTER — TELEMEDICINE (OUTPATIENT)
Dept: PSYCHIATRY | Facility: CLINIC | Age: 21
End: 2021-03-10
Payer: COMMERCIAL

## 2021-03-10 VITALS — HEIGHT: 75 IN | BODY MASS INDEX: 37.92 KG/M2 | WEIGHT: 305 LBS

## 2021-03-10 DIAGNOSIS — G47.11 HYPERSOMNIA WITH LONG SLEEP TIME, IDIOPATHIC: ICD-10-CM

## 2021-03-10 DIAGNOSIS — Z23 ENCOUNTER FOR IMMUNIZATION: ICD-10-CM

## 2021-03-10 DIAGNOSIS — R53.82 CHRONIC FATIGUE: ICD-10-CM

## 2021-03-10 DIAGNOSIS — G25.81 RESTLESS LEG SYNDROME: ICD-10-CM

## 2021-03-10 DIAGNOSIS — I10 ESSENTIAL HYPERTENSION: ICD-10-CM

## 2021-03-10 DIAGNOSIS — E66.9 OBESITY (BMI 30.0-34.9): ICD-10-CM

## 2021-03-10 DIAGNOSIS — E55.9 VITAMIN D DEFICIENCY: ICD-10-CM

## 2021-03-10 DIAGNOSIS — F32.2 CURRENT SEVERE EPISODE OF MAJOR DEPRESSIVE DISORDER WITHOUT PSYCHOTIC FEATURES WITHOUT PRIOR EPISODE (HCC): Primary | ICD-10-CM

## 2021-03-10 DIAGNOSIS — F98.8 ATTENTION DEFICIT DISORDER (ADD) IN ADULT: ICD-10-CM

## 2021-03-10 DIAGNOSIS — F41.1 GAD (GENERALIZED ANXIETY DISORDER): ICD-10-CM

## 2021-03-10 PROCEDURE — 3008F BODY MASS INDEX DOCD: CPT | Performed by: INTERNAL MEDICINE

## 2021-03-10 PROCEDURE — 99214 OFFICE O/P EST MOD 30 MIN: CPT | Performed by: NURSE PRACTITIONER

## 2021-03-10 PROCEDURE — 90833 PSYTX W PT W E/M 30 MIN: CPT | Performed by: NURSE PRACTITIONER

## 2021-03-10 NOTE — PSYCH
Virtual Regular Visit    Name and Date of Birth:  Shyanne Fontaine 20 y o  2000 MRN: [id number]    Date of Visit:  March 10, 2021    Assessment/Plan:    Problem List Items Addressed This Visit        Cardiovascular and Mediastinum    Essential hypertension       Other    Attention deficit disorder (ADD) in adult    Current severe episode of major depressive disorder without psychotic features without prior episode ([facility] [address] ) - Primary    Obesity (BMI 30 0-34  9)    Chronic fatigue    Restless leg syndrome    Hypersomnia with long sleep time, idiopathic    KORY (generalized anxiety disorder)    Vitamin D deficiency          Reason for visit is   Chief Complaint   Patient presents with    Virtual Regular Visit    ADHD    Depression    Follow-up    Anxiety        Encounter provider Jovani Woody, [facility]    Provider located at [address] [address] [phone]      Recent Visits  No visits were found meeting these conditions  Showing recent visits within past 7 days and meeting all other requirements     Today's Visits  Date Type Provider Dept   03/10/21 [address]  Sonia Richard [address] today's visits and meeting all other requirements     Future Appointments  No visits were found meeting these conditions  Showing future appointments within next 150 days and meeting all other requirements        The patient was identified by name and date of birth  Shyanne Fontaine was informed that this is a telemedicine visit and that the visit is being conducted through [employer] and patient was informed that this is a secure, HIPAA-compliant platform  He agrees to proceed     My office door was closed  No one else was in the room  He acknowledged consent and understanding of privacy and security of the video platform   The patient has agreed to participate and understands they can discontinue the visit at any time  Patient is aware this is a billable service  Past Medical History:   Diagnosis Date    ADHD (attention deficit hyperactivity disorder)     Anxiety     Chronic kidney disease     kidney stones    Concussion     Depression     Self-injurious behavior     Sleep difficulties     history of sleep apnea  and getting workup for narcolepsy as of 8/19       Past Surgical History:   Procedure Laterality Date    HERNIA REPAIR      TONSILLECTOMY      TOOTH EXTRACTION Right 09/21/2020    R lower molar extraction       Current Outpatient Medications   Medication Sig Dispense Refill    buPROPion (WELLBUTRIN XL) 150 mg 24 hr tablet TAKE 1 TABLET EVERY MORNING 90 tablet 3    Cholecalciferol (Vitamin D3) 50 MCG (2000 UT) capsule Take 2 capsules (4,000 Units total) by mouth daily 180 capsule 1    Denta 5000 Plus 1 1 % CREA       lisdexamfetamine (VYVANSE) 30 MG capsule Take 1 capsule (30 mg total) by mouth every morningMax Daily Amount: 30 mg 30 capsule 0    Melatonin 10 MG TABS Take by mouth      vortioxetine (TRINTELLIX) 20 MG tablet Take 1 tablet (20 mg total) by mouth daily 90 tablet 0     No current facility-administered medications for this visit  Allergies   Allergen Reactions    Scopolamine          Video Exam    Vitals:    03/10/21 1546   Weight: (!) 138 kg (305 lb)   Height: 6' 3" (1 905 m)         I spent 30 minutes directly with the patient during this visit      [address] [place] [address] Rachel Kimble acknowledges that he has consented to an online visit or consultation  He understands that the online visit is based solely on information provided by him, and that, in the absence of a face-to-face physical evaluation by the physician, the diagnosis he receives is both limited and provisional in terms of accuracy and completeness  This is not intended to replace a full medical face-to-face evaluation by the physician   Isaiah Almanzar understands and accepts these terms  SUBJECTIVE:    Shaan Poole is seen today for a follow up for Major Depressive Disorder, Generalized Anxiety Disorder and ADHD  Since our last visit, overall symptoms have been unchanged  Shaan Poole states that he had 10 days of struggling with his sleep so he was off of his medication for a short period of time (about 10 days)  Shaan Poole states he continues to have strange dreams where he will wake within his dream into another dream   Vyvanse caused increased BP and headaches but "I am noticing improvement in mood and energy and improvement in mood "  He is scheduled for echo and stress test 2/25/21  He states cardiology did not prescribe BP meds at this time  HPI ROS:             ('was' notes: recent => remote)  Medication Side Effects: Headaches and elevated BP with vyvanse   (Was Straterra caused almost immediate vomiting and severe nausea even taking with food )   Depression (10 worst): 4 (Was 4)   Anxiety (10 worst): 6 (Was 2)   Safety concerns (SI, HI, etc): denies (Was  denies)   Hallucinations/Delusions Denies  (Was  denies)   Sleep: Hypersomnia however was not taking meds properly  Now getting back on track  (Was 9 am-midnight awake or at latest 3pm and awake until 3 am (this schedule is about 1/2 of the time))   Energy: Slightly improved but still low "it's fair" (Was  Low energy and motivation)   Appetite: normal (Was  High appetite)   Height  6 ft 3 in (Was  6 ft 3 in)   Weight Change: 305 lbs  (Was  300 lb patient reported)     Shaan Poole denies any side effects from medications unless noted above    Review Of Systems:      Constitutional feeling tired   ENT negative   Cardiovascular negative   Respiratory negative   Gastrointestinal negative   Genitourinary negative   Musculoskeletal negative   Integumentary negative   Neurological headache   Endocrine negative   Other Symptoms none, all other systems are negative     History Review:  The following portions of the patient's history were reviewed and documented: allergies, current medications, past family history, past medical history, past social history and problem list      Lab Review: No new labs or no relevant labs needing review with patient today    OBJECTIVE:     Vital signs in last 24 hours:    Vitals:    03/10/21 1546   Weight: (!) 138 kg (305 lb)   Height: 6' 3" (1 905 m)       Mental Status Evaluation:    Appearance age appropriate, casually dressed   Behavior cooperative, calm, good eye contact   Speech normal rate, normal volume, normal pitch   Mood slightly anxious, slightly depressed   Affect normal range and intensity, appropriate   Thought Processes organized, goal directed, linear   Associations intact associations   Thought Content no overt delusions   Perceptual Disturbances: no auditory hallucinations, no visual hallucinations   Abnormal Thoughts  Risk Potential Suicidal ideation - None  Homicidal ideation - None  Potential for aggression - No   Orientation oriented to person, place, time/date and situation   Memory recent memory intact, remote memory intact   Consciousness alert and awake   Attention Span Concentration Span attention span and concentration are age appropriate   Intellect appears to be of average intelligence   Insight moderate   Judgement partial   Muscle Strength and  Gait unable to assess today due to virtual visit   Motor activity unable to assess today due to virtual visit   Language no difficulty naming common objects, no difficulty repeating a phrase, unable to assess writing today due to virtual visit   Fund of Knowledge adequate knowledge of current events  adequate fund of knowledge regarding past history  adequate fund of knowledge regarding vocabulary    Pain mild   Pain Scale 2       Risks, Benefits And Possible Side Effects Of Medications:    AGREE: Risks, benefits, and possible side effects of medications explained to Armond Yost and he (or legal representative) verbalizes understanding and agreement for treatment  Controlled Medication Discussion:     Patient using medication appropriately  Cucoblaire Case has been filling controlled prescriptions on time as prescribed according to [address] [address] program    ______________________________________________________________    The following portions of the patient's history were reviewed and updated as appropriate: past family history, past medical history, past social history, past surgical history and problem list     Past psychiatric history, family psychiatric history, traumatic history, social history, substance abuse history copied from Dr Josefa Javed note dated July 18, 2019  Past Psychiatric History  Previous diagnoses include depression, anxiety, ADD  Prior outpatient psychiatric treatment: no  Prior therapy: yes  Prior inpatient psychiatric treatment: no  Prior suicide attempts: no  Prior self harm: hit self at times (side of head)  Prior violence or aggression: no     Social History:  The patient grew up in [place], [place]   Childhood was described as challenging      During childhood, parents were together  They have 0 sister(s) and 1 brother(s)  Patient is younger in birth order     Abuse/neglect: none     As far as the patient (or present family member) is aware, overall childhood development: Patient does ascribe to normal developmental milestones such as walking, talking, potty training and making childhood friends      Education level: good grades, HS Cum Laude  Repeat 6th grade ("I had a mental break down"   Kidney stone lodged in ureter at 13yo  "Instead of just acknowledging that I had anxiety I decided I had some neurologic issue", he had a lot of work ups, all negative   Mom , 'a hypochondraic' fed into)   Current occupation: no  Marital status: single  Children: no  Current Living Situation: the patient currently lives with parents    Social support: some with family     Rastafari Affiliation: Atheist   experience: no  Legal history: no  Access to Guns: yes (father's)     Substance use and treatment:  Tobacco use: no  Caffeine Use: yes  ETOH use: beers most days  Other substance use: marijuana off and on ('messes with anxiety')     Endorses previous experimentation with: LSD, mushrooms, marijuana     Longest clean time: not applicable  History of Inpatient/Outpatient rehabilitation program: no        Traumatic History:   Abuse: none  Other Traumatic Events: none      Family Psychiatric History:      Psychiatric Illness:      Hoarding (parents), Depression - parents, aunts, uncles, anxiety - parents  Substance Abuse:       EtOH- Dad  Suicide Attempts:        no family history of suicide attempts     Denies bipolar disorder, schizophrenia    Past Medical History:    Past Medical History:   Diagnosis Date    ADHD (attention deficit hyperactivity disorder)     Anxiety     Chronic kidney disease     kidney stones    Concussion     Depression     Self-injurious behavior     Sleep difficulties     history of sleep apnea  and getting workup for narcolepsy as of 8/19     Past Medical History Pertinent Negatives:   Diagnosis Date Noted    Disease of thyroid gland 08/19/2019    Eating disorder 08/19/2019    Liver disease 08/19/2019    Obsessive-compulsive disorder 08/19/2019    Violence, history of 08/19/2019     Past Surgical History:   Procedure Laterality Date    HERNIA REPAIR      TONSILLECTOMY      TOOTH EXTRACTION Right 09/21/2020    R lower molar extraction     Allergies   Allergen Reactions    Scopolamine        Substance Abuse History:    Social History     Substance and Sexual Activity   Alcohol Use Yes    Frequency: Monthly or less    Drinks per session: 1 or 2    Comment: socially     Social History     Substance and Sexual Activity   Drug Use Yes    Types: Marijuana    Comment: ocasionally       Social History:    Social History     Socioeconomic History    Marital status: Single     Spouse name: Not on file    Number of children: 0    Years of education: Not on file    Highest education level: Some college, no degree   Occupational History    Occupation: unemployed   Social Needs    Financial resource strain: Not on file    Food insecurity     Worry: Not on file     Inability: Not on file   [employer] needs     Medical: Not on file     Non-medical: Not on file   Tobacco Use    Smoking status: Never Smoker    Smokeless tobacco: Never Used   Substance and Sexual Activity    Alcohol use: Yes     Frequency: Monthly or less     Drinks per session: 1 or 2     Comment: socially    Drug use: Yes     Types: Marijuana     Comment: ocasionally    Sexual activity: Not on file   Lifestyle    Physical activity     Days per week: Not on file     Minutes per session: Not on file    Stress: Not on file   Relationships    Social connections     Talks on phone: Not on file     Gets together: Not on file     Attends Congregation service: Not on file     Active member of club or organization: Not on file     Attends meetings of clubs or organizations: Not on file     Relationship status: Not on file    Intimate partner violence     Fear of current or ex partner: Not on file     Emotionally abused: Not on file     Physically abused: Not on file     Forced sexual activity: Not on file   Other Topics Concern    Not on file   Social History Narrative    Not on file       Family Psychiatric History:     Family History   Problem Relation Age of Onset    COPD Mother     Hypertension Mother     Depression Mother     Hypertension Father     Depression Father     Anxiety disorder Father     Alcohol abuse Father     Anxiety disorder Brother     Depression Brother     Hypertension Brother     Alcohol abuse Paternal Grandfather        History Review:  The following portions of the patient's history were reviewed and updated as appropriate: allergies, current medications, past family history, past medical history, past social history, past surgical history and problem list          OBJECTIVE:     Vital signs in last 24 hours:    Vitals:    03/10/21 1546   Weight: (!) 138 kg (305 lb)   Height: 6' 3" (1 905 m)             Confidential Assessment:    Confidential assessment copied from Dr Florentin Atkins note July 18, 2019    Previous psychotropic medication trials:   effexor xr (mid 1777)  Concerta ER 36 CW(~9922)--YPJEAKJ pupils challenges with significant glare with driving  armodafinil (helped a little bit)  Ritalin-10 mg regular release in afternoon   Zoloft  Abilify  Buspar  Gabapentin  Pramipexole for restless legs   Strattera -stopped January 2021 after initiating, trialed 2 times and developed severe vomiting even when taking with food         History of Seizures: no  History of Head injury-LOC-Concussion: 4th grade, minor concussion (Basketball to head), no PCS    ADD without hyperactivity scale positive    Scales:    KORY-7 Flowsheet Screening      Most Recent Value   Over the last two weeks, how often have you been bothered by the following problems? Feeling nervous, anxious, or on edge  3  (Patient-Rptd)    Not being able to stop or control worrying  0  (Patient-Rptd)    Worrying too much about different things  1  (Patient-Rptd)    Trouble relaxing   2  (Patient-Rptd)    Being so restless that it's hard to sit still  0  (Patient-Rptd)    Becoming easily annoyed or irritable   2  (Patient-Rptd)    Feeling afraid as if something awful might happen  0  (Patient-Rptd)    How difficult have these problems made it for you to do your work, take care of things at home, or get along with other people?    Very difficult  (Patient-Rptd)    KORY Score   8      no new scales today    Assessment/Plan:       Diagnoses and all orders for this visit:    Current severe episode of major depressive disorder without psychotic features without prior episode ([facility] [address] )    KORY (generalized anxiety disorder)    Attention deficit disorder (ADD) in adult    Obesity (BMI 30 0-34  9)    Essential hypertension    Chronic fatigue    Restless leg syndrome    Hypersomnia with long sleep time, idiopathic    Vitamin D deficiency          Treatment Recommendations/Precautions/Plan:    Jose Bermeo states he was having hypersomnia, he was not taking medications as prescribed he missed several days up to approximately 10 days  He states when he was taking Vyvanse regularly he was feeling better energy and motivation however since he started to struggle with taking his medication for a short time his energy motivation vacillated  He states that he was also having strange dreams that during a dream he would wake up in going to a different dream within a dream   He is working with his therapist on this issue  He states when he would wake he would feel extremely exhausted and drained as he felt as though  He was in an alternate reality  Jose Bermeo did have an evaluation by a cardiologist for follow-up with his EKG as his EKG did show bradycardia he will be having a stress test and echo on March 25, 2021  He has not been having any chest pain shortness of breath other than if he exerts himself due to excessive weight per his report  He continues struggle with proper diet  He states that he was not yet started on blood pressure medication as he is monitoring his blood pressure and he is going to call the office if his blood pressure remains consistently above 130/80  At this time his blood pressure has been at that range or slightly above that range  He has been experiencing some headaches pretty consistently  Today he reports his headache at a 2/10, 10 being the worst this provider explained that he may be also experiencing some headaches as he is going off and on a medication which may be giving him some of the symptoms     He was advised by Cardiology to discontinue daily use of marijuana as this may be causing him excessive sleep sleepineiss as well as hypertension and headaches  Today Afshanblayne Ocampo understanding that he will need to set alarms to take medication rid consistently, monitor diet, report any side effects to medication, seek treatment if his headaches increase, he verbalizes understanding he is to contact cardiology if blood pressure remains elevated, he will have follow-up studies as prescribed  There will be no medication changes today as again he has not been consistent with his current dosing of medications  It is difficult to make changes until we have a baseline with consistent dosing  He denies suicidal or homicidal ideation, he denies auditory visual hallucinations, denies delusional thinking  No refills on medications required today as he has not been taking consistently  Patient has been educated about their diagnosis and treatment modalities  They voiced odalysin  g and agreement with the following plan:    -  Continue Vyvanse 30 mg p o  daily for continued improvement in symptoms of ADD as well as improvement in energy and motivation       -  Continue Trintellix 20 mg p o  daily for continued improvement in symptoms of anxiety and depression     -  Continue Wellbutrin  mg p o  q a m  for continued improvement in focus and attention as well as improvement in depressive symptoms may also help with improvement in energy      -  Patient has not yet received gene sight testing      - copied forward from previous note:discussed potential for treatment with or evaluation with Dr Marlin Mann for possible [address] in future on the neuro star if medication changes do not improve significant depressive and motivation symptoms      -  Follow-up with this provider April 12th 11:30 a m , 05/26/2021,   06/21/2021 4:30 p m     - continue to follow with PCP office, Dr Vamshi Pope and YAO Huerta, for abnormal labs and coordination with cardiology, status post cardiology visit 03/02/2021 for bradycardia     Also continue to manage borderline blood sugars, hypertension and any other medical concerns  - maintain routine follow-up visits with therapist Rocío Eubanks from [place], [place]  Patient has been seeing 1 time weekly for approximately 45 minute session and patient feels has been helpful Xiang Cheng continues to work with his therapist on techniques for improvement in motivation energy etc       - maintain followups with sleep medicine team    -Discussed self monitoring of symptoms, and symptom monitoring tools     -Patient has been informed of 24 hours and weekend coverage for urgent situations accessed by calling the main clinic phone number      -treatment plan completed   02/10/2021 and verbal consent obtained due virtual format  Risks/Benefits      Risks, Benefits And Possible Side Effects Of Medications:    Risks, benefits, and possible side effects of medications explained to Xiang Cheng and he verbalizes understanding and agreement for treatment  Patient education on Concerta and Ritalin completed including elevated heart rate, elevated bp, seizures, anxiety/irritability, activation/induction of christina, abuse potential, interactions with other medications, risk of sudden death, appetite suppression/weight loss discussed        Controlled Medication Discussion:     Xiang Cheng has been filling controlled prescriptions on time as prescribed according to [address]      Psychotherapy Provided:       Individual psychotherapy provided:             YAO Jones 03/10/21

## 2021-03-16 ENCOUNTER — IMMUNIZATIONS (OUTPATIENT)
Dept: FAMILY MEDICINE CLINIC | Facility: HOSPITAL | Age: 21
End: 2021-03-16

## 2021-03-16 DIAGNOSIS — Z23 ENCOUNTER FOR IMMUNIZATION: Primary | ICD-10-CM

## 2021-03-16 PROCEDURE — 91300 SARS-COV-2 / COVID-19 MRNA VACCINE (PFIZER-BIONTECH) 30 MCG: CPT

## 2021-03-16 PROCEDURE — 0001A SARS-COV-2 / COVID-19 MRNA VACCINE (PFIZER-BIONTECH) 30 MCG: CPT

## 2021-03-25 ENCOUNTER — HOSPITAL ENCOUNTER (OUTPATIENT)
Dept: NON INVASIVE DIAGNOSTICS | Facility: CLINIC | Age: 21
Discharge: HOME | End: 2021-03-25
Payer: COMMERCIAL

## 2021-03-25 DIAGNOSIS — R07.89 CHEST PAIN, ATYPICAL: ICD-10-CM

## 2021-03-25 DIAGNOSIS — R00.1 SINUS BRADYCARDIA: ICD-10-CM

## 2021-03-25 DIAGNOSIS — R03.0 ELEVATED BLOOD-PRESSURE READING, WITHOUT DIAGNOSIS OF HYPERTENSION: ICD-10-CM

## 2021-03-25 PROCEDURE — 93306 TTE W/DOPPLER COMPLETE: CPT

## 2021-03-25 PROCEDURE — 93306 TTE W/DOPPLER COMPLETE: CPT | Performed by: INTERNAL MEDICINE

## 2021-03-26 ENCOUNTER — TELEPHONE (OUTPATIENT)
Dept: CARDIOLOGY CLINIC | Facility: CLINIC | Age: 21
End: 2021-03-26

## 2021-03-26 NOTE — TELEPHONE ENCOUNTER
----- Message from Matt Bejarano MD sent at 3/25/2021  6:55 PM EDT -----  Please call the patient and inform him that there is no significant finding on echocardiogram and overall everything looks okay

## 2021-03-30 ENCOUNTER — HOSPITAL ENCOUNTER (OUTPATIENT)
Dept: NON INVASIVE DIAGNOSTICS | Facility: CLINIC | Age: 21
Discharge: HOME | End: 2021-03-30
Payer: COMMERCIAL

## 2021-03-30 PROCEDURE — 93017 CV STRESS TEST TRACING ONLY: CPT

## 2021-03-30 PROCEDURE — 93018 CV STRESS TEST I&R ONLY: CPT | Performed by: INTERNAL MEDICINE

## 2021-03-30 PROCEDURE — 93016 CV STRESS TEST SUPVJ ONLY: CPT | Performed by: INTERNAL MEDICINE

## 2021-03-31 LAB
MAX DIASTOLIC BP: 104 MMHG
MAX HEART RATE: 173 BPM
MAX PREDICTED HEART RATE: 197 BPM
MAX. SYSTOLIC BP: 246 MMHG
PROTOCOL NAME: NORMAL
TARGET HR FORMULA: NORMAL
TEST INDICATION: NORMAL
TIME IN EXERCISE PHASE: NORMAL

## 2021-04-07 ENCOUNTER — IMMUNIZATIONS (OUTPATIENT)
Dept: FAMILY MEDICINE CLINIC | Facility: HOSPITAL | Age: 21
End: 2021-04-07

## 2021-04-07 DIAGNOSIS — Z23 ENCOUNTER FOR IMMUNIZATION: Primary | ICD-10-CM

## 2021-04-07 PROCEDURE — 91300 SARS-COV-2 / COVID-19 MRNA VACCINE (PFIZER-BIONTECH) 30 MCG: CPT

## 2021-04-07 PROCEDURE — 0002A SARS-COV-2 / COVID-19 MRNA VACCINE (PFIZER-BIONTECH) 30 MCG: CPT

## 2021-04-09 DIAGNOSIS — R53.82 CHRONIC FATIGUE: ICD-10-CM

## 2021-04-09 DIAGNOSIS — F98.8 ATTENTION DEFICIT DISORDER (ADD) IN ADULT: ICD-10-CM

## 2021-04-12 ENCOUNTER — TELEMEDICINE (OUTPATIENT)
Dept: PSYCHIATRY | Facility: CLINIC | Age: 21
End: 2021-04-12
Payer: COMMERCIAL

## 2021-04-12 DIAGNOSIS — F98.8 ATTENTION DEFICIT DISORDER (ADD) IN ADULT: ICD-10-CM

## 2021-04-12 DIAGNOSIS — F41.1 GAD (GENERALIZED ANXIETY DISORDER): ICD-10-CM

## 2021-04-12 DIAGNOSIS — I10 ESSENTIAL HYPERTENSION: ICD-10-CM

## 2021-04-12 DIAGNOSIS — G25.81 RESTLESS LEG SYNDROME: ICD-10-CM

## 2021-04-12 DIAGNOSIS — R53.82 CHRONIC FATIGUE: ICD-10-CM

## 2021-04-12 DIAGNOSIS — F32.2 CURRENT SEVERE EPISODE OF MAJOR DEPRESSIVE DISORDER WITHOUT PSYCHOTIC FEATURES WITHOUT PRIOR EPISODE (HCC): Primary | ICD-10-CM

## 2021-04-12 DIAGNOSIS — E66.9 OBESITY (BMI 30.0-34.9): ICD-10-CM

## 2021-04-12 PROCEDURE — 99214 OFFICE O/P EST MOD 30 MIN: CPT | Performed by: NURSE PRACTITIONER

## 2021-04-12 NOTE — PSYCH
Virtual Regular Visit    Name and Date of Birth:  Nerissa Flores 20 y o  2000 MRN: [id number]    Date of Visit:  April 12, 2021    Assessment/Plan:    Problem List Items Addressed This Visit        Cardiovascular and Mediastinum    Essential hypertension       Other    Attention deficit disorder (ADD) in adult    Current severe episode of major depressive disorder without psychotic features without prior episode ([address] ) - Primary    Obesity (BMI 30 0-34  9)    Chronic fatigue    Restless leg syndrome    KORY (generalized anxiety disorder)          Reason for visit is   Chief Complaint   Patient presents with    Virtual Regular Visit        Encounter provider Carmen Nicholas, [facility]    Provider located at [address] [address] [phone]      Recent Visits  No visits were found meeting these conditions  Showing recent visits within past 7 days and meeting all other requirements     Today's Visits  Date Type Provider Dept   04/12/21 [address]  [facility] [facility] today's visits and meeting all other requirements     Future Appointments  No visits were found meeting these conditions  Showing future appointments within next 150 days and meeting all other requirements        The patient was identified by name and date of birth  Nerissa Flores was informed that this is a telemedicine visit and that the visit is being conducted through [employer] and patient was informed that this is a secure, HIPAA-compliant platform  He agrees to proceed     My office door was closed  No one else was in the room  He acknowledged consent and understanding of privacy and security of the video platform  The patient has agreed to participate and understands they can discontinue the visit at any time  Patient is aware this is a billable service           Past Medical History:   Diagnosis Date  ADHD (attention deficit hyperactivity disorder)     Anxiety     Concussion     Depression     Kidney stones     Self-injurious behavior     Sleep difficulties     history of sleep apnea  and getting workup for narcolepsy as of 8/19       Past Surgical History:   Procedure Laterality Date    HERNIA REPAIR      TONSILLECTOMY      TOOTH EXTRACTION Right 09/21/2020    R lower molar extraction       Current Outpatient Medications   Medication Sig Dispense Refill    buPROPion (WELLBUTRIN XL) 150 mg 24 hr tablet TAKE 1 TABLET EVERY MORNING 90 tablet 3    Cholecalciferol (Vitamin D3) 50 MCG (2000 UT) capsule Take 2 capsules (4,000 Units total) by mouth daily 180 capsule 1    Denta 5000 Plus 1 1 % CREA       lisdexamfetamine (VYVANSE) 30 MG capsule Take 1 capsule (30 mg total) by mouth every morningMax Daily Amount: 30 mg 30 capsule 0    Melatonin 10 MG TABS Take by mouth      vortioxetine (TRINTELLIX) 20 MG tablet Take 1 tablet (20 mg total) by mouth daily 90 tablet 0     No current facility-administered medications for this visit  Allergies   Allergen Reactions    Scopolamine      Video Exam    There were no vitals filed for this visit  I spent 45 minutes directly with the patient during this visit      [address] [place] [address] Rachel Lamin acknowledges that he has consented to an online visit or consultation  He understands that the online visit is based solely on information provided by him, and that, in the absence of a face-to-face physical evaluation by the physician, the diagnosis he receives is both limited and provisional in terms of accuracy and completeness  This is not intended to replace a full medical face-to-face evaluation by the physician  Cece Fofana understands and accepts these terms  SUBJECTIVE:    Stephanie Arizmendi is seen today for a follow up for Major Depressive Disorder, Generalized Anxiety Disorder and ADHD      Since our last visit, overall symptoms have been stable  Low the reports discontinuing his Wellbutrin Vyvanse and Trintellix approximately 3 weeks ago stating he has been having nausea headaches and  Occasional elevated blood pressure  He states that he has been working closely with his therapist weekly and "I just have never been consistent with my medication  "      Through therapy Forrest Wagner states "I need to grow up, I am lazy, I don't see the medication fixing that  However the medication did help with my energy "  Forrest Wagner states that when he was previously on antidepressants he was not necessarily understanding his mental health issues and now that he has been very consistent with therapy he has better understanding his situation and desire for "where I want to be in life  "    Forrest Wagner states he did obtain a job at [employer] he is waiting for his social security card to come in the mail so he can initiate his job and he believes he will happen in 2 weeks  He has not had any side effects from discontinuing all of this medication he has not had any suicidal or homicidal thoughts he denies any auditory visual hallucinations, denies any delusional thinking  He continues to care for himself he states that his vivid weird dreams have decreased significantly  He feels as though he does need to restart the Vyvanse  He did complete his stress test      HPI ROS:             ('was' notes: recent => remote)  Medication Side Effects: Nausea, headaches, elevated BP  (Was Headaches and elevated BP with vyvanse)   Depression (10 worst): 4 (Was 4)   Anxiety (10 worst): 6 (Was 6)   Safety concerns (SI, HI, etc): denies (Was denies)   Hallucinations/Delusions denies (Was denies)   Sleep: 10 hours per night (Was Hypersomnia however was not taking meds properly    Now getting back on track )   Energy: Energy remains low but motivation a bit better  (Was slightly improved but still low "it's fair")   Appetite: normal (Was normal)   Height 6 ft 3 in (Was 6 ft 3 in)   Weight Change: 305 lbs pt reported  (Was 305 lbs pt reported)     [place] denies any side effects from medications unless noted above    Review Of Systems:      Constitutional negative and fluctuating energy level   ENT negative   Cardiovascular negative   Respiratory negative   Gastrointestinal negative   Genitourinary negative   Musculoskeletal negative   Integumentary negative   Neurological negative   Endocrine negative   Other Symptoms none, all other systems are negative     History Review: The following portions of the patient's history were reviewed and documented: allergies, current medications, past family history, past medical history, past social history and problem list      Lab Review: No new labs or no relevant labs needing review with patient today      OBJECTIVE:     Vital signs in last 24 hours: There were no vitals filed for this visit      Mental Status Evaluation:    Appearance age appropriate, casually dressed   Behavior cooperative, calm, good eye contact   Speech normal rate, normal volume, normal pitch   Mood less anxious, less depressed   Affect normal range and intensity, appropriate   Thought Processes organized, goal directed, linear   Associations intact associations   Thought Content no overt delusions   Perceptual Disturbances: no auditory hallucinations, no visual hallucinations   Abnormal Thoughts  Risk Potential Suicidal ideation - None  Homicidal ideation - None  Potential for aggression - No   Orientation oriented to person, place, time/date and situation   Memory recent and remote memory grossly intact   Consciousness alert and awake   Attention Span Concentration Span attention span and concentration are age appropriate   Intellect appears to be of average intelligence   Insight intact   Judgement intact   Muscle Strength and  Gait unable to assess today due to virtual visit   Motor activity unable to assess today due to virtual visit   Language no difficulty naming common objects, no difficulty repeating a phrase, unable to assess writing today due to virtual visit   Fund of Knowledge adequate knowledge of current events  adequate fund of knowledge regarding past history  adequate fund of knowledge regarding vocabulary    Pain none   Pain Scale 0       Risks, Benefits And Possible Side Effects Of Medications:    AGREE: Risks, benefits, and possible side effects of medications explained to [facility] and he (or legal representative) verbalizes understanding and agreement for treatment  Controlled Medication Discussion:     Patient using medication appropriately  [facility] has been filling controlled prescriptions on time as prescribed according to [employer] [address] program    ______________________________________________________________    The following portions of the patient's history were reviewed and updated as appropriate: past family history, past medical history, past social history, past surgical history and problem list     Past psychiatric history, family psychiatric history, traumatic history, social history, substance abuse history copied from Dr Devendra Peña note dated July 18, 2019  Past Psychiatric History  Previous diagnoses include depression, anxiety, ADD  Prior outpatient psychiatric treatment: no  Prior therapy: yes  Prior inpatient psychiatric treatment: no  Prior suicide attempts: no  Prior self harm: hit self at times (side of head)  Prior violence or aggression: no     Social History:  The patient grew up in [place], [place]   Childhood was described as challenging      During childhood, parents were together  They have 0 sister(s) and 1 brother(s)   Patient is younger in birth order     Abuse/neglect: none     As far as the patient (or present family member) is aware, overall childhood development: Patient does ascribe to normal developmental milestones such as walking, talking, potty training and making childhood friends      Education level: good grades, HS Cum Laude  Repeat 6th grade ("I had a mental break down"   Kidney stone lodged in ureter at 15yo  "Instead of just acknowledging that I had anxiety I decided I had some neurologic issue", he had a lot of work ups, all negative   Mom , 'a hypochondraic' fed into)   Current occupation: no  Marital status: single  Children: no  Current Living Situation: the patient currently lives with parents    Social support: some with family     Taoism Affiliation: Atheist   experience: no  Legal history: no  Access to Guns: yes (father's)     Substance use and treatment:  Tobacco use: no  Caffeine Use: yes  ETOH use: beers most days  Other substance use: marijuana off and on ('messes with anxiety')     Endorses previous experimentation with: LSD, mushrooms, marijuana     Longest clean time: not applicable  History of Inpatient/Outpatient rehabilitation program: no        Traumatic History:   Abuse: none  Other Traumatic Events: none      Family Psychiatric History:      Psychiatric Illness:      Hoarding (parents), Depression - parents, aunts, uncles, anxiety - parents  Substance Abuse:       EtOH- Dad  Suicide Attempts:        no family history of suicide attempts     Denies bipolar disorder, schizophrenia    Past Medical History:    Past Medical History:   Diagnosis Date    ADHD (attention deficit hyperactivity disorder)     Anxiety     Concussion     Depression     Kidney stones     Self-injurious behavior     Sleep difficulties     history of sleep apnea  and getting workup for narcolepsy as of 8/19     Past Medical History Pertinent Negatives:   Diagnosis Date Noted    Disease of thyroid gland 08/19/2019    Eating disorder 08/19/2019    Liver disease 08/19/2019    Obsessive-compulsive disorder 08/19/2019    Violence, history of 08/19/2019     Past Surgical History:   Procedure Laterality Date    HERNIA REPAIR      TONSILLECTOMY      TOOTH EXTRACTION Right 09/21/2020    R lower molar extraction     Allergies   Allergen Reactions    Scopolamine        Substance Abuse History:    Social History     Substance and Sexual Activity   Alcohol Use Yes    Frequency: Monthly or less    Drinks per session: 1 or 2    Comment: socially     Social History     Substance and Sexual Activity   Drug Use Yes    Types: Marijuana    Comment: ocasionally       Social History:    Social History     Socioeconomic History    Marital status: Single     Spouse name: Not on file    Number of children: 0    Years of education: Not on file    Highest education level: Some college, no degree   Occupational History    Occupation: unemployed   Social Needs    Financial resource strain: Not on file    Food insecurity     Worry: Not on file     Inability: Not on file   [employer] needs     Medical: Not on file     Non-medical: Not on file   Tobacco Use    Smoking status: Never Smoker    Smokeless tobacco: Never Used   Substance and Sexual Activity    Alcohol use: Yes     Frequency: Monthly or less     Drinks per session: 1 or 2     Comment: socially    Drug use: Yes     Types: Marijuana     Comment: ocasionally    Sexual activity: Not on file   Lifestyle    Physical activity     Days per week: Not on file     Minutes per session: Not on file    Stress: Not on file   Relationships    Social connections     Talks on phone: Not on file     Gets together: Not on file     Attends Hinduism service: Not on file     Active member of club or organization: Not on file     Attends meetings of clubs or organizations: Not on file     Relationship status: Not on file    Intimate partner violence     Fear of current or ex partner: Not on file     Emotionally abused: Not on file     Physically abused: Not on file     Forced sexual activity: Not on file   Other Topics Concern    Not on file   Social History Narrative    Not on file       Family Psychiatric History:     Family History   Problem Relation Age of Onset  COPD Mother     Hypertension Mother     Depression Mother     Hypertension Father     Depression Father     Anxiety disorder Father     Alcohol abuse Father     Anxiety disorder Brother     Depression Brother     Hypertension Brother     Alcohol abuse Paternal Grandfather        History Review: The following portions of the patient's history were reviewed and updated as appropriate: allergies, current medications, past family history, past medical history, past social history, past surgical history and problem list          OBJECTIVE:     Vital signs in last 24 hours: There were no vitals filed for this visit  Confidential Assessment:    Confidential assessment copied from Dr Duane Parks note July 18, 2019    Previous psychotropic medication trials:   effexor xr (mid 0519)  Concerta ER 36 GY(~5020)--XEQMSHY pupils challenges with significant glare with driving  armodafinil (helped a little bit)  Ritalin-10 mg regular release in afternoon   Zoloft  Abilify  Buspar  Gabapentin  Pramipexole for restless legs   Strattera -stopped January 2021 after initiating, trialed 2 times and developed severe vomiting even when taking with food         History of Seizures: no  History of Head injury-LOC-Concussion: 4th grade, minor concussion (Basketball to head), no PCS    ADD without hyperactivity scale positive    Scales:    no new scales today    Assessment/Plan:       Diagnoses and all orders for this visit:    Current severe episode of major depressive disorder without psychotic features without prior episode (HCC)    KORY (generalized anxiety disorder)    Attention deficit disorder (ADD) in adult    Obesity (BMI 30 0-34  9)    Restless leg syndrome    Chronic fatigue    Essential hypertension          Treatment Recommendations/Precautions/Plan:    Andrea Dinero states he continues to struggle with energy and motivation    "I would like to be happier but I have accepted my mood  "  He states is therapy has been helping him greatly "I am starting to better understand what I can expect and where I am at "  He states "my depression is no so crippling, my trouble comes in with my anxiety, my attitude and consistency "      "I only notice maybe a slight improvement with meds but I notice more now the side effects like chronic nausea "  He states he recently vomited when putting in a mailbox  He states he does not feel completley focused or clear when he is on his meds  At this time is interested in only remaining on Vyvanse and does not want to restart Wellbutrin or Trintellix at this time  He states that in general not specifically Wellbutrin and  Trintellix alone but all SSRIs caused him GI distress, elevated blood pressure and feelings as though he is sort of disconnected  He agrees to maintain psychotherapy appointments weekly and he verbalizes that he has the numbers for the [facility] crisis lines and he will contact this provider as well if there are any increases in depressive or anxiety symptoms  Patient has been educated about their diagnosis and treatment modalities  They voiced rebeka  g and agreement with the following plan:    -  Continue Vyvanse 30 mg p o  daily for continued improvement in symptoms of ADD as well as improvement in energy and motivation  Thirty day supply sent to pharmacy 04/12/2021      -   Patient self discontinued Trintellix as he does not want to take SSRI therapy at this time working in therapy with his therapist on depressive symptoms anxiety symptoms and lack of motivation and energy "I just need to grow up  "  Patient did obtain a job    Denies SI HI     - patient self discontinued Wellbutrin XL same reason as Trintellix above       - gene sght testing has not been completed as of yet    - copied forward:discussed potential for treatment with or evaluation with Dr Luz Pierre for possible [facility] in future on the neuro star if medication changes do not improve significant depressive and motivation symptoms      -  Follow-up with this provider  05/26/2021,   06/21/2021 4:30 p m     - continue to follow with PCP office, Dr General Matthews and Ubaldo Frankel, CRNP, for abnormal labs and coordination with cardiology, status post cardiology visit 03/02/2021 for bradycardia  Also continue to manage borderline blood sugars, hypertension and any other medical concerns  -  Continue follow-up with psychotherapist Master Prince from [place], [place]  Patient has been seeing 1 time weekly for approximately 45 minute session and patient feels has been helpful Ead Olmedo continues to work with his therapist on techniques for improvement in motivation energy etc       - maintain followups with sleep medicine team    -Discussed self monitoring of symptoms, and symptom monitoring tools     -Patient has been informed of 24 hours and weekend coverage for urgent situations accessed by calling the main clinic phone number      -treatment plan completed   02/10/2021 and verbal consent obtained due virtual format  Risks/Benefits      Risks, Benefits And Possible Side Effects Of Medications:    Risks, benefits, and possible side effects of medications explained to Edamyriam Edwardslle and he verbalizes understanding and agreement for treatment  Patient education on Concerta and Ritalin completed including elevated heart rate, elevated bp, seizures, anxiety/irritability, activation/induction of christina, abuse potential, interactions with other medications, risk of sudden death, appetite suppression/weight loss discussed  Controlled Medication Discussion:     Eda Olmedo has been filling controlled prescriptions on time as prescribed according to [address]      Psychotherapy Provided:           Individual psychotherapy provided: Yes  Counseling was provided during the session today for 25 minutes    Medications, treatment progress and treatment plan reviewed with Sheryle Spruce  Medication changes discussed with Marko  Medication education provided to Sheryle Spruce  Recent stressor including COVID-19 issues, family problems, chronic mental illness and noncompliance with treatment discussed with Marko  Coping strategies reviewed with Marko  Importance of medication and treatment compliance reviewed with Marko  Importance of follow up with family physician for medical issues reviewed with Sheryle Spruce  Reassurance and supportive therapy provided  Crisis/safety plan discussed with Sheryle Spruce  This note was shared with patient            YAO Andersen 04/12/21

## 2021-05-17 DIAGNOSIS — R53.82 CHRONIC FATIGUE: ICD-10-CM

## 2021-05-17 DIAGNOSIS — F98.8 ATTENTION DEFICIT DISORDER (ADD) IN ADULT: ICD-10-CM

## 2021-05-17 NOTE — TELEPHONE ENCOUNTER
All documentation, nursing notes, and PDMP website reviewed  Tellodavid Rob called the clinic today and appropriately requested refill of controlled psychotropic medications (Vyvanse)  Benwayne Ormond was last evaluated on 4/12/21 and plan during that visit was to continue this agent given efficacy and tolerability  Duwayne Ormond did not endorse adverse medication side effects at that time  Duwayne Ormond is currently taking Vyvanse and tolerating it well  Review of PDMP website reveals no concerns for abuse or misuse  As such, will refill script today for 30-days as I am covering for patient's primary provider  Duwayne Ormond has a scheduled follow-up visit for medication management on 5/26/2021

## 2021-05-17 NOTE — TELEPHONE ENCOUNTER
Will ask covering provider to review in [facility]'s absence   Next appointment 5/26/21

## 2021-05-26 ENCOUNTER — TELEMEDICINE (OUTPATIENT)
Dept: PSYCHIATRY | Facility: CLINIC | Age: 21
End: 2021-05-26
Payer: COMMERCIAL

## 2021-05-26 VITALS — HEIGHT: 75 IN | WEIGHT: 290 LBS | BODY MASS INDEX: 36.06 KG/M2

## 2021-05-26 DIAGNOSIS — G47.11 HYPERSOMNIA WITH LONG SLEEP TIME, IDIOPATHIC: ICD-10-CM

## 2021-05-26 DIAGNOSIS — G25.81 RESTLESS LEG SYNDROME: ICD-10-CM

## 2021-05-26 DIAGNOSIS — R53.82 CHRONIC FATIGUE: ICD-10-CM

## 2021-05-26 DIAGNOSIS — E55.9 VITAMIN D DEFICIENCY: ICD-10-CM

## 2021-05-26 DIAGNOSIS — F32.2 CURRENT SEVERE EPISODE OF MAJOR DEPRESSIVE DISORDER WITHOUT PSYCHOTIC FEATURES WITHOUT PRIOR EPISODE (HCC): Primary | ICD-10-CM

## 2021-05-26 DIAGNOSIS — E66.9 OBESITY (BMI 30.0-34.9): ICD-10-CM

## 2021-05-26 DIAGNOSIS — F41.1 GAD (GENERALIZED ANXIETY DISORDER): ICD-10-CM

## 2021-05-26 DIAGNOSIS — F98.8 ATTENTION DEFICIT DISORDER (ADD) IN ADULT: ICD-10-CM

## 2021-05-26 PROCEDURE — 99214 OFFICE O/P EST MOD 30 MIN: CPT | Performed by: NURSE PRACTITIONER

## 2021-05-26 PROCEDURE — 90833 PSYTX W PT W E/M 30 MIN: CPT | Performed by: NURSE PRACTITIONER

## 2021-05-26 NOTE — PSYCH
Virtual Regular Visit    Name and Date of Birth:  Bharathi Butler 20 y o  2000 MRN: [id number]    Date of Visit:   May 26, 2021    Assessment/Plan:    Problem List Items Addressed This Visit        Other    Attention deficit disorder (ADD) in adult    Relevant Medications    lisdexamfetamine (VYVANSE) 40 MG capsule    Current severe episode of major depressive disorder without psychotic features without prior episode ([facility] [address] ) - Primary    Relevant Medications    lisdexamfetamine (VYVANSE) 40 MG capsule    Obesity (BMI 30 0-34  9)    Chronic fatigue    Relevant Medications    lisdexamfetamine (VYVANSE) 40 MG capsule    Restless leg syndrome    Hypersomnia with long sleep time, idiopathic    OKRY (generalized anxiety disorder)    Relevant Medications    lisdexamfetamine (VYVANSE) 40 MG capsule    Vitamin D deficiency          Reason for visit is   Chief Complaint   Patient presents with    Virtual Regular Visit    Anxiety    Depression    Follow-up    ADHD        Encounter provider [facility]    Provider located at [address] [address] [phone]      Recent Visits  No visits were found meeting these conditions  Showing recent visits within past 7 days and meeting all other requirements     Today's Visits  Date Type Provider Dept   05/26/21 Telemedicine Marisol Estrella today's visits and meeting all other requirements     Future Appointments  No visits were found meeting these conditions  Showing future appointments within next 150 days and meeting all other requirements        The patient was identified by name and date of birth  Bharathi Butler was informed that this is a telemedicine visit and that the visit is being conducted through telephone  My office door was closed  No one else was in the room    He acknowledged consent and understanding of privacy and security of the video platform  The patient has agreed to participate and understands they can discontinue the visit at any time  It was my intent to perform this visit via video technology but the patient was not able to do a video connection so the visit was completed via audio telephone only  Patient's camera would not allow video or microphone so had to convert to telephone  Patient is aware this is a billable service  Past Medical History:   Diagnosis Date    ADHD (attention deficit hyperactivity disorder)     Anxiety     Concussion     Depression     Kidney stones     Self-injurious behavior     Sleep difficulties     history of sleep apnea  and getting workup for narcolepsy as of 8/19       Past Surgical History:   Procedure Laterality Date    HERNIA REPAIR      TONSILLECTOMY      TOOTH EXTRACTION Right 09/21/2020    R lower molar extraction       Current Outpatient Medications   Medication Sig Dispense Refill    Denta 5000 Plus 1 1 % CREA       lisdexamfetamine (VYVANSE) 40 MG capsule Take 1 capsule (40 mg total) by mouth every morningMax Daily Amount: 40 mg 30 capsule 0    buPROPion (WELLBUTRIN XL) 150 mg 24 hr tablet TAKE 1 TABLET EVERY MORNING (Patient not taking: Reported on 5/26/2021) 90 tablet 3    Cholecalciferol (Vitamin D3) 50 MCG (2000 UT) capsule Take 2 capsules (4,000 Units total) by mouth daily 180 capsule 1    Melatonin 10 MG TABS Take by mouth      vortioxetine (TRINTELLIX) 20 MG tablet Take 1 tablet (20 mg total) by mouth daily 90 tablet 0     No current facility-administered medications for this visit  Allergies   Allergen Reactions    Scopolamine          Vitals:    05/26/21 0804   Weight: 132 kg (290 lb)   Height: 6' 3" (1 905 m)           I spent 30 minutes directly with the patient during this visit      [address] [place] [address] Rachel Kimble acknowledges that he has consented to an online visit or consultation   He understands that the online visit is based solely on information provided by him, and that, in the absence of a face-to-face physical evaluation by the physician, the diagnosis he receives is both limited and provisional in terms of accuracy and completeness  This is not intended to replace a full medical face-to-face evaluation by the physician  Patsy Amato understands and accepts these terms  SUBJECTIVE:    Sheryle Spruce is seen today for a follow up for Major Depressive Disorder, Generalized Anxiety Disorder and ADHD  Since our last visit, overall symptoms have been stable  Sheryle Spruce states he has been working at [employer] in [place] from 1 pm-10 pm   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[employer] and it is working out well for him  She states he is experiencing some mild back pain and mild ankle discomfort since he is active again  "I am sticking to a schedule, I really only have enough energy to go to work but I don't really have enough energy for anything else "    He states he feels as though the Vyvanse has not been as effective as it was in the past   He is takng the Vyvanse at 10:30 am and it starts to work in about 45 minutes and then it wears off after a few hours  Sheryle Spruce states within a few months he will be moving in with his brother in [place] and he is looking forward to this move          HPI ROS:             ('was' notes: recent => remote)  Medication Side Effects: Denies (BP has been 140's over 70's)  (Was Nausea, headaches, elevated BP)   Depression (10 worst): 2-3 (Was 4)   Anxiety (10 worst): 6 (Was 6)   Safety concerns (SI, HI, etc): denies (Was denies)   Hallucinations/Delusions denies (Was denies)   Sleep: 1 am-10 am (8-10 hours per night) abnormal dreams resolved (Was 10 hours per night)   Energy: Energy and motivation slightly improving but hard to tell (Was  Energy remains low but motivation a bit better)   Appetite: normal (Was  normal)   Height  6 feet 3 inches (Was  6 feet 3 inches)   Weight Change: 290 lbs pt reported (Was  305 pounds patient reported)     Kailee Foot denies any side effects from medications unless noted above    Review Of Systems:      Constitutional fluctuating energy level   ENT negative   Cardiovascular negative   Respiratory negative   Gastrointestinal negative   Genitourinary negative   Musculoskeletal back pain and ankle pain   Integumentary negative   Neurological negative   Endocrine negative   Other Symptoms none, all other systems are negative     History Review:  The following portions of the patient's history were reviewed and documented: allergies, current medications, past family history, past medical history, past social history and problem list      Lab Review: No new labs or no relevant labs needing review with patient today      OBJECTIVE:     Vital signs in last 24 hours:    Vitals:    05/26/21 0804   Weight: 132 kg (290 lb)   Height: 6' 3" (1 905 m)       Mental Status Evaluation:    Appearance unable to assess today due to virtual visit   Behavior cooperative, calm   Speech normal rate, normal volume, normal pitch   Mood euthymic   Affect unable to assess today due to virtual visit   Thought Processes organized, goal directed, linear   Associations intact associations   Thought Content no overt delusions   Perceptual Disturbances: no auditory hallucinations, no visual hallucinations   Abnormal Thoughts  Risk Potential Suicidal ideation - None  Homicidal ideation - None  Potential for aggression - No   Orientation oriented to person, place, time/date and situation   Memory recent and remote memory grossly intact   Consciousness alert and awake   Attention Span Concentration Span attention span and concentration are age appropriate   Intellect appears to be of average intelligence   Insight intact   Judgement intact   Muscle Strength and  Gait unable to assess today due to virtual visit   Motor activity unable to assess today due to virtual visit   Pain mild   Pain Scale 2       Risks, Benefits And Possible Side Effects Of Medications:    AGREE: Risks, benefits, and possible side effects of medications explained to Connie Pendleton and he (or legal representative) verbalizes understanding and agreement for treatment  Controlled Medication Discussion:     Patient using medication appropriately  Connie Pendleton has been filling controlled prescriptions on time as prescribed according to [facility] program     Connie Pendleton reminded of risks of Vyvanse/stimulant use including elevated heart rate, elevated bp, seizures, anxiety/irritability, activation/induction of christina, abuse potential, interactions with other medications, risk of sudden death, appetite suppression/weight loss discussed  ______________________________________________________________      The following portions of the patient's history were reviewed and updated as appropriate: past family history, past medical history, past social history, past surgical history and problem list     Past psychiatric history, family psychiatric history, traumatic history, social history, substance abuse history copied from Dr Mcclure Sales note dated July 18, 2019  Past Psychiatric History  Previous diagnoses include depression, anxiety, ADD  Prior outpatient psychiatric treatment: no  Prior therapy: yes  Prior inpatient psychiatric treatment: no  Prior suicide attempts: no  Prior self harm: hit self at times (side of head)  Prior violence or aggression: no     Social History:  The patient grew up in [place], [place]   Childhood was described as challenging      During childhood, parents were together  They have 0 sister(s) and 1 brother(s)   Patient is younger in birth order     Abuse/neglect: none     As far as the patient (or present family member) is aware, overall childhood development: Patient does ascribe to normal developmental milestones such as walking, talking, potty training and making childhood friends      Education level: good grades, HS Cum Laude  Repeat 6th grade ("I had a mental break down"   Kidney stone lodged in ureter at 13yo  "Instead of just acknowledging that I had anxiety I decided I had some neurologic issue", he had a lot of work ups, all negative   Mom , 'a hypochondraic' fed into)   Current occupation: no  Marital status: single  Children: no  Current Living Situation: the patient currently lives with parents    Social support: some with family     Lutheran Affiliation: Atheist   experience: no  Legal history: no  Access to Guns: yes (father's)     Substance use and treatment:  Tobacco use: no  Caffeine Use: yes  ETOH use: beers most days  Other substance use: marijuana off and on ('messes with anxiety')     Endorses previous experimentation with: LSD, mushrooms, marijuana     Longest clean time: not applicable  History of Inpatient/Outpatient rehabilitation program: no        Traumatic History:   Abuse: none  Other Traumatic Events: none      Family Psychiatric History:      Psychiatric Illness:      Hoarding (parents), Depression - parents, aunts, uncles, anxiety - parents  Substance Abuse:       EtOH- Dad  Suicide Attempts:        no family history of suicide attempts     Denies bipolar disorder, schizophrenia    Past Medical History:    Past Medical History:   Diagnosis Date    ADHD (attention deficit hyperactivity disorder)     Anxiety     Concussion     Depression     Kidney stones     Self-injurious behavior     Sleep difficulties     history of sleep apnea  and getting workup for narcolepsy as of 8/19     Past Medical History Pertinent Negatives:   Diagnosis Date Noted    Disease of thyroid gland 08/19/2019    Eating disorder 08/19/2019    Liver disease 08/19/2019    Obsessive-compulsive disorder 08/19/2019    Violence, history of 08/19/2019     Past Surgical History:   Procedure Laterality Date    HERNIA REPAIR      TONSILLECTOMY      TOOTH EXTRACTION Right 09/21/2020    R lower molar extraction     Allergies   Allergen Reactions    Scopolamine        Substance Abuse History:    Social History     Substance and Sexual Activity   Alcohol Use Yes    Frequency: Monthly or less    Drinks per session: 1 or 2    Comment: socially     Social History     Substance and Sexual Activity   Drug Use Yes    Types: Marijuana    Comment: ocasionally       Social History:    Social History     Socioeconomic History    Marital status: Single     Spouse name: Not on file    Number of children: 0    Years of education: Not on file    Highest education level: Some college, no degree   Occupational History    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   Social Needs    Financial resource strain: Not on file    Food insecurity     Worry: Not on file     Inability: Not on file   [employer] needs     Medical: Not on file     Non-medical: Not on file   Tobacco Use    Smoking status: Never Smoker    Smokeless tobacco: Never Used   Substance and Sexual Activity    Alcohol use: Yes     Frequency: Monthly or less     Drinks per session: 1 or 2     Comment: socially    Drug use: Yes     Types: Marijuana     Comment: ocasionally    Sexual activity: Not on file   Lifestyle    Physical activity     Days per week: Not on file     Minutes per session: Not on file    Stress: Not on file   Relationships    Social connections     Talks on phone: Not on file     Gets together: Not on file     Attends Denominational service: Not on file     Active member of club or organization: Not on file     Attends meetings of clubs or organizations: Not on file     Relationship status: Not on file    Intimate partner violence     Fear of current or ex partner: Not on file     Emotionally abused: Not on file     Physically abused: Not on file     Forced sexual activity: Not on file   Other Topics Concern    Not on file   Social History Narrative    Not on file       Family Psychiatric History:     Family History   Problem Relation Age of Onset    COPD Mother     Hypertension Mother     Depression Mother     Hypertension Father     Depression Father     Anxiety disorder Father     Alcohol abuse Father     Anxiety disorder Brother     Depression Brother     Hypertension Brother     Alcohol abuse Paternal Grandfather        History Review: The following portions of the patient's history were reviewed and updated as appropriate: allergies, current medications, past family history, past medical history, past social history, past surgical history and problem list          OBJECTIVE:     Vital signs in last 24 hours:    Vitals:    05/26/21 0804   Weight: 132 kg (290 lb)   Height: 6' 3" (1 905 m)             Confidential Assessment:    Confidential assessment copied from Dr Stafford Aw note July 18, 2019    Previous psychotropic medication trials:   effexor xr (mid 7739)  Concerta ER 36 FF(~4157)--GVQYDVT pupils challenges with significant glare with driving  armodafinil (helped a little bit)  Ritalin-10 mg regular release in afternoon   Zoloft  Abilify  Buspar  Gabapentin  Pramipexole for restless legs   Strattera -stopped January 2021 after initiating, trialed 2 times and developed severe vomiting even when taking with food         History of Seizures: no  History of Head injury-LOC-Concussion: 4th grade, minor concussion (Basketball to head), no PCS    ADD without hyperactivity scale positive    Scales:      No new scales today    Assessment/Plan:       Diagnoses and all orders for this visit:    Current severe episode of major depressive disorder without psychotic features without prior episode ([facility] [address] )    Attention deficit disorder (ADD) in adult  -     lisdexamfetamine (VYVANSE) 40 MG capsule; Take 1 capsule (40 mg total) by mouth every morningMax Daily Amount: 40 mg    Restless leg syndrome    Chronic fatigue  -     lisdexamfetamine (VYVANSE) 40 MG capsule;  Take 1 capsule (40 mg total) by mouth every morningMax Daily Amount: 40 mg    Obesity (BMI 30 0-34  9)    KORY (generalized anxiety disorder)    Vitamin D deficiency    Hypersomnia with long sleep time, idiopathic          Treatment Recommendations/Precautions/Plan:    Gloria Delgado is happy to report that he did start working for the [employer] approximately 4 weeks ago in [place]  He states that he is working the 1-10 p m  shift which has been working well well for his sleep schedule  He does not feel as though his depressive and anxiety symptoms have gotten worse since self discontinuation of Wellbutrin XL and Trintellix  He has not had any episodes of feeling suicidal he has not had any significant depressive episodes or feelings of sadness  He continues to struggle with  Chronic fatigue and he feels as though his energy and motivation have improved slightly since working however he continues to struggle and he feels as though he only has enough energy motivation to get up and go to work and no energy or motivation to do anything else  Again he feels as though the Vyvanse wears off after just a couple of hours  He is agreeable to increase dosing to 40 mg p o  daily new prescription sent to pharmacy  He denies SI /HI, denies auditory visual hallucinations, denies delusional thinking  He feels as though the Vyvanse does help him with focus and attention while at work to some level however he feels it could be better  He continues to work very closely with his psychotherapist  Blood pressure has been well controlled at this time in better since discontinuation of Effexor XR  He feels as though his restless legs have been "not all that bad  "    Patient has been educated about their diagnosis and treatment modalities  They voiced understanding and agreement with the following plan:    -  Increase Vyvanse from 30 mg p o  daily to 40 mg p o  daily for continued improvement in symptoms of ADD as well as improvement in energy motivation  Thirty day supply sent to pharmacy 05/26/2021  Of note 30 mg Vyvanse for last filled per PDMP on 05/17/2021  Patient has been filling prescriptions appropriately per the PDMP  -  Patient has not yet completed gene sight testing and will hold off this time     -  At this time we will hold off on possibility of T MS as Yani Dewey has been doing well with psychotherapy from a depression and anxiety standpoint and he self discontinued Wellbutrin and Trintellix in March 2021  This information was copied forward from previous note[de-identified]   for treatment with or evaluation with Dr Rhonda Mckeon for possible [address] [place] in future on the neuro star if medication changes do not improve significant depressive and motivation symptoms      -  Follow-up with this provider   06/21/2021 4:30 p m , August 5th 9:00 a m     -   Maintain follow with PCP office, Dr Maxine Rankin and YAO Conner, for abnormal labs and coordination with cardiology, status post cardiology visit 03/02/2021 for bradycardia  Also continue to manage borderline blood sugars, hypertension and any other medical concerns  -   maintain follow-up with psychotherapist Joe Cueva from [place], [place]  Patient has been seeing 1 time weekly for approximately 45 minute session and patient feels has been helpful Yani Dewey continues to work with his therapist on techniques for improvement in motivation energy etc       - maintain followups with sleep medicine team    -Discussed self monitoring of symptoms, and symptom monitoring tools     -Patient has been informed of 24 hours and weekend coverage for urgent situations accessed by calling the main clinic phone number      -treatment plan completed   02/10/2021 and verbal consent obtained due virtual format  Risks/Benefits      Risks, Benefits And Possible Side Effects Of Medications:    Risks, benefits, and possible side effects of medications explained to Yani Dewey and he verbalizes understanding and agreement for treatment      Patient education on Concerta and Ritalin completed including elevated heart rate, elevated bp, seizures, anxiety/irritability, activation/induction of christina, abuse potential, interactions with other medications, risk of sudden death, appetite suppression/weight loss discussed  Controlled Medication Discussion:     Duwayne Ormond has been filling controlled prescriptions on time as prescribed according to [facility]      Psychotherapy Provided:       Individual psychotherapy provided: Yes  Counseling was provided during the session today for 20 minutes  Medications, treatment progress and treatment plan reviewed with Duwayne Ormond  Medication changes discussed with Marko  Medication education provided to Duwayne Ormond  Recent stressor including family issues, job stress, health issues, chronic pain and occasional anxiety discussed with Duwayne Ormond  Coping strategies reviewed with Marko  Importance of medication and treatment compliance reviewed with Marko  Importance of follow up with family physician for medical issues reviewed with Duwayne Ormond  Reassurance and supportive therapy provided  Crisis/safety plan discussed with Duwayne Ormond  This note was shared with patient              YAO Pina 05/26/21

## 2021-06-21 ENCOUNTER — TELEMEDICINE (OUTPATIENT)
Dept: PSYCHIATRY | Facility: CLINIC | Age: 21
End: 2021-06-21
Payer: COMMERCIAL

## 2021-06-21 VITALS — WEIGHT: 272 LBS | BODY MASS INDEX: 33.82 KG/M2 | HEIGHT: 75 IN

## 2021-06-21 DIAGNOSIS — F32.2 CURRENT SEVERE EPISODE OF MAJOR DEPRESSIVE DISORDER WITHOUT PSYCHOTIC FEATURES WITHOUT PRIOR EPISODE (HCC): Primary | ICD-10-CM

## 2021-06-21 DIAGNOSIS — G47.11 HYPERSOMNIA WITH LONG SLEEP TIME, IDIOPATHIC: ICD-10-CM

## 2021-06-21 DIAGNOSIS — I10 ESSENTIAL HYPERTENSION: ICD-10-CM

## 2021-06-21 DIAGNOSIS — R53.82 CHRONIC FATIGUE: ICD-10-CM

## 2021-06-21 DIAGNOSIS — F41.1 GAD (GENERALIZED ANXIETY DISORDER): ICD-10-CM

## 2021-06-21 DIAGNOSIS — G25.81 RESTLESS LEG SYNDROME: ICD-10-CM

## 2021-06-21 DIAGNOSIS — F98.8 ATTENTION DEFICIT DISORDER (ADD) IN ADULT: ICD-10-CM

## 2021-06-21 PROCEDURE — 90833 PSYTX W PT W E/M 30 MIN: CPT | Performed by: NURSE PRACTITIONER

## 2021-06-21 PROCEDURE — 99214 OFFICE O/P EST MOD 30 MIN: CPT | Performed by: NURSE PRACTITIONER

## 2021-06-21 NOTE — PSYCH
Virtual Regular Visit    Name and Date of Birth:  Shelby Negron 20 y o  2000 MRN: [id number]    Date of Visit:    June 21, 2021    Assessment/Plan:    Problem List Items Addressed This Visit        Cardiovascular and Mediastinum    Essential hypertension       Other    Attention deficit disorder (ADD) in adult    Relevant Medications    lisdexamfetamine (VYVANSE) 50 MG capsule    Current severe episode of major depressive disorder without psychotic features without prior episode ([facility] [address] ) - Primary    Relevant Medications    lisdexamfetamine (VYVANSE) 50 MG capsule    Chronic fatigue    Relevant Medications    lisdexamfetamine (VYVANSE) 50 MG capsule    Restless leg syndrome    Hypersomnia with long sleep time, idiopathic    KORY (generalized anxiety disorder)    Relevant Medications    lisdexamfetamine (VYVANSE) 50 MG capsule            Reason for visit is   Chief Complaint   Patient presents with    Virtual Regular Visit    Depression    Anxiety    ADHD    Follow-up    Sleeping Problem        Encounter provider Ruma Ohara, [facility]    Provider located at [address] [place] [id number] [phone]      Recent Visits  No visits were found meeting these conditions  Showing recent visits within past 7 days and meeting all other requirements  Today's Visits  Date Type Provider Dept   06/21/21 [address]  Sonia Richard 426 today's visits and meeting all other requirements  Future Appointments  No visits were found meeting these conditions  Showing future appointments within next 150 days and meeting all other requirements       The patient was identified by name and date of birth  Shelby Negron was informed that this is a telemedicine visit and that the visit is being conducted through [address] [place] [address] Now and patient was informed that this is a secure, HIPAA-compliant platform   He agrees to proceed     My office door was closed  No one else was in the room  He acknowledged consent and understanding of privacy and security of the video platform  The patient has agreed to participate and understands they can discontinue the visit at any time  Patient is aware this is a billable service  Past Medical History:   Diagnosis Date    ADHD (attention deficit hyperactivity disorder)     Anxiety     Concussion     Depression     Kidney stones     Self-injurious behavior     Sleep difficulties     history of sleep apnea  and getting workup for narcolepsy as of 8/19       Past Surgical History:   Procedure Laterality Date    HERNIA REPAIR      TONSILLECTOMY      TOOTH EXTRACTION Right 09/21/2020    R lower molar extraction       Current Outpatient Medications   Medication Sig Dispense Refill    Denta 5000 Plus 1 1 % CREA       lisdexamfetamine (VYVANSE) 50 MG capsule Take 1 capsule (50 mg total) by mouth every morningMax Daily Amount: 50 mg 30 capsule 0    Melatonin 10 MG TABS Take by mouth      buPROPion (WELLBUTRIN XL) 150 mg 24 hr tablet TAKE 1 TABLET EVERY MORNING (Patient not taking: Reported on 5/26/2021) 90 tablet 3    Cholecalciferol (Vitamin D3) 50 MCG (2000 UT) capsule Take 2 capsules (4,000 Units total) by mouth daily 180 capsule 1    vortioxetine (TRINTELLIX) 20 MG tablet Take 1 tablet (20 mg total) by mouth daily 90 tablet 0     No current facility-administered medications for this visit  Allergies   Allergen Reactions    Scopolamine        Video Exam    Vitals:    06/21/21 1631   Weight: 123 kg (272 lb)   Height: 6' 3" (1 905 m)           I spent 30 minutes directly with the patient during this visit      VIRTUAL VISIT DISCLAIMER    Damian Akua acknowledges that he has consented to an online visit or consultation   He understands that the online visit is based solely on information provided by him, and that, in the absence of a face-to-face physical evaluation by the physician, the diagnosis he receives is both limited and provisional in terms of accuracy and completeness  This is not intended to replace a full medical face-to-face evaluation by the physician  Lang Howard understands and accepts these terms  SUBJECTIVE:    Michele Linn is seen today for a follow up for Major Depressive Disorder, Generalized Anxiety Disorder and ADHD  Since our last visit, overall symptoms have been "a bit tired because since I am working I haven't made room in my life for much else but work "  He states his schedule is all over the place and not consistent  He states he would like to have a more consistent schedule  "My anxiety is a bit all over the place, my depression is not where it was and I have been working hard in therapy on this "  He states he feels this is a new way of life for him   "I am finding anxious because I feel like I am forgetting something because I leave work and I am done with my tasks for the day "  He states he is feeling bad that he is not taking care of things around the house, "It is no longer physically exhausting but I am trying to get emotionally adjusted to working "  He states he is trying to find his balance  He continues to see his therapist 1 x weekly  He states he lost 30 lbs since working full time    "I was sedentary for so long and now I am physically active and now I am hydrating and I feel physically better "     Michele Linn states with the increase in Vyvanse to 40 mg he states he does not notice a big difference       HPI ROS:             ('was' notes: recent => remote)  Medication Side Effects:  denies   (Was Denies (BP has been 140's over 70's))   Depression (10 worst): 2-4 (Was 2-3)   Anxiety (10 worst): 6 (Was 6)   Safety concerns (SI, HI, etc): Denies  (Was  denies)   Hallucinations/Delusions denies (Was  denies)   Sleep: 8-10 hours per night, no NM (Was 1 am-10 am (8-10 hours per night) abnormal dreams resolved) Energy: Energy and motivation same since started working-low end of moderate (Was Energy and motivation slightly improving but hard to tell)   Appetite: Normal  (Was normal)   Height 6 ft 3 in (Was 6 ft 3 in)   Weight Change: 272 lbs pt reported  (Was 290 lb pt reported)     Taryn Renteria denies any side effects from medications unless noted above    Review Of Systems:      Constitutional negative   ENT negative   Cardiovascular negative   Respiratory negative   Gastrointestinal negative   Genitourinary negative   Musculoskeletal ankle pain   Integumentary negative   Neurological negative   Endocrine negative   Other Symptoms none, all other systems are negative     History Review:  The following portions of the patient's history were reviewed and documented: allergies, current medications, past family history, past medical history, past social history and problem list      Lab Review: No new labs or no relevant labs needing review with patient today      OBJECTIVE:     Vital signs in last 24 hours:    Vitals:    06/21/21 1631   Weight: 123 kg (272 lb)   Height: 6' 3" (1 905 m)       Mental Status Evaluation:    Appearance age appropriate, casually dressed   Behavior cooperative, mildly anxious   Speech normal rate, normal volume, normal pitch   Mood mildly anxious, mildly depressed   Affect normal range and intensity, appropriate   Thought Processes organized, goal directed, linear   Associations intact associations   Thought Content no overt delusions   Perceptual Disturbances: no auditory hallucinations, no visual hallucinations   Abnormal Thoughts  Risk Potential Suicidal ideation - None  Homicidal ideation - None  Potential for aggression - No   Orientation oriented to person, place, time/date and situation   Memory recent and remote memory grossly intact   Consciousness alert and awake   Attention Span Concentration Span attention span and concentration are age appropriate   Intellect appears to be of average intelligence   Insight intact   Judgement intact   Muscle Strength and  Gait unable to assess today due to virtual visit   Motor activity unable to assess today due to virtual visit   Language no difficulty naming common objects, no difficulty repeating a phrase, unable to assess writing today due to virtual visit   Fund of Knowledge adequate knowledge of current events  adequate fund of knowledge regarding past history  adequate fund of knowledge regarding vocabulary    Pain mild   Pain Scale 3       Risks, Benefits And Possible Side Effects Of Medications:    AGREE: Risks, benefits, and possible side effects of medications explained to Chestomega Sravanangelito and he (or legal representative) verbalizes understanding and agreement for treatment  Controlled Medication Discussion:     Patient using medication appropriately  Mario Lopez has been filling controlled prescriptions on time as prescribed according to [place] Prescription Drug Monitoring program    Vyvanse side effects reviewed completed including elevated heart rate, elevated bp, seizures, anxiety/irritability, activation/induction of christina, abuse potential, interactions with other medications, risk of sudden death, appetite suppression/weight loss discussed  ______________________________________________________________    The following portions of the patient's history were reviewed and updated as appropriate: past family history, past medical history, past social history, past surgical history and problem list     Past psychiatric history, family psychiatric history, traumatic history, social history, substance abuse history copied from Dr Lynnae Boeck note dated July 18, 2019       Past Psychiatric History  Previous diagnoses include depression, anxiety, ADD  Prior outpatient psychiatric treatment: no  Prior therapy: yes  Prior inpatient psychiatric treatment: no  Prior suicide attempts: no  Prior self harm: hit self at times (side of head)  Prior violence or aggression: no     Social History:  The patient grew up in [place], [place]   Childhood was described as challenging      During childhood, parents were together  They have 0 sister(s) and 1 brother(s)  Patient is younger in birth order     Abuse/neglect: none     As far as the patient (or present family member) is aware, overall childhood development: Patient does ascribe to normal developmental milestones such as walking, talking, potty training and making childhood friends      Education level: good grades, HS Cum Laude  Repeat 6th grade ("I had a mental break down"   Kidney stone lodged in ureter at 13yo  "Instead of just acknowledging that I had anxiety I decided I had some neurologic issue", he had a lot of work ups, all negative   Mom , 'a hypochondraic' fed into)   Current occupation: no  Marital status: single  Children: no  Current Living Situation: the patient currently lives with parents    Social support: some with family     Jew Affiliation: [facility]   experience: no  Legal history: no  Access to Guns: yes (father's)     Substance use and treatment:  Tobacco use: no  Caffeine Use: yes  ETOH use: beers most days  Other substance use: marijuana off and on ('messes with anxiety')     Endorses previous experimentation with: LSD, mushrooms, marijuana     Longest clean time: not applicable  History of Inpatient/Outpatient rehabilitation program: no        Traumatic History:   Abuse: none  Other Traumatic Events: none      Family Psychiatric History:      Psychiatric Illness:      Hoarding (parents), Depression - parents, aunts, uncles, anxiety - parents  Substance Abuse:       EtOH- Dad  Suicide Attempts:        no family history of suicide attempts     Denies bipolar disorder, schizophrenia    Past Medical History:    Past Medical History:   Diagnosis Date    ADHD (attention deficit hyperactivity disorder)     Anxiety     Concussion     Depression     Kidney stones     Self-injurious behavior     Sleep difficulties     history of sleep apnea  and getting workup for narcolepsy as of 8/19     Past Medical History Pertinent Negatives:   Diagnosis Date Noted    Disease of thyroid gland 08/19/2019    Eating disorder 08/19/2019    Liver disease 08/19/2019    Obsessive-compulsive disorder 08/19/2019    Violence, history of 08/19/2019     Past Surgical History:   Procedure Laterality Date    HERNIA REPAIR      TONSILLECTOMY      TOOTH EXTRACTION Right 09/21/2020    R lower molar extraction     Allergies   Allergen Reactions    Scopolamine        Substance Abuse History:    Social History     Substance and Sexual Activity   Alcohol Use Yes    Comment: socially     Social History     Substance and Sexual Activity   Drug Use Yes    Types: Marijuana    Comment: ocasionally       Social History:    Social History     Socioeconomic History    Marital status: Single     Spouse name: Not on file    Number of children: 0    Years of education: Not on file    Highest education level: Some college, no degree   Occupational History    Occupation: [id number]     Comment: vidal   Tobacco Use    Smoking status: Never Smoker    Smokeless tobacco: Never Used   Vaping Use    Vaping Use: Never used   Substance and Sexual Activity    Alcohol use: Yes     Comment: socially    Drug use: Yes     Types: Marijuana     Comment: ocasionally    Sexual activity: Not on file   Other Topics Concern    Not on file   Social History Narrative    Not on file     Social Determinants of Health     Financial Resource Strain:     Difficulty of Paying Living Expenses:    Food Insecurity:     Worried About Running Out of Food in the Last Year:     Ran Out of Food in the Last Year:    Transportation Needs:     Lack of Transportation (Medical):      Lack of Transportation (Non-Medical):    Physical Activity:     Days of Exercise per Week:     Minutes of Exercise per Session:    Stress:     Feeling of Stress : Social Connections:     Frequency of Communication with Friends and Family:     Frequency of Social Gatherings with Friends and Family:     Attends Restoration Services:     Active Member of Clubs or Organizations:     Attends Club or Organization Meetings:     Marital Status:    Intimate Partner Violence:     Fear of Current or Ex-Partner:     Emotionally Abused:     Physically Abused:     Sexually Abused:        Family Psychiatric History:     Family History   Problem Relation Age of Onset    COPD Mother     Hypertension Mother     Depression Mother     Hypertension Father     Depression Father     Anxiety disorder Father     Alcohol abuse Father     Anxiety disorder Brother     Depression Brother     Hypertension Brother     Alcohol abuse Paternal Grandfather        History Review: The following portions of the patient's history were reviewed and updated as appropriate: allergies, current medications, past family history, past medical history, past social history, past surgical history and problem list          OBJECTIVE:     Vital signs in last 24 hours:    Vitals:    06/21/21 1631   Weight: 123 kg (272 lb)   Height: 6' 3" (1 905 m)             Confidential Assessment:    Confidential assessment copied from Dr Chris Lopez note July 18, 2019    Previous psychotropic medication trials:   effexor xr (mid 5140)  Concerta ER 36 MCKEON(~6796)--XNSWTFE pupils challenges with significant glare with driving  armodafinil (helped a little bit)  Ritalin-10 mg regular release in afternoon   Zoloft  Abilify  Buspar  Gabapentin  Pramipexole for restless legs   Strattera -stopped January 2021 after initiating, trialed 2 times and developed severe vomiting even when taking with food         History of Seizures: no  History of Head injury-LOC-Concussion: 4th grade, minor concussion (Basketball to head), no PCS    ADD without hyperactivity scale positive    Scales:   no new scales today        Assessment/Plan: Diagnoses and all orders for this visit:    Current severe episode of major depressive disorder without psychotic features without prior episode ([facility] [address] )    Attention deficit disorder (ADD) in adult  -     lisdexamfetamine (VYVANSE) 50 MG capsule; Take 1 capsule (50 mg total) by mouth every morningMax Daily Amount: 50 mg    KORY (generalized anxiety disorder)    Chronic fatigue  -     lisdexamfetamine (VYVANSE) 50 MG capsule; Take 1 capsule (50 mg total) by mouth every morningMax Daily Amount: 50 mg    Restless leg syndrome    Essential hypertension    Hypersomnia with long sleep time, idiopathic          Treatment Recommendations/Precautions/Plan:    Meli Patel has been doing well overall since last visit, he states he has had some symptoms of anxiety depression however he feels they are both very well controlled with coping skills and continued psychotherapy sessions  He denies any side effects from Vyvanse 40 mg such as chest pain palpitations decrease in appetite increase in anxiety, visual changes dizziness etcetera  He states that he continues to struggle with waking up early enough to accomplish anything prior to his work shift and this is his major goal at this time  He also states that he continues to struggle with attention and focus while at work  At this time we will increase Vyvanse from 40 mg to 50 mg p o  daily to improve focus and attention  Loading continues to sleep between 8 and 10 hours per night without issue  He denies suicidal homicidal ideation, he denies auditory visual hallucinations, denies delusional thinking  He states since initiating work full-time he is hydrating better and eating better and he has lost approximately 30 lb which makes him feel better overall      He is working with his therapist very closely to restructure his thought process related to discovering activities that he would like to accomplish prior to going to work each day for example keeping his room clean and not think about as a task with thinking about it as doing this so he can enjoy his living space  He also states he would like to try to hike or read a book or maybe play a game prior to going to work this will entice him to get out of bed  At this time Casper Ramos well Maintain therapy appointments and increase Vyvanse    Patient has been educated about their diagnosis and treatment modalities  They voiced understanding and agreement with the following plan:    - increase Vyvanse  From 40 mg p o  daily to 50 mg p o  daily for continued improvement in energy and motivation and ADD symptoms  Patient filling appropriately per the  PDMP, last filled 05/26/2021 quantity 30 with 0 refills  30 day supply with 0 refills sent to pharmacy 06/21/2021     -    Will continue to hold off on gene sight testing at this time    -  Copied forward from previous note: At this time we will hold off on possibility of T MS as Casper Ramos has been doing well with psychotherapy from a depression and anxiety standpoint and he self discontinued Wellbutrin and Trintellix in March 2021  This information was copied forward from previous note[de-identified]   for treatment with or evaluation with Dr Jovany Walton for possible [facility] in future on the neuro star if medication changes do not improve significant depressive and motivation symptoms      -  Follow-up with this provider   08/11/2021 11:30 a m     -    Continue to follow-up with primary care physician office, Dr Kemal Vides and YAO Mitchell, for abnormal labs and coordination with cardiology, status post cardiology visit 03/02/2021 for bradycardia  Also continue to manage borderline blood sugars, hypertension and any other medical concerns  -  Continue to follow with therapist Javon Bass from [place], [place]    Patient has been seeing 1 time weekly for approximately 45 minute session and patient feels has been helpful Matthiasdomi Ramos continues to work with his therapist on techniques for improvement in motivation energy etc       - maintain followups with sleep medicine team    -Discussed self monitoring of symptoms, and symptom monitoring tools     -Patient has been informed of 24 hours and weekend coverage for urgent situations accessed by calling the main clinic phone number      -treatment plan completed  06/21/2021 and verbal consent obtained due virtual format  Psychotherapy Provided:     Individual psychotherapy provided: Yes  Counseling was provided during the session today for 17 minutes  Medications, treatment progress and treatment plan reviewed with Christiano Garrett  Medication changes discussed with Marko  Medication education provided to Christiano Garrett  Goals discussed during in session: improve sleep and improve self-care  Recent stressor including family issues, occasional anxiety and chronic mental illness discussed with Christiano Garrett  Coping strategies reviewed with Marko  Importance of medication and treatment compliance reviewed with Marko  Importance of follow up with family physician for medical issues reviewed with Christiano Garrett  Reassurance and supportive therapy provided  Crisis/safety plan discussed with Christiano Garrett  This note was shared with patient         YAO Bassett 06/21/21

## 2021-06-21 NOTE — BH TREATMENT PLAN
TREATMENT PLAN (Medication Management Only)        [facility]    Name and Date of Birth:  Brit Cedeno 19 y o  2000  Date of Treatment Plan: June 21, 2021  Diagnosis/Diagnoses:    1  Current severe episode of major depressive disorder without psychotic features without prior episode ([address] )    2  Attention deficit disorder (ADD) in adult    3  KORY (generalized anxiety disorder)    4  Chronic fatigue    5  Restless leg syndrome    6  Essential hypertension    7  Hypersomnia with long sleep time, idiopathic      Strengths/Personal Resources for Self-Care: "assessing the patterns of my behavior, I am paying attention to my sleep schedule, I am confiident I can get through hard times"  Area/Areas of need (in own words): "daily discipline in sleep schedule and getting my chores done"  1  Long Term Goal: improve self-care  Target Date:6 months - 12/21/2021  Person/Persons responsible for completion of goal: Marko  2  Short Term Objective (s) - How will we reach this goal?:   A  Provider new recommended medication/dosage changes and/or continue medication(s): Medication changes: I have changed Logan J Weiler's lisdexamfetamine  I am also having him maintain his buPROPion, Vitamin D3, Denta 5000 Plus, Melatonin, and Vortioxetine HBr     B  improve sleep schedule by waking up in the AM prior to [address]  a hobby that entices me to get out of bed such as a hike or play a game or read a book"  C  remind myself of long term goals to motivate myself for the future "my relationship with self care is an internal de paz, I want to develope a more positive attitude about enjoying keeping my room clean so I can enjoy the space more "     Target Date:6 months - 12/21/2021  Person/Persons Responsible for Completion of Goal: Marko  Progress Towards Goals: continuing treatment  Treatment Modality: medication management every 8 weeks  Review due 180 days from date of this plan: 6 months - 12/21/2021  Expected length of service: ongoing treatment  My Physician/PA/NP and I have developed this plan together and I agree to work on the goals and objectives  I understand the treatment goals that were developed for my treatment    Treatment Plan done but not signed at time of office visit due to:  Plan reviewed by phone or in person  and verbal consent given due to Precious social distjesus

## 2021-08-11 ENCOUNTER — TELEMEDICINE (OUTPATIENT)
Dept: PSYCHIATRY | Facility: CLINIC | Age: 21
End: 2021-08-11
Payer: COMMERCIAL

## 2021-08-11 VITALS — HEIGHT: 75 IN | BODY MASS INDEX: 31.08 KG/M2 | WEIGHT: 250 LBS

## 2021-08-11 DIAGNOSIS — G25.81 RESTLESS LEG SYNDROME: ICD-10-CM

## 2021-08-11 DIAGNOSIS — F32.2 CURRENT SEVERE EPISODE OF MAJOR DEPRESSIVE DISORDER WITHOUT PSYCHOTIC FEATURES WITHOUT PRIOR EPISODE (HCC): Primary | ICD-10-CM

## 2021-08-11 DIAGNOSIS — R53.82 CHRONIC FATIGUE: ICD-10-CM

## 2021-08-11 DIAGNOSIS — F41.1 GAD (GENERALIZED ANXIETY DISORDER): ICD-10-CM

## 2021-08-11 DIAGNOSIS — E55.9 VITAMIN D DEFICIENCY: ICD-10-CM

## 2021-08-11 DIAGNOSIS — F98.8 ATTENTION DEFICIT DISORDER (ADD) IN ADULT: ICD-10-CM

## 2021-08-11 DIAGNOSIS — G47.11 HYPERSOMNIA WITH LONG SLEEP TIME, IDIOPATHIC: ICD-10-CM

## 2021-08-11 DIAGNOSIS — I10 ESSENTIAL HYPERTENSION: ICD-10-CM

## 2021-08-11 PROCEDURE — 90833 PSYTX W PT W E/M 30 MIN: CPT | Performed by: NURSE PRACTITIONER

## 2021-08-11 PROCEDURE — 99214 OFFICE O/P EST MOD 30 MIN: CPT | Performed by: NURSE PRACTITIONER

## 2021-08-11 NOTE — PSYCH
Virtual Regular Visit    Name and Date of Birth:  Esa Rosa 20 y o  2000 MRN: [id number]    Date of Visit:     August 11, 2021    Verification of patient location:    Patient is located in the following state in which I hold an active license [place]      Assessment/Plan:    Problem List Items Addressed This Visit        Cardiovascular and Mediastinum    Essential hypertension       Other    Attention deficit disorder (ADD) in adult    Relevant Medications    lisdexamfetamine (VYVANSE) 60 MG capsule    Current severe episode of major depressive disorder without psychotic features without prior episode ([facility] [address] ) - Primary    Relevant Medications    lisdexamfetamine (VYVANSE) 60 MG capsule    Chronic fatigue    Relevant Medications    lisdexamfetamine (VYVANSE) 60 MG capsule    Restless leg syndrome    Hypersomnia with long sleep time, idiopathic    KORY (generalized anxiety disorder)    Relevant Medications    lisdexamfetamine (VYVANSE) 60 MG capsule    Vitamin D deficiency          Reason for visit is   Chief Complaint   Patient presents with    Virtual Regular Visit    ADHD    Anxiety    Depression    Follow-up    Sleeping Problem        Encounter provider Kathleen Bernard [facility]    Provider located at [address] [place] [id number] [phone]      Recent Visits  No visits were found meeting these conditions  Showing recent visits within past 7 days and meeting all other requirements  Today's Visits  Date Type Provider Dept   08[facility]11/21 [facility] and meeting all other requirements  Future Appointments  No visits were found meeting these conditions  Showing future appointments within next 150 days and meeting all other requirements       The patient was identified by name and date of birth   Esa Rosa was informed that this is a telemedicine visit and [facility] and patient was informed that this is a secure, HIPAA-compliant platform  He agrees to proceed     My office door was closed  No one else was in the room  He acknowledged consent and understanding of privacy and security of the video platform  The patient has agreed to participate and understands they can discontinue the visit at any time  Patient is aware this is a billable service  Past Medical History:   Diagnosis Date    ADHD (attention deficit hyperactivity disorder)     Anxiety     Concussion     Depression     Kidney stones     Self-injurious behavior     Sleep difficulties     history of sleep apnea  and getting workup for narcolepsy as of 8/19       Past Surgical History:   Procedure Laterality Date    HERNIA REPAIR      TONSILLECTOMY      TOOTH EXTRACTION Right 09/21/2020    R lower molar extraction       Current Outpatient Medications   Medication Sig Dispense Refill    buPROPion (WELLBUTRIN XL) 150 mg 24 hr tablet TAKE 1 TABLET EVERY MORNING (Patient not taking: Reported on 5/26/2021) 90 tablet 3    Cholecalciferol (Vitamin D3) 50 MCG (2000 UT) capsule Take 2 capsules (4,000 Units total) by mouth daily 180 capsule 1    Denta 5000 Plus 1 1 % CREA       lisdexamfetamine (VYVANSE) 60 MG capsule Take 1 capsule (60 mg total) by mouth every morningMax Daily Amount: 60 mg 30 capsule 0    Melatonin 10 MG TABS Take by mouth      vortioxetine (TRINTELLIX) 20 MG tablet Take 1 tablet (20 mg total) by mouth daily 90 tablet 0     No current facility-administered medications for this visit  Allergies   Allergen Reactions    Scopolamine          Video Exam    Vitals:    08/11/21 1135   Weight: 113 kg (250 lb)   Height: 6' 3" (1 905 m)           I spent 30 minutes directly with the patient during this visit    VIRTUAL VISIT [facility] verbally agrees to participate in [employer]   Pt is aware that [employer] could be limited without vital signs or the ability to perform a full hands-on physical Merly Lund understands he or the provider may request at any time to terminate the video visit and request the patient to seek care or treatment in person  SUBJECTIVE:    [facility] is seen today for a follow up for Major Depressive Disorder, Generalized Anxiety Disorder and ADHD  Since our last visit, overall symptoms have been stable  [facility] states, "physically I am feeling better but mentally I am at the same place almost stalled out "  [place] Farm[place] have been taking responsibility for my low motivation and energy and needing to rest "  [facility] continues to work with his therapist on behavioral techniques to work on "my bad habits in life "  He states he is questioning what role the medication plays in his energy and motivation, "I don't notice a lot on Vyvanse "      [facility] denies significant depression or anxiety symptoms he feels as though he does not need to restart medication and he feels as though he is able to manage the symptoms well with coping skills learned through therapy  He denies suicidal homicidal ideation, he denies any auditory visual hallucinations, denies delusional thinking  He continues to work at "[employer] and he has been physically active and losing weight purposefully through his increased physical activity  He states from a physical standpoint he has been feeling great            HPI ROS:             ('was' notes: recent => remote)  Medication Side Effects:  denies  (Was  denies)   Depression (10 worst): 4 (Was  2-4)   Anxiety (10 worst): 6 (Was  6)   Safety concerns (SI, HI, etc): denies (Was  denies)   Hallucinations/Delusions denies (Was  denies)   Sleep: 8-10 hours of sleep, and still feels exhausted upon waking  (Was  8-10 hours per night, no nightmares)   Energy: Energy and motivation low end of moderate (Was  Energy motivation same since working -low end of moderate)   Appetite: normal (Was  normal)   Height  6 ft 3 in (Was  6 ft 3 in)   Weight Change: 250 lbs pt reported  (Was  272 lb patient reported)     Alma Rowland denies any side effects from medications unless noted above    Review Of Systems:      Constitutional negative   ENT negative   Cardiovascular negative   Respiratory negative   Gastrointestinal negative   Genitourinary negative   Musculoskeletal negative   Integumentary negative   Neurological negative   Endocrine negative   Other Symptoms none, all other systems are negative     History Review:  The following portions of the patient's history were reviewed and documented: allergies, current medications, past family history, past medical history, past social history and problem list      Lab Review: No new labs or no relevant labs needing review with patient today    OBJECTIVE:     Vital signs in last 24 hours:    Vitals:    08/11/21 1135   Weight: 113 kg (250 lb)   Height: 6' 3" (1 905 m)       Mental Status Evaluation:    Appearance age appropriate, casually dressed   Behavior cooperative, calm, good eye contact   Speech normal rate, normal volume, normal pitch   Mood mildly anxious, mildly depressed   Affect normal range and intensity, appropriate   Thought Processes organized, goal directed, linear   Associations intact associations   Thought Content no overt delusions   Perceptual Disturbances: no auditory hallucinations, no visual hallucinations   Abnormal Thoughts  Risk Potential Suicidal ideation - None  Homicidal ideation - None  Potential for aggression - No   Orientation oriented to person, place, time/date and situation   Memory recent and remote memory grossly intact   Consciousness alert and awake   Attention Span Concentration Span attention span and concentration are age appropriate   Intellect appears to be of average intelligence   Insight intact   Judgement intact   Muscle Strength and  Gait unable to assess today due to virtual visit   Motor activity unable to assess today due to virtual visit   Language no difficulty naming common objects, no difficulty repeating a phrase, unable to assess writing today due to virtual visit   Fund of Knowledge adequate knowledge of current events  adequate fund of knowledge regarding past history  adequate fund of knowledge regarding vocabulary    Pain none   Pain Scale 0       Risks, Benefits And Possible Side Effects Of Medications:    AGREE: Risks, benefits, and possible side effects of medications explained to Alan Guaman and he (or legal representative) verbalizes understanding and agreement for treatment  Controlled Medication Discussion:     Patient using medication appropriately  Alan Guaman has been filling controlled prescriptions on time as [facility]    Patient reminded of stimulant risks including: elevated heart rate, elevated bp, seizures, anxiety/irritability, activation/induction of christina, abuse potential, interactions with other medications, risk of sudden death, appetite suppression/weight loss discussed  ____________________________________________________________      The following portions of the patient's history were reviewed and updated as appropriate: past family history, past medical history, past social history, past surgical history and problem list     Past psychiatric history, family psychiatric history, traumatic history, social history, substance abuse history copied from Dr Tonya Mobley note dated July 18, 2019       Past Psychiatric History  Previous diagnoses include depression, anxiety, ADD  Prior outpatient psychiatric treatment: no  Prior therapy: yes  Prior inpatient psychiatric treatment: no  Prior suicide attempts: no  Prior self harm: hit self at times (side of head)  Prior violence or aggression: no     Social History:  The patient grew up in [place], [place]   Childhood was described as challenging      During childhood, parents were together  They have 0 sister(s) and 1 brother(s)  Patient is younger in birth order     Abuse/neglect: none     As far as the patient (or present family member) is aware, overall childhood development: Patient does ascribe to normal developmental milestones such as walking, talking, potty training and making childhood friends      Education level: good grades, HS Cum Laude  Repeat 6th grade ("I had a mental break down"   Kidney stone lodged in ureter at 13yo  "Instead of just acknowledging that I had anxiety I decided I had some neurologic issue", he had a lot of work ups, all negative   Mom , 'a hypochondraic' fed into)   Current occupation: no  Marital status: single  Children: no  Current Living Situation: the patient currently lives with parents    Social support: some with family     Mu-ism Affiliation: [facility]   experience: no  Legal history: no  Access to Guns: yes (father's)     Substance use and treatment:  Tobacco use: no  Caffeine Use: yes  ETOH use: beers most days  Other substance use: marijuana off and on ('messes with anxiety')     Endorses previous experimentation with: LSD, mushrooms, marijuana     Longest clean time: not applicable  History of Inpatient/Outpatient rehabilitation program: no        Traumatic History:   Abuse: none  Other Traumatic Events: none      Family Psychiatric History:      Psychiatric Illness:      Hoarding (parents), Depression - parents, aunts, uncles, anxiety - parents  Substance Abuse:       EtOH- Dad  Suicide Attempts:        no family history of suicide attempts     Denies bipolar disorder, schizophrenia    Past Medical History:    Past Medical History:   Diagnosis Date    ADHD (attention deficit hyperactivity disorder)     Anxiety     Concussion     Depression     Kidney stones     Self-injurious behavior     Sleep difficulties     history of sleep apnea  and getting workup for narcolepsy as of 8/19 Past Medical History Pertinent Negatives:   Diagnosis Date Noted    Disease of thyroid gland 08/19/2019    Eating disorder 08/19/2019    Liver disease 08/19/2019    Obsessive-compulsive disorder 08/19/2019    Violence, history of 08/19/2019     Past Surgical History:   Procedure Laterality Date    HERNIA REPAIR      TONSILLECTOMY      TOOTH EXTRACTION Right 09/21/2020    R lower molar extraction     Allergies   Allergen Reactions    Scopolamine        Substance Abuse History:    Social History     Substance and Sexual Activity   Alcohol Use Yes    Comment: socially     Social History     Substance and Sexual Activity   Drug Use Yes    Types: Marijuana    Comment: ocasionally       Social History:    Social History     Socioeconomic History    Marital status: Single     Spouse name: Not on file    Number of children: 0    Years of education: Not on file    Highest education level: Some college, no degree   Occupational History    Occupation: [employer]     Comment: [employer]   Tobacco Use    Smoking status: Never Smoker    Smokeless tobacco: Never Used   Vaping Use    Vaping Use: Never used   Substance and Sexual Activity    Alcohol use: Yes     Comment: socially    Drug use: Yes     Types: Marijuana     Comment: ocasionally    Sexual activity: Not on file   Other Topics Concern    Not on file   Social History Narrative    Not on file     Social Determinants of Health     Financial Resource Strain:     Difficulty of Paying Living Expenses:    Food Insecurity:     Worried About Running Out of Food in the Last Year:     Ran Out of Food in the Last Year:    Transportation Needs:     Lack of Transportation (Medical):      Lack of Transportation (Non-Medical):    Physical Activity:     Days of Exercise per Week:     Minutes of Exercise per Session:    Stress:     Feeling of Stress :    Social Connections:     Frequency of Communication with Friends and Family:     Frequency of Social Gatherings with Friends and Family:     Attends Adventist Services:     Active Member of Clubs or Organizations:     Attends Club or Organization Meetings:     Marital Status:    Intimate Partner Violence:     Fear of Current or Ex-Partner:     Emotionally Abused:     Physically Abused:     Sexually Abused:        Family Psychiatric History:     Family History   Problem Relation Age of Onset    COPD Mother     Hypertension Mother     Depression Mother     Hypertension Father     Depression Father     Anxiety disorder Father     Alcohol abuse Father     Anxiety disorder Brother     Depression Brother     Hypertension Brother     Alcohol abuse Paternal Grandfather        History Review:  The following portions of the patient's history were reviewed and updated as appropriate: allergies, current medications, past family history, past medical history, past social history, past surgical history and problem list          OBJECTIVE:     Vital signs in last 24 hours:    Vitals:    08/11/21 1135   Weight: 113 kg (250 lb)   Height: 6' 3" (1 905 m)             Confidential Assessment:    Confidential assessment copied from Dr Robert Sharpe note July 18, 2019    Previous psychotropic medication trials:   effexor xr (mid 1053)  Concerta ER 36 BZ(~0553)--ZTJELHG pupils challenges with significant glare with driving  armodafinil (helped a little bit)  Ritalin-10 mg regular release in afternoon   Zoloft  Abilify  Buspar  Gabapentin  Pramipexole for restless legs   Strattera -stopped January 2021 after initiating, trialed 2 times and developed severe vomiting even when taking with food         History of Seizures: no  History of Head injury-LOC-Concussion: 4th grade, minor concussion (Basketball to head), no PCS    ADD without hyperactivity scale positive    Scales:            Assessment/Plan:       Diagnoses and all orders for this visit:    Current severe episode of major depressive disorder without psychotic features without prior episode (HCC)    Attention deficit disorder (ADD) in adult  -     lisdexamfetamine (VYVANSE) 60 MG capsule; Take 1 capsule (60 mg total) by mouth every morningMax Daily Amount: 60 mg    KORY (generalized anxiety disorder)    Chronic fatigue  -     lisdexamfetamine (VYVANSE) 60 MG capsule; Take 1 capsule (60 mg total) by mouth every morningMax Daily Amount: 60 mg    Restless leg syndrome    Essential hypertension    Hypersomnia with long sleep time, idiopathic    Vitamin D deficiency          Treatment Recommendations/Precautions/Plan:    Nga Mueller  States he has been doing overall status quo since last visit  He states that he feels slightly stuck and his progression from mental health standpoint  He continues to work very closely with his psychotherapist on behavioral techniques for improving his energy and motivation as Nga Mueller believes much of his challenges with energy motivation are behavioral and learned behaviors over the years  He reports low depression and anxiety and he does not feel as though he needs to restart any antidepressant or antianxiety medication at this time  He continues to use Vyvanse however he does not notice a big difference in focus per se or energy or motivation overall  He is using about 300 mg of caffeine per day as well which he was advised to decrease caffeine intake  Nga Mueller is agreeable to increase Vyvanse to 60 mg p o  daily as he may not be at adequate dosing at this time and he is agreeable to maximize Vyvanse in general before switching ADD medication  He is not having any side effects to Vyvanse, he denies chest pain shortness of breath dizziness visual changes headaches etcetera  He continues to follow with primary care  Nga Mueller has been very active with work and he is down to 250 lb which he reports he is very proud of and he is also proud of the work that he has been doing in his day-to-day job    He denies suicidal homicidal ideation denies auditory visual hallucinations and denies delusional thinking  He reports that he continues to struggle with motivation and energy when it comes to things that he is not particularly interested in doing and he also states that he will sleep 8-10 hours and feel fatigued upon waking  Anai De León was encouraged to restart his vitamin-D replacement as his vitamin-D level from December 2020 was low  He will have this repeated in December or January this coming year when it will be covered by insurance  Patient has been educated about their diagnosis and treatment modalities  They voiced understanding and agreement with the following plan:    -  Increase Vyvanse from 50 mg p o  daily to 60 mg p o  daily for continued improvement in ADD, improvement in energy and motivation  Thirty day supply +0 refills sent to pharmacy 08/11/2021 patient has been filling appropriately per the PDMP, last filled 06/21/2021     -    Will continue to hold off on genetic testing for now  -  Copied forward: At this time we will hold off on possibility of T MS as Anai De León has been doing well with psychotherapy from a depression and anxiety standpoint and he self discontinued Wellbutrin and Trintellix in March 2021  This information was copied forward from previous note[de-identified]   for treatment with or evaluation with Dr Emanuel Torres for possible [facility] in future on the neuro star if medication changes do not improve significant depressive and motivation symptoms      -  Follow-up with this provider   09/15/2021 10:00 a m  virtual per patient request    -   Maintain followups with PCP Dr Thor Aguilera and YAO Jenkins, for abnormal labs and coordination with cardiology, status post cardiology visit 03/02/2021 for bradycardia  Also continue to manage borderline blood sugars, hypertension and any other medical concerns  - continue psychotherapy with Christopher Quinones from [place], [place]    Patient has been seeing 1 time weekly for approximately 45 minute session and patient feels has been helpful Dimitrismaria eugenia Vivar continues to work with his therapist on techniques for improvement in motivation energy etc       -Discussed self monitoring of symptoms, and symptom monitoring tools     -Patient has been informed of 24 hours and weekend coverage for urgent situations accessed by calling the main clinic phone number      -treatment plan completed  06/21/2021 and verbal consent obtained due virtual format  Psychotherapy Provided:       Individual psychotherapy provided:               YAO Soares 08/11/21    Portions of the record may have been created with voice recognition software  Occasional wrong word or "sound a like" substitutions may have occurred due to the inherent limitations of voice recognition software  Read the chart carefully and recognize, using context, where substitutions have occurred

## 2021-09-27 DIAGNOSIS — R53.82 CHRONIC FATIGUE: ICD-10-CM

## 2021-09-27 DIAGNOSIS — F98.8 ATTENTION DEFICIT DISORDER (ADD) IN ADULT: ICD-10-CM

## 2021-09-27 NOTE — TELEPHONE ENCOUNTER
Patient filling Vyvanse appropriately per the PDMP, last filled 08/11/2021 quantity 30 refill for same sent to pharmacy Vyvanse 60 mg quantity 30 0 refills 09/27/21

## 2021-10-29 DIAGNOSIS — F98.8 ATTENTION DEFICIT DISORDER (ADD) IN ADULT: ICD-10-CM

## 2021-10-29 DIAGNOSIS — R53.82 CHRONIC FATIGUE: ICD-10-CM

## 2021-12-17 DIAGNOSIS — R53.82 CHRONIC FATIGUE: ICD-10-CM

## 2021-12-17 DIAGNOSIS — F98.8 ATTENTION DEFICIT DISORDER (ADD) IN ADULT: ICD-10-CM

## 2021-12-20 ENCOUNTER — TELEPHONE (OUTPATIENT)
Dept: PSYCHIATRY | Facility: CLINIC | Age: 21
End: 2021-12-20

## 2021-12-20 DIAGNOSIS — F98.8 ATTENTION DEFICIT DISORDER (ADD) IN ADULT: ICD-10-CM

## 2021-12-20 DIAGNOSIS — R53.82 CHRONIC FATIGUE: ICD-10-CM

## 2021-12-20 NOTE — TELEPHONE ENCOUNTER
John Sigala contacted the [address] [place] [address],[address] office requesting a refill of vyvanse   He used to see montrell, he's not scheduled to see dr Yuliet Barnard until february

## 2021-12-29 NOTE — TELEPHONE ENCOUNTER
According to the medication record a prescription for Vyvanse 60 mg was sent 12/20/21 to [facility] [place]

## 2022-01-24 DIAGNOSIS — F98.8 ATTENTION DEFICIT DISORDER (ADD) IN ADULT: ICD-10-CM

## 2022-01-24 DIAGNOSIS — R53.82 CHRONIC FATIGUE: ICD-10-CM

## 2022-01-24 NOTE — TELEPHONE ENCOUNTER
Good morning, this patient is not currently under my care  Our initial visit is for 2/3/22  Could you please forward this to [facility] covering provider?  Thank you

## 2022-02-03 ENCOUNTER — OFFICE VISIT (OUTPATIENT)
Dept: PSYCHIATRY | Facility: CLINIC | Age: 22
End: 2022-02-03
Payer: COMMERCIAL

## 2022-02-03 DIAGNOSIS — F90.0 ADHD (ATTENTION DEFICIT HYPERACTIVITY DISORDER), INATTENTIVE TYPE: ICD-10-CM

## 2022-02-03 DIAGNOSIS — F33.1 MAJOR DEPRESSIVE DISORDER, RECURRENT, MODERATE (HCC): ICD-10-CM

## 2022-02-03 DIAGNOSIS — F41.1 GAD (GENERALIZED ANXIETY DISORDER): Primary | ICD-10-CM

## 2022-02-03 PROCEDURE — 90792 PSYCH DIAG EVAL W/MED SRVCS: CPT | Performed by: PSYCHIATRY & NEUROLOGY

## 2022-02-03 RX ORDER — ESCITALOPRAM OXALATE 10 MG/1
TABLET ORAL
Qty: 30 TABLET | Refills: 0 | Status: SHIPPED | OUTPATIENT
Start: 2022-02-03 | End: 2022-02-28

## 2022-02-03 NOTE — BH TREATMENT PLAN
TREATMENT PLAN (Medication Management Only)        [facility]    Name/Date of Birth/MRN:  Patsy Amato 22 y o  2000 MRN: [id number]  Date of Treatment Plan: February 3, 2022  Diagnosis/Diagnoses:   1  KORY (generalized anxiety disorder)    2  ADHD (attention deficit hyperactivity disorder), inattentive type    3  Major depressive disorder, recurrent, moderate (HCC)        Strengths/Personal Resources for Self-Care:  I am good at communicating complex subjects, storytelling, resilience"  Area/Areas of need (in own words):  "Social anxiety"  1  Long Term Goal: "Communicate the way I need to at work"   Target Date: 180 days from treatment plan  Person/Persons responsible for completion of goal: Dr Darion Heredia and Self  2  Short Term Objective (s) - How will we reach this goal?:   A  Provider new recommended medication/dosage changes and/or continue medication(s):  Start Lexapro, continue Vyvanse  B    Continue psychotherapy  C  Recommend maintaining sleep-wake cycle  Target Date: 6 months from treatment plan unless noted otherwise  Person/Persons Responsible for Completion of Goal: Dr Darion Heredia and Self   Progress Towards Goals: initiating treatment   Treatment Modality: Medication management and therapy PRN  Review due 180 days from date of this plan: Approximately 6 months from today ( 8/3/2022 )    Expected length of service: ongoing treatment unless revised  My Physician/PA/NP and I have developed this plan together and I agree to work on the goals and objectives  I understand the treatment goals that were developed for my treatment    Signature:       Date and time:  Signature of parent/guardian if under age of 15 years: Date and time:  Signature of provider:      Date and time:  Signature of Supervising Physician:    Date and time: 2/3/2022      hSantelle Bucio DO     Treatment Plan done but not signed at time of office visit due to:  Plan reviewed by phone or in person  and verbal consent given due to [address] distancing

## 2022-02-03 NOTE — PSYCH
[address] [facility]    Name and Date of Birth:  Cece Fofana 22 y o  2000 MRN: [id number]    Date of Visit: February 3, 2022    Reason for visit: Full psychiatric intake assessment for medication management     HPI     Cece Fofana is a 23 y o  male with a past psychiatric history significant for MDD, ADHD, KORY  who presents to the [address] [place] [address] E outpatient clinic for intake assessment  [facility] is a transfer [facility], last seen on 8/11/21  Stephanie Arizmendi presents reporting longstanding history of depression, anxiety, ADHD  States that he has felt depressed my whole life  States that he had a "secluded childhood" and that his parents were hoarders - complacent, apathetic"  Reports that he had ambitions for college throughout high school wanting to study physics  States that during college he experienced stress of being away from home, his relationship with his girlfriend had ended, that he did not have friends, and had poor academic performance in school  Reports that he began to experience depression and hypersomnia  Reports that he started therapy in 2020 and began following with Dr Fang Pierre in 2019  States that he subsequently followed with [facility]  He reports that since being in treatment he has had some improvement in depressive symptoms  Reports recently he has had worsening of 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 [employer]  States that he has significant anxiety regarding social interactions  States that he wants to be agreeable and likable  He states that he worries what people think of him  Stephanie Arizmendi endorses both acute and chronic anxiety that is pathologic in nature and suggestive of a formal diagnosis of generalized anxiety disorder  Stephanie Arizmendi reports excessive nervousness, irrational worry, and overt anxiousness   Stephanie Arizmendi reports feeling restless, irritability, and inability to relax  At times, Nancy Mcconnell is overwhelmed/consumed by irrational fear  Throughout today's session, Nancy Mcconnell appears anxious  KORY-7 score obtained during today's visit was 12  Nancy Mcconnell endorses both an acute and chronic history of neurovegetative symptomatology suggestive of major depressive disorder  Nancy Mcconnell reports fragmented and non-restorative sleep that is likely exacerbating mood symptomatology  States that his sleep schedule changes due to working overnight for work and will try to revert back to the day on days off  Marko endorses anergia and impairment of motivation  Nancy Mcconnell reports low mood, erratic concentration and periodic inattentiveness  Nancy Mcconnell reports occasional limited pleasure in activities previously found pleasurable (anhedonia)  Nancy Mcconnell adamantly denies suicidal or homicidal ideation  Nancy Mcconnell is future-oriented and demonstrates self preservation as evidenced by today's evaluation in which Nancy Mcconnell is seeking psychiatric intervention to improve overall mental health and outlook on life  PHQ-9 score obtained during today's visit was 11  Nancy Mcconnell vehemently denies any acute or chronic history suggestive of an underlying affective (bipolar) organization  Nancy Mcconnell denies previous episodes of elevated/expansive mood, lengthy periods without sleep, grandiosity, or intense and prolonged irritability  Nancy Mcconnell denies atypical periods of increased goal-directed behavior, excessive spending, or sexual promiscuity  The patient has no history of pathologic impulsivity or extreme mood lability  During today's evaluation, Nancy Mcconnell does not exhibit objective evidence of hypomania/christina  Nancy Mcconnell is mostly organized in thought without flight of ideas or loosening of associations  Speech does not appear to be pressured or rapid and Nancy Mcconnell responds well to verbal redirecting  Nancy Mcconnell denies historical symptomatology suggestive of an underlying psychotic process   Nancy Mcconnell does not currently endorse acute perceptual disturbances such as A/V hallucinations, paranoia, referential ideation, or delusions  Michele Linn denies acute and chronic Schneiderian symptoms, including: thought-broadcasting, thought-insertion, thought-withdrawal or audible thoughts  During today's evaluation, Michele Linn does not exhibit objective evidence of lita psychosis as the patient does not appear internally preoccupied or easily distracted  Marko's thoughts are organized, linear, and reality-based  Patient reports cannabis use of approximately 1 to 2 times per week  Reports occasional alcohol use  ADHD evaluation completed below  Patient reports ADHD symptoms are currently controlled with Vyvanse  He denies adverse effects to medications  Reports that he is interested in starting a medication for depression and anxiety  Attention Deficit Hyperactivity Disorder (ADHD) Evaluation:  Inattention (6): 1) Careless mistakes/poor attention, 2) Cannot sustain attention, 3) does not listen when spoken to directly, 4) Poor follow through on instructions or tasks, 5) Organizational challenges, 7) loses important items, 8) Easily distracted, 9) Forgetful in daily activities  Hyperactivity and/or impulsivity (6): denies  Present prior to the age of 13? Reports yes, having had difficulties at school  Significant impairment or interference in 2 or more settings (personal and professional/academic)?  Reports at home and at work, reports at college as well        Current Rating Scores:     Current PHQ-9   PHQ-2/9 Depression Screening    Little interest or pleasure in doing things: 1 - several days  Feeling down, depressed, or hopeless: 3 - nearly every day  Trouble falling or staying asleep, or sleeping too much: 0 - not at all  Feeling tired or having little energy: 3 - nearly every day  Poor appetite or overeatin - not at all  Feeling bad about yourself - or that you are a failure or have let yourself or your family down: 3 - nearly every day  Trouble concentrating on things, such as reading the newspaper or watching television: 1 - several days  Moving or speaking so slowly that other people could have noticed  Or the opposite - being so fidgety or restless that you have been moving around a lot more than usual: 0 - not at all  Thoughts that you would be better off dead, or of hurting yourself in some way: 0 - not at all  PHQ-9 Score: 11   PHQ-9 Interpretation: Moderate depression        Current KORY-7 is   KORY-7 Flowsheet Screening      Most Recent Value   Over the last 2 weeks, how often have you been bothered by any of the following problems? Feeling nervous, anxious, or on edge 3   Not being able to stop or control worrying 2   Worrying too much about different things 2   Trouble relaxing 2   Being so restless that it is hard to sit still 0   Becoming easily annoyed or irritable 1   Feeling afraid as if something awful might happen 2   KORY-7 Total Score 12            Psychiatric Review Of Systems:    Sleep changes: no  Appetite changes: no  Weight changes: no  Energy/anergy: decreased  Interest/pleasure/anhedonia: decreased  Attention/concentration: decreased  Psychomotor agitation/retardation: no  Somatic symptoms: no  Anxiety/panic: yes  June: does not endorse current or previous episodes of june/hypomania  Guilty/hopeless: no  Self injurious behavior/risky behavior: no  Suicidal ideation: denies  Homicidal ideation: denies  Auditory hallucinations: no  Visual hallucinations: no  Other hallucinations: no  Delusional thinking: no  Eating disorder history: no  Obsessive/compulsive symptoms: no    Review Of Systems:    Constitutional as noted in HPI   ENT negative   Cardiovascular negative   Respiratory negative   Gastrointestinal negative   Genitourinary negative   Musculoskeletal negative   Integumentary negative   Neurological negative   Endocrine negative   Other Symptoms none, all other systems are negative       Family Psychiatric History:     Family History   Problem Relation Age of Onset    COPD Mother     Hypertension Mother     Depression Mother     Hypertension Father     Depression Father     Anxiety disorder Father     Alcohol abuse Father     Anxiety disorder Brother     Depression Brother     Hypertension Brother     Alcohol abuse Paternal Grandfather        Past Psychiatric History:   Parts of this note were copied forward from a previous note from [facility] [address] on 8/11/21 and reviewed with the patient  Inpatient psychiatric admissions: denies  Prior outpatient psychiatric linkage: Reports previously following Dr Stew Arthur, [facility]  Past/current psychotherapy: current weekly therapy  History of suicidal attempts/gestures: denies  History of violence/aggressive behaviors: denies  Psychotropic medication trials: effexor xr (mid 2388), Concerta ER 36 TM(~6686)--[id number] pupils challenges with significant glare with driving, armodafinil (helped a little bit), Ritalin-10 mg regular release in afternoon, Zoloft, Abilify, Buspar, Gabapentin, Pramipexole for restless legs, Strattera -stopped January 2021 after initiating, trialed 2 times and developed severe vomiting even when taking with food  Trintellix, Wellbutrin, Vyvanse  Substance abuse inpatient/outpatient rehabilitation: denies    Substance Abuse History:    Reports currently using cannabis 1-2/week  Reports occasional alcohol use  Denies history of alcohol, illict substance, or tobacco abuse  Denies past legal actions or arrests secondary to substance intoxication  The patient denies prior DWIs/DUIs  Elsa Banerjee does not exhibit objective evidence of substance withdrawal during today's examination nor does Elsa Banerjee appear under the influence of any psychoactive substance  Social History:    Developmental: Denies a history of milestone/developmental delay  Reports history of IEP from 6th grade for "mental health"    Education: some college  Marital history: single  Living arrangement, social support: parents  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[employer], overnight manager  Access to firearms: Reports direct access to weapons/firearms  Twin White has no history of arrests or violence with a deadly weapon  Traumatic History:     Abuse: denies  Other Traumatic Events: denies    Past Medical History:    Past Medical History:   Diagnosis Date    ADHD (attention deficit hyperactivity disorder)     Anxiety     Concussion     Depression     Kidney stones     Self-injurious behavior     Sleep difficulties     history of sleep apnea  and getting workup for narcolepsy as of 8/19     Past Medical History Pertinent Negatives:   Diagnosis Date Noted    Disease of thyroid gland 08/19/2019    Eating disorder 08/19/2019    Liver disease 08/19/2019    Obsessive-compulsive disorder 08/19/2019    Violence, history of 08/19/2019     Past Surgical History:   Procedure Laterality Date    HERNIA REPAIR      TONSILLECTOMY      TOOTH EXTRACTION Right 09/21/2020    R lower molar extraction     Allergies   Allergen Reactions    Scopolamine        History Review: The following portions of the patient's history were reviewed and updated as appropriate: allergies, current medications, past family history, past medical history, past social history, past surgical history and problem list     OBJECTIVE:    Vital signs in last 24 hours: There were no vitals filed for this visit      Mental Status Evaluation:    Appearance wearing a mask and glasses   Behavior cooperative, restless   Speech normal rate, normal volume, normal pitch   Mood "anxious"   Affect normal range and intensity, appropriate, anxious   Thought Processes organized, goal directed   Associations intact associations   Thought Content no overt delusions   Perceptual Disturbances: Denies auditory or visual hallucinations   Abnormal Thoughts  Risk Potential Denies suicidal or homicidal ideation   Orientation oriented to person, place, time/date and situation   Memory recent and remote memory grossly intact   Consciousness alert and awake   Attention Span Concentration Span attention span and concentration are age appropriate   Intellect appears to be of average intelligence   Insight fair   Judgement fair   Muscle Strength and  Gait normal gait and normal balance   Motor Activity no abnormal movements   Language no difficulty naming common objects, no difficulty repeating a phrase, no difficulty writing a sentence   Fund of Knowledge adequate knowledge of current events  adequate fund of knowledge regarding past history  adequate fund of knowledge regarding vocabulary        Laboratory Results: I have personally reviewed all pertinent laboratory/tests results    Recent Labs (last 2 months):   No visits with results within 2 Month(s) from this visit  Latest known visit with results is:   Hospital Outpatient Visit on 03/25/2021   Component Date Value    Protocol Name 03/30/2021 Felice Verdugo Time In Exercise Phase 03/30/2021 00:07:01     MAX   SYSTOLIC BP 20/05/9699 745     Max Diastolic Bp 23/50/1206 897     Max Heart Rate 03/30/2021 173     Max Predicted Heart Rate 03/30/2021 197     Reason for Termination 03/30/2021                      Value:Target Heart Rate Achieved  Exaggerated BP increase      Test Indication 03/30/2021 CHEST PAIN     Target Hr Formular 03/30/2021 (220 - Age)*85%        Suicide/Homicide Risk Assessment:    Risk of Harm to Self:  The following ratings are based on assessment at the time of the interview  Demographic risk factors include: , age: young adult (15-24)  Historical Risk Factors include: chronic depressive symptoms, chronic anxiety symptoms, substance use  Recent Specific Risk Factors include: current depressive symptoms, current anxiety symptoms  Protective Factors: no current suicidal ideation, access to mental health treatment, compliant with medications, effective coping skills, having a sense of purpose or meaning in life, stable living environment, stable job  Weapons: rifle  The following steps have been taken to ensure weapons are properly secured: locked  Based on today's assessment, Evie Talley presents the following risk of harm to self: low    Risk of Harm to Others: The following ratings are based on assessment at the time of the interview  Protective Factors: no current homicidal ideation  Weapons: rifle  The following steps have been taken to ensure weapons are properly secured: locked  Based on today's assessment, Evie Talley presents the following risk of harm to others: low    The following interventions are recommended: no intervention changes needed  Although patient's acute lethality risk is LOW, long-term/chronic lethality risk is mildly elevated given chronic depressive, anxious, ADHD symptoms, substance use, patient's demographics  However, at the current moment, Evie Talley is future-oriented, forward-thinking, and demonstrates ability to act in a self-preserving manner as evidenced by volitionally presenting to the clinic today, seeking treatment  Additionally, Evie Talley sits throughout the assessment wearing personal protective gear (ie mask) in the context of an ongoing viral pandemic, suggesting a will and desire to live  Evie Talley contracts for safety and is not an imminent risk of harm to self or others  Evie Talley understands that if they can no longer contract for safety, they need to call the office or report to their nearest Emergency Room for immediate evaluation  At this juncture, inpatient hospitalization is not currently warranted  To mitigate future risk, patient should adhere to treatment recommendations, avoid alcohol/illicit substance use, utilize community-based resources and familiar support, and prioritize mental health treatment         Assessment/Plan:   Neeraj Gallego is a 23 y o   male, Single (never ), lives with parents, currently employed, w/ PMH of HTN, obestiy, Hereditary hemochromatosis, vitamin deficiency and PPH of KORY, MDD, ADHD, denies prior psychiatric admissions, denies prior SA, who presented to the mental health clinic for the initial intake and psychiatric evaluation  Patient is a transfer from [facility], last seen on 8/11/21  [facility] presents reporting longstanding history of depression, anxiety, ADHD  States that he has felt depressed my whole life  States that he had a "secluded childhood" and that his parents were hoarders - complacent, apathetic"  Reports that he had ambitions for college throughout high school wanting to study physics  States that during college he experienced stress of being away from home, his relationship with his girlfriend had ended, that he did not have friends, and had poor academic performance in school  Reports that he began to experience depression and hypersomnia  Reports that he started therapy in 2020 and began following with Dr Krystina Fernandez in 2019  States that he subsequently followed with [facility]  He reports that since being in treatment he has had some improvement in depressive symptoms  Reports recently he has had worsening of 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 [employer]  States that he has significant anxiety regarding social interactions  States that he wants to be agreeable and likable  He states that he worries what people think of him  [facility] endorses both acute and chronic anxiety that is pathologic in nature and suggestive of a formal diagnosis of generalized anxiety disorder  KORY-7 score obtained during today's visit was 12  [facility] endorses both an acute and chronic history of neurovegetative symptomatology suggestive of major depressive disorder  [facility] adamantly denies suicidal or homicidal ideation  PHQ-9 score obtained during today's visit was 11  [facility] vehemently denies any acute or chronic history suggestive of an underlying affective (bipolar) organization   [facility] denies historical symptomatology suggestive of an underlying psychotic process  Patient reports cannabis use of approximately 1 to 2 times per week  Reports occasional alcohol use  ADHD evaluation completed above  Patient reports ADHD symptoms are currently controlled with Vyvanse  He denies adverse effects to medications  Patient had a normal stress test in March of 2021  Reports that he is interested in starting a medication for depression and anxiety  Discussed the importance of maintaining consistent sleep-wake cycle as this can affect the patient's mood  Discussed continuing Vyvanse for ADHD as patient reports improvement in symptoms since taking this and denies adverse effects  Discussed starting Lexapro for depression and anxiety  Patient verbalized understanding and was in agreement with the plan  DSM-5 Diagnoses:     1 ) Major depressive disorder, recurrent, moderate   2 ) Generalized Anxiety Disorder  3 ) ADHD  R/o Social anxiety disorder  R/o Dysthymia      Treatment Recommendations/Precautions:   Start Lexapro 5 mg daily for 6 days then increase to 10 mg daily   Continue Vyvanse 60 mg daily   Recommended maintaining consistent sleep-wake cycle   Medication management follow-up in four weeks   Continue psychotherapy with own therapist   Aware of need to follow up with family physician for medical issues   Aware of 24 hour and weekend coverage for urgent situations accessed by calling [facility] main practice number    Medications Risks/Benefits:      Risks, Benefits And Possible Side Effects Of Medications:    Risks, benefits, and possible side effects of medications explained to [facility] and he verbalizes understanding and agreement for treatment  Lexapro PARQ completed including serotonin syndrome, SIADH, worsening depression, suicidality, induction of christina, GI upset, headaches, activation, sexual side effects, sedation, potential drug interactions, and others      Vyvanse PARQ completed including elevated heart rate, elevated bp, seizures, anxiety/irritability, activation/induction of christina, abuse potential, interactions with other medications, risk of sudden death, appetite suppression/weight loss and other risks  For MALES- rare priapism  Controlled Medication Discussion:     Michele Linn has been filling controlled prescriptions on time as prescribed according to [facility] Prescription Drug Monitoring Program    Treatment Plan:    Completed and signed during the session: Yes - Treatment Plan done but not signed at time of office visit due to:  Plan reviewed in person and verbal consent given due to Precious social ruben      Note Share Disclaimer:      This note was not shared with the patient due to reasonable likelihood of causing patient harm      Devendra Carroll DO 02/03/22

## 2022-02-26 DIAGNOSIS — F41.1 GAD (GENERALIZED ANXIETY DISORDER): ICD-10-CM

## 2022-02-28 RX ORDER — ESCITALOPRAM OXALATE 10 MG/1
10 TABLET ORAL DAILY
Qty: 30 TABLET | Refills: 1 | Status: SHIPPED | OUTPATIENT
Start: 2022-02-28 | End: 2022-03-25

## 2022-03-03 ENCOUNTER — OFFICE VISIT (OUTPATIENT)
Dept: PSYCHIATRY | Facility: CLINIC | Age: 22
End: 2022-03-03

## 2022-03-03 DIAGNOSIS — F41.1 GAD (GENERALIZED ANXIETY DISORDER): ICD-10-CM

## 2022-03-03 DIAGNOSIS — F33.1 MAJOR DEPRESSIVE DISORDER, RECURRENT, MODERATE (HCC): Primary | ICD-10-CM

## 2022-03-03 DIAGNOSIS — F90.0 ADHD (ATTENTION DEFICIT HYPERACTIVITY DISORDER), INATTENTIVE TYPE: ICD-10-CM

## 2022-03-03 NOTE — PSYCH
MEDICATION MANAGEMENT NOTE        [facility]      Name and Date of Birth:  Brit Cedeno 22 y o  2000 MRN: [id number]    Date of Visit: March 3, 2022    Reason for Visit: Follow-up visit for medication management     SUBJECTIVE:    Brit Cedeno is a 23 y o  male with past psychiatric history significant for MDD, KORY, ADHD who was personally seen and evaluated today at the [address] [place] [address] E outpatient clinic for follow-up and medication management  Taryn Renteria presents reporting improvements in depression and anxiety since last visit after starting Lexapro  Patient reports that he noticed improvements in mood the first day starting Lexapro  He reports that he has been better able to motivate himself and has had a better outlook on life  Reports anxiety has improved and feels less worried and more eager to accomplish tasks  He reports he is not as worried about feeling stressed  Reports feeling more hopeful compared to previous  Taryn Gouldjaida denies suicidal or homicidal ideation  He reports continued stressors at work and regarding his living situation and possibly having to move in the short term  Reports continuing to struggle with sleep-wake cycle, often wanting to stay awake during the day rather than maintaining his overnight work sleep-wake cycle  He denies adverse effects to medications  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  Reports he feels that his social anxiety has improved overall  Reports being compliant with medications and that ADHD symptoms are currently controlled  Current Rating Scores:     None completed today      Review Of Systems:      Constitutional as noted in HPI   ENT negative   Cardiovascular negative   Respiratory negative   Gastrointestinal negative   Genitourinary negative   Musculoskeletal negative   Integumentary negative   Neurological negative   Endocrine negative   Other Symptoms none, all other systems are negative       Past Psychiatric History: (unchanged information from previous note copied and italicized) - Information that is bolded has been updated  Parts of this note were copied forward from a previous note from [address] on 8/11/21 and reviewed with the patient  Inpatient psychiatric admissions: denies  Prior outpatient psychiatric linkage: Reports previously following Dr Chau Kamara, [facility]  Past/current psychotherapy: current weekly therapy  History of suicidal attempts/gestures: denies  History of violence/aggressive behaviors: denies  Psychotropic medication trials: effexor xr (mid 9220), Concerta ER 36 DP(~5184)--KWTTWPP pupils challenges with significant glare with driving, armodafinil (helped a little bit), Ritalin-10 mg regular release in afternoon, Zoloft, Abilify, Buspar, Gabapentin, Pramipexole for restless legs, Strattera -stopped January 2021 after initiating, trialed 2 times and developed severe vomiting even when taking with food  Trintellix, Wellbutrin, Vyvanse, Lexapro  Substance abuse inpatient/outpatient rehabilitation: denies         Substance Abuse History: (unchanged information from previous note copied and italicized) - Information that is bolded has been updated  Reports currently using cannabis 1-2/week  Reports occasional alcohol use  Denies history of alcohol, illict substance, or tobacco abuse  Denies past legal actions or arrests secondary to substance intoxication  The patient denies prior DWIs/DUIs  Narda Hendersons does not exhibit objective evidence of substance withdrawal during today's examination nor does Narda Beans appear under the influence of any psychoactive substance  Social History: (unchanged information from previous note copied and italicized) - Information that is bolded has been updated  Developmental: Denies a history of milestone/developmental delay   Reports history of IEP from 6th grade for "mental health"  Education: some college  Marital history: single  Living arrangement, social support: parents  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[employer], overnight manager  Access to firearms: Reports direct access to weapons/firearms  Scotty Rodriguez has no history of arrests or violence with a deadly weapon  Traumatic History: (unchanged information from previous note copied and italicized) - Information that is bolded has been updated     Abuse: denies  Other Traumatic Events: denies        Past Medical History:    Past Medical History:   Diagnosis Date    ADHD (attention deficit hyperactivity disorder)     Anxiety     Concussion     Depression     Kidney stones     Self-injurious behavior     Sleep difficulties     history of sleep apnea  and getting workup for narcolepsy as of 8/19     Past Medical History Pertinent Negatives:   Diagnosis Date Noted    Disease of thyroid gland 08/19/2019    Eating disorder 08/19/2019    Liver disease 08/19/2019    Obsessive-compulsive disorder 08/19/2019    Violence, history of 08/19/2019     Past Surgical History:   Procedure Laterality Date    HERNIA REPAIR      TONSILLECTOMY      TOOTH EXTRACTION Right 09/21/2020    R lower molar extraction     Allergies   Allergen Reactions    Scopolamine        Substance Abuse History:    Social History     Substance and Sexual Activity   Alcohol Use Yes    Comment: socially     Social History     Substance and Sexual Activity   Drug Use Yes    Types: Marijuana    Comment: ocasionally       Social History:    Social History     Socioeconomic History    Marital status: Single     Spouse name: Not on file    Number of children: 0    Years of education: Not on file    Highest education level: Some college, no degree   Occupational History    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   Tobacco Use    Smoking status: Never Smoker    Smokeless tobacco: Never Used   Vaping Use    Vaping Use: Never used   Substance and Sexual Activity    Alcohol use: Yes     Comment: socially    Drug use: Yes     Types: Marijuana     Comment: ocasionally    Sexual activity: Not on file   Other Topics Concern    Not on file   Social History Narrative    Not on file     Social Determinants of Health     Financial Resource Strain: Not on file   Food Insecurity: Not on file   Transportation Needs: Not on file   Physical Activity: Not on file   Stress: Not on file   Social Connections: Not on file   Intimate Partner Violence: Not on file   Housing Stability: Not on file       Family Psychiatric History:     Family History   Problem Relation Age of Onset    COPD Mother     Hypertension Mother     Depression Mother     Hypertension Father     Depression Father     Anxiety disorder Father     Alcohol abuse Father     Anxiety disorder Brother     Depression Brother     Hypertension Brother     Alcohol abuse Paternal Grandfather        History Review: The following portions of the patient's history were reviewed and updated as appropriate: allergies, current medications, past family history, past medical history, past social history, past surgical history and problem list          OBJECTIVE:     Vital signs in last 24 hours: There were no vitals filed for this visit      Mental Status Evaluation:    Appearance appears stated age, casually dressed, sitting upright in chair, wearing a mask and glasses   Behavior cooperative and less restless   Speech normal rate, normal volume, normal pitch   Mood "good"   Affect normal range and intensity, appropriate, less anxious   Thought Processes organized, goal directed   Associations intact associations   Thought Content no overt delusions   Perceptual Disturbances: no auditory hallucinations, no visual hallucinations   Abnormal Thoughts  Risk Potential Denies suicidal or homicidal ideation   Orientation oriented to person, place, time/date and situation   Memory recent and remote memory grossly intact Consciousness alert and awake   Attention Span Concentration Span attention span and concentration are age appropriate   Intellect appears to be of average intelligence   Insight fair   Judgement fair   Muscle Strength and  Gait normal gait and normal balance   Motor activity no abnormal movements   Language within normal limits   Fund of Knowledge adequate knowledge of current events  adequate fund of knowledge regarding past history  adequate fund of knowledge regarding vocabulary        Laboratory Results: I have personally reviewed all pertinent laboratory/tests results      Suicide/Homicide Risk Assessment:    Risk of Harm to Self:  The following ratings are based on assessment at the time of the interview  Demographic risk factors include: , male, age: young adult (15-24)  Historical Risk Factors include: chronic depressive symptoms, chronic anxiety symptoms, substance use  Recent Specific Risk Factors include: current depressive symptoms, current anxiety symptoms  Protective Factors: no current suicidal ideation, access to mental health treatment, compliant with medications, compliant with mental health treatment, effective coping skills, having a sense of purpose or meaning in life, stable living environment, stable job  Weapons: rifle  The following steps have been taken to ensure weapons are properly secured: locked  Based on today's assessment, Eda Honorio presents the following risk of harm to self: low    Risk of Harm to Others: The following ratings are based on assessment at the time of the interview  Protective Factors: no current homicidal ideation  Weapons: rifle   The following steps have been taken to ensure weapons are properly secured: locked  Based on today's assessment, Eda Edwardslle presents the following risk of harm to others: low    The following interventions are recommended: no intervention changes needed      Lethality Statement:  Based on today's assessment and clinical criteria, [facility] contracts for safety and is not an imminent risk of harm to self or others  Outpatient level of care is deemed appropriate at this current time  Shasta Estevez understands that if they can no longer contract for safety, they need to call the office or report to their nearest Emergency Room for immediate evaluation  Assessment/Plan:     Shasta Estevez was personally seen and evaluated today at the [address] [place] [address] outpatient clinic for follow up for MDD, KORY, ADHD  Shasta Estevez presents reporting improvements in depression and anxiety since last visit after starting Lexapro  Patient reports that he noticed improvements in mood the first day starting Lexapro  He reports that he has been better able to motivate himself and has had a better outlook on life  Reports anxiety has improved and feels less worried and more eager to accomplish tasks  He reports he is not as worried about feeling stressed  Reports feeling more hopeful compared to previous  Shasta Estevez denies suicidal or homicidal ideation  He reports continued stressors at work and regarding his living situation and possibly having to move in the short term  Reports continuing to struggle with sleep-wake cycle, often wanting to stay awake during the day rather than maintaining his overnight work sleep-wake cycle  He denies adverse effects to medications  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  Reports he feels that his social anxiety has improved overall  Reports being compliant with medications and that ADHD symptoms are currently controlled  Discussed continuing Lexapro for continued improvement in depression and anxiety and awaiting continued therapeutic effect over the next few weeks  Reiterated the importance of maintaining a consistent sleep-wake cycle as this can affect the patient's mood  Patient verbalized understanding and was in agreement with the plan  DSM-5 Diagnoses:   1   Major depressive disorder, recurrent, moderate ([facility] [address] )    2  ADHD (attention deficit hyperactivity disorder), inattentive type    3  KORY (generalized anxiety disorder)    R/o Social anxiety disorder   R/o Dysthymia        Treatment Recommendations/Precautions:   Continue Lexapro 10 mg daily   Continue Vyvanse 60 mg daily   Medication management follow up in 5 weeks  · Recommended maintaining consistent sleep-wake cycle   Continue psychotherapy with own therapist   Aware of need to follow up with family physician for medical issues   Aware of 24 hour and weekend coverage for urgent situations accessed by calling [facility] main practice number    Medications Risks/Benefits      Risks, Benefits And Possible Side Effects Of Medications:    Risks, benefits, and possible side effects of medications explained to Shasta Estevez and he verbalizes understanding and agreement for treatment  Controlled Medication Discussion:     Shasta Mosherjaida has been filling controlled prescriptions on time as prescribed according to [facility]    Psychotherapy Provided:     Individual psychotherapy provided:   Medication education provided to Shasta Estevez  Reassurance and supportive therapy provided  Treatment Plan:    Completed and signed during the session: Not applicable - Treatment Plan not due at this session    Note Share Disclaimer:      This note was not shared with the patient due to reasonable likelihood of causing patient harm    Rosalia Hsu DO 03/03/22

## 2022-03-10 ENCOUNTER — OFFICE VISIT (OUTPATIENT)
Dept: CARDIOLOGY CLINIC | Facility: CLINIC | Age: 22
End: 2022-03-10
Payer: COMMERCIAL

## 2022-03-10 VITALS
OXYGEN SATURATION: 99 % | BODY MASS INDEX: 31.78 KG/M2 | WEIGHT: 261 LBS | RESPIRATION RATE: 16 BRPM | DIASTOLIC BLOOD PRESSURE: 82 MMHG | HEIGHT: 76 IN | HEART RATE: 69 BPM | SYSTOLIC BLOOD PRESSURE: 120 MMHG

## 2022-03-10 DIAGNOSIS — R00.1 SINUS BRADYCARDIA ON ECG: Primary | ICD-10-CM

## 2022-03-10 PROCEDURE — 99213 OFFICE O/P EST LOW 20 MIN: CPT | Performed by: INTERNAL MEDICINE

## 2022-03-10 PROCEDURE — 3079F DIAST BP 80-89 MM HG: CPT | Performed by: INTERNAL MEDICINE

## 2022-03-10 PROCEDURE — 1036F TOBACCO NON-USER: CPT | Performed by: INTERNAL MEDICINE

## 2022-03-10 PROCEDURE — 3008F BODY MASS INDEX DOCD: CPT | Performed by: INTERNAL MEDICINE

## 2022-03-10 PROCEDURE — 3074F SYST BP LT 130 MM HG: CPT | Performed by: INTERNAL MEDICINE

## 2022-03-10 NOTE — PROGRESS NOTES
[facility] [place]  [id number] [facility] [id number]  Cardiology Follow Up    Nerissa Flores  2000  [id number]      1  Sinus bradycardia on ECG     2  BMI 31 0-31 9,adult         Chief Complaint   Patient presents with    Follow-up       Interval History:   38-year-old male with attention deficit hyperactive disorder, idiopathic hyper so omni a, prediabetic, obesity presented for follow-up    Patient was initially seen in Cardiology Clinic in March 2021 for sinus bradycardia  He underwent echo and exercise stress test without any significant finding  His heart rate appropriately increased to exercise    Since last clinic visit patient is doing fine  He denies any chest pain shortness of breath, palpitation, dizziness, orthopnea, leg edema loss of conscious  No chest pain or shortness of breath on exertion  Patient is vaccinated against COVID  As per patient he lost few lb since last clinic visit  He has intermittently monitor blood pressure without any significant readings      No recent labs available for review  (From previous clinic visit  patient was noted to have sinus bradycardia with heart rate 48 on EKG in December 2020  Patient has history of tachycardia in the past when he was young and used to be on Bystolic which was stopped for last 8 years  Patient was on losartan and hydrochlorothiazide last year  for uncontrolled blood pressure which was thought secondary to  medication induced   patient has occasionally felt atypical chest pain and shortness of breath lasting few seconds without any aggravating or relieving factor  He feels dizzy when he goes for hot shower   he denies chest pain shortness of breath at present time    Denies palpitation,  Nausea, vomiting, orthopnea, paroxysmal nocturnal dyspnea or loss of consciousness)    Review of Systems:  All the review of system negative except as mentioned above  Patient Active Problem List   Diagnosis    Attention deficit disorder (ADD) in adult    Current moderate episode of major depressive disorder without prior episode ([facility] [address] )    Obesity (BMI 30 0-34  9)    Hereditary hemochromatosis ([facility] [address] )    Chronic fatigue    Restless leg syndrome    Essential hypertension    Soft tissue mass    Hypersomnia with long sleep time, idiopathic    Sleep paralysis    Hypnopompic hallucination    KORY (generalized anxiety disorder)    Non-restorative sleep    Vitamin D deficiency    Sinus bradycardia by electrocardiogram    Hypercalcemia    ADHD (attention deficit hyperactivity disorder), inattentive type    Major depressive disorder, recurrent, moderate (HCC)     Past Medical History:   Diagnosis Date    ADHD (attention deficit hyperactivity disorder)     Anxiety     Concussion     Depression     Kidney stones     Self-injurious behavior     Sleep difficulties     history of sleep apnea  and getting workup for narcolepsy as of 8/19     Social History     Socioeconomic History    Marital status: Single     Spouse name: Not on file    Number of children: 0    Years of education: Not on file    Highest education level: Some college, no degree   Occupational History    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   Tobacco Use    Smoking status: Never Smoker    Smokeless tobacco: Never Used   Vaping Use    Vaping Use: Never used   Substance and Sexual Activity    Alcohol use: Yes     Comment: socially    Drug use: Yes     Types: Marijuana     Comment: ocasionally    Sexual activity: Not on file   Other Topics Concern    Not on file   Social History Narrative    Not on file     Social Determinants of Health     Financial Resource Strain: Not on file   Food Insecurity: Not on file   Transportation Needs: Not on file   Physical Activity: Not on file   Stress: Not on file   Social Connections: Not on file   Intimate Partner Violence: Not on file   Housing Stability: Not on file      Family History   Problem Relation Age of Onset  COPD Mother     Hypertension Mother     Depression Mother     Hypertension Father     Depression Father     Anxiety disorder Father     Alcohol abuse Father     Anxiety disorder Brother     Depression Brother     Hypertension Brother     Alcohol abuse Paternal Grandfather      Past Surgical History:   Procedure Laterality Date    HERNIA REPAIR      TONSILLECTOMY      TOOTH EXTRACTION Right 09/21/2020    R lower molar extraction       Current Outpatient Medications:     Cholecalciferol (Vitamin D3) 50 MCG (2000 UT) capsule, Take 2 capsules (4,000 Units total) by mouth daily, Disp: 180 capsule, Rfl: 1    Denta 5000 Plus 1 1 % CREA, , Disp: , Rfl:     escitalopram (LEXAPRO) 10 mg tablet, Take 1 tablet (10 mg total) by mouth daily, Disp: 30 tablet, Rfl: 1    lisdexamfetamine (VYVANSE) 60 MG capsule, Take 1 capsule (60 mg total) by mouth every morning Max Daily Amount: 60 mg, Disp: 30 capsule, Rfl: 0    Melatonin 10 MG TABS, Take 5 mg by mouth  , Disp: , Rfl:   Allergies   Allergen Reactions    Scopolamine        Labs:  No visits with results within 6 Month(s) from this visit  Latest known visit with results is:   Hospital Outpatient Visit on 03/25/2021   Component Date Value    Protocol Name 03/30/2021 Gilberto Temple Time In Exercise Phase 03/30/2021 00:07:01     MAX  SYSTOLIC BP 75/41/5581 891     Max Diastolic Bp 75/54/9195 955     Max Heart Rate 03/30/2021 173     Max Predicted Heart Rate 03/30/2021 197     Reason for Termination 03/30/2021                      Value:Target Heart Rate Achieved  Exaggerated BP increase      Test Indication 03/30/2021 CHEST PAIN     Target Hr Formular 03/30/2021 (220 - Age)*85%      Imaging: No results found      Physical Exam:  General:  Obese, awake, alert and oriented x3, not in distress  Neck: supple, no JVD  Eyes: PERRL, conjunctiva normal  Lungs:  Bilateral air entry positive, no wheeze/rhonchi or crackle  Heart:  S1-S2 normal, no murmur  Abdomen: Soft ,nondistended ,nontender, bowel sounds positive  Extremities:  No leg edema, no deformity, ROM normal  Neuro:  Moving all extremities, speech clear  Skin: warm, no rash    /82 (BP Location: Left arm, Patient Position: Sitting, Cuff Size: Large)   Pulse 69   Resp 16   Ht 6' 4" (1 93 m)   Wt 118 kg (261 lb)   SpO2 99%   BMI 31 77 kg/m²     Cardiographics :  Exercise treadmill stress test in March 2021 exercise time 7 minutes Mets achieved 10 1 maximum predicted heart rate 80 8%, no chest pain during stress, stress EKG negative for ischemia    Echo in March 2021 showed LVEF of 60% without regional wall motion abnormality, trace MR, trace TR      Assessment:    1  Sinus bradycardia  Resolved  2  Obesity  3  ADD/IHS    Recommendations:    Patient was advised to follow-up with primary care physician  Patient will be discharged from cardiology clinic    He was advised to take low-salt, low-fat/low-cholesterol diet and try to lose weight    Patient was informed to call us back make an appointment as needed  Above all discussed with patient    Patient understands and agrees

## 2022-03-25 DIAGNOSIS — F41.1 GAD (GENERALIZED ANXIETY DISORDER): ICD-10-CM

## 2022-03-25 RX ORDER — ESCITALOPRAM OXALATE 10 MG/1
TABLET ORAL
Qty: 90 TABLET | Refills: 0 | Status: SHIPPED | OUTPATIENT
Start: 2022-03-25 | End: 2022-05-09 | Stop reason: SDUPTHER

## 2022-04-04 ENCOUNTER — OFFICE VISIT (OUTPATIENT)
Dept: PSYCHIATRY | Facility: CLINIC | Age: 22
End: 2022-04-04

## 2022-04-04 DIAGNOSIS — F90.0 ADHD (ATTENTION DEFICIT HYPERACTIVITY DISORDER), INATTENTIVE TYPE: ICD-10-CM

## 2022-04-04 DIAGNOSIS — F33.1 MAJOR DEPRESSIVE DISORDER, RECURRENT, MODERATE (HCC): Primary | ICD-10-CM

## 2022-04-04 DIAGNOSIS — F41.1 GAD (GENERALIZED ANXIETY DISORDER): ICD-10-CM

## 2022-04-04 NOTE — PSYCH
MEDICATION MANAGEMENT NOTE        [facility]      Name and Date of Birth:  Arsh Perez 22 y o  2000 MRN: [id number]    Date of Visit: April 4, 2022    Reason for Visit: Follow-up visit for medication management     SUBJECTIVE:    Arsh Perez is a 23 y o  male with past psychiatric history significant for MDD, ADHD, KORY who was personally seen and evaluated today at the [address] [place] [address] E outpatient clinic for follow-up and medication management  Xiang Cheng presents reporting feeling good  He reports improvements in mood and anxiety since last visit  Reports that now he is enjoying going to work more, feels less nervous there, 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  Reports that leadership have noticed an improvement in his work abilities and that they have been more supportive of him  Reports that he feels he has not experienced something beneficial professionally such as his current situation before and is happy about this  Reports that he has been making more plans with friends  States that he bought a car and is waiting for it to be delivered at the dealership  He reports that he is taking more initiative and is handling things more  He does report some continued depression at times  Xiang Cheng currently denies suicidal or homicidal ideation  He reports improvements in anxiety  He states that he is hopeful about the future and is motivated for continued improvements  He reports adequate sleep and appetite  States that after he gets a car he is planning to live on his own and is hopeful to be promoted at work  He denies adverse effects to medications          Current Rating Scores:     Current PHQ-9   PHQ-2/9 Depression Screening    Little interest or pleasure in doing things: 3 - nearly every day  Feeling down, depressed, or hopeless: 2 - more than half the days  Trouble falling or staying asleep, or sleeping too much: 1 - several days  Feeling tired or having little energy: 2 - more than half the days  Poor appetite or overeatin - several days  Feeling bad about yourself - or that you are a failure or have let yourself or your family down: 1 - several days  Trouble concentrating on things, such as reading the newspaper or watching television: 1 - several days  Moving or speaking so slowly that other people could have noticed  Or the opposite - being so fidgety or restless that you have been moving around a lot more than usual: 0 - not at all  Thoughts that you would be better off dead, or of hurting yourself in some way: 0 - not at all  PHQ-9 Score: 11   PHQ-9 Interpretation: Moderate depression          Review Of Systems:      Constitutional as noted in HPI   ENT negative   Cardiovascular negative   Respiratory negative   Gastrointestinal negative   Genitourinary negative   Musculoskeletal negative   Integumentary negative   Neurological negative   Endocrine negative   Other Symptoms none, all other systems are negative       Past Psychiatric History: (unchanged information from previous note copied and italicized) - Information that is bolded has been updated     Parts of this note were copied forward [facility] and reviewed with the patient  Inpatient psychiatric admissions: denies  Prior outpatient psychiatric linkage: Reports previously following Dr Puckett Son, [facility]  Past/current psychotherapy: current weekly therapy  History of suicidal attempts/gestures: denies  History of violence/aggressive behaviors: denies  Psychotropic medication trials: effexor xr (mid ), Concerta ER [address](~1232)--LWGAHVE pupils challenges with significant glare with driving, armodafinil (helped a little bit), Ritalin-10 mg regular release in afternoon, Zoloft, Abilify, Buspar, Gabapentin, Pramipexole for restless legs, Strattera -stopped 2021 after initiating, trialed 2 times and developed severe vomiting even when taking with food  Trintellix, Wellbutrin, Vyvanse, Lexapro  Substance abuse inpatient/outpatient rehabilitation: denies           Substance Abuse History: (unchanged information from previous note copied and italicized) - Information that is bolded has been updated  Reports currently using cannabis 1-2/week  Reports occasional alcohol use  Denies history of alcohol, illict substance, or tobacco abuse  Denies past legal actions or arrests secondary to substance intoxication  The patient denies prior DWIs/DUIs  Marko does not exhibit objective evidence of substance withdrawal during today's examination nor does Marko appear under the influence of any psychoactive substance           Social History: (unchanged information from previous note copied and italicized) - Information that is bolded has been updated  Developmental: Denies a history of milestone/developmental delay  Reports history of IEP from 6th grade for "mental health"  Education: some college  Marital history: single  Living arrangement, social support: parents  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 [employer], overnight manager  Access to firearms: Reports direct access to weapons/firearms  Watsoncus Moellers no history of arrests or violence with a deadly weapon          Traumatic History: (unchanged information from previous note copied and italicized) - Information that is bolded has been updated     Abuse: denies  Other Traumatic Events: denies          Past Medical History:    Past Medical History:   Diagnosis Date    ADHD (attention deficit hyperactivity disorder)     Anxiety     Concussion     Depression     Kidney stones     Self-injurious behavior     Sleep difficulties     history of sleep apnea  and getting workup for narcolepsy as of 8/19     Past Medical History Pertinent Negatives:   Diagnosis Date Noted    Disease of thyroid gland 08/19/2019    Eating disorder 08/19/2019    Liver disease 08/19/2019  Obsessive-compulsive disorder 08/19/2019    Violence, history of 08/19/2019     Past Surgical History:   Procedure Laterality Date    HERNIA REPAIR      TONSILLECTOMY      TOOTH EXTRACTION Right 09/21/2020    R lower molar extraction     Allergies   Allergen Reactions    Scopolamine        Substance Abuse History:    Social History     Substance and Sexual Activity   Alcohol Use Yes    Comment: socially     Social History     Substance and Sexual Activity   Drug Use Yes    Types: Marijuana    Comment: ocasionally       Social History:    Social History     Socioeconomic History    Marital status: Single     Spouse name: Not on file    Number of children: 0    Years of education: Not on file    Highest education level: Some college, no degree   Occupational History    Occupation: [employer]     Comment: [employer]   Tobacco Use    Smoking status: Never Smoker    Smokeless tobacco: Never Used   Vaping Use    Vaping Use: Never used   Substance and Sexual Activity    Alcohol use: Yes     Comment: socially    Drug use: Yes     Types: Marijuana     Comment: ocasionally    Sexual activity: Not on file   Other Topics Concern    Not on file   Social History Narrative    Not on file     Social Determinants of Health     Financial Resource Strain: Not on file   Food Insecurity: Not on file   Transportation Needs: Not on file   Physical Activity: Not on file   Stress: Not on file   Social Connections: Not on file   Intimate Partner Violence: Not on file   Housing Stability: Not on file       Family Psychiatric History:     Family History   Problem Relation Age of Onset    COPD Mother     Hypertension Mother     Depression Mother     Hypertension Father     Depression Father     Anxiety disorder Father     Alcohol abuse Father     Anxiety disorder Brother     Depression Brother     Hypertension Brother     Alcohol abuse Paternal Grandfather        History Review:  The following portions of the patient's history were reviewed and updated as appropriate: allergies, current medications, past family history, past medical history, past social history, past surgical history and problem list          OBJECTIVE:     Vital signs in last 24 hours: There were no vitals filed for this visit      Mental Status Evaluation:    Appearance appears stated age, casually dressed, sitting upright in chair, wearing a mask and glasses   Behavior cooperative and less restless   Speech normal rate, normal volume, normal pitch   Mood "good"   Affect normal range and intensity, appropriate, less anxious   Thought Processes organized, goal directed   Associations intact associations   Thought Content no overt delusions   Perceptual Disturbances: no auditory hallucinations, no visual hallucinations   Abnormal Thoughts  Risk Potential Denies suicidal or homicidal ideation   Orientation oriented to person, place, time/date and situation   Memory recent and remote memory grossly intact   Consciousness alert and awake   Attention Span Concentration Span attention span and concentration are age appropriate   Intellect appears to be of average intelligence   Insight fair   Judgement fair   Muscle Strength and  Gait normal gait and normal balance   Motor activity no abnormal movements   Language Within normal limits   Fund of Knowledge adequate knowledge of current events  adequate fund of knowledge regarding past history  adequate fund of knowledge regarding vocabulary        Laboratory Results: I have personally reviewed all pertinent laboratory/tests results      Suicide/Homicide Risk Assessment:    Risk of Harm to Self:  The following ratings are based on assessment at the time of the interview  Demographic risk factors include: , male, age: young adult (15-24)  Historical Risk Factors include: chronic depressive symptoms, chronic anxiety symptoms, substance use  Recent Specific Risk Factors include: current depressive symptoms, current anxiety symptoms  Protective Factors: no current suicidal ideation, access to mental health treatment, compliant with medications, compliant with mental health treatment, effective coping skills, having a sense of purpose or meaning in life, stable living environment, stable job, supportive family  Weapons: rifle  The following steps have been taken to ensure weapons are properly secured: locked  Based on today's assessment, [employer] presents the following risk of harm to self: low    Risk of Harm to Others: The following ratings are based on assessment at the time of the interview  Protective Factors: no current homicidal ideation  Weapons: rifle  The following steps have been taken to ensure weapons are properly secured: locked  Based on today's assessment, [employer] presents the following risk of harm to others: low    The following interventions are recommended: no intervention changes needed      Lethality Statement:  Based on today's assessment and clinical criteria, Eva Robles contracts for safety and is not an imminent risk of harm to self or others  Outpatient level of care is deemed appropriate at this current time  [employer] understands that if they can no longer contract for safety, they need to call the office or report to their nearest Emergency Room for immediate evaluation  Assessment/Plan:     [employer] was personally seen and evaluated today at the [address] [place] [address] [address] outpatient clinic for follow up for MDD, ADHD, KORY  [employer] presents reporting feeling good  He reports improvements in mood and anxiety since last visit  Reports that now he is enjoying going to work more, feels less nervous there, 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  Reports that leadership have noticed an improvement in his work abilities and that they have been more supportive of him    Reports that he feels he has not experienced something beneficial professionally such as his current situation before and is happy about this  Reports that he has been making more plans with friends  States that he bought a car and is waiting for it to be delivered at the dealership  He reports that he is taking more initiative and is handling things more  He does report some continued depression at times  Xiang Cheng currently denies suicidal or homicidal ideation  He reports improvements in anxiety  He states that he is hopeful about the future and is motivated for continued improvements  He reports adequate sleep and appetite  States that after he gets a car he is planning to live on his own and is hopeful to be promoted at work  He denies adverse effects to medications  Discussed continuing Lexapro for continued improvement in depression and anxiety  Discussed continuing Vyvanse for continued improvement of ADHD  Risks/benefits/alternativies to treatment discussed, including a myriad of potential adverse medication side effects, to which Xiang Cheng voiced understanding and consented fully to treatment  Patient agreed to call the office if he experiences adverse effects, has questions regarding his psychiatric treatment  DSM-5 Diagnoses:   1  Major depressive disorder, recurrent, moderate (HCC)    2  ADHD (attention deficit hyperactivity disorder), inattentive type    3   KORY (generalized anxiety disorder)    R/o Social anxiety disorder   R/o Dysthymia      Treatment Recommendations/Precautions:   Continue Lexapro 10 mg daily   Continue Vyvanse 60 mg daily   Medication management follow up in 8 weeks   Continue therapy with own therapist   Aware of need to follow up with family physician for medical issues   Aware of 24 hour and weekend coverage for urgent situations accessed by calling [facility] main practice number    Medications Risks/Benefits      Risks, Benefits And Possible Side Effects Of Medications:    Risks, benefits, and possible side effects of medications explained to [facility] and he verbalizes understanding and agreement for treatment  Lexapro PARQ completed including serotonin syndrome, SIADH, worsening depression, suicidality, induction of christina, GI upset, headaches, activation, sexual side effects, sedation, potential drug interactions, and others  Vyvanse PARQ completed including elevated heart rate, elevated bp, seizures, anxiety/irritability, activation/induction of christina, abuse potential, interactions with other medications, risk of sudden death, appetite suppression/weight loss and other risks  For MALES- rare priapism  Controlled Medication Discussion:     [facility] has been filling controlled prescriptions on time as prescribed according to [employer] [employer] [facility]    Psychotherapy Provided:     Individual psychotherapy provided: Medication education provided to [facility]  Recent stressor including job stress discussed with [facility]  Importance of medication and treatment compliance reviewed with Marko  Importance of follow up with family physician for medical issues reviewed with [facility]  Reassurance and supportive therapy provided  Treatment Plan:    Completed and signed during the session: Not applicable - Treatment Plan not due at this session    Note Share Disclaimer:      This note was not shared with the patient due to reasonable likelihood of causing patient harm    Yvonne Albarado DO 04/04/22

## 2022-04-27 DIAGNOSIS — F90.0 ADHD (ATTENTION DEFICIT HYPERACTIVITY DISORDER), INATTENTIVE TYPE: ICD-10-CM

## 2022-04-27 NOTE — TELEPHONE ENCOUNTER
PDMP website reviewed  Horacio Romeo has been appropriately adherent to controlled psychotropic medications without evidence of abuse or misuse  As such, will send 30-day refill to pharmacy of choice and follow up as necessary

## 2022-05-09 DIAGNOSIS — F41.1 GAD (GENERALIZED ANXIETY DISORDER): ICD-10-CM

## 2022-05-09 RX ORDER — ESCITALOPRAM OXALATE 10 MG/1
10 TABLET ORAL DAILY
Qty: 14 TABLET | Refills: 0 | Status: SHIPPED | OUTPATIENT
Start: 2022-05-09 | End: 2022-05-31 | Stop reason: SDUPTHER

## 2022-05-09 NOTE — TELEPHONE ENCOUNTER
Odalys Vasquezleona on nursing line stating he is out of Escitalopram and has to use mail order now per insurance  He will need prescription sent to [facility] however also needs a short supply to local pharmacy until he receives the prescription in the mail  Called and spoke with female at [facility] regarding 3/25/22 prescription for 90 day supply  Per technician this prescription will have to go to [address]  Short supply will have to pay out of pocket with discount coupon  Spoke with Paris Socks and relayed message from pharmacy  Advised with Ena a 1 week supply would be 11 29 and 2 week supply would be 18 58  He is requesting a 2 week supply of Escitalopram be sent to local [facility] pharmacy so he can pay out of pocket  Once Marko picks up the medication we will sent prescription to Mailorder to alleviate any confusion  Please send 2 week supply for Escitalopram to [facility] in [place]  Will use [facility] to pay out of pocket

## 2022-05-09 NOTE — TELEPHONE ENCOUNTER
Two week supply of escitalopram sent to [facility] in [place]  Does the patient need a prescription for escitalopram sent to mail order pharmacy as well or is the previous 03/25/2022 prescription being transferred?

## 2022-05-10 NOTE — TELEPHONE ENCOUNTER
Lm for Shaan Poole  2 week supply of Lexapro was sent to [facility] [place]  Advised him to pay out of pocket using the [employer] discount card and encouraged Shaan Poole to call nursing line when he has picked the 2 week supply up  Once picked up and in his possession, will have Dr Alana Ivey send a prescription to mail order pharmacy

## 2022-05-31 ENCOUNTER — OFFICE VISIT (OUTPATIENT)
Dept: PSYCHIATRY | Facility: CLINIC | Age: 22
End: 2022-05-31

## 2022-05-31 DIAGNOSIS — F90.0 ADHD (ATTENTION DEFICIT HYPERACTIVITY DISORDER), INATTENTIVE TYPE: ICD-10-CM

## 2022-05-31 DIAGNOSIS — F33.1 MAJOR DEPRESSIVE DISORDER, RECURRENT, MODERATE (HCC): Primary | ICD-10-CM

## 2022-05-31 DIAGNOSIS — F41.1 GAD (GENERALIZED ANXIETY DISORDER): ICD-10-CM

## 2022-05-31 RX ORDER — ESCITALOPRAM OXALATE 10 MG/1
10 TABLET ORAL DAILY
Qty: 30 TABLET | Refills: 1 | Status: SHIPPED | OUTPATIENT
Start: 2022-05-31 | End: 2022-06-24
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
## 2022-05-31 NOTE — BH TREATMENT PLAN
TREATMENT PLAN (Medication Management Only)        [facility]    Name/Date of Birth/MRN:  Kaylen Avelar 22 y o  2000 MRN: [id number]  Date of Treatment Plan: May 31, 2022  Diagnosis/Diagnoses:   1  Major depressive disorder, recurrent, moderate (HCC)    2  ADHD (attention deficit hyperactivity disorder), inattentive type    3  KORY (generalized anxiety disorder)      Strengths/Personal Resources for Self-Care: "I am good at communicating complex subjects, storytelling, resilience"   Area/Areas of need (in own words): "Time management"  1  Long Term Goal: "Get stability back"   Target Date: 180 days from treatment plan  Person/Persons responsible for completion of goal: Dr Nirali Ruiz and Self  2  Short Term Objective (s) - How will we reach this goal?:   A  Provider new recommended medication/dosage changes and/or continue medication(s): Continue Lexapro and Vyvanse  Jenean Fleet Psychotherapy with own therapist   C  Attend medication management appointments  D   "Moving, changing the store I work at, exercising, developing a routine and sleep schedule"   Target Date: 6 months from treatment plan unless noted otherwise  Person/Persons Responsible for Completion of Goal: Dr Nirali Ruiz and Self   Progress Towards Goals: continuing treatment, progressing   Treatment Modality: Medication management and therapy PRN  Review due 180 days from date of this plan: Approximately 6 months from today ( 11/30/2022 )    Expected length of service: ongoing treatment unless revised  My Physician/PA/NP and I have developed this plan together and I agree to work on the goals and objectives  I understand the treatment goals that were developed for my treatment    Signature:       Date and time:  Signature of parent/guardian if under age of 15 years: Date and time:  Signature of provider:      Date and time:  Signature of Supervising Physician:    Date and time: 5/31/2022      Yvonne Albarado,      Treatment Plan done but not signed at time of office visit due to:  Plan reviewed by phone or in person  and verbal consent given due to [place] social distancing

## 2022-07-15 DIAGNOSIS — F90.0 ADHD (ATTENTION DEFICIT HYPERACTIVITY DISORDER), INATTENTIVE TYPE: ICD-10-CM

## 2022-07-25 ENCOUNTER — OFFICE VISIT (OUTPATIENT)
Dept: PSYCHIATRY | Facility: CLINIC | Age: 22
End: 2022-07-25

## 2022-07-25 DIAGNOSIS — F90.0 ADHD (ATTENTION DEFICIT HYPERACTIVITY DISORDER), INATTENTIVE TYPE: ICD-10-CM

## 2022-07-25 DIAGNOSIS — F41.1 GAD (GENERALIZED ANXIETY DISORDER): ICD-10-CM

## 2022-07-25 DIAGNOSIS — F33.1 MAJOR DEPRESSIVE DISORDER, RECURRENT, MODERATE (HCC): Primary | ICD-10-CM

## 2022-07-25 RX ORDER — ESCITALOPRAM OXALATE 10 MG/1
10 TABLET ORAL DAILY
Qty: 90 TABLET | Refills: 0 | Status: SHIPPED | OUTPATIENT
Start: 2022-07-25 | End: 2022-09-20 | Stop reason: SDUPTHER

## 2022-07-25 NOTE — PATIENT INSTRUCTIONS
Please present for your scheduled follow-up appointment approximately 15 minutes prior to appointment time  If you are running late or are unable to attend your appointment, please call our [place] office at [phone] to notify the office of your absence  If you are in psychological crisis including not limited to suicidal/homicidal thoughts or thoughts of self-injury, do not hesitate to call/contact your [facility] hotline (see below) or go to the nearest emergency department    [facility] Crisis: [address] [facility] Crisis: [phone]  [address] [facility] Crisis: [address] Crisis: [address] Crisis: [address] Crisis: [address] [facility] Crisis: [phone]  National Suicide Prevention Hotline: 6-382.300.3418

## 2022-07-25 NOTE — PSYCH
MEDICATION MANAGEMENT NOTE        [address] [facility]      Name and Date of Birth:  Carol Malave 22 y o  2000 MRN: [id number]    Date of Visit: July 25, 2022    Reason for Visit: Follow-up visit for medication management     SUBJECTIVE:    Carol Malave is a 23 y o  male with past psychiatric history significant for MDD, ADHD, KORY who was personally seen and evaluated today at the [address] [place] [address] E outpatient clinic for follow-up and medication management  Andrea Dinero presents reporting feeling okay  Patient reports some increase stress lately, reports due to work and family stressors  Reports that he and his parents are looking for a new place to live  Reports that he feels that the process has become easier lately and is hopeful to find a new living situation soon  Patient reports that he has had increased stress from work and has been working more over time recently  Reports that he will be likely switching his shift to the day shift soon and feels that this will improve his stress as well as his mood  Patient reports that he still continues to have difficulty at times with his sleep schedule and routine  He reports experiencing some worsening of depressive symptoms since last visit  He reports feeling more tired and less motivated at times  Reports occasionally feeling down  He reports continued adequate interest, appetite  Andrea Bar currently denies suicidal or homicidal ideation  He reports that he continues to engage in activities he enjoys such as hiking  He reports that anxiety is unchanged since last visit  He reports continued improvement in ADHD symptoms  He reports that he has missed approximately 7 to 10 doses in the past month of Lexapro  He reports due to his fluctuating sleep schedule he sometimes forgetting to take the Lexapro    Reports that he recently acquired a smart watch and has set reminders to take his Lexapro and states that it has been very helpful and has improved his compliance over the past week  Patient reports that he would like to solidify a sleep routine  He reports feeling hopeful for the future  Current Rating Scores:     None completed today  Review Of Systems:      Constitutional as noted in HPI   ENT negative   Cardiovascular negative   Respiratory negative   Gastrointestinal negative   Genitourinary negative   Musculoskeletal negative   Integumentary negative   Neurological negative   Endocrine negative   Other Symptoms none, all other systems are negative       Past Psychiatric History: (unchanged information from previous note copied and italicized) - Information that is bolded has been updated  Parts of this note were copied forward from a previous note from [address] on 8/11/21 and reviewed with the patient  Inpatient psychiatric admissions: denies  Prior outpatient psychiatric linkage: Reports previously following Dr Lazarus Avelar, [facility]  Past/current psychotherapy: current weekly therapy  History of suicidal attempts/gestures: denies  History of violence/aggressive behaviors: denies  Psychotropic medication trials: effexor xr (mid 2018), Concerta ER 36 CH(~4757)--RMYKFOG pupils challenges with significant glare with driving, armodafinil (helped a little bit), Ritalin-10 mg regular release in afternoon, Zoloft, Abilify, Buspar, Gabapentin, Pramipexole for restless legs, Strattera -stopped January 2021 after initiating, trialed 2 times and developed severe vomiting even when taking with food  Trintellix, Wellbutrin, Vyvanse, Lexapro  Substance abuse inpatient/outpatient rehabilitation: denies           Substance Abuse History: (unchanged information from previous note copied and italicized) - Information that is bolded has been updated  Reports currently using cannabis 1-2/week  Reports occasional alcohol use  Denies history of alcohol, illict substance, or tobacco abuse  Denies past legal actions or arrests secondary to substance intoxication  The patient denies prior DWIs/DUIs  Marko does not exhibit objective evidence of substance withdrawal during today's examination nor does Marko appear under the influence of any psychoactive substance           Social History: (unchanged information from previous note copied and italicized) - Information that is bolded has been updated  Developmental: Denies a history of milestone/developmental delay  Reports history of IEP from 6th grade for "mental health"  Education: some college  Marital history: single  Living arrangement, social support: parents  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 [employer], overnight manager  Access to firearms: Reports direct access to weapons/firearms  Evon Dickey no history of arrests or violence with a deadly weapon          Traumatic History: (unchanged information from previous note copied and italicized) - Information that is bolded has been updated     Abuse: denies  Other Traumatic Events: denies      Past Medical History:    Past Medical History:   Diagnosis Date    ADHD (attention deficit hyperactivity disorder)     Anxiety     Concussion     Depression     Kidney stones     Self-injurious behavior     Sleep difficulties     history of sleep apnea  and getting workup for narcolepsy as of 8/19        Past Surgical History:   Procedure Laterality Date    HERNIA REPAIR      TONSILLECTOMY      TOOTH EXTRACTION Right 09/21/2020    R lower molar extraction     Allergies   Allergen Reactions    Scopolamine        Substance Abuse History:    Social History     Substance and Sexual Activity   Alcohol Use Yes    Comment: socially     Social History     Substance and Sexual Activity   Drug Use Yes    Types: Marijuana    Comment: ocasionally       Social History:    Social History     Socioeconomic History    Marital status: Single     Spouse name: Not on file    Number of children: 0    Years of education: Not on file    Highest education level: Some college, no degree   Occupational History    Occupation: [employer]     Comment: vidal   Tobacco Use    Smoking status: Never Smoker    Smokeless tobacco: Never Used   Vaping Use    Vaping Use: Never used   Substance and Sexual Activity    Alcohol use: Yes     Comment: socially    Drug use: Yes     Types: Marijuana     Comment: ocasionally    Sexual activity: Not on file   Other Topics Concern    Not on file   Social History Narrative    Not on file     Social Determinants of Health     Financial Resource Strain: Not on file   Food Insecurity: Not on file   Transportation Needs: Not on file   Physical Activity: Not on file   Stress: Not on file   Social Connections: Not on file   Intimate Partner Violence: Not on file   Housing Stability: Not on file       Family Psychiatric History:     Family History   Problem Relation Age of Onset    COPD Mother     Hypertension Mother     Depression Mother     Hypertension Father     Depression Father     Anxiety disorder Father     Alcohol abuse Father     Anxiety disorder Brother     Depression Brother     Hypertension Brother     Alcohol abuse Paternal Grandfather        History Review: The following portions of the patient's history were reviewed and updated as appropriate: allergies, current medications, past family history, past medical history, past social history, past surgical history and problem list          OBJECTIVE:     Vital signs in last 24 hours: There were no vitals filed for this visit      Mental Status Evaluation:    Appearance appears stated age, casually dressed, sitting upright in chair and wearing glasses   Behavior cooperative and less restless   Speech normal rate, normal volume, normal pitch   Mood "ok"   Affect normal range and intensity, appropriate   Thought Processes organized, goal directed   Associations intact associations   Thought Content no overt delusions   Perceptual Disturbances: no auditory hallucinations, no visual hallucinations   Abnormal Thoughts  Risk Potential Denies suicidal or homicidal ideation   Orientation oriented to person, place, time/date and situation   Memory recent and remote memory grossly intact   Consciousness alert and awake   Attention Span Concentration Span attention span and concentration are age appropriate   Intellect appears to be of average intelligence   Insight fair   Judgement fair   Muscle Strength and  Gait normal gait and normal balance   Motor activity no abnormal movements   Language Within normal limits   Fund of Knowledge adequate knowledge of current events  adequate fund of knowledge regarding past history  adequate fund of knowledge regarding vocabulary        Laboratory Results: I have personally reviewed all pertinent laboratory/tests results      Suicide/Homicide Risk Assessment:    Risk of Harm to Self:  The following ratings are based on assessment at the time of the interview  Demographic risk factors include: , male, age: young adult (15-24)  Historical Risk Factors include: chronic depressive symptoms, chronic anxiety symptoms, substance use  Recent Specific Risk Factors include: current depressive symptoms, current anxiety symptoms  Protective Factors: no current suicidal ideation, access to mental health treatment, compliant with medications, compliant with mental health treatment, effective coping skills, having a sense of purpose or meaning in life, stable living environment, stable job, supportive family  Weapons: rifle  The following steps have been taken to ensure weapons are properly secured: locked  Based on today's assessment, Matthiasdomi Richard presents the following risk of harm to self: low    Risk of Harm to Others: The following ratings are based on assessment at the time of the interview  Demographic Risk Factors include: male, 14-21 years of age  Historical Risk Factors include: substance use    Recent Specific Risk Factors include: concomitant mood disorder  Protective Factors: no current homicidal ideation, access to mental health treatment, compliant with medications, compliant with mental health treatment, effective coping skills, supportive family, stable living environment  Weapons: rifle  The following steps have been taken to ensure weapons are properly secured: locked  Based on today's assessment, Duwayne Ormond presents the following risk of harm to others: low    The following interventions are recommended: no intervention changes needed      Lethality Statement:  Based on today's assessment and clinical criteria, [facility] contracts for safety and is not an imminent risk of harm to self or others  Outpatient level of care is deemed appropriate at this current time  Duwayne Ormond understands that if they can no longer contract for safety, they need to call the office or report to their nearest Emergency Room for immediate evaluation  Assessment/Plan:     Duwayne Ormond was personally seen and evaluated today at the [address] [place] [address] [address] outpatient clinic for follow up for MDD, ADHD, KORY  Duwayne Ormond presents reporting feeling okay  Patient reports some increase stress lately, reports due to work and family stressors  Reports that he and his parents are looking for a new place to live  Reports that he feels that the process has become easier lately and is hopeful to find a new living situation soon  Patient reports that he has had increased stress from work and has been working more over time recently  Reports that he will be likely switching his shift to the day shift soon and feels that this will improve his stress as well as his mood  Patient reports that he still continues to have difficulty at times with his sleep schedule and routine  He reports experiencing some worsening of depressive symptoms since last visit  He reports feeling more tired and less motivated at times    Reports occasionally feeling down   He reports continued adequate interest, appetite  Eda Olmedo currently denies suicidal or homicidal ideation  He reports that he continues to engage in activities he enjoys such as hiking  He reports that anxiety is unchanged since last visit  He reports continued improvement in ADHD symptoms  He reports that he has missed approximately 7 to 10 doses in the past month of Lexapro  He reports due to his fluctuating sleep schedule he sometimes forgetting to take the Lexapro  Reports that he recently acquired a smart watch and has set reminders to take his Lexapro and states that it has been very helpful and has improved his compliance over the past week  Patient reports that he would like to solidify a sleep routine  He reports feeling hopeful for the future  Patient denies adverse effects to medications  Discussed the importance of medication and treatment compliance  Discussed continuing Lexapro at current dosage further improvement of depressive and anxious symptoms and for patient to improve compliance as missed doses of Lexapro could contribute to worsening of depressive and anxious symptoms, and patient verbalized understanding and agreement  Discussed continuing Vyvanse at current dosage for continued improvement of ADHD symptoms  Risks/benefits/alternativies to treatment discussed, including a myriad of potential adverse medication side effects, to which Eda Olmedo voiced understanding and consented fully to treatment  Patient agrees to call the office if he experiences adverse effects, has questions regarding his psychiatric treatment  DSM-5 Diagnoses:   1  Major depressive disorder, recurrent, moderate (HCC)    2  KORY (generalized anxiety disorder)    3   ADHD (attention deficit hyperactivity disorder), inattentive type        Treatment Recommendations/Precautions:   Continue Lexapro 10 mg daily   Continue Vyvanse 60 mg daily    Medication management follow up in 8 weeks   Continue psychotherapy with own therapist   Aware of need to follow up with family physician for medical issues   Aware of 24 hour and weekend coverage for urgent situations accessed by calling [facility] main practice number    Medications Risks/Benefits      Risks, Benefits And Possible Side Effects Of Medications:    Risks, benefits, and possible side effects of medications explained to Brielle Cárdenas and he verbalizes understanding and agreement for treatment  Angeles Gomez completed including serotonin syndrome, SIADH, worsening depression, suicidality, induction of christina, GI upset, headaches, activation, sexual side effects, sedation, potential drug interactions, and others      Vyvanse PARQ completed including elevated heart rate, elevated bp, seizures, anxiety/irritability, activation/induction of christina, abuse potential, interactions with other medications, risk of sudden death, appetite suppression/weight loss and other risks  For MALES- rare priapism  Discussed increased risk of serotonin syndrome with concomitant use of Lexapro and Vyvanse  Signs and symptoms of serotonin syndrome discussed with patient and patient agrees to go to the emergency department if he experiences such  Controlled Medication Discussion:     Brielle Cárdenas has been filling controlled prescriptions on time as prescribed according to [facility]    Psychotherapy Provided:     Individual psychotherapy provided: Medication education provided to Brielle Cárdenas  Recent stressor including job stress discussed with Brielle Cárdenas  Importance of medication and treatment compliance reviewed with Marko  Importance of follow up with family physician for medical issues reviewed with Brielle Cárdenas  Reassurance and supportive therapy provided  Treatment Plan:    Completed and signed during the session: Not applicable - Treatment Plan not due at this session    Note Share Disclaimer:      This note was not shared with the patient due to reasonable likelihood of causing patient harm    Shantelle Bucio, DO 07/25/22

## 2022-08-29 DIAGNOSIS — F90.0 ADHD (ATTENTION DEFICIT HYPERACTIVITY DISORDER), INATTENTIVE TYPE: ICD-10-CM

## 2022-08-29 NOTE — TELEPHONE ENCOUNTER
PDMP website reviewed  Rik Elijah has been appropriately adherent to controlled psychotropic medications without evidence of abuse or misuse  As such, will send 30-day refill to pharmacy of choice and follow up as necessary

## 2022-09-20 ENCOUNTER — TELEPHONE (OUTPATIENT)
Dept: PSYCHIATRY | Facility: CLINIC | Age: 22
End: 2022-09-20

## 2022-09-20 ENCOUNTER — TELEMEDICINE (OUTPATIENT)
Dept: PSYCHIATRY | Facility: CLINIC | Age: 22
End: 2022-09-20

## 2022-09-20 DIAGNOSIS — F90.0 ADHD (ATTENTION DEFICIT HYPERACTIVITY DISORDER), INATTENTIVE TYPE: ICD-10-CM

## 2022-09-20 DIAGNOSIS — F41.1 GAD (GENERALIZED ANXIETY DISORDER): ICD-10-CM

## 2022-09-20 DIAGNOSIS — F33.1 MAJOR DEPRESSIVE DISORDER, RECURRENT, MODERATE (HCC): Primary | ICD-10-CM

## 2022-09-20 PROCEDURE — NC001 PR NO CHARGE: Performed by: PSYCHIATRY & NEUROLOGY

## 2022-09-20 RX ORDER — ESCITALOPRAM OXALATE 10 MG/1
10 TABLET ORAL DAILY
Qty: 90 TABLET | Refills: 0 | Status: SHIPPED | OUTPATIENT
Start: 2022-09-20

## 2022-09-20 NOTE — PSYCH
PSYCHIATRIC VIRTUAL VISIT: MEDICATION MANAGEMENT NOTE        [facility]      Name and Date of Birth:  Neeraj Gallego 22 y o  2000 MRN: [id number]    Psychiatric Virtual Visit:    Verification of patient location: Patient is located in the state that I hold an active license: [place]    REQUIRED DOCUMENTATION:     Provider located at [address] [facility] [address] at [address] in [place] [address]  TeleMed provider: Kerri Ramirez DO  Patient was identified by name and date of birth  Neeraj Gallego was informed that this is a telemedicine visit and that the visit is being conducted through [facility] and patient was informed this is a secure, HIPAA-complaint platform  He agrees to proceed     My office door was closed  No one else was in the room  He acknowledged consent and understanding of privacy and security of the video platform  The patient has agreed to participate and understands they can discontinue the visit at any time  Patient is aware this is a billable service  Assessment/Plan:    Problem List Items Addressed This Visit        Other    KORY (generalized anxiety disorder)    ADHD (attention deficit hyperactivity disorder), inattentive type    Major depressive disorder, recurrent, moderate ([facility] [address] ) - Primary          Recent Visits  Date Type Provider Dept   09/19/22 Office Visit Kerri Ramirez DO [facility]   Showing recent visits within past 7 days and meeting all other requirements  Today's Visits  Date Type Provider Dept   09/20/22 Telemedicine DO [facility] today's visits and meeting all other requirements  Future Appointments  No visits were found meeting these conditions    Showing future appointments within next 150 days and meeting all other requirements       MEDICATION MANAGEMENT NOTE        [facility]      Name and Date of Birth:  Whitney Quan 22 y o  2000 MRN: [id number]    Date of Visit: September 20, 2022    Reason for Visit: Follow-up visit for medication management     SUBJECTIVE:    Whitney Quan is a 23 y o  male with past psychiatric history significant for MDD, KORY, ADHD who was personally seen and evaluated today at the [address] [place] [address] E outpatient clinic for follow-up and medication management  John Sigala presents reporting increase in depression and anxiety since last visit  Reports that he has moved to a new location in [address] [place] [address] near where he works  He reports stressors of moving and family stressors of his parent's health conditions and assisting them  He also reports work stressors and stated that he had been working more than usual at [employer]  Reports having felt overwhelmed due to these stressors  Reports that as a result he has "stepped down" from his current position in the company and will be returning to his previous position  States that he his glad that he made this decision as he feels that it will be less stressful for him  Reports that he will be returning to working in the day shift which he states will better regulate his sleep schedule  He reports that he has had difficulty with compliance with Lexapro, states that is due to his fluctuating sleep schedule and has difficulty taking the medication at the same time each day  States that he believes he has missed about 15 doses of Lexapro since last visit  Reports that about a week and a half ago he did not take Lexapro for about five days  Reports that since he has been compliant with Lexapro  Reports compliance with Vyvanse as he states that he remembers to take it when he wakes up  Patient denies having experienced discontinuation symptoms  Denies chest pain  Denies adverse effects to medications    He reports having more low mood, decreased interest and enjoyment in things, decreased sleep, energy, motivation, focus  Reports having fair appetite  Denies having crying spells  He reports that he has not had any mood swings  Verta Every currently denies suicidal or homicidal ideation  Patient reports that he enjoys where he lives now more than his previous living location  Reports that it is more relaxing and quiet  Current Rating Scores:     None completed today  Review Of Systems:      Constitutional as noted in HPI   ENT negative   Cardiovascular negative   Respiratory negative   Gastrointestinal negative   Genitourinary negative   Musculoskeletal negative   Integumentary negative   Neurological negative   Endocrine negative   Other Symptoms none, all other systems are negative       Past Psychiatric History: (unchanged information from previous note copied and italicized) - Information that is bolded has been updated  Parts of this note were copied forward from a previous note from [address] on 8/11/21 and reviewed with the patient  Inpatient psychiatric admissions: denies  Prior outpatient psychiatric linkage: Reports previously following Dr Ana Laura Gonzales, Marine Duverney, CRNP  Past/current psychotherapy: current weekly therapy  History of suicidal attempts/gestures: denies  History of violence/aggressive behaviors: denies  Psychotropic medication trials: effexor xr (mid 2018), Concerta ER 36 AR(~3875)--HHXHASK pupils challenges with significant glare with driving, armodafinil (helped a little bit), Ritalin-10 mg regular release in afternoon, Zoloft, Abilify, Buspar, Gabapentin, Pramipexole for restless legs, Strattera -stopped January 2021 after initiating, trialed 2 times and developed severe vomiting even when taking with food  Trintellix, Wellbutrin, Vyvanse, Lexapro  Substance abuse inpatient/outpatient rehabilitation: denies           Substance Abuse History: (unchanged information from previous note copied and italicized) - Information that is bolded has been updated     Reports currently using cannabis 1-2/week  Reports occasional alcohol use  Denies history of alcohol, illict substance, or tobacco abuse  Denies past legal actions or arrests secondary to substance intoxication  The patient denies prior DWIs/DUIs  Marko does not exhibit objective evidence of substance withdrawal during today's examination nor does Marko appear under the influence of any psychoactive substance           Social History: (unchanged information from previous note copied and italicized) - Information that is bolded has been updated  Developmental: Denies a history of milestone/developmental delay  Reports history of IEP from 6th grade for "mental health"  Education: some college  Marital history: single  Living arrangement, social support: parents  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 [employer], overnight manager  Access to firearms: Reports direct access to weapons/firearms  Leonila Tyrell no history of arrests or violence with a deadly weapon          Traumatic History: (unchanged information from previous note copied and italicized) - Information that is bolded has been updated     Abuse: denies  Other Traumatic Events: denies       Past Medical History:    Past Medical History:   Diagnosis Date    ADHD (attention deficit hyperactivity disorder)     Anxiety     Concussion     Depression     Kidney stones     Self-injurious behavior     Sleep difficulties     history of sleep apnea  and getting workup for narcolepsy as of 8/19        Past Surgical History:   Procedure Laterality Date    HERNIA REPAIR      TONSILLECTOMY      TOOTH EXTRACTION Right 09/21/2020    R lower molar extraction     Allergies   Allergen Reactions    Scopolamine        Substance Abuse History:    Social History     Substance and Sexual Activity   Alcohol Use Yes    Comment: socially     Social History     Substance and Sexual Activity   Drug Use Yes    Types: Marijuana    Comment: ocasionally       Social History:    Social History Socioeconomic History    Marital status: Single     Spouse name: Not on file    Number of children: 0    Years of education: Not on file    Highest education level: Some college, no degree   Occupational History    Occupation: Kaleigh Sagar     Comment: vidal   Tobacco Use    Smoking status: Never Smoker    Smokeless tobacco: Never Used   Vaping Use    Vaping Use: Never used   Substance and Sexual Activity    Alcohol use: Yes     Comment: socially    Drug use: Yes     Types: Marijuana     Comment: ocasionally    Sexual activity: Not on file   Other Topics Concern    Not on file   Social History Narrative    Not on file     Social Determinants of Health     Financial Resource Strain: Not on file   Food Insecurity: Not on file   Transportation Needs: Not on file   Physical Activity: Not on file   Stress: Not on file   Social Connections: Not on file   Intimate Partner Violence: Not on file   Housing Stability: Not on file       Family Psychiatric History:     Family History   Problem Relation Age of Onset    COPD Mother     Hypertension Mother     Depression Mother     Hypertension Father     Depression Father     Anxiety disorder Father     Alcohol abuse Father     Anxiety disorder Brother     Depression Brother     Hypertension Brother     Alcohol abuse Paternal Grandfather        History Review: The following portions of the patient's history were reviewed and updated as appropriate: allergies, current medications, past family history, past medical history, past social history, past surgical history and problem list          OBJECTIVE:     Vital signs in last 24 hours: There were no vitals filed for this visit      Mental Status Evaluation:    Appearance appears stated age, casually dressed and wearing glasses   Behavior cooperative and slightly restless   Speech normal rate, normal volume, normal pitch   Mood "ok"   Affect normal range and intensity, appropriate, anxious   Thought Processes organized, goal directed   Associations intact associations   Thought Content no overt delusions   Perceptual Disturbances: no auditory hallucinations, no visual hallucinations   Abnormal Thoughts  Risk Potential Denies suicidal or homicidal ideation   Orientation oriented to person, place, time/date and situation   Memory recent and remote memory grossly intact   Consciousness alert and awake   Attention Span Concentration Span attention span and concentration are age appropriate   Intellect appears to be of average intelligence   Insight fair   Judgement fair   Muscle Strength and  Gait unable to assess today due to virtual visit   Motor activity unable to assess today due to virtual visit   Language Within normal limits   Fund of Knowledge adequate knowledge of current events  adequate fund of knowledge regarding past history  adequate fund of knowledge regarding vocabulary        Laboratory Results: I have personally reviewed all pertinent laboratory/tests results      Suicide/Homicide Risk Assessment:    Risk of Harm to Self:  The following ratings are based on assessment at the time of the interview  Demographic risk factors include: , male, age: young adult (15-24)  Historical Risk Factors include: chronic depressive symptoms, chronic anxiety symptoms, substance use  Recent Specific Risk Factors include: current depressive symptoms, current anxiety symptoms  Protective Factors: no current suicidal ideation, access to mental health treatment, compliant with medications, compliant with mental health treatment, effective coping skills, having a sense of purpose or meaning in life, stable living environment, stable job, supportive family  Weapons: rifle  The following steps have been taken to ensure weapons are properly secured: locked  Based on today's assessment, Dewey Amanda presents the following risk of harm to self: low    Risk of Harm to Others:   The following ratings are based on assessment at the time of the interview  Demographic Risk Factors include: male, 14-21 years of age  Historical Risk Factors include: substance use  Recent Specific Risk Factors include: concomitant mood disorder  Protective Factors: no current homicidal ideation, access to mental health treatment, compliant with medications, effective coping skills, supportive family, stable living environment  Weapons: rifle  The following steps have been taken to ensure weapons are properly secured: locked  Based on today's assessment, Duwayne Ormond presents the following risk of harm to others: low    The following interventions are recommended: no intervention changes needed      Lethality Statement:  Based on today's assessment and clinical criteria, Tello Rob contracts for safety and is not an imminent risk of harm to self or others  Outpatient level of care is deemed appropriate at this current time  Duwayne Ormond understands that if they can no longer contract for safety, they need to call the office or report to their nearest Emergency Room for immediate evaluation  Assessment/Plan:     Duwayne Ormond was personally seen and evaluated today at the [address] [place] [address] [address] outpatient clinic for follow up for MDD, KORY, ADHD  Duwayne Ormond presents reporting increase in depression and anxiety since last visit  Reports that he has moved to a new location in [place] near where he works  He reports stressors of moving and family stressors of his parent's health conditions and assisting them  He also reports work stressors and stated that he had been working more than usual at [employer]  Reports having felt overwhelmed due to these stressors  Reports that as a result he has "stepped down" from his current position in the company and will be returning to his previous position  States that he his glad that he made this decision as he feels that it will be less stressful for him    Reports that he will be returning to working in the day shift which he states will better regulate his sleep schedule  He reports that he has had difficulty with compliance with Lexapro, states that is due to his fluctuating sleep schedule and has difficulty taking the medication at the same time each day  States that he believes he has missed about 15 doses of Lexapro since last visit  Reports that about a week and a half ago he did not take Lexapro for about five days  Reports that since he has been compliant with Lexapro  Reports compliance with Vyvanse as he states that he remembers to take it when he wakes up  Patient denies having experienced discontinuation symptoms  Denies chest pain  Denies adverse effects to medications  He reports having more low mood, decreased interest and enjoyment in things, decreased sleep, energy, motivation, focus  Reports having fair appetite  Denies having crying spells  He reports that he has not had any mood swings  Nancy Mcconnell currently denies suicidal or homicidal ideation  Patient reports that he enjoys where he lives now more than his previous living location  Reports that it is more relaxing and quiet  Discussed the importance of medication and treatment compliance and patient verbalized understanding  Discussed the importance of daily compliance with Lexapro for therapeutic effect for depression and anxiety and patient verbalized understanding  Discussed that despite patient's reported increase in depression and anxiety we discussed plan to Lexapro at the current dosage as it is difficult to determine the efficacy of Lexapro at this dosage without proper compliance over weeks time  Patient verbalized understanding and agreement to this  He states that he feels that Lexapro has been helpful for improving his depressive and anxious symptoms, that he wants to be more compliant and commit to taking it daily" and that he does not want to change the dosage of Lexapro at this current time    He reports having more motivation to improve compliance with Lexapro and states that he feels that he is more hopeful that he will be able to do so  Discussed that it appears that patient's reported worsening of focus is related to worsening of depression and anxiety and discussed monitoring for improvement in this with improvement of depression and anxiety and therefore discussed continuing Vyvanse at the current dosage for ADHD and patient verbalize interest, understanding, and agreement  Patient reports that he continues to follow in psychotherapy  Discussed for patient to have a sooner follow-up appointment for continued monitoring of patient's condition and patient agrees to call the office if feels that his condition worsens  Patient verbalized understanding and was in agreement with the plan  Patient agrees to call the office if he experiences adverse effects, has questions regarding his psychiatric treatment  DSM-5 Diagnoses:   1  Major depressive disorder, recurrent, moderate (HCC)    2  KORY (generalized anxiety disorder)    3  ADHD (attention deficit hyperactivity disorder), inattentive type          Treatment Recommendations/Precautions:   Continue Lexapro 10 mg daily   Continue Vyvanse 60 mg daily   Medication management follow up in 6 weeks   Continue psychotherapy with own therapist   Aware of need to follow up with family physician for medical issues   Aware of 24 hour and weekend coverage for urgent situations accessed by calling [facility] main practice number    Medications Risks/Benefits      Risks, Benefits And Possible Side Effects Of Medications:    Risks, benefits, and possible side effects of medications explained to Felipa Barnett and he verbalizes understanding and agreement for treatment      Controlled Medication Discussion:     Felipa Barnett has been filling controlled prescriptions on time as prescribed according to [facility]    Psychotherapy Provided: Individual psychotherapy provided: Medication education provided to Felipa Barnett  Recent stressor including work stress discussed with Felipa Barnett  Importance of medication and treatment compliance reviewed with Marko  Importance of follow up with family physician for medical issues reviewed with Felipa Barnett  Reassurance and supportive therapy provided  Treatment Plan:    Completed and signed during the session: Not applicable - Treatment Plan not due at this session    Note Share Disclaimer: This note was not shared with the patient due to reasonable likelihood of causing patient harm    Carlton Rojas DO 09/20/22  VIRTUAL VISIT [address] verbally agrees to participate in [employer]  Pt is aware that [employer] could be limited without vital signs or the ability to perform a full hands-on physical exam  Mireille Echevarria understands he or the provider may request at any time to terminate the video visit and request the patient to seek care or treatment in person       Carlton Rojas DO 09/20/22

## 2022-09-20 NOTE — PATIENT INSTRUCTIONS
Please present for your scheduled follow-up appointment approximately 15 minutes prior to appointment time  If you are running late or are unable to attend your appointment, please call our [place] office at [phone] to notify the office of your absence  If you are in psychological crisis including not limited to suicidal/homicidal thoughts or thoughts of self-injury, do not hesitate to call/contact your [facility] hotline (see below) or go to the nearest emergency department    [facility] Crisis: [address] [facility] Crisis: [phone]  [address] Crisis: [address] Crisis: [address] Crisis: [address] Crisis: [address]  Crisis: [phone]  National Suicide Prevention Hotline: 7-147.709.9644

## 2022-09-20 NOTE — TELEPHONE ENCOUNTER
Left message for patient to call office to set up 6-week follow up appointment with Dr Jj Iraheta  He was last seen, virtually, on 9/20/22

## 2022-10-27 DIAGNOSIS — F90.0 ADHD (ATTENTION DEFICIT HYPERACTIVITY DISORDER), INATTENTIVE TYPE: ICD-10-CM

## 2022-10-31 DIAGNOSIS — F90.0 ADHD (ATTENTION DEFICIT HYPERACTIVITY DISORDER), INATTENTIVE TYPE: ICD-10-CM

## 2022-11-01 NOTE — TELEPHONE ENCOUNTER
PDMP website reviewed  Viet Alva has been appropriately adherent to controlled psychotropic medications (Vyvanse) without evidence of abuse or misuse  As such, will send 30-day refill to pharmacy of choice and follow up as necessary

## 2022-11-08 ENCOUNTER — TELEPHONE (OUTPATIENT)
Dept: PSYCHIATRY | Facility: CLINIC | Age: 22
End: 2022-11-08

## 2022-11-08 ENCOUNTER — TELEMEDICINE (OUTPATIENT)
Dept: PSYCHIATRY | Facility: CLINIC | Age: 22
End: 2022-11-08

## 2022-11-08 DIAGNOSIS — F41.1 GAD (GENERALIZED ANXIETY DISORDER): ICD-10-CM

## 2022-11-08 DIAGNOSIS — F90.0 ADHD (ATTENTION DEFICIT HYPERACTIVITY DISORDER), INATTENTIVE TYPE: ICD-10-CM

## 2022-11-08 DIAGNOSIS — F33.1 MAJOR DEPRESSIVE DISORDER, RECURRENT, MODERATE (HCC): Primary | ICD-10-CM

## 2022-11-08 RX ORDER — ESCITALOPRAM OXALATE 10 MG/1
15 TABLET ORAL DAILY
Qty: 135 TABLET | Refills: 0 | Status: SHIPPED | OUTPATIENT
Start: 2022-11-08

## 2022-11-08 NOTE — BH TREATMENT PLAN
TREATMENT PLAN (Medication Management Only)        [facility]    Name/Date of Birth/MRN:  Reji Nj 22 y o  2000 MRN: [id number]  Date of Treatment Plan: November 8, 2022  Diagnosis/Diagnoses:   1  Major depressive disorder, recurrent, moderate (HCC)    2  KORY (generalized anxiety disorder)    3  ADHD (attention deficit hyperactivity disorder), inattentive type      Strengths/Personal Resources for Self-Care: "I am good at communicating complex subjects, storytelling, resilience"   Area/Areas of need (in own words): "depression"  1  Long Term Goal: "improve depression"   Target Date: 180 days from treatment plan  Person/Persons responsible for completion of goal: Dr Venessa Burkitt and Self  2  Short Term Objective (s) - How will we reach this goal?:   A  Provider new recommended medication/dosage changes and/or continue medication(s):  Increase Lexapro, continue Vyvanse, may try melatonin as needed for sleep  B  Continue psychotherapy with own therapist  C  Attend medication management follow-up appointments  Target Date: 6 months from treatment plan unless noted otherwise  Person/Persons Responsible for Completion of Goal: Dr Venessa Burkitt and Self   Progress Towards Goals: continuing treatment, progressing   Treatment Modality: Medication management and therapy PRN  Review due 180 days from date of this plan: Approximately 6 months from today ( 5/8/2023 )    Expected length of service: ongoing treatment unless revised  My Physician/PA/NP and I have developed this plan together and I agree to work on the goals and objectives  I understand the treatment goals that were developed for my treatment    Signature:       Date and time:  Signature of parent/guardian if under age of 15 years: Date and time:  Signature of provider:      Date and time:  Signature of Supervising Physician:    Date and time: 11/8/2022      [address] [place] [address] done but not signed at time of office visit due to:  Plan reviewed via virtual visit and verbal consent given due to [address] distancing

## 2022-11-08 NOTE — PSYCH
PSYCHIATRIC VIRTUAL VISIT: MEDICATION MANAGEMENT NOTE        [facility]      Name and Date of Birth:  Margret Lyles 22 y o  2000 MRN: [id number]    Psychiatric Virtual Visit:    Verification of patient location: Patient is located in the state that I hold an active license: [place]    REQUIRED DOCUMENTATION:     Provider located at [facility] at [address] in [place] [address]  TeleMed provider: Randell Daniel DO  Patient was identified by name and date of birth  Margret Lyles was informed that this is a telemedicine visit and that the visit is being conducted through the [employer]  He agrees to proceed     My office door was closed  No one else was in the room  He acknowledged consent and understanding of privacy and security of the video platform  The patient has agreed to participate and understands they can discontinue the visit at any time  Patient is aware this is a billable service  Assessment/Plan:    Problem List Items Addressed This Visit        Other    KORY (generalized anxiety disorder)    Relevant Medications    escitalopram (LEXAPRO) 10 mg tablet    ADHD (attention deficit hyperactivity disorder), inattentive type    Relevant Medications    escitalopram (LEXAPRO) 10 mg tablet    Major depressive disorder, recurrent, moderate (HCC) - Primary    Relevant Medications    escitalopram (LEXAPRO) 10 mg tablet          Recent Visits  No visits were found meeting these conditions  Showing recent visits within past 7 days and meeting all other requirements  Today's Visits  Date Type Provider Dept   11/08/22 Telemedicine DO Benedicto Cormier 18 today's visits and meeting all other requirements  Future Appointments  No visits were found meeting these conditions    Showing future appointments within next 150 days and meeting all other requirements       MEDICATION MANAGEMENT NOTE        [place] [address] [facility]      Name and Date of Birth:  Jabier Wagner 22 y o  2000 MRN: [id number]    Date of Visit: November 8, 2022    Reason for Visit: Follow-up visit for medication management     SUBJECTIVE:    Jabier Wagner is a 23 y o  male with past psychiatric history significant for MDD, KORY, ADHD who was personally seen and evaluated today at the [address] [place] [address] E outpatient clinic for follow-up and medication management  Loraine Pearson presents reporting continued depression  He reports improvements in work stressors compared to last visit  States that for the past 10 days he has had a new position at work which he states is important for him  Reports that he has been told by coworkers that he is “smiling more”  He reports feeling less anxious and stressed as a result of this new position  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  He reports however that he feels that his depressive symptoms have worsened in some ways and though has also somewhat improved in others  He reports that he sleeps about 8 hours per night and generally does not feel rested upon awakening  He reports that he is able to function at work and has more motivation compared to previous accomplishing tasks at home after work  He reports on weekends he feels that he has low energy and low motivation  Reports having had worsening of mood and experiencing low/down mood  Reports having had a decrease in interest and pleasure  He reports recent increases in appetite  Wakeeneybirdie Pearson currently denies suicidal or homicidal ideation, plan, or intent  He reports that he tries to [facility] my chin up" and continuing forward  He reports reports that despite his current depression he feels that his overall outlook has improved    He reports having some continued anxiety though states that it is improved since his position at work has changed  He reports that he feels that ADHD symptoms are overall controlled however feels that they are influenced by his energy and mood  He reports improvement in compliance with Lexapro since last visit  He previously had reported having missed about 15 doses of Lexapro since the visit prior to then  He reports today that he has missed approximately four doses of Lexapro since last visit  He reports his compliance has improved due to now taking both his Vyvanse and Lexapro after waking and prior to going to work and he reports that he feels that his compliance will continue to improve due to this  Denies adverse effects to medications  Current Rating Scores:     None completed today  Review Of Systems:      Constitutional as noted in HPI   ENT negative   Cardiovascular negative   Respiratory negative   Gastrointestinal negative   Genitourinary negative   Musculoskeletal negative   Integumentary negative   Neurological negative   Endocrine negative   Other Symptoms none, all other systems are negative       Past Psychiatric History: (unchanged information from previous note copied and italicized) - Information that is bolded has been updated     Parts of this note were copied forward from a previous note from [address] on 8/11/21 and reviewed with the patient  Inpatient psychiatric admissions: denies  Prior outpatient psychiatric linkage: Reports previously following Dr Millie Solorio, [facility]  Past/current psychotherapy: current weekly therapy  History of suicidal attempts/gestures: denies  History of violence/aggressive behaviors: denies  Psychotropic medication trials: effexor xr (mid 2018), Concerta ER 36 QE(~5717)--QGCOPIY pupils challenges with significant glare with driving, armodafinil (helped a little bit), Ritalin-10 mg regular release in afternoon, Zoloft, Abilify, Buspar, Gabapentin, Pramipexole for restless legs, Strattera -stopped January 2021 after initiating, trialed 2 times and developed severe vomiting even when taking with food  Trintellix, Wellbutrin, Vyvanse, Lexapro  Substance abuse inpatient/outpatient rehabilitation: denies           Substance Abuse History: (unchanged information from previous note copied and italicized) - Information that is bolded has been updated  Reports currently using cannabis 1-2/week  Reports occasional alcohol use  Denies history of alcohol, illict substance, or tobacco abuse  Denies past legal actions or arrests secondary to substance intoxication  The patient denies prior DWIs/DUIs  Marko does not exhibit objective evidence of substance withdrawal during today's examination nor does Marko appear under the influence of any psychoactive substance           Social History: (unchanged information from previous note copied and italicized) - Information that is bolded has been updated  Developmental: Denies a history of milestone/developmental delay  Reports history of IEP from 6th grade for "mental health"  Education: some college  Marital history: single  Living arrangement, social support: parents  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 [employer], working in [employer]  Access to firearms: Reports direct access to weapons/firearms  Andria Dotter no history of arrests or violence with a deadly weapon          Traumatic History: (unchanged information from previous note copied and italicized) - Information that is bolded has been updated     Abuse: denies  Other Traumatic Events: denies       Past Medical History:    Past Medical History:   Diagnosis Date   • ADHD (attention deficit hyperactivity disorder)    • Anxiety    • Concussion    • Depression    • Kidney stones    • Self-injurious behavior    • Sleep difficulties     history of sleep apnea  and getting workup for narcolepsy as of 8/19        Past Surgical History:   Procedure Laterality Date   • HERNIA REPAIR     • TONSILLECTOMY     • TOOTH EXTRACTION Right 09/21/2020    BETITO lower molar extraction     Allergies   Allergen Reactions   • Scopolamine        Substance Abuse History:    Social History     Substance and Sexual Activity   Alcohol Use Yes    Comment: socially     Social History     Substance and Sexual Activity   Drug Use Yes   • Types: Marijuana    Comment: ocasionally       Social History:    Social History     Socioeconomic History   • Marital status: Single     Spouse name: Not on file   • Number of children: 0   • Years of education: Not on file   • Highest education level: Some college, no degree   Occupational History   • 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   Tobacco Use   • Smoking status: Never Smoker   • Smokeless tobacco: Never Used   Vaping Use   • Vaping Use: Never used   Substance and Sexual Activity   • Alcohol use: Yes     Comment: socially   • Drug use: Yes     Types: Marijuana     Comment: ocasionally   • Sexual activity: Not on file   Other Topics Concern   • Not on file   Social History Narrative   • Not on file     Social Determinants of Health     Financial Resource Strain: Not on file   Food Insecurity: Not on file   Transportation Needs: Not on file   Physical Activity: Not on file   Stress: Not on file   Social Connections: Not on file   Intimate Partner Violence: Not on file   Housing Stability: Not on file       Family Psychiatric History:     Family History   Problem Relation Age of Onset   • COPD Mother    • Hypertension Mother    • Depression Mother    • Hypertension Father    • Depression Father    • Anxiety disorder Father    • Alcohol abuse Father    • Anxiety disorder Brother    • Depression Brother    • Hypertension Brother    • Alcohol abuse Paternal Grandfather        History Review:  The following portions of the patient's history were reviewed and updated as appropriate: allergies, current medications, past family history, past medical history, past social history, past surgical history and problem list          OBJECTIVE:     Vital signs in last 24 hours: There were no vitals filed for this visit      Mental Status Evaluation:    Appearance appears stated age, casually dressed and wearing glasses   Behavior calm and cooperative   Speech normal rate, normal volume   Mood "depressed"   Affect somewhat constricted   Thought Processes organized, goal directed   Associations intact associations   Thought Content no overt delusions   Perceptual Disturbances: no auditory hallucinations, no visual hallucinations   Abnormal Thoughts  Risk Potential Denies suicidal or homicidal ideation, intent, or plan   Orientation oriented to person, place, time/date and situation   Memory recent and remote memory grossly intact   Consciousness alert and awake   Attention Span Concentration Span attention span and concentration are age appropriate   Intellect appears to be of average intelligence   Insight fair   Judgement fair   Muscle Strength and  Gait unable to assess today due to virtual visit   Motor activity unable to assess today due to virtual visit   Language Within normal limits   Fund of Knowledge adequate knowledge of current events  adequate fund of knowledge regarding past history  adequate fund of knowledge regarding vocabulary        Laboratory Results: I have personally reviewed all pertinent laboratory/tests results      Suicide/Homicide Risk Assessment:    Risk of Harm to Self:  The following ratings are based on assessment at the time of the interview  Demographic risk factors include: , male, age: young adult (15-24)  Historical Risk Factors include: chronic depressive symptoms, chronic anxiety symptoms, substance use  Recent Specific Risk Factors include: current depressive symptoms, current anxiety symptoms  Protective Factors: no current suicidal ideation, access to mental health treatment, compliant with medications, compliant with mental health treatment, effective coping skills, having a sense of purpose or meaning in life, stable living environment, stable job, supportive family  Weapons: rifle  The following steps have been taken to ensure weapons are properly secured: locked  Based on today's assessment, Anne Morales presents the following risk of harm to self: low    Risk of Harm to Others: The following ratings are based on assessment at the time of the interview  Demographic Risk Factors include: male, 14-21 years of age  Historical Risk Factors include: substance use  Recent Specific Risk Factors include: concomitant mood disorder  Protective Factors: no current homicidal ideation, access to mental health treatment, compliant with medications, compliant with mental health treatment, effective coping skills, supportive family, stable living environment  Weapons: rifle  The following steps have been taken to ensure weapons are properly secured: locked  Based on today's assessment, Anne Morales presents the following risk of harm to others: low    The following interventions are recommended: no intervention changes needed      Lethality Statement:  Based on today's assessment and clinical criteria, Jb Piña contracts for safety and is not an imminent risk of harm to self or others  Outpatient level of care is deemed appropriate at this current time  Anne Morales understands that if they can no longer contract for safety, they need to call the office or report to their nearest Emergency Room for immediate evaluation  Assessment/Plan:     Anne Morales was personally seen and evaluated today at the [address] [place] [address] [address] outpatient clinic for follow up for MDD, KORY, ADHD  Anne Morales presents reporting continued depression  He reports improvements in work stressors compared to last visit  States that for the past 10 days he has had a new position at work which he states is important for him  Reports that he has been told by coworkers that he is “smiling more”  He reports feeling less anxious and stressed as a result of this new position  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  He reports however that he feels that his depressive symptoms have worsened in some ways and though has also somewhat improved in others  He reports that he sleeps about 8 hours per night and generally does not feel rested upon awakening  He reports that he is able to function at work and has more motivation compared to previous accomplishing tasks at home after work  He reports on weekends he feels that he has low energy and low motivation  Reports having had worsening of mood and experiencing low/down mood  Reports having had a decrease in interest and pleasure  He reports recent increases in appetite  Libertylarisa Freed currently denies suicidal or homicidal ideation, plan, or intent  He reports that he tries to [facility] my chin up" and continuing forward  He reports reports that despite his current depression he feels that his overall outlook has improved  He reports having some continued anxiety though states that it is improved since his position at work has changed  He reports that he feels that ADHD symptoms are overall controlled however feels that they are influenced by his energy and mood  He reports improvement in compliance with Lexapro since last visit  He previously had reported having missed about 15 doses of Lexapro since the visit prior to then  He reports today that he has missed approximately four doses of Lexapro since last visit  He reports his compliance has improved due to now taking both his Vyvanse and Lexapro after waking and prior to going to work and he reports that he feels that his compliance will continue to improve due to this  Denies adverse effects to medications  Patient reports continued depression and that his depressive symptoms are not at goal   He reports that despite his change in job position and decrease in stress he feels that his mood and interest have worsened  Reports that he is able to function at work and accomplish tasks after work at home however states that he has decreased energy and motivation on the weekends  He reports increased in compliance with Lexapro since last visit  As symptoms are not at goal and per patient's report have not improved with increased Lexapro compliance and improvement of work stress and now working during the day, discussed plan to increase Lexapro for further improvement of depression and anxiety and patient verbalized interest, understanding, and agreement  Encouraged patient to continue to increase medication compliance for further therapeutic effect of medication and patient agreed to do so stating that his compliance has continued to improve since working the day shift  Discussed continuing Vyvanse at the current dosage for continued improvement of ADHD and patient verbalized interest, understanding, and agreement  Discussed that as patient has recently switched from night shift to day shift that he may try over-the-counter 1 to 3 mg of melatonin as needed for sleep to help improve his circadian rhythm adjusting to the day shift and patient verbalized interest in this  Patient verbalized understanding and was in agreement with the plan  Risks/benefits/alternativies to treatment discussed, including a myriad of potential adverse medication side effects, to which [facility] voiced understanding and consented fully to treatment  Will consider at future visit ordering TSH and possibly other laboratories to rule out medical etiologies for patient's symptoms, particularly if patient's reports of decreased energy persist  Patient agrees to call the office if he experiences adverse effects, has questions regarding his psychiatric treatment  DSM-5 Diagnoses:   1  Major depressive disorder, recurrent, moderate (HCC)    2  KORY (generalized anxiety disorder)    3   ADHD (attention deficit hyperactivity disorder), inattentive type Treatment Recommendations/Precautions:  · Increase Lexapro to 15 mg daily  · Continue Vyvanse 60 mg daily  · Patient can try OTC melatonin 1-3 mg q h s  p r n  sleep  · Medication management follow up in 8 weeks  · Continue psychotherapy with own therapist  · Aware of need to follow up with family physician for medical issues  · Aware of 24 hour and weekend coverage for urgent situations accessed by calling [facility] main practice number       Medications Risks/Benefits      Risks, Benefits And Possible Side Effects Of Medications:    Risks, benefits, and possible side effects of medications explained to Michell Noble and he verbalizes understanding and agreement for treatment  Lexapro PARQ completed including serotonin syndrome, worsening depression, FDA black box warning on suicidality, induction of christina, GI upset, headaches, sexual side effects, sedation, potential drug interactions, and others  Discussed increased risk of serotonin syndrome with concomitant use of Lexapro and Vyvanse  Signs and symptoms of serotonin syndrome discussed with patient and patient agrees to go to the emergency department if he experiences such  Controlled Medication Discussion:     Michell Noble has been filling controlled prescriptions on time as prescribed according to [facility]    Psychotherapy Provided:     Individual psychotherapy provided:   Medication changes discussed with Michell Noble  Medication education provided to Michell Linear  Importance of medication and treatment compliance reviewed with Marko  Importance of follow up with family physician for medical issues reviewed with Michell Noble  Reassurance and supportive therapy provided  Crisis/safety plan discussed with Michell Linear       Treatment Plan:    Completed and signed during the session: Yes - Treatment Plan done but not signed at time of office visit due to:  Plan reviewed by video and verbal consent given due to Precious social distancing    Note Share Disclaimer: This note was not shared with the patient due to reasonable likelihood of causing patient harm    Margo Salas DO 11/08/22      VIRTUAL VISIT [address] verbally agrees to participate in [employer]  Pt is aware that [employer] could be limited without vital signs or the ability to perform a full hands-on physical exam  Kaylakatie Balbina understands he or the provider may request at any time to terminate the video visit and request the patient to seek care or treatment in person       Margo Salas DO 11/08/22

## 2022-11-08 NOTE — PATIENT INSTRUCTIONS
Please present for your scheduled follow-up appointment approximately 15 minutes prior to appointment time  If you are running late or are unable to attend your appointment, please call our [facility] office at [phone] to notify the office of your absence  If you are in psychological crisis including not limited to suicidal/homicidal thoughts or thoughts of self-injury, do not hesitate to call/contact your [facility] hotline (see below) or go to the nearest emergency department    [facility] Crisis: [address] Crisis: [phone]  [address] Crisis: [address] Crisis: [address] Crisis: [address] Crisis: [address] Crisis: [phone]  National Suicide Prevention Hotline: 8-417.454.9135

## 2022-11-08 NOTE — TELEPHONE ENCOUNTER
LVM  Last seen 11/8/22  Pt needs an 8 wk f/u with Dr Kenzie Cole  Previous appt was virtual  Per Dr Kenzie Cole, pt can be scheduled on a Tuesday

## 2022-12-05 DIAGNOSIS — F90.0 ADHD (ATTENTION DEFICIT HYPERACTIVITY DISORDER), INATTENTIVE TYPE: ICD-10-CM

## 2022-12-05 NOTE — TELEPHONE ENCOUNTER
PDMP website reviewed  Taryn Renteria has been appropriately adherent to controlled psychotropic medications (Vyvanse) without evidence of abuse or misuse  As such, will send 30-day refill to pharmacy of choice and follow up as necessary

## 2023-01-11 DIAGNOSIS — F90.0 ADHD (ATTENTION DEFICIT HYPERACTIVITY DISORDER), INATTENTIVE TYPE: ICD-10-CM

## 2023-01-11 NOTE — TELEPHONE ENCOUNTER
PDMP website reviewed  Lilian Christina has been appropriately adherent to controlled psychotropic medications (Vyvanse) without evidence of abuse or misuse  As such, will send 30-day refill to pharmacy of choice and follow up as necessary

## 2023-01-17 ENCOUNTER — TELEPHONE (OUTPATIENT)
Dept: PSYCHIATRY | Facility: CLINIC | Age: 23
End: 2023-01-17

## 2023-01-17 ENCOUNTER — TELEMEDICINE (OUTPATIENT)
Dept: PSYCHIATRY | Facility: CLINIC | Age: 23
End: 2023-01-17

## 2023-01-17 DIAGNOSIS — F41.1 GAD (GENERALIZED ANXIETY DISORDER): ICD-10-CM

## 2023-01-17 DIAGNOSIS — F33.1 MAJOR DEPRESSIVE DISORDER, RECURRENT, MODERATE (HCC): Primary | ICD-10-CM

## 2023-01-17 DIAGNOSIS — F90.0 ADHD (ATTENTION DEFICIT HYPERACTIVITY DISORDER), INATTENTIVE TYPE: ICD-10-CM

## 2023-01-17 RX ORDER — UREA 10 %
1 LOTION (ML) TOPICAL
COMMUNITY

## 2023-01-17 RX ORDER — ESCITALOPRAM OXALATE 10 MG/1
15 TABLET ORAL DAILY
Qty: 135 TABLET | Refills: 0 | Status: SHIPPED | OUTPATIENT
Start: 2023-01-17

## 2023-01-17 NOTE — PSYCH
PSYCHIATRIC VIRTUAL VISIT: MEDICATION MANAGEMENT NOTE        [facility]      Name and Date of Birth:  Paige Urias 23 y o  2000 MRN: [id number]    Psychiatric Virtual Visit:    Verification of patient location: Patient is located in the state that I hold an active license: [place]    REQUIRED DOCUMENTATION:     Provider located at [address] [place] [address] E at [address] [facility] in [place] [address]  TeleMed provider: Daphney Peters DO  Patient was identified by name and date of birth  Paige Urias was informed that this is a telemedicine visit and that the visit is being conducted through the [facility] [employer]  He agrees to proceed     My office door was closed  No one else was in the room  He acknowledged consent and understanding of privacy and security of the video platform  The patient has agreed to participate and understands they can discontinue the visit at any time  Patient is aware this is a billable service  Assessment/Plan:    Problem List Items Addressed This Visit    None      Recent Visits  No visits were found meeting these conditions  Showing recent visits within past 7 days and meeting all other requirements  Future Appointments  No visits were found meeting these conditions  Showing future appointments within next 150 days and meeting all other requirements       MEDICATION MANAGEMENT NOTE        [facility] Suki LOZANO      Name and Date of Birth:  Paige Urias 23 y o  2000 MRN: [id number]    Date of Visit: January 17, 2023    Reason for Visit: Follow-up visit for medication management     SUBJECTIVE:    Paige Urias is a 20 y o  male with past psychiatric history significant for MDD, KORY, ADHD who was personally seen and evaluated today at the [address] [place] [address] [address] outpatient clinic for follow-up and medication management   Nick Fisher presents reporting continued depression  He reports having fluctuating motivation and energy  Reports some days his motivation and energy are low and on some days he feels that they are better  Reports typically he feels that the symptoms are worse in the morning  Reports at times having difficulty falling asleep and staying asleep  Reports going to sleep at about 6:30 to 7:30 PM, wakes up at about 2:30 AM to prepare for work, and then is finished with work around 1 PM   He reports he feels at times he "overeats" due to "boredom"  Reports generally his mood is "consistent, somewhat low"  He reports that some days he feels his mood is better  Karina Osuna currently denies suicidal or homicidal ideation, plan, or intent  In terms of ADHD symptoms patient reports that "it has been pretty well"  Reports that his focus, motivation, energy worsen if he "misses" a day of taking Vyvanse which he states occurs occasionally  Reports he feels that things have improved since he stepped down from his previous position at work  He reports work stressors particularly and states that 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  Reports that he feels that recently he has "learned more about socializing"  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  Reports compliance with Lexapro  Denies adverse effects to medications  Reports that since last visit after increasing Lexapro he feels that he has noticed improvements and feeling more relaxed, feels that he is "less sensitive" to stress, and states that he has been better able to "shrug things off"  He reports he feels that it is also helping him cope with stressors at work  He reports that he feels that his outlook has improved  Current Rating Scores:     None completed today      Review Of Systems:      Constitutional as noted in HPI   ENT negative   Cardiovascular negative   Respiratory negative   Gastrointestinal negative   Genitourinary negative Musculoskeletal negative   Integumentary negative   Neurological negative   Endocrine negative   Other Symptoms none, all other systems are negative       Past Psychiatric History: (unchanged information from previous note copied and italicized) - Information that is bolded has been updated  Parts of this note were copied forward from a previous note from [address] on 8/11/21 and reviewed with the patient  Inpatient psychiatric admissions: denies  Prior outpatient psychiatric linkage: Reports previously following Dr Yulia Winters, [facility]  Past/current psychotherapy: current weekly therapy  History of suicidal attempts/gestures: denies  History of violence/aggressive behaviors: denies  Psychotropic medication trials: effexor xr (mid 2018), Concerta ER 36 XR(~0683)--GFIHQCF pupils challenges with significant glare with driving, armodafinil (helped a little bit), Ritalin-10 mg regular release in afternoon, Zoloft, Abilify, Buspar, Gabapentin, Pramipexole for restless legs, Strattera -stopped January 2021 after initiating, trialed 2 times and developed severe vomiting even when taking with food  Trintellix, Wellbutrin, Vyvanse, Lexapro  Substance abuse inpatient/outpatient rehabilitation: denies           Substance Abuse History: (unchanged information from previous note copied and italicized) - Information that is bolded has been updated  Reports currently using cannabis 1-2/week  Reports occasional alcohol use  Denies history of alcohol, illict substance, or tobacco abuse  Denies past legal actions or arrests secondary to substance intoxication  The patient denies prior DWIs/DUIs  Marko does not exhibit objective evidence of substance withdrawal during today's examination nor does Marko appear under the influence of any psychoactive substance           Social History: (unchanged information from previous note copied and italicized) - Information that is bolded has been updated     Developmental: Denies a history of milestone/developmental delay  Reports history of IEP from 6th grade for "mental health"  Education: some college  Marital history: single  Living arrangement, social support: parents  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 [employer], working in produce and meat  Access to firearms: Reports direct access to weapons/firearms  Ross Pore no history of arrests or violence with a deadly weapon          Traumatic History: (unchanged information from previous note copied and italicized) - Information that is bolded has been updated     Abuse: denies  Other Traumatic Events: denies         Past Medical History:    Past Medical History:   Diagnosis Date   • ADHD (attention deficit hyperactivity disorder)    • Anxiety    • Concussion    • Depression    • Kidney stones    • Self-injurious behavior    • Sleep difficulties     history of sleep apnea  and getting workup for narcolepsy as of 8/19        Past Surgical History:   Procedure Laterality Date   • HERNIA REPAIR     • TONSILLECTOMY     • TOOTH EXTRACTION Right 09/21/2020    R lower molar extraction     Allergies   Allergen Reactions   • Scopolamine        Substance Abuse History:    Social History     Substance and Sexual Activity   Alcohol Use Yes    Comment: socially     Social History     Substance and Sexual Activity   Drug Use Yes   • Types: Marijuana    Comment: ocasionally       Social History:    Social History     Socioeconomic History   • Marital status: Single     Spouse name: Not on file   • Number of children: 0   • Years of education: Not on file   • Highest education level: Some college, no degree   Occupational History   • 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   Tobacco Use   • Smoking status: Never   • Smokeless tobacco: Never   Vaping Use   • Vaping Use: Never used   Substance and Sexual Activity   • Alcohol use: Yes     Comment: socially   • Drug use: Yes     Types: Marijuana     Comment: ocasionally   • Sexual activity: Not on file   Other Topics Concern   • Not on file   Social History Narrative   • Not on file     Social Determinants of Health     Financial Resource Strain: Not on file   Food Insecurity: Not on file   Transportation Needs: Not on file   Physical Activity: Not on file   Stress: Not on file   Social Connections: Not on file   Intimate Partner Violence: Not on file   Housing Stability: Not on file       Family Psychiatric History:     Family History   Problem Relation Age of Onset   • COPD Mother    • Hypertension Mother    • Depression Mother    • Hypertension Father    • Depression Father    • Anxiety disorder Father    • Alcohol abuse Father    • Anxiety disorder Brother    • Depression Brother    • Hypertension Brother    • Alcohol abuse Paternal Grandfather        History Review: The following portions of the patient's history were reviewed and updated as appropriate: allergies, current medications, past family history, past medical history, past social history, past surgical history and problem list          OBJECTIVE:     Vital signs in last 24 hours: There were no vitals filed for this visit      Mental Status Evaluation:    Appearance appears stated age, casually dressed and wearing glasses   Behavior calm and cooperative   Speech normal rate, normal volume   Mood "more on the positive side"   Affect normal range and intensity, appropriate   Thought Processes organized, goal directed   Associations intact associations   Thought Content no overt delusions   Perceptual Disturbances: no auditory hallucinations, no visual hallucinations   Abnormal Thoughts  Risk Potential Denies suicidal or homicidal ideation, intent, or plan   Orientation oriented to person, place, time/date and situation   Memory recent and remote memory grossly intact   Consciousness alert and awake   Attention Span Concentration Span attention span and concentration are age appropriate   Intellect appears to be of average intelligence   Insight improving Judgement improving   Muscle Strength and  Gait unable to assess today due to virtual visit   Motor activity unable to assess today due to virtual visit   Language Within normal limits   Fund of Knowledge adequate knowledge of current events  adequate fund of knowledge regarding past history  adequate fund of knowledge regarding vocabulary        Laboratory Results: I have personally reviewed all pertinent laboratory/tests results      Suicide/Homicide Risk Assessment:    Risk of Harm to Self:  The following ratings are based on assessment at the time of the interview  Demographic risk factors include: , male  Historical Risk Factors include: chronic depressive symptoms, chronic anxiety symptoms, substance use  Recent Specific Risk Factors include: diagnosis of depression, current depressive symptoms, current anxiety symptoms  Protective Factors: no current suicidal ideation, access to mental health treatment, compliant with medications, compliant with mental health treatment, effective coping skills, having a sense of purpose or meaning in life, stable living environment, stable job, supportive family  Weapons: rifle  The following steps have been taken to ensure weapons are properly secured: previously reported it is locked  Based on today's assessment, Blossom Alonzo presents the following risk of harm to self: low    Risk of Harm to Others: The following ratings are based on assessment at the time of the interview  Demographic Risk Factors include: male, 14-21 years of age  Historical Risk Factors include: substance use  Recent Specific Risk Factors include: concomitant mood disorder  Protective Factors: no current homicidal ideation, access to mental health treatment, compliant with medications, compliant with mental health treatment, effective coping skills, supportive family, stable living environment  Weapons: rifle   The following steps have been taken to ensure weapons are properly secured: previously reported it is locked  Based on today's assessment, Ann Marques presents the following risk of harm to others: low    The following interventions are recommended: no intervention changes needed      Lethality Statement:  Based on today's assessment and clinical criteria, Khai Cabrera contracts for safety and is not an imminent risk of harm to self or others  Outpatient level of care is deemed appropriate at this current time  Ann Marques understands that if they can no longer contract for safety, they need to call the office or report to their nearest Emergency Room for immediate evaluation  Assessment/Plan:     Ann Marques was personally seen and evaluated today at the [address] [place] [address] outpatient clinic for follow up for MDD, KORY, ADHD  Ann Marques presents reporting continued depression  He reports having fluctuating motivation and energy  Reports some days his motivation and energy are low and on some days he feels that they are better  Reports typically he feels that the symptoms are worse in the morning  Reports at times having difficulty falling asleep and staying asleep  Reports going to sleep at about 6:30 to 7:30 PM, wakes up at about 2:30 AM to prepare for work, and then is finished with work around 1 PM   He reports he feels at times he "overeats" due to "boredom"  Reports generally his mood is "consistent, somewhat low"  He reports that some days he feels his mood is better  Ann Marques currently denies suicidal or homicidal ideation, plan, or intent  In terms of ADHD symptoms patient reports that "it has been pretty well"  Reports that his focus, motivation, energy worsen if he "misses" a day of taking Vyvanse which he states occurs occasionally  Reports he feels that things have improved since he stepped down from his previous position at work  He reports work stressors particularly and states that 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    Reports that he feels that recently he has "learned more about socializing"  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  Reports compliance with Lexapro  Denies adverse effects to medications  Reports that since last visit after increasing Lexapro he feels that he has noticed improvements and feeling more relaxed, feels that he is "less sensitive" to stress, and states that he has been better able to "shrug things off"  He reports he feels that it is also helping him cope with stressors at work  He reports that he feels that his outlook has improved  Patient reports continued symptoms of depression  Reports difficulty with motivation and energy  Reports that he feels that he is "getting by well enough" and states that he wants to continue taking the same dosages of his medications  Patient reports a history of following with sleep medicine and having been previously diagnosed with hypersomnia  Discussed recommendation for patient to follow-up with sleep medicine for further evaluation and treatment of his sleep as improvements in his sleep could lead to improvements in energy and motivation  Discussed plan for patient to continue in psychotherapy and patient stated that he plans to continue to do so  Discussed plan to continue patient's current psychiatric medications at the current dosages for continued improvement of his conditions and patient verbalized interest, understanding, and was in agreement in the plan  Discussed plan to order lab work (CBC, CMP, TSH, vitamin D) to evaluate for medical etiologies for patient's reported depressive symptoms and patient verbalized interest, understanding, and was in agreement with the plan  Patient agrees to call the office if he experiences adverse effects, worsening of his condition, and/or has questions regarding his psychiatric treatment        DSM-5 Diagnoses:   Major depressive disorder, recurrent, moderate (HCC)  ADHD (attention deficit hyperactivity disorder), inattentive type  KORY (generalized anxiety disorder)    Treatment Recommendations/Precautions:  • Continue Lexapro 15 mg daily  • Continue Vyvanse 60 mg daily  • Patient may continue to try over-the-counter melatonin 1 mg nightly as needed sleep  • CBC, CMP, TSH, vitamin D to evaluate for medical etiologies of patient's reported depressive symptoms  • Medication management follow-up in 8 weeks  • Continue psychotherapy with own therapist  • Follow-up with sleep medicine for further evaluation and treatment of sleep  • Aware of need to follow up with family physician for medical issues  • Aware of 24 hour and weekend coverage for urgent situations accessed by calling [facility] main practice number    Medications Risks/Benefits      Risks, Benefits And Possible Side Effects Of Medications:    Risks, benefits, and possible side effects of medications explained to [facility] and he verbalizes understanding and agreement for treatment  Controlled Medication Discussion:     [facility] has been filling controlled prescriptions on time as prescribed according to [facility]    Psychotherapy Provided:     Individual psychotherapy provided: Medication education provided to [facility]  Recent stressor including work stress discussed with [facility]  Reassurance and supportive therapy provided  Treatment Plan:    Completed and signed during the session: Not applicable - Treatment Plan not due at this session    Note Share Disclaimer: This note was not shared with the patient due to reasonable likelihood of causing patient harm    Zehra DO 01/17/23       VIRTUAL VISIT [facility] verbally agrees to participate in [employer]   Pt is aware that [employer] could be limited without vital signs or the ability to perform a full hands-on physical exam  Adonis Jordan understands he or the provider may request at any time to terminate the video visit and request the patient to seek care or treatment in person       Dao Gomez DO 01/17/23

## 2023-01-17 NOTE — PATIENT INSTRUCTIONS
Please call the office nursing staff for medication issues including refills, problems obtaining medications, side effects, etc at [phone]  Please return for a follow up appointment as discussed  If you are running late or are unable to attend your appointment, please call our [place] office at [phone]  If you are in psychological crisis including not limited to suicidal/homicidal thoughts or thoughts of self-injury, do not hesitate to call/contact your [facility] hotline (see below) or go to the nearest emergency department    [facility] Crisis: [address] Crisis: [phone]  [address] Crisis: [address] Crisis: [address] Crisis: [address] Crisis: [address] [facility] Crisis: [phone]  National Suicide Prevention Hotline: 5-109.611.7689

## 2023-02-10 DIAGNOSIS — F90.0 ADHD (ATTENTION DEFICIT HYPERACTIVITY DISORDER), INATTENTIVE TYPE: ICD-10-CM

## 2023-03-21 DIAGNOSIS — F90.0 ADHD (ATTENTION DEFICIT HYPERACTIVITY DISORDER), INATTENTIVE TYPE: ICD-10-CM

## 2023-03-27 ENCOUNTER — TELEPHONE (OUTPATIENT)
Dept: PSYCHIATRY | Facility: CLINIC | Age: 23
End: 2023-03-27

## 2023-03-27 ENCOUNTER — TELEMEDICINE (OUTPATIENT)
Dept: PSYCHIATRY | Facility: CLINIC | Age: 23
End: 2023-03-27

## 2023-03-27 DIAGNOSIS — F33.1 MAJOR DEPRESSIVE DISORDER, RECURRENT, MODERATE (HCC): Primary | ICD-10-CM

## 2023-03-27 DIAGNOSIS — F41.1 GAD (GENERALIZED ANXIETY DISORDER): ICD-10-CM

## 2023-03-27 RX ORDER — ESCITALOPRAM OXALATE 20 MG/1
20 TABLET ORAL DAILY
Qty: 90 TABLET | Refills: 0 | Status: SHIPPED | OUTPATIENT
Start: 2023-03-27

## 2023-03-27 NOTE — BH TREATMENT PLAN
"TREATMENT PLAN (Medication Management Only)        [facility]    Name/Date of Birth/MRN:  Devika Massey 23 y o  2000 MRN: [id number]  Date of Treatment Plan: March 27, 2023  Diagnosis/Diagnoses:   1  Major depressive disorder, recurrent, moderate (HCC)    2  KORY (generalized anxiety disorder)      Strengths/Personal Resources for Self-Care: \"I am good at communicating complex subjects, storytelling, resilience\"   Area/Areas of need (in own words): depression  1  Long Term Goal: improve depression   Target Date: 180 days from treatment plan  Person/Persons responsible for completion of goal: Dr Qiana Irwin and Self  2  Short Term Objective (s) - How will we reach this goal?:   A  Provider new recommended medication/dosage changes and/or continue medication(s): Increase Lexapro, continue Vyvanse, may continue to use melatonin as needed for sleep/every third day  B   Continue psychotherapy with own therapist  C  Attend medication management follow up appointments  D  Patient's care to be transferred to a new incoming resident in July  Target Date: 6 months from treatment plan unless noted otherwise  Person/Persons Responsible for Completion of Goal: Dr Qiana Irwin and Self   Progress Towards Goals: continuing treatment, progressing   Treatment Modality: Medication management and therapy PRN  Review due 180 days from date of this plan: Approximately 6 months from today ( 9/27/2023 )    Expected length of service: ongoing treatment unless revised  My Physician/PA/NP and I have developed this plan together and I agree to work on the goals and objectives  I understand the treatment goals that were developed for my treatment    Signature:       Date and time:  Signature of parent/guardian if under age of 15 years: Date and time:  Signature of provider:      Date and time:  Signature of Supervising Physician:    Date and time: 3/27/2023      Rukhsana Merida DO " Treatment Plan done but not signed at time of office visit due to:  Plan reviewed via virtual visit  and verbal consent given due to [address] distancing

## 2023-03-27 NOTE — PSYCH
PSYCHIATRIC VIRTUAL VISIT: MEDICATION MANAGEMENT NOTE        [facility]      Name and Date of Birth:  Sunita Arriaza 23 y o  2000 MRN: [id number]    Psychiatric Virtual Visit:    Verification of patient location: Patient is located in the state that I hold an active license: [place]    REQUIRED DOCUMENTATION:     Provider located at [address] [facility] [address] at [address] in [place] [address]  TeleMed provider: Nora Marrero DO  Patient was identified by name and date of birth  Sunita Arriaza was informed that this is a telemedicine visit and that the visit is being conducted through the [address] Now platform  He agrees to proceed     My office door was closed  No one else was in the room  He acknowledged consent and understanding of privacy and security of the video platform  The patient has agreed to participate and understands they can discontinue the visit at any time  Patient is aware this is a billable service  Assessment/Plan:    Problem List Items Addressed This Visit        Other    KORY (generalized anxiety disorder)    Relevant Medications    escitalopram (LEXAPRO) 20 mg tablet    Major depressive disorder, recurrent, moderate (HCC) - Primary    Relevant Medications    escitalopram (LEXAPRO) 20 mg tablet       Recent Visits  No visits were found meeting these conditions  Showing recent visits within past 7 days and meeting all other requirements  Today's Visits  Date Type Provider Dept   03/27/23 Telephone Nora Marrero DO [address] [facility]   03/27/23 [id number] [facility] DO [address] today's visits and meeting all other requirements  Future Appointments  No visits were found meeting these conditions    Showing future appointments within next 150 days and meeting all other requirements       MEDICATION MANAGEMENT NOTE        [facility]      Name and Date of Birth:  Mariajose Worthington 23 y o  2000 MRN: [id number]    Date of Visit: March 27, 2023    Reason for Visit: Follow-up visit for medication management     SUBJECTIVE:    Mariajose Worthington is a 20 y o  male with past psychiatric history significant for MDD, ADHD, KORY who was personally seen and evaluated today at the [address] [place] [address] outpatient clinic for follow-up and medication management  Duke Ga presents reporting continued depression  He reports having low energy, little interest in things, \"feeling discouraged\", having difficulty starting or finishing tasks and feels that he has things to \"catch up\" on  Reports that he has not been maintaining housework as much as he is wants to  He reports having a fluctuating sleep schedule and that sometimes he will fall asleep after work around 2 PM, wake up in the evening, and then followed sleep again in the night  He reports having had an increase in appetite, decrease in motivation  Denies crying spells  Reports feeling guilty about not doing things that he would like to  Duke Ga currently denies suicidal or homicidal ideation, plan, or intent  Patient reports that anxiety \"does not really bother me so much\"  Reports that he feels that he is managing it is better  Reports that social anxiety is improved  He reports that he has been taking Vyvanse on the weekends - which he states he normally does not do due in order to take a break - and that he has decreased energy and decreased focus when he does not take it  Reports in the past he would not take it on the weekend and would still be able to get work done  He reports feeling more hopeful now that spring is \"around the corner\"  Reports that he enjoys spending time outside including hiking and taking things to look under a microscope  States that he wants to go back to college to study physics  Reports that he has been making steps to try to return to college    " He denies having adverse effects to medications  Current Rating Scores:     None completed today  Review Of Systems:      Constitutional as noted in HPI   ENT negative   Cardiovascular negative   Respiratory negative   Gastrointestinal negative   Genitourinary negative   Musculoskeletal negative   Integumentary negative   Neurological negative   Endocrine negative   Other Symptoms none, all other systems are negative       Past Psychiatric History: (unchanged information from previous note copied and italicized) - Information that is bolded has been updated  Parts of this note were copied forward from a previous note from [address] on 8/11/21 and reviewed with the patient  Inpatient psychiatric admissions: denies  Prior outpatient psychiatric linkage: Reports previously following Dr Abdi Florez, [facility]  Past/current psychotherapy: current weekly therapy  History of suicidal attempts/gestures: denies  History of violence/aggressive behaviors: denies  Psychotropic medication trials: effexor xr (mid 2018), Concerta ER 36 ZM(~7191)--CBYWBHD pupils challenges with significant glare with driving, armodafinil (helped a little bit), Ritalin-10 mg regular release in afternoon, Zoloft, Abilify, Buspar, Gabapentin, Pramipexole for restless legs, Strattera -stopped January 2021 after initiating, trialed 2 times and developed severe vomiting even when taking with food  Trintellix, Wellbutrin, Vyvanse, Lexapro  Substance abuse inpatient/outpatient rehabilitation: denies           Substance Abuse History: (unchanged information from previous note copied and italicized) - Information that is bolded has been updated  Reports currently using cannabis 1-2/week  Reports occasional alcohol use  Denies history of alcohol, illict substance, or tobacco abuse  Denies past legal actions or arrests secondary to substance intoxication   The patient denies prior DWIs/DUIs  Marko does not exhibit objective evidence of "substance withdrawal during today's examination nor does Marko appear under the influence of any psychoactive substance           Social History: (unchanged information from previous note copied and italicized) - Information that is bolded has been updated  Developmental: Denies a history of milestone/developmental delay  Reports history of IEP from 6th grade for \"mental health\"  Education: some college  Marital history: single  Living arrangement, social support: parents  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 [employer], working in [employer] and [employer]  Access to firearms: Reports direct access to weapons/firearms  Kaylee Moreno no history of arrests or violence with a deadly weapon          Traumatic History: (unchanged information from previous note copied and italicized) - Information that is bolded has been updated     Abuse: denies  Other Traumatic Events: denies      Past Medical History:    Past Medical History:   Diagnosis Date   • ADHD (attention deficit hyperactivity disorder)    • Anxiety    • Concussion    • Depression    • Kidney stones    • Self-injurious behavior    • Sleep difficulties     history of sleep apnea  and getting workup for narcolepsy as of 8/19        Past Surgical History:   Procedure Laterality Date   • HERNIA REPAIR     • TONSILLECTOMY     • TOOTH EXTRACTION Right 09/21/2020    R lower molar extraction     Allergies   Allergen Reactions   • Scopolamine        Substance Abuse History:    Social History     Substance and Sexual Activity   Alcohol Use Yes    Comment: socially     Social History     Substance and Sexual Activity   Drug Use Yes   • Types: Marijuana    Comment: ocasionally       Social History:    Social History     Socioeconomic History   • Marital status: Single     Spouse name: Not on file   • Number of children: 0   • Years of education: Not on file   • Highest education level: Some college, no degree   Occupational History   • Occupation: [employer]     Comment: [employer]   Tobacco Use " "  • Smoking status: Never   • Smokeless tobacco: Never   Vaping Use   • Vaping Use: Never used   Substance and Sexual Activity   • Alcohol use: Yes     Comment: socially   • Drug use: Yes     Types: Marijuana     Comment: ocasionally   • Sexual activity: Not on file   Other Topics Concern   • Not on file   Social History Narrative   • Not on file     Social Determinants of Health     Financial Resource Strain: Not on file   Food Insecurity: Not on file   Transportation Needs: Not on file   Physical Activity: Not on file   Stress: Not on file   Social Connections: Not on file   Intimate Partner Violence: Not on file   Housing Stability: Not on file       Family Psychiatric History:     Family History   Problem Relation Age of Onset   • COPD Mother    • Hypertension Mother    • Depression Mother    • Hypertension Father    • Depression Father    • Anxiety disorder Father    • Alcohol abuse Father    • Anxiety disorder Brother    • Depression Brother    • Hypertension Brother    • Alcohol abuse Paternal Grandfather        History Review: The following portions of the patient's history were reviewed and updated as appropriate: allergies, current medications, past family history, past medical history, past social history, past surgical history and problem list          OBJECTIVE:     Vital signs in last 24 hours: There were no vitals filed for this visit  Mental Status Evaluation:    Appearance appears stated age and wearing glasses   has long hair   Behavior calm and cooperative   Speech normal rate, normal volume   Mood \"depressed\"   Affect normal range and intensity, appropriate   Thought Processes organized, goal directed   Associations intact associations   Thought Content no overt delusions   Perceptual Disturbances: No verbalized hallucinations   Abnormal Thoughts  Risk Potential Denies suicidal or homicidal ideation, plan, or intent   Orientation oriented to person, place, time/date and situation   Memory " recent and remote memory grossly intact   Consciousness alert and awake   Attention Span Concentration Span attention span and concentration are age appropriate   Intellect appears to be of average intelligence   Insight improving   Judgement improving   Muscle Strength and  Gait unable to assess today due to virtual visit   Motor activity unable to assess today due to virtual visit   Language Within normal limits   Fund of Knowledge adequate knowledge of current events  adequate fund of knowledge regarding past history  adequate fund of knowledge regarding vocabulary        Laboratory Results: I have personally reviewed all pertinent laboratory/tests results      Suicide/Homicide Risk Assessment:    Risk of Harm to Self:  The following ratings are based on assessment at the time of the interview  Demographic risk factors include: , male  Historical Risk Factors include: chronic depressive symptoms, chronic anxiety symptoms, substance use  Recent Specific Risk Factors include: diagnosis of depression, current depressive symptoms, current anxiety symptoms  Protective Factors: no current suicidal ideation, access to mental health treatment, compliant with medications, compliant with mental health treatment, effective coping skills, having a sense of purpose or meaning in life, stable living environment, stable job, supportive family  Weapons: rifle  The following steps have been taken to ensure weapons are properly secured: previously reported it is locked  Based on today's assessment, Maddison Elva presents the following risk of harm to self: low    Risk of Harm to Others: The following ratings are based on assessment at the time of the interview  Demographic Risk Factors include: male, 14-21 years of age  Historical Risk Factors include: substance use  Recent Specific Risk Factors include: concomitant mood disorder    Protective Factors: no current homicidal ideation, access to mental health treatment, compliant "with medications, compliant with mental health treatment, effective coping skills, supportive family, stable living environment  Weapons: rifle  The following steps have been taken to ensure weapons are properly secured: previously reported it is locked  Based on today's assessment, Troy French presents the following risk of harm to others: low    The following interventions are recommended: no intervention changes needed      Lethality Statement:  Based on today's assessment and clinical criteria, Nola Crawford contracts for safety and is not an imminent risk of harm to self or others  Outpatient level of care is deemed appropriate at this current time  Troy French understands that if they can no longer contract for safety, they need to call the office or report to their nearest Emergency Room for immediate evaluation  Assessment/Plan:     Troy French was personally seen and evaluated today at the [address] [place] [address] E outpatient clinic for follow up for MDD, ADHD, KORY  Troy French presents reporting continued depression  He reports having low energy, little interest in things, \"feeling discouraged\", having difficulty starting or finishing tasks and feels that he has things to \"catch up\" on  Reports that he has not been maintaining housework as much as he is wants to  He reports having a fluctuating sleep schedule and that sometimes he will fall asleep after work around 2 PM, wake up in the evening, and then followed sleep again in the night  He reports having had an increase in appetite, decrease in motivation  Denies crying spells  Reports feeling guilty about not doing things that he would like to  Troy French currently denies suicidal or homicidal ideation, plan, or intent  Patient reports that anxiety \"does not really bother me so much\"  Reports that he feels that he is managing it is better  Reports that social anxiety is improved    He reports that he has been taking Vyvanse on the weekends - which he states he " "normally does not do due in order to take a break - and that he has decreased energy and decreased focus when he does not take it  Reports in the past he would not take it on the weekend and would still be able to get work done  He reports feeling more hopeful now that spring is \"around the corner\"  Reports that he enjoys spending time outside including hiking and taking things to look under a microscope  States that he wants to go back to college to study physics  Reports that he has been making steps to try to return to college  He denies having adverse effects to medications  Patient reports and endorses continuing to experience depression  Reports compliance with his medications  Discussed plan for patient to undergo previously ordered labs to evaluate for medical etiologies for depression and patient stated that he would and that he is interested in this  Discussed the importance of maintaining a proper sleep cycle and that patient can use melatonin as needed for sleep or once every third day around the time he would like to sleep to promote and regulate his sleep cycle and patient verbalized interest, understanding, and was in agreement  Discussed plan to increase Lexapro for further improvement of depression and anxiety and patient verbalized interest, understanding, and was in agreement with the plan  Risks/benefits/alternativies to treatment discussed, including a myriad of potential adverse medication side effects, to which Jovany Walker voiced understanding and consented fully to treatment  Patient agrees to call the office if he experiences adverse effects, worsening of his condition, and/or has questions regarding his psychiatric treatment  DSM-5 Diagnoses:   1  Major depressive disorder, recurrent, moderate (HCC)    2   KORY (generalized anxiety disorder)          Treatment Recommendations/Precautions:  • Increase Lexapro to 20 mg daily  • Continue Vyvanse 60 mg daily  • Patient may continue " to try over-the-counter melatonin 1 mg nightly as needed sleep/every third day  • CBC, CMP, TSH, vitamin D previously ordered to evaluate for medical etiologies of patient's reported depressive symptoms, reminded patient to undergo these labs which he stated he would do  • Medication management follow up in 7 weeks  o Discussed with patient that this resident is leaving the office in June and that after next visit patient's will follow-up for transfer of care appointment with a new incoming resident and patient verbalized understanding and agreement  • Aware of need to follow up with family physician for medical issues  • Aware of 24 hour and weekend coverage for urgent situations accessed by calling [facility] main practice number    Medications Risks/Benefits      Risks, Benefits And Possible Side Effects Of Medications:    Risks, benefits, and possible side effects of medications explained to [facility] and he verbalizes understanding and agreement for treatment  Lexapro PARQ completed including serotonin syndrome, worsening depression, suicidality, GI upset, headaches, potential drug interactions    Reiterated risks of serotonin syndrome with patient  Signs and symptoms of serotonin syndrome discussed with patient and patient verbalized understanding and agrees to go to the nearest emergency department if he experiences such      Controlled Medication Discussion:     [facility] has been filling controlled prescriptions on time as prescribed according to [facility]    Psychotherapy Provided:     Individual psychotherapy provided: Medication changes discussed with [facility]  Medication education provided to [facility]  Recent stressor including work stress discussed with [facility]  Reassurance and supportive therapy provided        Treatment Plan:    Completed and signed during the session: Yes - Treatment Plan done but not signed at time of office visit due to:  Plan reviewed in person and verbal consent given due to [facility]ious social distancing    Note Share Disclaimer: This note was not shared with the patient due to reasonable likelihood of causing patient harm    Rukhsana Merida DO 03/27/23    VIRTUAL VISIT [facility] verbally agrees to participate in [facility]  Pt is aware that [facility] could be limited without vital signs or the ability to perform a full hands-on physical exam  Devika Massey understands he or the provider may request at any time to terminate the video visit and request the patient to seek care or treatment in person       Rukhsana Merida DO 03/27/23

## 2023-03-27 NOTE — PATIENT INSTRUCTIONS
Please call the office nursing staff for medication issues including refills, problems obtaining medications, side effects, etc at [phone]  Please return for a follow up appointment as discussed  If you are running late or are unable to attend your appointment, please call our [place] office at [phone]  If you are in psychological crisis including not limited to suicidal/homicidal thoughts or thoughts of self-injury, do not hesitate to call/contact your [facility] hotline (see below) or go to the nearest emergency department    [facility] Crisis: [address] Crisis: [phone]  [address] Crisis: [address] Crisis: [address] Crisis: [address] Crisis: [address]  Crisis: [phone]  National Suicide Prevention Hotline: 4-171.882.9893

## 2023-05-11 ENCOUNTER — TELEPHONE (OUTPATIENT)
Dept: PSYCHIATRY | Facility: CLINIC | Age: 23
End: 2023-05-11

## 2023-06-01 DIAGNOSIS — F90.0 ADHD (ATTENTION DEFICIT HYPERACTIVITY DISORDER), INATTENTIVE TYPE: ICD-10-CM

## 2023-06-02 NOTE — TELEPHONE ENCOUNTER
PDMP website reviewed  Faisal Henderson has been appropriately adherent to controlled psychotropic medications (Vyvanse) without evidence of abuse or misuse   As such, will send 30-day refill to pharmacy of choice and patient to follow up for transfer of care appointment with Dr Mariely Knight on 7/13/23

## 2023-06-08 ENCOUNTER — PROCEDURE VISIT (OUTPATIENT)
Dept: NEUROLOGY | Facility: CLINIC | Age: 23
End: 2023-06-08
Payer: COMMERCIAL

## 2023-06-08 DIAGNOSIS — G62.9 NEUROPATHY: ICD-10-CM

## 2023-06-08 PROCEDURE — 95886 MUSC TEST DONE W/N TEST COMP: CPT | Performed by: PSYCHIATRY & NEUROLOGY

## 2023-06-08 PROCEDURE — 95910 NRV CNDJ TEST 7-8 STUDIES: CPT | Performed by: PSYCHIATRY & NEUROLOGY

## 2023-06-08 NOTE — PROGRESS NOTES
EMG 2 limb lower extremity     Date/Time 6/8/2023 8:20 AM     Performed by  Terri Kelly MD   Authorized by Yelena Alba DPM             EMG BILATERAL LOWER EXTREMITY    Patient reports symptoms of low back pain radiating into the left hip and down the leg associated with standing and heavy lifting  He also reports numbness and tingling of the medial toes of the right foot    Motor and sensory conduction studies were performed on the bilateral peroneal, tibial and sural nerves  The distal motor latencies were normal  The motor action potential amplitudes were normal  Conduction velocities were normal including conduction of the peroneal nerve across the fibular head  Bilateral peroneal and tibial F waves were normal     Bilateral sural distal sensory latencies were normal with normal sensory action potential amplitudes  H  reflexes were symmetrically normal     Concentric needle EMG was performed in various distal and proximal muscles of the lower extremities bilaterally including EDB, tibialis anterior, gastrocnemius medius, vastus lateralis, biceps femoralis short head and the low lumbar paraspinal regions  There is no evidence of spontaneous activity seen  The compound motor unit potentials were of normal configuration and interference patterns were full or full for effort  IMPRESSION: This is a normal EMG of the bilateral lower extremities  SID Green  [facility] AT [address]  [phone]    Electromyography & Nerve Conduction Studies Report          Full Name: Misty Nguyen Gender: Male  MRN: [id number] YOB: 2000      Visit Date: 6/8/2023 8:38 AM  Age: 20 Years  Examining Physician: Vinicio Romano MD   Referring Physician: DR Esteban Reynaga  Medical History: TEMP 30 - 32 HEAT ASISTANCE      Sensory Nerve Conduction Study       Nerve / Sites Rec   Site Onset Lat Peak Lat  Amp Segments Distance Velocity     ms ms µV  cm m/s   R Sural - (Antidromic)  Calf Ankle 2 3 3 1 14 6 Calf - Ankle 14 61      Ref  ?3 3  ? 6 0 Ref  ?40   L Sural - (Antidromic)  Calf Ankle 2 5 3 4 28 4 Calf - Ankle 14 56      Ref  ?3 3  ? 6 0 Ref  ?40       Motor Nerve Conduction Study       Nerve / Sites Muscle Latency Ref  Amplitude Ref  Segments Distance Lat Diff Velocity Ref  ms ms mV mV  cm ms m/s m/s   R Peroneal - EDB      Ankle EDB 6 4 ?6 5 7 0 ?2 0 Ankle - EDB 9         B  Fib Head EDB 13 8  6 9  B  Fib Head - Ankle 38 7 35 52 ?44      A  Fib Head EDB 15 3  6 8  A  Fib Head - B  Fib Head 10 1 56 64 ?44   L Peroneal - EDB      Ankle EDB 6 3 ?6 5 7 1 ?2 0 Ankle - EDB 9         B  Fib Head EDB 14 0  6 9  B  Fib Head - Ankle 36 5 7 73 47 ?44      A  Fib Head EDB 14 9  6 4  A  Fib Head - B  Fib Head 10 0 85 117 ?44   R Tibial - AH      Ankle AH 4 4 ?5 8 12 4 ?4 0 Ankle - AH 9         Knee AH 14 3  11 3  Knee - Ankle 43 9 88 44 ?41   L Tibial - AH      Ankle AH 5 9 ?5 8 8 3 ?4 0 Ankle - AH 9         Knee AH 15 0  7 9  Knee - Ankle 44 9 10 48 ?41   L Peroneal - Tib Ant      Fib Head Tib Ant 2 5 ?6 7 4 2 ?3 0 Fib Head - Tib Ant          Pop fossa Tib Ant 4 6  3 9  Pop fossa - Fib Head 10 2 12 47 ?44   R Peroneal - Tib Ant      Fib Head Tib Ant 2 5 ?6 7 3 9 ?3 0 Fib Head - Tib Ant          Pop fossa Tib Ant 4 7  3 5  Pop fossa - Fib Head 10 2 23 45 ?44       F Waves       Nerve F Latency Ref  ms ms   R Peroneal - EDB 51 8 ?56 0   R Tibial - AH 56 0 ?56 0   L Peroneal - EDB 55 9 ?56 0   L Tibial - AH 48 7 ?56 0       H Reflex       Nerve H Latency    ms   R Tibial - Soleus 31 2   L Tibial - Soleus 30 2       EMG Summary Table     Spontaneous MUAP Recruitment   Muscle Nerve Roots IA Fib PSW Fasc H F  Dur  Amp PPP Config Pattern   L  Tibialis anterior Deep peroneal (Fibular) L4-L5 NL None None None None NL NL None NL NL   R   Tibialis anterior Deep peroneal (Fibular) L4-L5 NL None None None None NL NL None NL NL   R  Extensor digitorum brevis Tibial L5-S1 NL None None None None NL NL None NL NL   L  Extensor digitorum brevis Tibial L5-S1 NL None None None None NL NL None NL NL   R  Gastrocnemius (Medial head) Tibial S1-S2 NL None None None None NL NL None NL NL   L  Gastrocnemius (Medial head) Tibial S1-S2 NL None None None None NL NL None NL NL   R  Biceps femoris (short head) Sciatic (peroneal division) L5-S2 NL None None None None NL NL None NL NL   L  Biceps femoris (short head) Sciatic (peroneal division) L5-S2 NL None None None None NL NL None NL NL   R  Quadriceps Femoral L2-L4 NL None None None None NL NL None NL NL   L  Quadriceps Femoral L2-L4 NL None None None None NL NL None NL NL   R  Lumbar paraspinals (low)  - NL None None None None NL NL None NL NL   L   Lumbar paraspinals (low)  - NL None None None None NL NL None NL NL

## 2023-07-07 DIAGNOSIS — F90.0 ADHD (ATTENTION DEFICIT HYPERACTIVITY DISORDER), INATTENTIVE TYPE: Primary | ICD-10-CM

## 2023-07-07 NOTE — TELEPHONE ENCOUNTER
Vyvanse was escripted for 30 days, no RF.  Due now, fills are as prescribed as per PDMP Quality 110: Preventive Care And Screening: Influenza Immunization: Influenza immunization was not ordered or administered, reason not given Quality 130: Documentation Of Current Medications In The Medical Record: Current Medications Documented Detail Level: Detailed Quality 111:Pneumonia Vaccination Status For Older Adults: Pneumococcal Vaccination not Administered or Previously Received, Reason not Otherwise Specified

## 2023-07-13 ENCOUNTER — OFFICE VISIT (OUTPATIENT)
Dept: PSYCHIATRY | Facility: CLINIC | Age: 23
End: 2023-07-13

## 2023-07-13 VITALS
HEART RATE: 69 BPM | BODY MASS INDEX: 37.97 KG/M2 | SYSTOLIC BLOOD PRESSURE: 156 MMHG | DIASTOLIC BLOOD PRESSURE: 90 MMHG | WEIGHT: 311.9 LBS

## 2023-07-13 DIAGNOSIS — F33.1 MAJOR DEPRESSIVE DISORDER, RECURRENT, MODERATE (HCC): Primary | ICD-10-CM

## 2023-07-13 DIAGNOSIS — F90.0 ADHD (ATTENTION DEFICIT HYPERACTIVITY DISORDER), INATTENTIVE TYPE: ICD-10-CM

## 2023-07-13 DIAGNOSIS — F41.1 GAD (GENERALIZED ANXIETY DISORDER): ICD-10-CM

## 2023-07-13 RX ORDER — BUPROPION HYDROCHLORIDE 150 MG/1
150 TABLET ORAL DAILY
Qty: 30 TABLET | Refills: 1 | Status: SHIPPED | OUTPATIENT
Start: 2023-07-13

## 2023-07-13 NOTE — PSYCH
[facility]    Name and Date of Birth:  Caryle Ferrari 23 y.o. 2000 MRN: [id number]    Date of Visit: July 13, 2023    Reason for visit: Full psychiatric intake assessment for medication management     Chief Complaint: Medication management, "Depression"    HPI     Caryle Ferrari is a 20 y.o.  Male, single, some college education, domiciled with his parents, with past medical history of idiopathic hypersomnia, RLS, and past psychiatric history of depression, anxiety, ADHD who presents to the [address] [place] [address] outpatient clinic as a transfer of care from Dr. Katelyn Mann. In review of past psychiatric history per chart review and discussion with patient, Genetyrese Epps reported feeling depression "my whole life" and reported additional longstanding history of anxiety and ADHD. He stats his parents were "hoarders - complacent, apathetic" and that he had a "Secluded childhood." He wished to study physics in college but once he got to college he experienced stress from being away from home, had a relationship end, felt he did not have friends, had poor academic performance in school, and began feeling more depressed. He began seeing Dr. Viola Gastelum in 2019 then was subsequently seeing YAO aCrdoso. He began therapy in 2020 which is virtual. He feels his depression has somewhat improved since beginning treatment. He endorses acute and chronic anxiety which manifests as excessive nervousness, irrational worry, and feels especially anxious in social interactions, though is generally anxious as well. At his most recent visit with Dr. Katelyn Mann in March, he reported continued depression, anhedonia, poor motivation, low energy, difficulty with sleep, appetite increase, and feelings of guilt. He did report hope about the change in weather and being able to enjoy his outdoor hobbies including hiking.     Today Lorraine Epps presents reporting depression has been difficult but anxiety has not bothered him. ADHD symptoms cause him to be somewhat forgetful. Since that time he has lost job at [employer] in April  due to attendance because he is caring for his parents. Father had a stroke last year, cannot drive, mother cannot drive and both have medical problems. Mother had a toe amputation due to poor wound healing. He is working on fixing up their house. Had two moves in the past five years, one eviction. Tried to be paid as parents caregiver but does not qualify due to father's income. 77-year-old brother has moved out and he would like to but feels his parents cannot live on their own. Denies side effects with the lexapro but thinks his Vyvanse may be causing him to not eat enough during the day but then overeating at the end of the day. Current Rating Scores:     Current PHQ-9   PHQ-2/9 Depression Screening    Little interest or pleasure in doing things: 1 - several days  Feeling down, depressed, or hopeless: 3 - nearly every day  Trouble falling or staying asleep, or sleeping too much: 3 - nearly every day  Feeling tired or having little energy: 3 - nearly every day  Poor appetite or overeating: 3 - nearly every day  Feeling bad about yourself - or that you are a failure or have let yourself or your family down: 2 - more than half the days  Trouble concentrating on things, such as reading the newspaper or watching television: 2 - more than half the days  Moving or speaking so slowly that other people could have noticed.  Or the opposite - being so fidgety or restless that you have been moving around a lot more than usual: 0 - not at all  Thoughts that you would be better off dead, or of hurting yourself in some way: 0 - not at all  PHQ-9 Score: 17   PHQ-9 Interpretation: Moderately severe depression        Current KORY-7 is   KORY-7 Flowsheet Screening    Flowsheet Row Most Recent Value   Over the last 2 weeks, how often have you been bothered by any of the following problems? Feeling nervous, anxious, or on edge 1   Not being able to stop or control worrying 0   Worrying too much about different things 1   Trouble relaxing 1   Being so restless that it is hard to sit still 1   Becoming easily annoyed or irritable 1   Feeling afraid as if something awful might happen 0   KORY-7 Total Score 5      .     Psychiatric Review Of Systems:    Sleep changes: increased, Idiopathic hypersomnia  Appetite changes: decreased (may be from medication)  Weight changes: increased   Energy/anergy: decreased  Interest/pleasure/anhedonia: no  Attention/concentration: yes  Psychomotor agitation/retardation: no  Somatic symptoms: no  Anxiety/panic: no  June: no  Guilty/hopeless: yes  Self injurious behavior/risky behavior: no  Suicidal ideation: no  Homicidal ideation: no  Auditory hallucinations: no  Visual hallucinations: no  Other hallucinations: no  Delusional thinking: no  Obsessive/compulsive symptoms: no    Review Of Systems:     Constitutional negative   ENT negative   Cardiovascular negative   Respiratory negative   Gastrointestinal negative   Genitourinary negative   Musculoskeletal negative   Integumentary negative   Neurological negative   Endocrine negative   Other Symptoms none, all other systems are negative       Past Psychiatric History:     Past psychiatric diagnoses:   • ADHD depression and anxiety  Inpatient psychiatric admissions:   • None  Prior outpatient psychiatric treatment:   • Had been a patient of Dr. Houston Correa at [facility] since March 2022  • Previously was seeing Dr. Jose Milton and Mary Kate Hernandez at [facility]  Past/current psychotherapy:   •  Weekly therapy online at a private agency  History of suicidal attempts/gestures:   • denies   History of non-suicidal self-injurious behavior:   • None  History of violence/aggressive behaviors:   • denies  Psychotropic medication trials:   • Effexor XR (2018) (nausea)  • Concerta ER 18QI (9592) - dilated pupils, chalenges with glares when driving  • Armodafinil (somewhat helpful)  • Ritalin 10mg, Vyvanse Strattera (vomiting)  • Zoloft, abilify, buspar, gabapentin, trintellix, wellbutrin (, lexapro  • Pramipexole for RLS  • Nuvigil for hypersomnia (several years ago)  Substance abuse inpatient/outpatient rehabilitation:   • Denies  Eating disorder history:   • no    Substance Abuse History:    Marijuana use 4-5 times per week   Formerly used psychedelics which "bordered on problematic"  Denies history of alcohol, illict substance, or tobacco abuse., Patient denies previous legal actions or arrests related to substance intoxication including prior DWIs/DUIs., Patient does not exhibit objective evidence of substance withdrawal during today's examination nor do they appear under the influence of any psychoactive substance. Family Psychiatric History:     Family History   Problem Relation Age of Onset   • COPD Mother    • Hypertension Mother    • Depression Mother    • Hypertension Father    • Depression Father    • Anxiety disorder Father    • Alcohol abuse Father    • Anxiety disorder Brother    • Depression Brother    • Hypertension Brother    • Alcohol abuse Paternal Grandfather      Psychiatric Family History: Father - depression, anxiety, alcohol use  Brother - anxiety, depression  Mother - Depression  Suicide Attempts: none  Patient otherwise denies known family history of psychiatric illness, substance use, or suicide attempts. Social History:    Developmental: Patient denies a history of milestone/developmental delay. , Patient denies any known in-utero exposure to toxins or illicit substances. ,   Reports a history of IEP in 6th grade for "mental health"  In 6th grade got a kidney stone, was anxious about getting stent removed, mother was convinced he and neurological d/o and absence seizures, he missed a lot of school.  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 Unloaded but not locked. Provided two gun locks. Patient denies history of arrests or violence with a deadly weapon.  history: None    Traumatic History:     Abuse: Denies  Other Traumatic Events: Childhood issues as above    Past Medical History:    Past Medical History:   Diagnosis Date   • ADHD (attention deficit hyperactivity disorder)    • Anxiety    • Concussion    • Depression    • Kidney stones    • Self-injurious behavior    • Sleep difficulties     history of sleep apnea  and getting workup for narcolepsy as of 8/19        Past Surgical History:   Procedure Laterality Date   • HERNIA REPAIR     • TONSILLECTOMY     • TOOTH EXTRACTION Right 09/21/2020    R lower molar extraction     Allergies   Allergen Reactions   • Scopolamine        History Review:     The following portions of the patient's history were reviewed and updated as appropriate: allergies, current medications, past family history, past medical history, past social history, past surgical history and problem list.    OBJECTIVE:    Vital signs in last 24 hours:    Vitals:    07/13/23 1032   BP: 156/90   BP Location: Left arm   Pulse: 69   Weight: (!) 141 kg (311 lb 14.4 oz)       Mental Status Evaluation:    Appearance age appropriate, casually dressed   Behavior cooperative, calm   Speech normal rate, normal volume, normal pitch   Mood euthymic   Affect normal range and intensity, appropriate   Thought Processes organized, goal directed   Associations intact associations   Thought Content no overt delusions   Perceptual Disturbances: Denies auditory or visual hallucinations and Does not appear to be responding to internal stimuli Abnormal Thoughts  Risk Potential Denies suicidal or homicidal ideation, plan, or intent   Orientation oriented to person, place, time/date and situation   Memory recent and remote memory grossly intact   Consciousness alert and awake   Attention Span Concentration Span attention span and concentration are age appropriate   Intellect appears to be of average intelligence   Insight intact   Judgement intact   Muscle Strength and  Gait normal muscle strength and normal muscle tone, normal gait and normal balance   Motor Activity no abnormal movements   Language no difficulty naming common objects, no difficulty repeating a phrase, no difficulty writing a sentence   Fund of Knowledge adequate knowledge of current events  adequate fund of knowledge regarding past history  adequate fund of knowledge regarding vocabulary        Laboratory Results: I have personally reviewed all pertinent laboratory/tests results    Recent Labs (last 6 months):   No visits with results within 6 Month(s) from this visit. Latest known visit with results is:   Hospital Outpatient Visit on 03/25/2021   Component Date Value   • Protocol Name 03/30/2021 KENDRICK    • Time In Exercise Phase 03/30/2021 00:07:01    • MAX.  SYSTOLIC BP 98/05/9199 380    • Max Diastolic Bp 70/95/2558 698    • Max Heart Rate 03/30/2021 173    • Max Predicted Heart Rate 03/30/2021 197    • Reason for Termination 03/30/2021                      Value:Target Heart Rate Achieved  Exaggerated BP increase     • Test Indication 03/30/2021 CHEST PAIN    • Target Hr Formular 03/30/2021 (220 - Age)*85%        Suicide/Homicide Risk Assessment:    Risk of Harm to Self:  • The following ratings are based on assessment at the time of the interview  • Demographic risk factors include: , never   • Historical Risk Factors include: history of depression, history of anxiety, substance use  • Recent Specific Risk Factors include: diagnosis of depression  • Protective Factors: no current suicidal ideation, able to manage anger well, access to mental health treatment, compliant with medications, compliant with mental health treatment, having a desire to be alive, having a sense of purpose or meaning in life, responsibilities and duties to others, stable living environment, supportive family  • Weapons: several guns. The following steps have been taken to ensure weapons are properly secured: Provided two gun locks, encouraged to purchase more gun locks  • Based on today's assessment, Emma Gonzales presents the following risk of harm to self: low    Risk of Harm to Others:  • The following ratings are based on assessment at the time of the interview  • Demographic Risk Factors include: male, unemployed, under age 36. • Historical Risk Factors include: drug abuse. • Recent Specific Risk Factors include: weapons or other means available. • Protective Factors: no current homicidal ideation, able to manage anger well, access to mental health treatment, compliant with medications, compliant with mental health treatment, resilience, responsibilities and duties to others, safe and stable living environment, support system, supportive family  • Weapons: several guns. The following steps have been taken to ensure weapons are properly secured: provided two gun locks, encouraged to purchase more  • Based on today's assessment, Emma Gonzales presents the following risk of harm to others: low    The following interventions are recommended: no intervention changes needed. Although patient's acute lethality risk is LOW, long-term/chronic lethality risk is mildly elevated given above., However, at the current moment, patient is future-oriented, forward-thinking, and demonstrates ability to act in a self-preserving manner as evidenced by volitionally seeking psychiatric evaluation and treatment today. Osmany Quezada contracts for safety and is not an imminent risk of harm to self or others.  Outpatient level of care is deemed appropriate at this current time. Patient understands that if they can no longer contract for safety, they need to call the office or report to their nearest Emergency Room for immediate evaluation. At this juncture, inpatient hospitalization is not currently warranted. To mitigate future risk, patient should adhere to treatment recommendations, avoid alcohol/illicit substance use, utilize community-based resources and familiar support, and prioritize mental health treatment. Assessment/Plan:   Ed Board is a 20 y.o.  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 Patient reports that while his anxiety symptoms are improving and manageable, his depression symptoms are still moderately severe. He endorses hypersomnolence, difficulty with motivation and concentration, or energy, low mood, guilt and hopelessness at times, but denies suicidal ideation. We discussed various options to address these depressive symptoms, patient is agreeable to augmentation with Wellbutrin at this time. DSM-5 Diagnoses:     1. Major depressive disorder, recurrent, moderate (HCC)    2. KORY (generalized anxiety disorder)    3.  ADHD (attention deficit hyperactivity disorder), inattentive type        Treatment Recommendations/Precautions:  • Psychopharmacologic management as follows:  o Continue Lexapro 20mg daily for depression and anxiety  - PARQ completed including serotonin syndrome, SIADH, worsening depression, suicidality, induction of christina, GI upset, headaches, activation, sexual side effects, sedation, potential drug interactions, and others.  o Initiate Wellbutrin XL 150mg daily in the morning to augment Lexapro for depression  - PARQ completed including induction of christina, decreased seizure threshold and risk with alcohol or electrolyte disturbances, headaches, hypertension and cardiovascular effects, GI distress, weight loss, agitation/activation, dizziness, tremor, anxiety, potential for drug interactions, and others. o Continue Vyvanse 60mg daily for ADHD  PARQ completed including elevated heart rate, elevated bp, seizures, anxiety/irritability, activation/induction of christina, abuse potential, interactions with other medications, risk of sudden death, appetite suppression/weight loss and other risks. For MALES- rare priapism. • Encouraged patient to make appointment with PCP to discuss blood pressure and obtain wellness visit  • Encouraged to cut back on mariajuana use  • Ordered additional labs and reminded patient of existing labs already ordered. CC'd PCP on these labs. • Medication management follow-up in 6 weeks  • Continue psychotherapy with own therapist  • Aware of 24 hour and weekend coverage for urgent situations accessed by calling [facility] main practice number  Risks, benefits, and possible side effects of medications explained to Talia Wesley and he verbalizes understanding and agreement for treatment. Controlled Medication Discussion:     Talia Wesley has been filling controlled prescriptions on time as prescribed according to [place] Prescription Drug Monitoring Program    Treatment Plan:    Completed and signed during the session: Yes - Treatment Plan done but not signed at time of office visit due to:  Plan reviewed in person and verbal consent given due to [facility] social distancing      Note Share Disclaimer:      This note was not shared with the patient due to reasonable likelihood of causing patient harm      Ruddy Baez MD 07/13/23

## 2023-07-13 NOTE — PATIENT INSTRUCTIONS
Please call the office nursing staff for medication issues including refills, problems getting medications, bothersome side effects, etc at [phone]. Please return for a follow up appointment as discussed and arrive approximately 15 minutes prior to your appointment time. If you are running late or are unable to attend your appointment, please call our [place] office at [phone], or if you were seen in the [facility]) office, please call [phone]    If you have thoughts of harming yourself or are otherwise in psychological crisis, do not hesitate to contact your [address], or 911 or go to the nearest emergency room.   [facility] Crisis: [address] Crisis: [phone]  [address] Crisis: [address] Crisis: [address] [facility] [address] Crisis: [address] Crisis: [address] Crisis: [phone]  National Suicide Prevention Hotline: 4-638.479.5387 or call 65

## 2023-07-25 ENCOUNTER — OFFICE VISIT (OUTPATIENT)
Dept: FAMILY MEDICINE CLINIC | Facility: CLINIC | Age: 23
End: 2023-07-25
Payer: COMMERCIAL

## 2023-07-25 VITALS
SYSTOLIC BLOOD PRESSURE: 142 MMHG | HEIGHT: 75 IN | OXYGEN SATURATION: 100 % | BODY MASS INDEX: 38.67 KG/M2 | TEMPERATURE: 97.3 F | DIASTOLIC BLOOD PRESSURE: 96 MMHG | HEART RATE: 90 BPM | WEIGHT: 311 LBS

## 2023-07-25 DIAGNOSIS — E66.01 CLASS 2 SEVERE OBESITY DUE TO EXCESS CALORIES WITH SERIOUS COMORBIDITY AND BODY MASS INDEX (BMI) OF 38.0 TO 38.9 IN ADULT (HCC): ICD-10-CM

## 2023-07-25 DIAGNOSIS — I10 ESSENTIAL HYPERTENSION: Primary | ICD-10-CM

## 2023-07-25 DIAGNOSIS — Z13.220 SCREENING FOR LIPID DISORDERS: ICD-10-CM

## 2023-07-25 PROBLEM — E83.110 HEREDITARY HEMOCHROMATOSIS (HCC): Status: RESOLVED | Noted: 2018-01-09 | Resolved: 2023-07-25

## 2023-07-25 PROCEDURE — 99214 OFFICE O/P EST MOD 30 MIN: CPT | Performed by: PHYSICIAN ASSISTANT

## 2023-07-25 RX ORDER — HYDROCHLOROTHIAZIDE 25 MG/1
25 TABLET ORAL DAILY
Qty: 90 TABLET | Refills: 0 | Status: SHIPPED | OUTPATIENT
Start: 2023-07-25 | End: 2023-10-23

## 2023-07-25 NOTE — PROGRESS NOTES
Name: Caryle Ferrari      : 2000      MRN: [id number]  Encounter Provider: Sugar Hollins PA-C  Encounter Date: 2023   Encounter department: [address] [place] [address]     1. Essential hypertension  -     Comprehensive metabolic panel; Future  -     CBC and differential; Future  -     hydrochlorothiazide (HYDRODIURIL) 25 mg tablet; Take 1 tablet (25 mg total) by mouth daily    2. Class 2 severe obesity due to excess calories with serious comorbidity and body mass index (BMI) of 38.0 to 38.9 in [facility])  -     Comprehensive metabolic panel; Future    3. Screening for lipid disorders  -     Lipid Panel with Direct LDL reflex; Future     Pt prescribed HCTZ once daily and was advised to record daily BP readings at home for 2 weeks. Follow up in 2 weeks or sooner if needed. Labs as above. BMI Counseling: Body mass index is 38.87 kg/m². The BMI is above normal. Nutrition recommendations include reducing portion sizes, decreasing overall calorie intake, moderation in carbohydrate intake, increasing intake of lean protein, reducing intake of saturated fat and trans fat and reducing intake of cholesterol. Exercise recommendations include moderate aerobic physical activity for 150 minutes/week. Subjective     Pt is a 20 y.o male with a pmhx of depression anxiety and ADHD in for a cc of "I have high bloood pressure and would like to start medication" Pt rpeorts that he was started on Wellbutrin by his psychiatrist and during the appointment his BP was in the 150's/90's and has not started the medication due to the high blood pressure. He also reports some significant weight gain that has also contributed to the increased blood pressure. Pt does report some occasional blurred vision and shortness of breath with exertion but denies constant chest pain at rest. Pt has no there concerns at this time.      Review of Systems   Constitutional: Negative for chills, fatigue and fever.   HENT: Negative for congestion, ear pain, hearing loss, nosebleeds, postnasal drip, rhinorrhea, sinus pressure, sinus pain, sneezing and sore throat. Eyes: Negative for pain, discharge, itching and visual disturbance. Respiratory: Positive for shortness of breath (with exertion ). Negative for cough, chest tightness and wheezing. Cardiovascular: Negative for chest pain, palpitations and leg swelling. Gastrointestinal: Negative for abdominal pain, blood in stool, constipation, diarrhea, nausea and vomiting. Genitourinary: Negative for frequency and urgency. Neurological: Negative for dizziness, light-headedness and numbness.        Past Medical History:   Diagnosis Date   • ADHD (attention deficit hyperactivity disorder)    • Anxiety    • Concussion    • Depression    • Kidney stones    • Self-injurious behavior    • Sleep difficulties     history of sleep apnea  and getting workup for narcolepsy as of 8/19     Past Surgical History:   Procedure Laterality Date   • HERNIA REPAIR     • TONSILLECTOMY     • TOOTH EXTRACTION Right 09/21/2020    R lower molar extraction     Family History   Problem Relation Age of Onset   • COPD Mother    • Hypertension Mother    • Depression Mother    • Hypertension Father    • Depression Father    • Anxiety disorder Father    • Alcohol abuse Father    • Anxiety disorder Brother    • Depression Brother    • Hypertension Brother    • Alcohol abuse Paternal Grandfather      Social History     Socioeconomic History   • Marital status: Single     Spouse name: None   • Number of children: 0   • Years of education: None   • Highest education level: Some college, no degree   Occupational History   • 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   Tobacco Use   • Smoking status: Never   • Smokeless tobacco: Never   Vaping Use   • Vaping Use: Never used   Substance and Sexual Activity   • Alcohol use: Yes     Comment: socially   • Drug use: Yes     Types: Marijuana     Comment: ocasionally   • Sexual activity: None   Other Topics Concern   • None   Social History Narrative   • None     Social Determinants of Health     Financial Resource Strain: Not on file   Food Insecurity: Not on file   Transportation Needs: Not on file   Physical Activity: Not on file   Stress: Not on file   Social Connections: Not on file   Intimate Partner Violence: Not on file   Housing Stability: Not on file     Current Outpatient Medications on File Prior to Visit   Medication Sig   • buPROPion (Wellbutrin XL) 150 mg 24 hr tablet Take 1 tablet (150 mg total) by mouth daily Take 1 tablet (150 mg total) by mouth daily in the morning   • Cholecalciferol (Vitamin D3) 50 MCG (2000 UT) capsule Take 2 capsules (4,000 Units total) by mouth daily   • escitalopram (LEXAPRO) 20 mg tablet TAKE 1 TABLET DAILY   • lisdexamfetamine (VYVANSE) 60 MG capsule Take 1 capsule (60 mg total) by mouth every morning Max Daily Amount: 60 mg   • melatonin 1 mg Take 1 mg by mouth daily at bedtime as needed   • [DISCONTINUED] Denta 5000 Plus 1.1 % CREA      Allergies   Allergen Reactions   • Scopolamine      Immunization History   Administered Date(s) Administered   • COVID-19 PFIZER VACCINE 0.3 ML IM 03/16/2021, 04/07/2021   • DTaP 2000   • DTaP 5 1998, 1998, 1998, 10/23/1999, 03/20/2003   • HPV Quadrivalent 09/14/2015, 09/14/2015, 01/22/2016, 01/22/2016   • HPV9 09/14/2015, 09/14/2015   • Hep B, adult 1998, 1998, 01/30/1999   • Hib (PRP-OMP) 2000, 1998, 1998   • INFLUENZA 09/14/2015, 12/20/2018   • IPV 2000, 1998, 01/29/2000, 03/20/2003   • Influenza Quadrivalent Preservative Free 3 years and older IM 10/09/2014, 09/14/2015   • Influenza, injectable, quadrivalent, preservative free 0.5 mL 12/20/2018, 01/14/2020, 12/07/2020   • Influenza, seasonal, injectable 09/14/2015   • MMR 04/23/1999, 03/20/2003   • Meningococcal, Unknown Serogroups 05/14/2009, 07/25/2014   • Tdap 05/14/2009, 07/25/2014   • Varicella 07/24/1999, 05/14/2009       Objective     /96   Pulse 90   Temp (!) 97.3 °F (36.3 °C)   Ht 6' 3" (1.905 m)   Wt (!) 141 kg (311 lb)   SpO2 100%   BMI 38.87 kg/m²     Physical Exam  Vitals and nursing note reviewed. Constitutional:       General: He is not in acute distress. Appearance: Normal appearance. He is obese. HENT:      Head: Normocephalic and atraumatic. Nose: Nose normal.      Mouth/Throat:      Mouth: Mucous membranes are moist.      Pharynx: Oropharynx is clear. No oropharyngeal exudate or posterior oropharyngeal erythema. Eyes:      Pupils: Pupils are equal, round, and reactive to light. Cardiovascular:      Rate and Rhythm: Normal rate and regular rhythm. Pulses: Normal pulses. Heart sounds: Normal heart sounds. No murmur heard. Pulmonary:      Effort: Pulmonary effort is normal. No respiratory distress. Breath sounds: Normal breath sounds. No wheezing. Abdominal:      General: Bowel sounds are normal.      Palpations: Abdomen is soft. Musculoskeletal:         General: Normal range of motion. Cervical back: Normal range of motion and neck supple. Skin:     General: Skin is warm and dry. Neurological:      Mental Status: He is alert and oriented to person, place, and time.    Psychiatric:         Mood and Affect: Mood and affect normal.       Radha Esparza PA-C

## 2023-08-18 DIAGNOSIS — F90.0 ADHD (ATTENTION DEFICIT HYPERACTIVITY DISORDER), INATTENTIVE TYPE: ICD-10-CM

## 2023-08-18 NOTE — TELEPHONE ENCOUNTER
Notified patient requesting the following refill:  Requested Prescriptions     Signed Prescriptions Disp Refills   • lisdexamfetamine (VYVANSE) 60 MG capsule 30 capsule 0     Sig: Take 1 capsule (60 mg total) by mouth every morning Max Daily Amount: 60 mg     Authorizing Provider: Johnson Rosario has been filling controlled prescriptions on time as prescribed according to [address] Program    Refill request was sent to Dr. Verna Lancaster, my indirect supervisor, who will review and decide to approve/send to pharmacy.
Yes

## 2023-08-22 ENCOUNTER — TELEMEDICINE (OUTPATIENT)
Dept: PSYCHIATRY | Facility: CLINIC | Age: 23
End: 2023-08-22
Payer: COMMERCIAL

## 2023-08-22 DIAGNOSIS — F41.1 GAD (GENERALIZED ANXIETY DISORDER): ICD-10-CM

## 2023-08-22 DIAGNOSIS — F33.1 MAJOR DEPRESSIVE DISORDER, RECURRENT, MODERATE (HCC): Primary | ICD-10-CM

## 2023-08-22 DIAGNOSIS — F90.0 ADHD (ATTENTION DEFICIT HYPERACTIVITY DISORDER), INATTENTIVE TYPE: ICD-10-CM

## 2023-08-22 PROCEDURE — 99214 OFFICE O/P EST MOD 30 MIN: CPT | Performed by: PSYCHIATRY & NEUROLOGY

## 2023-08-22 RX ORDER — ARIPIPRAZOLE 5 MG/1
5 TABLET ORAL DAILY
Qty: 30 TABLET | Refills: 1 | Status: SHIPPED | OUTPATIENT
Start: 2023-08-22

## 2023-08-22 NOTE — PSYCH
MEDICATION MANAGEMENT NOTE        [facility] [facility]    Virtual Visit Disclaimer & Required Documentation  TeleMed provider: Olga Echols MD and Dr. Kati Griffin, located at [facility], [address] [facility] in [place], [place], [facility]. The patient is also located in [place] which is the state in which I hold an active license. The patient was identified by name and date of birth. Shaquille Trent was informed that this is a telemedicine visit and that the visit is being conducted through [facility] and patient was informed this is a secure, HIPAA-complaint platform. He agrees to proceed. My office door was closed. No one else was in the room. He acknowledged consent and understanding of privacy and security of the video platform. The patient has agreed to participate and understands they can discontinue the visit at any time. Shaquille Trent verbally agrees to participate in [employer]. Pt is aware that [employer] could be limited without vital signs or the ability to perform a full hands-on physical exam. The patient understands he or the provider may request at any time to terminate the video visit and request the patient to seek care or treatment in person. Patient is aware this is a billable service. Name and Date of Birth:  Shaquille Trent 23 y.o. 2000 MRN: [id number]    Date of Visit: August 22, 2023    Reason for Visit: Follow-up visit regarding medication management     _____________________________    Assessment/Plan   Shaquille Trent is a 20 y.o.  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  Eda Sherwood was personally seen and evaluated today at the [facility] outpatient clinic for follow-up regarding medication management. On assessment, Jessy Sam is reporting things have been about the same since last visit and he is endorsing depressive symptoms and feeling he is "in a rut." He denies any SI. He feels his vyvanse is helpful for concentration. He is struggling with motivation and anhedonia, hopelessness at times. He is reporting stress with having to care for his parents. Possible that a significant part of his depressive symptoms are due to current circumstances in his life. He has felt no benefit with wellbutrin and is agreeable to trying augmentation with abilify vs further increasing wellbutrin (mother and father both had bad reactions to wellbutrin and did not find it effective). Discussed risks and benefits. DSM-5 Diagnoses:     1. Major depressive disorder, recurrent, moderate (HCC)    2. ADHD (attention deficit hyperactivity disorder), inattentive type    3.  KORY (generalized anxiety disorder)        Treatment Recommendations/Precautions:  • Continue psychopharmacological management as follows:  o Continue Lexapro 20mg daily for depression and anxiety  o Continue Vyvanse 60mg daily for ADHD  o Stop wellbutrin xl 150mg due to lack of efficacy  o Initiate abilify 5mg daily for antidepressant augmentation  - PARQ was completed for second generation antipsychotic medication including restlessness, sedation, GI distress, dizziness, risk of metabolic syndrome, EPS (akathisia, TD, etc), rare NMS, orthostatic hypotension, cardiovascular risks such as QT prolongation  • Reminded patient regarding labs: CBC, lipid panel, CMP, HA1c - first reminder  • Consider Genesight testing if we need to make further changes due to multiple failed trials  • Follow up in 1 month for medication management  • Continue psychotherapy  • Follow up with PCP for medical issues and ongoing care  • Aware of 24 hour and weekend coverage for urgent situations accessed by calling [facility] main practice number    Individual psychotherapy provided: No     Treatment Plan:     Completed and signed during the session: Not applicable - Treatment Plan not due at this session    Medications Risks/Benefits:      Risks, Benefits And Possible Side Effects Of Medications:    Risks, benefits, and possible side effects of medications explained to [facility] and he verbalizes understanding and agreement for treatment. Controlled Medication Discussion:     Not applicable    Medical Decision Making / Counseling / Coordination of Care: The following interventions are recommended: return in 1 month for follow up. Although patient's acute lethality risk is LOW, long-term/chronic lethality risk is mildly elevated given the risk factors listed above. However, at the current moment, [facility] is future-oriented, forward-thinking, and demonstrates ability to act in a self-preserving manner as evidenced by volitionally seeking psychiatric evaluation and treatment today. To mitigate future risk, patient should adhere to treatment recommendations, avoid alcohol/illicit substance use, utilize community-based resources and familiar support, and prioritize mental health treatment. The diagnosis and treatment plan were reviewed with the patient. Risks, benefits, and alternatives to treatment were discussed. The importance of medication and treatment compliance was reviewed with the patient.     _____________________________________________    History of Present Illness     Chief Complaint: "Things are about the same"    SUBJECTIVE:    Jossie Scott is a 20 y.o., male, possessing a past psychiatric history significant for ADHD, depression, anxiety, medically complicated by HTN, idiopathic hypersomnia, who was personally seen and evaluated at the [address] [place] [address] outpatient clinic via virtual platform for follow-up regarding medication management.  At their last visit, the plan was to continue lexapro 20mg, start Wellbutrin XL 150mg daily for argumentation, and continue Vyvanse 60mg daily for ADHD. Nickolas Van states that since their previous psychiatric appointment with this writer, things have been about the same. He has been spending all of his time taking his parents to appointments, and caring for them. He has been unable to plan to get his blood work done due to these things. His depression is either the same or slightly worse, and he is feeling more burnt out now. He denies any side effects. He has struggled with motivation, but is able to focus once he starts on something. He occasionally has hopelessness, and his depressive symptoms tend to be worse when he is tired. He is interested in seeing sleep medicine again. Presently, patient denies suicidal/homicidal ideation in addition to thoughts of self-injury. At conclusion of evaluation, patient is amenable to the recommendations of this writer including: continue psychotropic medications as prescribed, obtaining labs. Also, patient is amenable to calling/contacting the outpatient office including this writer if any acute adverse effects of their medication regimen arise in addition to any comments or concerns pertaining to their psychiatric management. Patient is amenable to calling/contacting crisis and/or attending to the nearest emergency department if their clinical condition deteriorates to assure their safety and stability, stating that they are able to appropriately confide in their family regarding their psychiatric state.     Current Rating Scores:     Current PHQ-9   PHQ-2/9 Depression Screening    Little interest or pleasure in doing things: 2 - more than half the days  Feeling down, depressed, or hopeless: 3 - nearly every day  Trouble falling or staying asleep, or sleeping too much: 3 - nearly every day  Feeling tired or having little energy: 3 - nearly every day  Poor appetite or overeatin - several days  Feeling bad about yourself - or that you are a failure or have let yourself or your family down: 2 - more than half the days  Trouble concentrating on things, such as reading the newspaper or watching television: 2 - more than half the days  Moving or speaking so slowly that other people could have noticed. Or the opposite - being so fidgety or restless that you have been moving around a lot more than usual: 0 - not at all  Thoughts that you would be better off dead, or of hurting yourself in some way: 0 - not at all  PHQ-9 Score: 16   PHQ-9 Interpretation: Moderately severe depression        Last visit's PHQ-9   PHQ-2/9 Depression Screening    Little interest or pleasure in doing things: 2 - more than half the days  Feeling down, depressed, or hopeless: 3 - nearly every day  Trouble falling or staying asleep, or sleeping too much: 3 - nearly every day  Feeling tired or having little energy: 3 - nearly every day  Poor appetite or overeatin - several days  Feeling bad about yourself - or that you are a failure or have let yourself or your family down: 2 - more than half the days  Trouble concentrating on things, such as reading the newspaper or watching television: 2 - more than half the days  Moving or speaking so slowly that other people could have noticed. Or the opposite - being so fidgety or restless that you have been moving around a lot more than usual: 0 - not at all  Thoughts that you would be better off dead, or of hurting yourself in some way: 0 - not at all  PHQ-9 Score: 16   PHQ-9 Interpretation: Moderately severe depression        Current KORY-7 is   KORY-7 Flowsheet Screening    Flowsheet Row Most Recent Value   Over the last 2 weeks, how often have you been bothered by any of the following problems?     Feeling nervous, anxious, or on edge 1   Not being able to stop or control worrying 2   Worrying too much about different things 2   Trouble relaxing 1   Being so restless that it is hard to sit still 0   Becoming easily annoyed or irritable 3   Feeling afraid as if something awful might happen 0   KORY-7 Total Score 9      . KORY-7 score was  at the last visit.     Psychiatric Review Of Systems:    Appetite: no  Adverse eating: no - overeating has decreased  Weight changes: no  Insomnia/sleeplessness: no  Fatigue/anergy: yes  Anhedonia/lack of interest: yes  Attention/concentration: no  Psychomotor agitation/retardation: no  Somatic symptoms: no  Anxiety/panic attack: yes somewhat - more when very tired or feeling down about himself  June/hypomania: no  Hopelessness/helplessness/worthlessness: no  Self-injurious behavior/high-risk behavior: no  Suicidal ideation: no  Homicidal ideation: no  Auditory hallucinations: no  Visual hallucinations: no  Other perceptual disturbances: no  Delusional thinking: no  Obsessive/compulsive symptoms: no    Review Of Systems:      Constitutional negative   ENT negative   Cardiovascular negative   Respiratory negative   Gastrointestinal negative   Genitourinary negative   Musculoskeletal negative   Integumentary negative   Neurological negative   Endocrine negative   Other Symptoms none, all other systems are negative     Objective    OBJECTIVE:     Visit Vitals  Smoking Status Never      Wt Readings from Last 6 Encounters:   07/25/23 (!) 141 kg (311 lb)   07/13/23 (!) 141 kg (311 lb 14.4 oz)   03/10/22 118 kg (261 lb)   08/11/21 113 kg (250 lb)   06/21/21 123 kg (272 lb)   05/26/21 132 kg (290 lb)        Past Medical History:   Diagnosis Date   • ADHD (attention deficit hyperactivity disorder)    • Anxiety    • Concussion    • Depression    • Kidney stones    • Self-injurious behavior    • Sleep difficulties     history of sleep apnea  and getting workup for narcolepsy as of 8/19      Past Surgical History:   Procedure Laterality Date   • HERNIA REPAIR     • TONSILLECTOMY     • TOOTH EXTRACTION Right 09/21/2020    R lower molar extraction       Meds/Allergies    Allergies   Allergen Reactions   • Scopolamine Current Outpatient Medications   Medication Instructions   • buPROPion (WELLBUTRIN XL) 150 mg, Oral, Daily, Take 1 tablet (150 mg total) by mouth daily in the morning   • escitalopram (LEXAPRO) 20 mg tablet TAKE 1 TABLET DAILY   • hydrochlorothiazide (HYDRODIURIL) 25 mg, Oral, Daily   • lisdexamfetamine (VYVANSE) 60 mg, Oral, Every morning   • melatonin 1 mg, Oral, Daily at bedtime PRN   • Vitamin D3 4,000 Units, Oral, Daily           Mental Status Exam:    Appearance age appropriate, casually dressed, wearing glasses, smiling   Behavior cooperative, calm   Speech normal rate, normal volume, normal pitch   Mood euthymic   Affect normal range and intensity, appropriate   Thought Processes organized, goal directed   Associations intact associations   Thought Content no overt delusions   Perceptual Disturbances: Denies auditory or visual hallucinations and Does not appear to be responding to internal stimuli   Abnormal Thoughts  Risk Potential Denies suicidal or homicidal ideation, plan, or intent   Orientation oriented to person, place, time/date and situation   Memory recent and remote memory grossly intact   Consciousness alert and awake   Attention Span Concentration Span attention span and concentration are age appropriate   Intellect appears to be of average intelligence   Insight intact   Judgement intact   Muscle Strength and  Gait unable to assess today due to virtual visit   Motor Activity unable to assess today due to virtual visit   Language no difficulty naming common objects, no difficulty repeating a phrase   Fund of Knowledge adequate knowledge of current events  adequate fund of knowledge regarding past history  adequate fund of knowledge regarding vocabulary      Laboratory Results: I have personally reviewed all pertinent laboratory/tests results    No visits with results within 6 Month(s) from this visit.    Latest known visit with results is:   Hospital Outpatient Visit on 03/25/2021   Component Date Value Ref Range Status   • Protocol Name 03/30/2021 [facility]-RA   Final   • Time In Exercise Phase 03/30/2021 00:07:01   Final   • MAX. SYSTOLIC BP 81/22/9072 980  mmHg Final   • Max Diastolic Bp 92/88/5899 256  mmHg Final   • Max Heart Rate 03/30/2021 173  BPM Final   • Max Predicted Heart Rate 03/30/2021 197  BPM Final   • Reason for Termination 03/30/2021    Final                    Value:Target Heart Rate Achieved  Exaggerated BP increase     • Test Indication 03/30/2021 CHEST PAIN   Final   • Target Hr Formular 03/30/2021 (220 - Age)*85%   Final          __________________________________    History Review: The following portions of the patient's history were reviewed and updated as appropriate: allergies, current medications, past family history, past medical history, past social history, past surgical history and problem list.    Unchanged information from this writer's previous assessment is copied and italicized; information that has changed is bolded.     Past Psychiatric History:      Past psychiatric diagnoses:   • ADHD depression and anxiety  Inpatient psychiatric admissions:   • None  Prior outpatient psychiatric treatment:   • Had been a patient of Dr. Christiana Sherman at [facility] since March 2022  • Previously was seeing Dr. Wicho Bowens and Mushtaq Pena at [address] [place] [address] psychotherapy:   •  Weekly therapy online at a private agency  History of suicidal attempts/gestures:   • denies    History of non-suicidal self-injurious behavior:   • None  History of violence/aggressive behaviors:   • denies  Psychotropic medication trials:   • Effexor XR (2018) (nausea)  • Concerta ER 28OP (8317) - dilated pupils, chalenges with glares when driving  • Armodafinil (somewhat helpful)  • Ritalin 10mg, Vyvanse Strattera (vomiting)  • Zoloft, abilify, buspar, gabapentin, trintellix,  lexapro  • Pramipexole for RLS  • Nuvigil for hypersomnia (several years ago)  • Wellbutrin Xl 150mg - ineffective for augmentation purposes  Substance abuse inpatient/outpatient rehabilitation:   • Denies  Eating disorder history:   • no     Substance Abuse History:     Marijuana use 4-5 times per week   Formerly used psychedelics which "bordered on problematic"  Denies history of alcohol, illict substance, or tobacco abuse., Patient denies previous legal actions or arrests related to substance intoxication including prior DWIs/DUIs., Patient does not exhibit objective evidence of substance withdrawal during today's examination nor do they appear under the influence of any psychoactive substance.       Family Psychiatric History:      Psychiatric Family History: Father - depression, anxiety, alcohol use  Brother - anxiety, depression  Mother - Depression  Suicide Attempts: none  Patient otherwise denies known family history of psychiatric illness, substance use, or suicide attempts.     Social History:     Developmental: Patient denies a history of milestone/developmental delay. , Patient denies any known in-utero exposure to toxins or illicit substances. ,   Reports a history of IEP in 6th grade for "mental health"  In 6th grade got a kidney stone, was anxious about getting stent removed, mother was convinced he and neurological d/o and absence seizures, he missed a lot of school. 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 Unloaded but not locked. Provided two gun locks. Patient denies history of arrests or violence with a deadly weapon.     history: None     Traumatic History:      Abuse: Denies  Other Traumatic Events: Childhood issues as above    ___________________________________    This note was not shared with the patient due to reasonable likelihood of causing patient harm    Nicole Gresham MD   08/22/23

## 2023-08-22 NOTE — PATIENT INSTRUCTIONS
Today's changes:  - Please stop taking Wellbutrin XL  - Continue to take your Vyvanse and Lexapro as usual  - When you receive abilify 5mg you can begin taking this in the morning. If you notice it makes you feel tired you can switch to taking in the evening. Please call the office nursing staff for medication issues including refills, problems getting medications, bothersome side effects, etc at [phone]. Please return for a follow up appointment as discussed and arrive approximately 15 minutes prior to your appointment time. If you are running late or are unable to attend your appointment, please call our [place] office at [phone], or if you were seen in the [facility]) office, please call [phone]    If you have thoughts of harming yourself or are otherwise in psychological crisis, do not hesitate to contact your [address], or 911 or go to the nearest emergency room.   [facility] Crisis: [address] [facility] Crisis: [phone]  [address] Crisis: [address] [facility] Crisis: [address] [facility] Crisis: [address] Crisis: [address] Crisis: [phone]  National Suicide Prevention Hotline: 0-691.418.4722 or call 65

## 2023-08-23 DIAGNOSIS — F90.0 ADHD (ATTENTION DEFICIT HYPERACTIVITY DISORDER), INATTENTIVE TYPE: ICD-10-CM

## 2023-08-23 NOTE — TELEPHONE ENCOUNTER
Notified patient requesting the following refill:  Requested Prescriptions     Pending Prescriptions Disp Refills   • lisdexamfetamine (VYVANSE) 60 MG capsule 30 capsule 0     Sig: Take 1 capsule (60 mg total) by mouth every morning Max Daily Amount: 60 mg       Latrell Jones has been filling controlled prescriptions on time as prescribed according to [address]. Last filled on 7/7/23    Refill request was sent to Dr. Sanjiv Javed for [facility]

## 2023-08-23 NOTE — PROGRESS NOTES
Farideh Hadley called the clinic today and appropriately requested refill of controlled psychotropic medications (Vyvanse). Review of PDMP website reveals no concerns for abuse or misuse. As such, will refill script today for 30-days as I am covering for patient's primary provider.

## 2023-09-05 DIAGNOSIS — F33.1 MAJOR DEPRESSIVE DISORDER, RECURRENT, MODERATE (HCC): ICD-10-CM

## 2023-09-05 DIAGNOSIS — F41.1 GAD (GENERALIZED ANXIETY DISORDER): ICD-10-CM

## 2023-09-05 RX ORDER — ESCITALOPRAM OXALATE 20 MG/1
TABLET ORAL
Qty: 90 TABLET | Refills: 1 | Status: SHIPPED | OUTPATIENT
Start: 2023-09-05

## 2023-09-05 NOTE — TELEPHONE ENCOUNTER
Patient requested refill of :  Requested Prescriptions     Pending Prescriptions Disp Refills   • escitalopram (LEXAPRO) 20 mg tablet [Pharmacy Med Name: ESCITALOPRAM TABS 20MG] 90 tablet 1     Sig: TAKE 1 TABLET DAILY         Refill request approved and sent to pharmacy on file per patient request.

## 2023-09-19 ENCOUNTER — TELEMEDICINE (OUTPATIENT)
Dept: PSYCHIATRY | Facility: CLINIC | Age: 23
End: 2023-09-19
Payer: COMMERCIAL

## 2023-09-19 ENCOUNTER — APPOINTMENT (OUTPATIENT)
Dept: LAB | Facility: HOSPITAL | Age: 23
End: 2023-09-19
Payer: COMMERCIAL

## 2023-09-19 DIAGNOSIS — F41.1 GAD (GENERALIZED ANXIETY DISORDER): ICD-10-CM

## 2023-09-19 DIAGNOSIS — E66.01 CLASS 2 SEVERE OBESITY DUE TO EXCESS CALORIES WITH SERIOUS COMORBIDITY AND BODY MASS INDEX (BMI) OF 38.0 TO 38.9 IN ADULT: ICD-10-CM

## 2023-09-19 DIAGNOSIS — F33.1 MAJOR DEPRESSIVE DISORDER, RECURRENT, MODERATE (HCC): ICD-10-CM

## 2023-09-19 DIAGNOSIS — F90.0 ADHD (ATTENTION DEFICIT HYPERACTIVITY DISORDER), INATTENTIVE TYPE: ICD-10-CM

## 2023-09-19 DIAGNOSIS — F33.1 MAJOR DEPRESSIVE DISORDER, RECURRENT, MODERATE (HCC): Primary | ICD-10-CM

## 2023-09-19 DIAGNOSIS — R53.82 CHRONIC FATIGUE: ICD-10-CM

## 2023-09-19 DIAGNOSIS — Z13.220 SCREENING FOR LIPID DISORDERS: ICD-10-CM

## 2023-09-19 DIAGNOSIS — I10 ESSENTIAL HYPERTENSION: ICD-10-CM

## 2023-09-19 LAB
25(OH)D3 SERPL-MCNC: 21 NG/ML (ref 30–100)
ALBUMIN SERPL BCP-MCNC: 4.2 G/DL (ref 3.5–5)
ALP SERPL-CCNC: 81 U/L (ref 34–104)
ALT SERPL W P-5'-P-CCNC: 36 U/L (ref 7–52)
ANION GAP SERPL CALCULATED.3IONS-SCNC: 5 MMOL/L
AST SERPL W P-5'-P-CCNC: 19 U/L (ref 13–39)
BASOPHILS # BLD AUTO: 0.04 THOUSANDS/ÂΜL (ref 0–0.1)
BASOPHILS NFR BLD AUTO: 1 % (ref 0–1)
BILIRUB SERPL-MCNC: 0.64 MG/DL (ref 0.2–1)
BUN SERPL-MCNC: 14 MG/DL (ref 5–25)
CALCIUM SERPL-MCNC: 10 MG/DL (ref 8.4–10.2)
CHLORIDE SERPL-SCNC: 103 MMOL/L (ref 96–108)
CHOLEST SERPL-MCNC: 193 MG/DL
CO2 SERPL-SCNC: 31 MMOL/L (ref 21–32)
CREAT SERPL-MCNC: 0.91 MG/DL (ref 0.6–1.3)
EOSINOPHIL # BLD AUTO: 0.15 THOUSAND/ÂΜL (ref 0–0.61)
EOSINOPHIL NFR BLD AUTO: 2 % (ref 0–6)
ERYTHROCYTE [DISTWIDTH] IN BLOOD BY AUTOMATED COUNT: 13.3 % (ref 11.6–15.1)
EST. AVERAGE GLUCOSE BLD GHB EST-MCNC: 100 MG/DL
GFR SERPL CREATININE-BSD FRML MDRD: 116 ML/MIN/1.73SQ M
GLUCOSE P FAST SERPL-MCNC: 89 MG/DL (ref 65–99)
HBA1C MFR BLD: 5.1 %
HCT VFR BLD AUTO: 44.3 % (ref 36.5–49.3)
HDLC SERPL-MCNC: 34 MG/DL
HGB BLD-MCNC: 15.3 G/DL (ref 12–17)
IMM GRANULOCYTES # BLD AUTO: 0.05 THOUSAND/UL (ref 0–0.2)
IMM GRANULOCYTES NFR BLD AUTO: 1 % (ref 0–2)
LDLC SERPL CALC-MCNC: 125 MG/DL (ref 0–100)
LYMPHOCYTES # BLD AUTO: 2.56 THOUSANDS/ÂΜL (ref 0.6–4.47)
LYMPHOCYTES NFR BLD AUTO: 29 % (ref 14–44)
MCH RBC QN AUTO: 29.7 PG (ref 26.8–34.3)
MCHC RBC AUTO-ENTMCNC: 34.5 G/DL (ref 31.4–37.4)
MCV RBC AUTO: 86 FL (ref 82–98)
MONOCYTES # BLD AUTO: 0.64 THOUSAND/ÂΜL (ref 0.17–1.22)
MONOCYTES NFR BLD AUTO: 7 % (ref 4–12)
NEUTROPHILS # BLD AUTO: 5.36 THOUSANDS/ÂΜL (ref 1.85–7.62)
NEUTS SEG NFR BLD AUTO: 60 % (ref 43–75)
NRBC BLD AUTO-RTO: 0 /100 WBCS
PLATELET # BLD AUTO: 260 THOUSANDS/UL (ref 149–390)
PMV BLD AUTO: 9.8 FL (ref 8.9–12.7)
POTASSIUM SERPL-SCNC: 4.1 MMOL/L (ref 3.5–5.3)
PROT SERPL-MCNC: 6.7 G/DL (ref 6.4–8.4)
RBC # BLD AUTO: 5.16 MILLION/UL (ref 3.88–5.62)
SODIUM SERPL-SCNC: 139 MMOL/L (ref 135–147)
TRIGL SERPL-MCNC: 168 MG/DL
TSH SERPL DL<=0.05 MIU/L-ACNC: 3.19 UIU/ML (ref 0.45–4.5)
WBC # BLD AUTO: 8.8 THOUSAND/UL (ref 4.31–10.16)

## 2023-09-19 PROCEDURE — 82306 VITAMIN D 25 HYDROXY: CPT

## 2023-09-19 PROCEDURE — 84443 ASSAY THYROID STIM HORMONE: CPT

## 2023-09-19 PROCEDURE — 83036 HEMOGLOBIN GLYCOSYLATED A1C: CPT

## 2023-09-19 PROCEDURE — 80061 LIPID PANEL: CPT

## 2023-09-19 PROCEDURE — 85025 COMPLETE CBC W/AUTO DIFF WBC: CPT

## 2023-09-19 PROCEDURE — 99214 OFFICE O/P EST MOD 30 MIN: CPT | Performed by: PSYCHIATRY & NEUROLOGY

## 2023-09-19 PROCEDURE — 36415 COLL VENOUS BLD VENIPUNCTURE: CPT

## 2023-09-19 PROCEDURE — 80053 COMPREHEN METABOLIC PANEL: CPT

## 2023-09-19 NOTE — PSYCH
MEDICATION MANAGEMENT NOTE        [facility] [facility]    Virtual Visit Disclaimer & Required Documentation  TeleMed provider: Jose Moss MD and Dr. Dany Acosta, located at [address] [place] [address], [address] [place] [address] Road in [place] [address]. The patient is also located in [place] which is the state in which I hold an active license. The patient was identified by name and date of birth. Lorin Fletcher was informed that this is a telemedicine visit and that the visit is being conducted through [facility] and patient was informed this is a secure, HIPAA-complaint platform. He agrees to proceed. My office door was closed. No one else was in the room. He acknowledged consent and understanding of privacy and security of the video platform. The patient has agreed to participate and understands they can discontinue the visit at any time. Lorin Fletcher verbally agrees to participate in [employer]. Pt is aware that [employer] could be limited without vital signs or the ability to perform a full hands-on physical exam. The patient understands he or the provider may request at any time to terminate the video visit and request the patient to seek care or treatment in person. Patient is aware this is a billable service. Name and Date of Birth:  Lorin Fletcher 23 y.o. 2000 MRN: [id number]    Date of Visit: September 19, 2023    Reason for Visit: Follow-up visit regarding medication management     _____________________________    Assessment/Plan   Thierno Sprague a 20 y. o.  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  Beti Bradly was personally seen and evaluated today at the [address] [place] [address] outpatient clinic for follow-up regarding medication management. On assessment, Linda Bonilla reports ongoing depressed thoughts and feelings, and ongoing anxiety difficulties in the setting of worsening of his idiopathic hypersomnia causing chronic fatigue, and caregiver burnout in caring for his parents. He has not followed with sleep medicine in a few years and had not tried any medications other than Concerta to address his idiopathic hypersomnia; we discussed he needs to follow up with sleep medicine as soon as possible to discuss further treatments to improve his quality of life. He also hopes to increase his exercising and improve his diet. We discussed strategies to improve medication adherence, such as pill location. Denies thoughts to harm himself, is future oriented with goals to improve his mental and physical wellness. DSM-5 Diagnoses:     1. Major depressive disorder, recurrent, moderate (HCC)    2. KORY (generalized anxiety disorder)    3. Chronic fatigue    4. ADHD (attention deficit hyperactivity disorder), inattentive type        Treatment Recommendations/Precautions:  • Continue psychopharmacological management as follows:  o Lexapro 20mg daily for depression and anxiety  o Vyvanse 60mg daily for ADHD symptoms  o Abilify 5mg daily for antidepressant augmentation  • Labs most recently obtained today, are still pending, discussed Lipid panel which had returned by time of appointment and discussed lifestyle changes to address high TG  • Follow up in 6 weeks for medication management  • Follow up with PCP for medical issues and ongoing care  • Continue psychotherapy  • Follow up with sleep medicine as soon as able  • Continue psychotherapy with own therapist  • Aware of 24 hour and weekend coverage for urgent situations accessed by calling [facility] main practice number    Individual psychotherapy provided: Counseling was provided during the session today for 15 minutes.      Treatment Plan:     Completed and signed during the session: Not applicable - Treatment Plan not due at this session    Medications Risks/Benefits:      Risks, Benefits And Possible Side Effects Of Medications:    Risks, benefits, and possible side effects of medications explained to [facility] and he verbalizes understanding and agreement for treatment. Controlled Medication Discussion:     [facility] has been filling controlled prescriptions on time as prescribed according to [facility] Monitoring Program    Medical Decision Making / Counseling / Coordination of Care: The following interventions are recommended: return in 6 weeks for follow up. Although patient's acute lethality risk is LOW, long-term/chronic lethality risk is mildly elevated given the risk factors listed above. However, at the current moment, [facility] is future-oriented, forward-thinking, and demonstrates ability to act in a self-preserving manner as evidenced by volitionally seeking psychiatric evaluation and treatment today. To mitigate future risk, patient should adhere to treatment recommendations, avoid alcohol/illicit substance use, utilize community-based resources and familiar support, and prioritize mental health treatment. The diagnosis and treatment plan were reviewed with the patient. Risks, benefits, and alternatives to treatment were discussed. The importance of medication and treatment compliance was reviewed with the patient.     _____________________________________________    History of Present Illness     Chief Complaint: "My sleep issues have been worse"    SUBJECTIVE:    Farideh Hadley is a 20 y.o. male, possessing a past psychiatric history significant for MDD, KORY, who was personally seen and evaluated at the [address] [place] [address] outpatient clinic virtually for follow-up regarding medication management.  At their last visit, the plan was to initate abilify to adjunct lexapro and vyvanse, and to discontinue Wellbutrin. Leigha Mcdonald states that since their previous psychiatric appointment with this writer, things have been somewhat worse in terms of his fatigue and idiopathic hypersomnia. He has been continuing to struggle with sleep, has been sleeping most of the day every day, poor energy, and has been unable to get his required tasks done. He has been unable to tell if the medication is working due to this, as he has been unable to get it until recently. Had labs drawn this morning. He feels caregiver burnout and his house has been messy as he has not been cleaning as much as he usually does. He reports negative thoughts such as wishing he were not born but denies passive death wishes, active SI, or plan or intent. He reports he can talk to his brother about things if these thoughts worsen. At times he misses medication dosages due to the sleeping issue, if too late in the day he will not take the Vyvanse, discussed strategies to decrease nonadherence. Has been seeing therapist weekly. Wants to try eating healthier and exercising more to better support his body and physical wellness, we discussed this will benefit his mental health as well. He reports he has not followed with sleep medicine in a few years and had only tried Concerta for his hypersomnia. We discussed this is important to follow with sleep medicine. Presently, patient denies suicidal/homicidal ideation in addition to thoughts of self-injury. At conclusion of evaluation, patient is amenable to the recommendations of this writer including: continue psychotropic medications as prescribed, continue therapy. Also, patient is amenable to calling/contacting the outpatient office including this writer if any acute adverse effects of their medication regimen arise in addition to any comments or concerns pertaining to their psychiatric management.   Patient is amenable to calling/contacting crisis and/or attending to the nearest emergency department if their clinical condition deteriorates to assure their safety and stability, stating that they are able to appropriately confide in their brother regarding their psychiatric state. Current Rating Scores:     None completed today.     Psychiatric Review Of Systems:    Appetite: no  Adverse eating: no  Weight changes: no  Insomnia/sleeplessness: hypersomnia  Fatigue/anergy: decreased  Anhedonia/lack of interest: has been playing video games but less enjoyment over the last week, less motivation for other hobbies  Attention/concentration: no, improved with vyvanse  Psychomotor agitation/retardation: no  Somatic symptoms: no  Anxiety/panic attack: less than previously, not as much worrying  June/hypomania: no  Hopelessness/helplessness/worthlessness: no  Self-injurious behavior/high-risk behavior: no  Suicidal ideation: no  Homicidal ideation: no  Auditory hallucinations: no  Visual hallucinations: no  Other perceptual disturbances: no  Delusional thinking: no  Obsessive/compulsive symptoms: no    Review Of Systems:      Constitutional negative   ENT negative   Cardiovascular negative   Respiratory negative   Gastrointestinal negative   Genitourinary negative   Musculoskeletal negative   Integumentary negative   Neurological negative   Endocrine negative   Other Symptoms none, all other systems are negative     Objective    OBJECTIVE:     Visit Vitals  Smoking Status Never      Wt Readings from Last 6 Encounters:   07/25/23 (!) 141 kg (311 lb)   07/13/23 (!) 141 kg (311 lb 14.4 oz)   03/10/22 118 kg (261 lb)   08/11/21 113 kg (250 lb)   06/21/21 123 kg (272 lb)   05/26/21 132 kg (290 lb)        Past Medical History:   Diagnosis Date   • ADHD (attention deficit hyperactivity disorder)    • Anxiety    • Concussion    • Depression    • Kidney stones    • Self-injurious behavior    • Sleep difficulties     history of sleep apnea  and getting workup for narcolepsy as of 8/19      Past Surgical History:   Procedure Laterality Date   • HERNIA REPAIR     • TONSILLECTOMY     • TOOTH EXTRACTION Right 09/21/2020    R lower molar extraction       Meds/Allergies    Allergies   Allergen Reactions   • Scopolamine      Current Outpatient Medications   Medication Instructions   • ARIPiprazole (ABILIFY) 5 mg, Oral, Daily   • escitalopram (LEXAPRO) 20 mg tablet TAKE 1 TABLET DAILY   • hydrochlorothiazide (HYDRODIURIL) 25 mg, Oral, Daily   • lisdexamfetamine (VYVANSE) 60 mg, Oral, Every morning   • melatonin 1 mg, Oral, Daily at bedtime PRN   • Vitamin D3 4,000 Units, Oral, Daily           Mental Status Exam:    Appearance age appropriate, casually dressed, wearing glasses   Behavior cooperative, calm   Speech normal rate, normal volume, normal pitch   Mood euthymic   Affect normal range and intensity, appropriate   Thought Processes organized, logical, goal directed   Associations intact associations   Thought Content no overt delusions   Perceptual Disturbances: Denies auditory or visual hallucinations and Does not appear to be responding to internal stimuli   Abnormal Thoughts  Risk Potential Denies suicidal or homicidal ideation, plan, or intent   Orientation oriented to person, place, time/date and situation   Memory recent and remote memory grossly intact   Consciousness alert and awake   Attention Span Concentration Span attention span and concentration are age appropriate   Intellect appears to be of average intelligence   Insight fair   Judgement fair   Muscle Strength and  Gait unable to assess today due to virtual visit   Motor Activity no abnormal movements   Language no difficulty naming common objects, no difficulty repeating a phrase   Fund of Knowledge adequate knowledge of current events  adequate fund of knowledge regarding past history  adequate fund of knowledge regarding vocabulary      Laboratory Results: I have personally reviewed all pertinent laboratory/tests results    Appointment on 09/19/2023   Component Date Value Ref Range Status   • TSH 3RD GENERATON 09/19/2023 3. 192  0.450 - 4.500 uIU/mL Final    Adult TSH (3rd generation) reference range follows the recommended guidelines of the American Thyroid Association, January, 2020.    • WBC 09/19/2023 8.80  4.31 - 10.16 Thousand/uL Final   • RBC 09/19/2023 5.16  3.88 - 5.62 Million/uL Final   • Hemoglobin 09/19/2023 15.3  12.0 - 17.0 g/dL Final   • Hematocrit 09/19/2023 44.3  36.5 - 49.3 % Final   • MCV 09/19/2023 86  82 - 98 fL Final   • MCH 09/19/2023 29.7  26.8 - 34.3 pg Final   • MCHC 09/19/2023 34.5  31.4 - 37.4 g/dL Final   • RDW 09/19/2023 13.3  11.6 - 15.1 % Final   • MPV 09/19/2023 9.8  8.9 - 12.7 fL Final   • Platelets 17/63/6678 260  149 - 390 Thousands/uL Final   • nRBC 09/19/2023 0  /100 WBCs Final   • Neutrophils Relative 09/19/2023 60  43 - 75 % Final   • Immat GRANS % 09/19/2023 1  0 - 2 % Final   • Lymphocytes Relative 09/19/2023 29  14 - 44 % Final   • Monocytes Relative 09/19/2023 7  4 - 12 % Final   • Eosinophils Relative 09/19/2023 2  0 - 6 % Final   • Basophils Relative 09/19/2023 1  0 - 1 % Final   • Neutrophils Absolute 09/19/2023 5.36  1.85 - 7.62 Thousands/µL Final   • Immature Grans Absolute 09/19/2023 0.05  0.00 - 0.20 Thousand/uL Final   • Lymphocytes Absolute 09/19/2023 2.56  0.60 - 4.47 Thousands/µL Final   • Monocytes Absolute 09/19/2023 0.64  0.17 - 1.22 Thousand/µL Final   • Eosinophils Absolute 09/19/2023 0.15  0.00 - 0.61 Thousand/µL Final   • Basophils Absolute 09/19/2023 0.04  0.00 - 0.10 Thousands/µL Final   • Sodium 09/19/2023 139  135 - 147 mmol/L Final   • Potassium 09/19/2023 4.1  3.5 - 5.3 mmol/L Final   • Chloride 09/19/2023 103  96 - 108 mmol/L Final   • CO2 09/19/2023 31  21 - 32 mmol/L Final   • ANION GAP 09/19/2023 5  mmol/L Final   • BUN 09/19/2023 14  5 - 25 mg/dL Final   • Creatinine 09/19/2023 0.91  0.60 - 1.30 mg/dL Final    Standardized to IDMS reference method   • Glucose, Fasting 09/19/2023 89  65 - 99 mg/dL Final   • Calcium 09/19/2023 10.0  8.4 - 10.2 mg/dL Final   • AST 09/19/2023 19  13 - 39 U/L Final   • ALT 09/19/2023 36  7 - 52 U/L Final    Specimen collection should occur prior to Sulfasalazine administration due to the potential for falsely depressed results. • Alkaline Phosphatase 09/19/2023 81  34 - 104 U/L Final   • Total Protein 09/19/2023 6.7  6.4 - 8.4 g/dL Final   • Albumin 09/19/2023 4.2  3.5 - 5.0 g/dL Final   • Total Bilirubin 09/19/2023 0.64  0.20 - 1.00 mg/dL Final    Use of this assay is not recommended for patients undergoing treatment with eltrombopag due to the potential for falsely elevated results. N-acetyl-p-benzoquinone imine (metabolite of Acetaminophen) will generate erroneously low results in samples for patients that have taken an overdose of Acetaminophen. • eGFR 09/19/2023 116  ml/min/1.73sq m Final   • Cholesterol 09/19/2023 193  See Comment mg/dL Final    Cholesterol:         Pediatric <18 Years        Desirable          <170 mg/dL      Borderline High    170-199 mg/dL      High               >=200 mg/dL        Adult >=18 Years            Desirable         <200 mg/dL      Borderline High   200-239 mg/dL      High              >239 mg/dL     • Triglycerides 09/19/2023 168 (H)  See Comment mg/dL Final    Triglyceride:     0-9Y            <75mg/dL     10Y-17Y         <90 mg/dL       >=18Y     Normal          <150 mg/dL     Borderline High 150-199 mg/dL     High            200-499 mg/dL        Very High       >499 mg/dL    Specimen collection should occur prior to Metamizole administration due to the potential for falsely depressed results.    • HDL, Direct 09/19/2023 34 (L)  >=40 mg/dL Final   • LDL Calculated 09/19/2023 125 (H)  0 - 100 mg/dL Final    LDL Cholesterol:     Optimal           <100 mg/dl     Near Optimal      100-129 mg/dl     Above Optimal       Borderline High 130-159 mg/dl       High            160-189 mg/dl       Very High       >189 mg/dl         This screening LDL is a calculated result. It does not have the accuracy of the Direct Measured LDL in the monitoring of patients with hyperlipidemia and/or statin therapy. Direct Measure LDL (QAL637) must be ordered separately in these patients.             ___________________________________    History Review: The following portions of the patient's history were reviewed and updated as appropriate: allergies, current medications, past family history, past medical history, past social history, past surgical history and problem list.    Unchanged information from this writer's previous assessment is copied and italicized; information that has changed is bolded.     Past Psychiatric History:      Past psychiatric diagnoses:   • ADHD depression and anxiety  Inpatient psychiatric admissions:   • None  Prior outpatient psychiatric treatment:   • Had been a patient of Dr. Karan Burns at [facility] since March 2022  • Previously was seeing Dr. Sherell Baumgarten and Nancy Long at [address] [place] [address] psychotherapy:   • Sank[facility] Starling therapy online at a private agency  History of suicidal attempts/gestures:   • denies    History of non-suicidal self-injurious behavior:   • None  History of violence/aggressive behaviors:   • denies  Psychotropic medication trials:   • Effexor XR (6380) (ASQFJJ)  • Concerta ER 53II (3776) - dilated pupils, chalenges with glares when driving  • Armodafinil (somewhat helpful)  • Ritalin 10mg, Vyvanse Strattera (vomiting)  • Zoloft, abilify, buspar, gabapentin, trintellix,  lexapro  • Pramipexole for RLS  • Nuvigil for hypersomnia (several years ago)  • Wellbutrin Xl 150mg - ineffective for augmentation purposes  Substance abuse inpatient/outpatient rehabilitation:   • Denies  Eating disorder history:   • no     Substance Abuse History:     Marijuana use 4-5 times per week   Formerly used psychedelics which "bordered on problematic"  Denies history of alcohol, illict substance, or tobacco abuse., Patient denies previous legal actions or arrests related to substance intoxication including prior DWIs/DUIs., Patient does not exhibit objective evidence of substance withdrawal during today's examination nor do they appear under the influence of any psychoactive substance.       Family Psychiatric History:      Psychiatric Family History: Father - depression, anxiety, alcohol use  Brother - anxiety, depression  Mother - Depression  Suicide Attempts: none  Patient otherwise denies known family history of psychiatric illness, substance use, or suicide attempts.     Social History:     Developmental: Patient denies a history of milestone/developmental delay. , Patient denies any known in-utero exposure to toxins or illicit substances. ,   Reports a history of IEP in 6th grade for "mental health"  In 6th grade got a kidney stone, was anxious about getting stent removed, mother was convinced he and neurological d/o and absence seizures, he missed a lot of school. 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 Unloaded but not locked.  Provided two gun locks.  Patient denies history of arrests or violence with a deadly weapon.    history: None     Traumatic History:      Abuse: Denies  Other Traumatic Events: Childhood issues as above  ___________________________________    This note was not shared with the patient due to reasonable likelihood of causing patient harm    Champ Garrett MD   09/19/23

## 2023-09-20 DIAGNOSIS — E55.9 VITAMIN D DEFICIENCY: Primary | ICD-10-CM

## 2023-09-20 RX ORDER — ERGOCALCIFEROL 1.25 MG/1
50000 CAPSULE ORAL WEEKLY
Qty: 8 CAPSULE | Refills: 0 | Status: SHIPPED | OUTPATIENT
Start: 2023-09-20

## 2023-09-20 NOTE — PROGRESS NOTES
Called Marko to discuss results of blood work, had discussed lipid panel in our appointment, however Vitamin D level which resulted last night demonstrates deficiency. Discussed daily vs weekly dosing, due to difficulty taking medication daily, decided upon once weekly dosing of 50,000 units for 8 weeks, and will obtain another level following this. Rest of labs appear to be within normal limits. All questions answered, encouraged Marilu Guerrero to call back with any questions or concerns and he voiced understanding and agreement.

## 2023-09-29 DIAGNOSIS — F90.0 ADHD (ATTENTION DEFICIT HYPERACTIVITY DISORDER), INATTENTIVE TYPE: ICD-10-CM

## 2023-09-29 NOTE — TELEPHONE ENCOUNTER
Medication Refill Request     Name of Medication VYVANSE  Dose/Frequency 60 MG/ take 1 capsule in morning  Quantity 60  Verified pharmacy   [x] [facility]  Verified ordering Provider   []  Does patient have enough for the next 3 days? Yes [] No [x]  Does patient have a follow-up appointment scheduled?  Yes [x] No []   If so when is appointment: 10/31 Pt admitted from [facility] assisted living, had [id number] PTA. Being treated for resp failure; on NC, ANJU on CKD3, and other comorbidities. PMH CAD, CHF, GERD, COPD, RA, DM, HTN, gout, HTN, anemia. When stable pt to be d/c to [facility]. Pt admitted from [facility] assisted living, had [id number] PTA. Being treated for resp failure; on NC, ANJU on CKD3, and other comorbidities. PMH CAD, CHF, GERD, COPD, RA, DM, HTN, gout, HTN, anemia, s/p ICD implant, cholecystectomy. When stable pt to be d/c to [facility]. Per RN pt eating well and in no GI distress.

## 2023-10-02 RX ORDER — LISDEXAMFETAMINE DIMESYLATE CAPSULES 60 MG/1
60 CAPSULE ORAL EVERY MORNING
Qty: 30 CAPSULE | Refills: 0
Start: 2023-10-02 | End: 2023-10-02 | Stop reason: SDUPTHER

## 2023-10-02 RX ORDER — LISDEXAMFETAMINE DIMESYLATE CAPSULES 60 MG/1
60 CAPSULE ORAL EVERY MORNING
Qty: 30 CAPSULE | Refills: 0 | Status: SHIPPED | OUTPATIENT
Start: 2023-10-02 | End: 2023-11-01

## 2023-10-02 NOTE — TELEPHONE ENCOUNTER
Notified patient requesting the following refill:  Requested Prescriptions     Signed Prescriptions Disp Refills   • lisdexamfetamine (VYVANSE) 60 MG capsule 30 capsule 0     Sig: Take 1 capsule (60 mg total) by mouth every morning Max Daily Amount: 60 mg     Authorizing Provider: Janett Eli has been filling controlled prescriptions on time as prescribed according to [address] [facility] [address] Program    Refill request was sent to Dr. Segundo Sanchez, my indirect supervisor, for cosignature.

## 2023-10-19 DIAGNOSIS — F33.1 MAJOR DEPRESSIVE DISORDER, RECURRENT, MODERATE (HCC): ICD-10-CM

## 2023-10-19 RX ORDER — ARIPIPRAZOLE 5 MG/1
5 TABLET ORAL DAILY
Qty: 30 TABLET | Refills: 0 | Status: SHIPPED | OUTPATIENT
Start: 2023-10-19 | End: 2023-11-18

## 2023-10-19 NOTE — TELEPHONE ENCOUNTER
Covering for Dr. Juani Parker.     Patient requested refill of :  Requested Prescriptions     Pending Prescriptions Disp Refills    ARIPiprazole (ABILIFY) 5 mg tablet 30 tablet 1     Sig: Take 1 tablet (5 mg total) by mouth daily         Refill request approved and sent to pharmacy on file per patient request.

## 2023-10-20 DIAGNOSIS — I10 ESSENTIAL HYPERTENSION: ICD-10-CM

## 2023-10-20 RX ORDER — HYDROCHLOROTHIAZIDE 25 MG/1
25 TABLET ORAL DAILY
Qty: 90 TABLET | Refills: 0 | Status: SHIPPED | OUTPATIENT
Start: 2023-10-20

## 2023-10-24 ENCOUNTER — TELEPHONE (OUTPATIENT)
Dept: NEUROLOGY | Facility: CLINIC | Age: 23
End: 2023-10-24

## 2023-10-24 NOTE — TELEPHONE ENCOUNTER
Called and LVM for patient regarding confirmation for upcoming appt w/ Dr. Keri Sultana. Provided patient w/ appt time, date, and location.

## 2023-10-30 ENCOUNTER — OFFICE VISIT (OUTPATIENT)
Dept: NEUROLOGY | Facility: CLINIC | Age: 23
End: 2023-10-30
Payer: COMMERCIAL

## 2023-10-30 VITALS
WEIGHT: 308.4 LBS | HEIGHT: 75 IN | HEART RATE: 66 BPM | OXYGEN SATURATION: 97 % | SYSTOLIC BLOOD PRESSURE: 130 MMHG | BODY MASS INDEX: 38.35 KG/M2 | DIASTOLIC BLOOD PRESSURE: 80 MMHG

## 2023-10-30 DIAGNOSIS — F12.90 MARIJUANA USE: ICD-10-CM

## 2023-10-30 DIAGNOSIS — E61.1 IRON DEFICIENCY: ICD-10-CM

## 2023-10-30 DIAGNOSIS — G47.61 PLMD (PERIODIC LIMB MOVEMENT DISORDER): ICD-10-CM

## 2023-10-30 DIAGNOSIS — G47.8 SLEEP PARALYSIS: ICD-10-CM

## 2023-10-30 DIAGNOSIS — E66.09 CLASS 2 OBESITY DUE TO EXCESS CALORIES WITHOUT SERIOUS COMORBIDITY WITH BODY MASS INDEX (BMI) OF 38.0 TO 38.9 IN ADULT: ICD-10-CM

## 2023-10-30 DIAGNOSIS — R06.83 SNORING: Primary | ICD-10-CM

## 2023-10-30 DIAGNOSIS — G47.19 EXCESSIVE DAYTIME SLEEPINESS: ICD-10-CM

## 2023-10-30 DIAGNOSIS — G25.81 RESTLESS LEG SYNDROME: ICD-10-CM

## 2023-10-30 DIAGNOSIS — E55.9 VITAMIN D DEFICIENCY: ICD-10-CM

## 2023-10-30 PROCEDURE — 99204 OFFICE O/P NEW MOD 45 MIN: CPT | Performed by: INTERNAL MEDICINE

## 2023-10-30 NOTE — PATIENT INSTRUCTIONS
Please fill out sleep diary 2 weeks before study  Stop taking melatonin   Try to decrease marijuana use  Get iron panel done on an empty stomach

## 2023-10-30 NOTE — PROGRESS NOTES
Sleep Consultation   Velvet Ormond 20 y.o. male MRN: [id number]      Reason for consultation: Excessive daytime sleepiness    Requesting physician: Lelia Brewer MD psychiatry    Assessment/Plan      1. Excessive daytime sleepiness  Diagnostic PSG on 6/13/2019 showed AHI 0.9, PLM index 32.9  Sleep efficiency 56.7%    Diagnostic PSG and MSLT on 8/15/2019 showed AHI 0.5, PLM 2.3    MSLT showed mean sleep latency of 18 minutes with no SOREM's    Sleeping 11 to 12 hours at night and continues to take naps during the day  Taking Vyvanse 60 mg which helps with wakefulness and ADHD  Taking Lexapro 20 mg daily and Abilify 5 mg    High risk for sleep apnea based on snoring, tiredness, elevated blood pressure, male gender, elevated BMI    Impression  This could be due to medications  Recommended he stop taking melatonin at night  Could be due to excessive marijuana use  He reports hypnagogic hallucinations and occasional sleep paralysis which has not occurred recently  Could be narcolepsy type II as patient denies history of cataplexy    Plan  Ordered diagnostic PSG with MSLT  Recommended he fill out 2-week sleep diary prior to sleep study    2. Restless legs syndrome   Frequent bothersome symptoms  Previous sleep study showed markedly elevated PLM index  Last iron panel from 2020 was normal with ferritin above 100    Plan   Ordered repeat iron panel    3. Periodic limb movement disorder  As above    4. Obesity  Counseled patient on lifestyle modifications including diet and exercise  BMI 38.55    5. Marijuana use  Patient states he smokes daily  He generally smokes in the morning  I explained that this could be causing his excessive daytime sleepiness and unrefreshing sleep as well    6.   Snoring  Worsened with recent weight gain of 50 pounds in the past year  During previous sleep studies which did not show obstructive sleep apnea he weighed approximately 280 pounds, currently 308 pounds    Plan  Ordered diagnostic PSG with MSLT    7. Vitamin D deficiency  Currently on 50,000 units weekly for 8 weeks as prescribed by another physician  I explained that vitamin D deficiency can also lead to daytime tiredness    8. History of isolated sleep paralysis    9. Hypnagogic hallucinations  Patient states that he was told by neurologist that could also be related to ocular migraines    History of Present Illness   HPI:  Kalli Solares is a 20 y.o. male with PMHx of hypertension, restless leg syndrome, ADHD, depression who presents for evaluation of excessive daytime sleepiness. He reports sleeping from 11 PM to 10 AM nightly. He is averaging 11 to 12 hours of sleep. He occasionally wakes up for unknown reasons. Despite sleeping so many hours he still feels tired and sleepy throughout the day. Current [place] score of 12. He takes Vyvanse in the morning for ADHD and also to help with wakefulness. He reports loud snoring. He has a smart watch which shows that his oxygen levels are decreasing to 70s and 80s. He reports recent weight gain of 50 pounds. He has been tested for sleep apnea in the past and was negative. Previous MSLT's were also nondiagnostic. He states he has history of being diagnosed with narcolepsy which was subsequently disproven according to the patient. He smokes marijuana daily generally in the morning. He occasionally drinks beer. He consumes up to 350 mg of caffeine daily. He reports episodes of hypnagogic hallucinations but thinks that they may be related to ocular migraines. He has not had them recently. He reports history of sleep paralysis also not in the past year. He reports frequent restless leg symptoms that are quite bothersome. They generally occur at rest and at night. He reports tossing and turning in his sleep and frequent kicking. He reports decreased concentration and memory. He frequently sleep talks.         Review of Systems      Genitourinary none   Cardiology none   Gastrointestinal none   Neurology none   Constitutional none   Integumentary none   Psychiatry none   Musculoskeletal none   Pulmonary none   ENT none   Endocrine none   Hematological none         Historical Information   Past Medical History:   Diagnosis Date    ADHD (attention deficit hyperactivity disorder)     Anxiety     Concussion     Depression     Kidney stones     Self-injurious behavior     Sleep difficulties     history of sleep apnea  and getting workup for narcolepsy as of 8/19     Past Surgical History:   Procedure Laterality Date    HERNIA REPAIR      TONSILLECTOMY      TOOTH EXTRACTION Right 09/21/2020    R lower molar extraction     Family History   Problem Relation Age of Onset    COPD Mother     Hypertension Mother     Depression Mother     Hypertension Father     Depression Father     Anxiety disorder Father     Alcohol abuse Father     Anxiety disorder Brother     Depression Brother     Hypertension Brother     Alcohol abuse Paternal Grandfather      Social History     Socioeconomic History    Marital status: Single     Spouse name: Not on file    Number of children: 0    Years of education: Not on file    Highest education level: Some college, no degree   Occupational History    Occupation: [employer]     Comment: [employer]   Tobacco Use    Smoking status: Never    Smokeless tobacco: Never   Vaping Use    Vaping Use: Never used   Substance and Sexual Activity    Alcohol use: Yes     Comment: socially    Drug use: Yes     Types: Marijuana     Comment: ocasionally    Sexual activity: Not on file   Other Topics Concern    Not on file   Social History Narrative    Not on file     Social Determinants of Health     Financial Resource Strain: Not on file   Food Insecurity: Not on file   Transportation Needs: Not on file   Physical Activity: Not on file   Stress: Not on file   Social Connections: Not on file   Intimate Partner Violence: Not on file   Housing Stability: Not on file       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Unemployed    Meds/Allergies   Allergies   Allergen Reactions    Scopolamine        Home medications:  Prior to Admission medications    Medication Sig Start Date End Date Taking? Authorizing Provider   ARIPiprazole (ABILIFY) 5 mg tablet Take 1 tablet (5 mg total) by mouth daily 10/19/23 11/18/23 Yes Emeka More DO   escitalopram (LEXAPRO) 20 mg tablet TAKE 1 TABLET DAILY 9/5/23  Yes Ric Montejo MD   hydrochlorothiazide (HYDRODIURIL) 25 mg tablet take 1 tablet by mouth once daily 10/20/23  Yes Osman Reagan PA-C   lisdexamfetamine (VYVANSE) 60 MG capsule Take 1 capsule (60 mg total) by mouth every morning Max Daily Amount: 60 mg 10/2/23 11/1/23 Yes Marvis Holstein III, DO   melatonin 1 mg Take 1 mg by mouth daily at bedtime as needed   Yes Historical Provider, MD   Vitamin D, Ergocalciferol, 71532 units CAPS Take 50,000 Units by mouth once a week 9/20/23  Yes Ric Montejo MD   Cholecalciferol (Vitamin D3) 50 MCG (2000 UT) capsule Take 2 capsules (4,000 Units total) by mouth daily 1/5/21 9/20/23  Nimesh Mon MD       Vitals:   Blood pressure 130/80, pulse 66, height 6' 3" (1.905 m), weight (!) 140 kg (308 lb 6.4 oz), SpO2 97 %. , Neck circumference 16.5 inches, Body mass index is 38.55 kg/m². Physical Exam  General: Awake alert and oriented x 3, conversant without conversational dyspnea, NAD, normal affect  HEENT:  PERRL, Sclera noninjected, nonicteric OU, Nares patent,  no craniofacial abnormalities, Mucous membranes, moist, no oral lesions, normal dentition  NECK:  Trachea midline, no accessory muscle use, no stridor, no cervical or supraclavicular adenopathy, JVP not elevated  CARDIAC: Reg, single s1/S2, no m/r/g  PULM: CTA bilaterally no wheezing, rhonchi or rales  EXT: No cyanosis, no clubbing, no edema, normal capillary refill  NEURO: no focal neurologic deficits, AAOx3, moving all extremities appropriately    Labs: I have personally reviewed pertinent lab results.   Lab Results Component Value Date    WBC 8.80 09/19/2023    HGB 15.3 09/19/2023    HCT 44.3 09/19/2023    MCV 86 09/19/2023     09/19/2023      Lab Results   Component Value Date    GLUCOSE 86 12/30/2014    CALCIUM 10.0 09/19/2023     12/30/2014    K 4.1 09/19/2023    CO2 31 09/19/2023     09/19/2023    BUN 14 09/19/2023    CREATININE 0.91 09/19/2023     Lab Results   Component Value Date    IRON 80 06/01/2020    TIBC 343 06/01/2020    FERRITIN 166 06/01/2020     No results found for: "Pam Gerry"  No results found for: "FOLATE"      Arterial Blood Gas result:  NA    Sleep studies:  Diagnostic PSG on 6/13/2019 showed AHI 0.9, PLM index 32.9  Sleep efficiency 56.7%    Diagnostic PSG and MSLT on 8/15/2019 showed AHI 0.5, PLM 2.3    MSLT showed mean sleep latency of 18 minutes with no SOREM's    Compliance Data:                                     NA    Venancio Cunningham MD  [facility]

## 2023-10-31 ENCOUNTER — TELEMEDICINE (OUTPATIENT)
Dept: PSYCHIATRY | Facility: CLINIC | Age: 23
End: 2023-10-31

## 2023-10-31 DIAGNOSIS — E55.9 VITAMIN D DEFICIENCY: ICD-10-CM

## 2023-10-31 DIAGNOSIS — F90.0 ADHD (ATTENTION DEFICIT HYPERACTIVITY DISORDER), INATTENTIVE TYPE: ICD-10-CM

## 2023-10-31 DIAGNOSIS — F33.1 MAJOR DEPRESSIVE DISORDER, RECURRENT, MODERATE (HCC): Primary | ICD-10-CM

## 2023-10-31 RX ORDER — LISDEXAMFETAMINE DIMESYLATE CAPSULES 60 MG/1
60 CAPSULE ORAL EVERY MORNING
Qty: 30 CAPSULE | Refills: 0 | Status: SHIPPED | OUTPATIENT
Start: 2023-10-31 | End: 2023-11-30

## 2023-10-31 RX ORDER — ARIPIPRAZOLE 5 MG/1
5 TABLET ORAL DAILY
Qty: 90 TABLET | Refills: 1 | Status: SHIPPED | OUTPATIENT
Start: 2023-10-31 | End: 2024-04-28

## 2023-10-31 NOTE — PSYCH
MEDICATION MANAGEMENT NOTE        [facility] [facility]    Virtual Visit Disclaimer & Required Documentation  TeleMed provider: Jose Moss MD and Dr. Mary Guillermo, located at [facility], [address] [facility] in [place], [place], [facility]. The patient is also located in [place] which is the state in which I hold an active license. The patient was identified by name and date of birth. Lorin Fletcher was informed that this is a telemedicine visit and that the visit is being conducted through [facility] and patient was informed this is a secure, HIPAA-complaint platform. He agrees to proceed. My office door was closed. No one else was in the room. He acknowledged consent and understanding of privacy and security of the video platform. The patient has agreed to participate and understands they can discontinue the visit at any time. Lorin Fletcher verbally agrees to participate in [employer]. Pt is aware that [employer] could be limited without vital signs or the ability to perform a full hands-on physical exam. The patient understands he or the provider may request at any time to terminate the video visit and request the patient to seek care or treatment in person. Patient is aware this is a billable service. Name and Date of Birth:  Lorin Fletcher 23 y.o. 2000 MRN: [id number]    Date of Visit: October 31, 2023    Reason for Visit: Follow-up visit regarding medication management     _____________________________    Assessment/Plan   Lorin Fletcher is a 20 y.o.  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  Beti Abdullahi was personally seen and evaluated today at the [facility] outpatient clinic for follow-up regarding medication management. On assessment, Libia Chung reports that he has had noticeable improvements on his current medication regimen, and has had increased motivation, enjoyment, slightly improved energy levels. He is still struggling with his sleep, however was able to see sleep medicine and has a sleep study scheduled at the end of November, where he believes he will likely be diagnosed with sleep apnea. He has been adherent to his medications and denies any side effects. He denies any suicidal ideation, and feels hopeful about the future. He is going to start looking for employment. At this time, he is agreeable to maintaining the same regimen and continuing to monitor for progress as his medical issues become better under control. He is agreeable to 8-week follow-up at this time, or sooner if any issues or concerns occur. DSM-5 Diagnoses:     1. Major depressive disorder, recurrent, moderate (HCC)    2. Vitamin D deficiency    3. ADHD (attention deficit hyperactivity disorder), inattentive type        Treatment Recommendations/Precautions:  Continue psychopharmacological management as follows:  Vyvanse 60mg daily for ADHD  Lexapro 20mg daily for depression  Abilify 5mg daily for antidepressant augmentation  Vitamin D 50,000 units weekly for 8 weeks, ending 11/8/23, then order retesting  Repeat vitamin D level ordered, to be completed after he takes his final capsule  Labs most recently obtained September 2023, reviewed.    Follow up in 8 weeks for medication management  Follow up with PCP for medical issues and ongoing care  Aware of 24 hour and weekend coverage for urgent situations accessed by calling [facility] main practice number    Individual psychotherapy provided: No     Treatment Plan:     Completed and signed during the session: Not applicable - Treatment Plan not due at this session    Medications Risks/Benefits:      Risks, Benefits And Possible Side Effects Of Medications:    Risks, benefits, and possible side effects of medications explained to [facility] and he verbalizes understanding and agreement for treatment. Controlled Medication Discussion:     [facility] has been filling controlled prescriptions on time as prescribed according to [facility]    Medical Decision Making / Counseling / Coordination of Care: The following interventions are recommended: return in 8 weeks for follow up. Although patient's acute lethality risk is LOW, long-term/chronic lethality risk is mildly elevated given the risk factors listed above. However, at the current moment, [facility] is future-oriented, forward-thinking, and demonstrates ability to act in a self-preserving manner as evidenced by volitionally seeking psychiatric evaluation and treatment today. To mitigate future risk, patient should adhere to treatment recommendations, avoid alcohol/illicit substance use, utilize community-based resources and familiar support, and prioritize mental health treatment. The diagnosis and treatment plan were reviewed with the patient. Risks, benefits, and alternatives to treatment were discussed. The importance of medication and treatment compliance was reviewed with the patient.     _____________________________________________    History of Present Illness     Chief Complaint: "I've had noticeable improvement"    SUBJECTIVE:    Daniela Burns is a 20 y.o. male, possessing a past psychiatric history significant for MDD, ADHD, KORY, who was personally seen and evaluated at the [address] [place] [address] outpatient clinic virtually for follow-up regarding medication management. At their last visit, the plan was to continue lexapro, vyvanse, and abilify. Once labs resulted we also added on vitamin D due to deficiency.     [facility] states that since their previous psychiatric appointment with this writer, he has noticed improvement in his mood and motivation. He visited his neurologist who recommended he stop melatonin due to hypersomnolence, and recommended a diagnostic sleep study, and for patient to reduce/discontinue marijuana use. Has sleep study scheduled for end of November. Suspicious he may have sleep apnea because he is tracking his oxygen at night and it drops to the 70s. He reports some improvements with his sleep and 5 days out of the week is able to wake up in the morning at a reasonable time. Has noticed a slight improvement in his mood. Was able to enjoy the weather and fall colors, hiking, and looking at things under the microscope. Anxiety level may be slightly increased but still manageable Taking abilify and lexapro, has been adherent. Denies side effects with medications, blood pressure has been better when he checks at home. 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 Has been having improved motivation, slightly more energy, cleaning house more. Presently, patient denies suicidal/homicidal ideation in addition to thoughts of self-injury. At conclusion of evaluation, patient is amenable to the recommendations of this writer including: continue psychotropic medications as prescribed. Also, patient is amenable to calling/contacting the outpatient office including this writer if any acute adverse effects of their medication regimen arise in addition to any comments or concerns pertaining to their psychiatric management. Patient is amenable to calling/contacting crisis and/or attending to the nearest emergency department if their clinical condition deteriorates to assure their safety and stability, stating that they are able to appropriately confide in their parents regarding their psychiatric state. Current Rating Scores:     None completed today.     Psychiatric Review Of Systems:  Unchanged information from this writer's previous assessment is copied and italicized; information that has changed is bolded.     Appetite: overeating sometimes, but feels this is improving (eating excessively 1 or 2 times per week)  Adverse eating: no  Weight changes: no  Insomnia/sleeplessness: hypersomnia  Fatigue/anergy: improving slightly  Anhedonia/lack of interest: improving -  Attention/concentration: no, improved with vyvanse  Psychomotor agitation/retardation: no  Somatic symptoms: no  Anxiety/panic attack: more on daily basis - feeling overwhelmed, no panic attacks  June/hypomania: no  Hopelessness/helplessness/worthlessness: no  Self-injurious behavior/high-risk behavior: no  Suicidal ideation: no  Homicidal ideation: no  Auditory hallucinations: no  Visual hallucinations: no  Other perceptual disturbances: no  Delusional thinking: no  Obsessive/compulsive symptoms: no    Review Of Systems:      Constitutional negative   ENT negative   Cardiovascular negative   Respiratory negative   Gastrointestinal negative   Genitourinary negative   Musculoskeletal negative   Integumentary negative   Neurological negative   Endocrine negative   Other Symptoms none, all other systems are negative     Objective    OBJECTIVE:     Visit Vitals  Smoking Status Never      Wt Readings from Last 6 Encounters:   10/30/23 (!) 140 kg (308 lb 6.4 oz)   07/25/23 (!) 141 kg (311 lb)   07/13/23 (!) 141 kg (311 lb 14.4 oz)   03/10/22 118 kg (261 lb)   08/11/21 113 kg (250 lb)   06/21/21 123 kg (272 lb)        Past Medical History:   Diagnosis Date    ADHD (attention deficit hyperactivity disorder)     Anxiety     Concussion     Depression     Kidney stones     Self-injurious behavior     Sleep difficulties     history of sleep apnea  and getting workup for narcolepsy as of 8/19      Past Surgical History:   Procedure Laterality Date    HERNIA REPAIR      TONSILLECTOMY      TOOTH EXTRACTION Right 09/21/2020    R lower molar extraction       Meds/Allergies    Allergies   Allergen Reactions    Scopolamine      Current Outpatient Medications Medication Instructions    ARIPiprazole (ABILIFY) 5 mg, Oral, Daily    escitalopram (LEXAPRO) 20 mg tablet TAKE 1 TABLET DAILY    hydrochlorothiazide (HYDRODIURIL) 25 mg, Oral, Daily    lisdexamfetamine (VYVANSE) 60 mg, Oral, Every morning    Vitamin D (Ergocalciferol) 50,000 Units, Oral, Weekly           Mental Status Exam:    Appearance age appropriate, casually dressed, wears glasses , appropriate grooming/hygiene   Behavior cooperative, calm   Speech normal rate, normal volume, normal pitch   Mood euthymic   Affect normal range and intensity, appropriate   Thought Processes organized, logical, goal directed   Associations intact associations   Thought Content no overt delusions   Perceptual Disturbances: Denies auditory or visual hallucinations and Does not appear to be responding to internal stimuli   Abnormal Thoughts  Risk Potential Denies suicidal or homicidal ideation, plan, or intent   Orientation oriented to person, place, time/date, and situation   Memory recent and remote memory grossly intact   Consciousness alert and awake   Attention Span Concentration Span attention span and concentration are age appropriate   Intellect appears to be of average intelligence   Insight good   Judgement good   Muscle Strength and  Gait unable to assess today due to virtual visit   Motor Activity unable to assess today due to virtual visit   Language no difficulty naming common objects, no difficulty repeating a phrase   Fund of Knowledge adequate knowledge of current events  adequate fund of knowledge regarding past history  adequate fund of knowledge regarding vocabulary      Laboratory Results: I have personally reviewed all pertinent laboratory/tests results    Appointment on 09/19/2023   Component Date Value Ref Range Status    TSH 3RD GENERATON 09/19/2023 3. 192  0.450 - 4.500 uIU/mL Final    Adult TSH (3rd generation) reference range follows the recommended guidelines of the American Thyroid Association, January, 2020.     Vit D, 25-Hydroxy 09/19/2023 21.0 (L)  30.0 - 100.0 ng/mL Final    Vitamin D guidelines established by Clinical Guidelines Subcommittee  of the Endocrine Society Task Force, 2011    Deficiency <20ng/ml   Insufficiency 20-30ng/ml   Sufficient  ng/ml     WBC 09/19/2023 8.80  4.31 - 10.16 Thousand/uL Final    RBC 09/19/2023 5.16  3.88 - 5.62 Million/uL Final    Hemoglobin 09/19/2023 15.3  12.0 - 17.0 g/dL Final    Hematocrit 09/19/2023 44.3  36.5 - 49.3 % Final    MCV 09/19/2023 86  82 - 98 fL Final    MCH 09/19/2023 29.7  26.8 - 34.3 pg Final    MCHC 09/19/2023 34.5  31.4 - 37.4 g/dL Final    RDW 09/19/2023 13.3  11.6 - 15.1 % Final    MPV 09/19/2023 9.8  8.9 - 12.7 fL Final    Platelets 75/42/8959 260  149 - 390 Thousands/uL Final    nRBC 09/19/2023 0  /100 WBCs Final    Neutrophils Relative 09/19/2023 60  43 - 75 % Final    Immat GRANS % 09/19/2023 1  0 - 2 % Final    Lymphocytes Relative 09/19/2023 29  14 - 44 % Final    Monocytes Relative 09/19/2023 7  4 - 12 % Final    Eosinophils Relative 09/19/2023 2  0 - 6 % Final    Basophils Relative 09/19/2023 1  0 - 1 % Final    Neutrophils Absolute 09/19/2023 5.36  1.85 - 7.62 Thousands/µL Final    Immature Grans Absolute 09/19/2023 0.05  0.00 - 0.20 Thousand/uL Final    Lymphocytes Absolute 09/19/2023 2.56  0.60 - 4.47 Thousands/µL Final    Monocytes Absolute 09/19/2023 0.64  0.17 - 1.22 Thousand/µL Final    Eosinophils Absolute 09/19/2023 0.15  0.00 - 0.61 Thousand/µL Final    Basophils Absolute 09/19/2023 0.04  0.00 - 0.10 Thousands/µL Final    Sodium 09/19/2023 139  135 - 147 mmol/L Final    Potassium 09/19/2023 4.1  3.5 - 5.3 mmol/L Final    Chloride 09/19/2023 103  96 - 108 mmol/L Final    CO2 09/19/2023 31  21 - 32 mmol/L Final    ANION GAP 09/19/2023 5  mmol/L Final    BUN 09/19/2023 14  5 - 25 mg/dL Final    Creatinine 09/19/2023 0.91  0.60 - 1.30 mg/dL Final    Standardized to IDMS reference method    Glucose, Fasting 09/19/2023 89  65 - 99 mg/dL Final    Calcium 09/19/2023 10.0  8.4 - 10.2 mg/dL Final    AST 09/19/2023 19  13 - 39 U/L Final    ALT 09/19/2023 36  7 - 52 U/L Final    Specimen collection should occur prior to Sulfasalazine administration due to the potential for falsely depressed results. Alkaline Phosphatase 09/19/2023 81  34 - 104 U/L Final    Total Protein 09/19/2023 6.7  6.4 - 8.4 g/dL Final    Albumin 09/19/2023 4.2  3.5 - 5.0 g/dL Final    Total Bilirubin 09/19/2023 0.64  0.20 - 1.00 mg/dL Final    Use of this assay is not recommended for patients undergoing treatment with eltrombopag due to the potential for falsely elevated results. N-acetyl-p-benzoquinone imine (metabolite of Acetaminophen) will generate erroneously low results in samples for patients that have taken an overdose of Acetaminophen. eGFR 09/19/2023 116  ml/min/1.73sq m Final    Hemoglobin A1C 09/19/2023 5.1  Normal 4.0-5.6%; PreDiabetic 5.7-6.4%; Diabetic >=6.5%; Glycemic control for adults with diabetes <7.0% % Final    EAG 09/19/2023 100  mg/dl Final    Cholesterol 09/19/2023 193  See Comment mg/dL Final    Cholesterol:         Pediatric <18 Years        Desirable          <170 mg/dL      Borderline High    170-199 mg/dL      High               >=200 mg/dL        Adult >=18 Years            Desirable         <200 mg/dL      Borderline High   200-239 mg/dL      High              >239 mg/dL      Triglycerides 09/19/2023 168 (H)  See Comment mg/dL Final    Triglyceride:     0-9Y            <75mg/dL     10Y-17Y         <90 mg/dL       >=18Y     Normal          <150 mg/dL     Borderline High 150-199 mg/dL     High            200-499 mg/dL        Very High       >499 mg/dL    Specimen collection should occur prior to Metamizole administration due to the potential for falsely depressed results.     HDL, Direct 09/19/2023 34 (L)  >=40 mg/dL Final    LDL Calculated 09/19/2023 125 (H)  0 - 100 mg/dL Final    LDL Cholesterol:     Optimal           <100 mg/dl Near Optimal      100-129 mg/dl     Above Optimal       Borderline High 130-159 mg/dl       High            160-189 mg/dl       Very High       >189 mg/dl         This screening LDL is a calculated result. It does not have the accuracy of the Direct Measured LDL in the monitoring of patients with hyperlipidemia and/or statin therapy. Direct Measure LDL (VRR879) must be ordered separately in these patients.             ___________________________________    History Review: The following portions of the patient's history were reviewed and updated as appropriate: allergies, current medications, past family history, past medical history, past social history, past surgical history, and problem list.    Unchanged information from this writer's previous assessment is copied and italicized; information that has changed is bolded.     Past Psychiatric History:      Past psychiatric diagnoses:   ADHD depression and anxiety  Inpatient psychiatric admissions:   None  Prior outpatient psychiatric treatment:   Had been a patient of Dr. Chang Rice at [facility] since March 2022  Previously was seeing Dr. Chandan Dodson and Rony Christianson at [address] [place] [address] psychotherapy:    Weekly therapy online at a private agency  History of suicidal attempts/gestures:   denies    History of non-suicidal self-injurious behavior:   None  History of violence/aggressive behaviors:   denies  Psychotropic medication trials:   Effexor XR (6026) (nausea)  Concerta ER 26RX (9211) - dilated pupils, chalenges with glares when driving  Armodafinil (somewhat helpful)  Ritalin 10mg, Vyvanse Strattera (vomiting)  Zoloft, abilify, buspar, gabapentin, trintellix,  lexapro  Pramipexole for RLS  Nuvigil for hypersomnia (several years ago)  Wellbutrin Xl 150mg - ineffective for augmentation purposes  Substance abuse inpatient/outpatient rehabilitation:   Denies  Eating disorder history:   no     Substance Abuse History:     Marijuana use 4-5 times per week   Formerly used psychedelics which "bordered on problematic"  Denies history of alcohol, illict substance, or tobacco abuse., Patient denies previous legal actions or arrests related to substance intoxication including prior DWIs/DUIs., Patient does not exhibit objective evidence of substance withdrawal during today's examination nor do they appear under the influence of any psychoactive substance. Family Psychiatric History:      Psychiatric Family History: Father - depression, anxiety, alcohol use  Brother - anxiety, depression  Mother - Depression  Suicide Attempts: none  Patient otherwise denies known family history of psychiatric illness, substance use, or suicide attempts. Social History:     Developmental: Patient denies a history of milestone/developmental delay. , Patient denies any known in-utero exposure to toxins or illicit substances. ,   Reports a history of IEP in 6th grade for "mental health"  In 6th grade got a kidney stone, was anxious about getting stent removed, mother was convinced he and neurological d/o and absence seizures, he missed a lot of school. 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 Unloaded but not locked. Provided two gun locks. Patient denies history of arrests or violence with a deadly weapon.     history: None     Traumatic History:      Abuse: Denies  Other Traumatic Events: Childhood issues as above  ___________________________________    This note was not shared with the patient due to reasonable likelihood of causing patient harm    Visit Time    Visit Start Time: 12:45PM  Visit Stop Time: 1:10PM  Total Visit Duration:  25 minutes    Keisha Lechuga MD   10/31/23

## 2023-10-31 NOTE — PATIENT INSTRUCTIONS
Please call the office nursing staff for medication issues including refills, problems getting medications, bothersome side effects, etc at [phone]. Please return for a follow up appointment as discussed and arrive approximately 15 minutes prior to your appointment time. If you are running late or are unable to attend your appointment, please call our [facility] office at [phone], or if you were seen in the [facility]) office, please call [phone]    If you have thoughts of harming yourself or are otherwise in psychological crisis, do not hesitate to contact your [address] [facility], or 911 or go to the nearest emergency room.   [facility] Crisis: [address] Crisis: [phone]  [address] Crisis: [address] Crisis: [facility] Crisis: [address] Crisis: [address] Crisis: [phone]  National Suicide Prevention Hotline: 5-111.267.6426 or call 65 No

## 2023-11-26 ENCOUNTER — HOSPITAL ENCOUNTER (OUTPATIENT)
Dept: SLEEP CENTER | Facility: CLINIC | Age: 23
Discharge: HOME | End: 2023-11-26
Payer: COMMERCIAL

## 2023-11-26 DIAGNOSIS — G47.19 EXCESSIVE DAYTIME SLEEPINESS: ICD-10-CM

## 2023-11-26 PROCEDURE — 95810 POLYSOM 6/> YRS 4/> PARAM: CPT | Performed by: INTERNAL MEDICINE

## 2023-11-26 PROCEDURE — 95810 POLYSOM 6/> YRS 4/> PARAM: CPT

## 2023-11-27 PROBLEM — G47.33 OSA (OBSTRUCTIVE SLEEP APNEA): Status: ACTIVE | Noted: 2023-11-27

## 2023-11-27 NOTE — PROGRESS NOTES
Sleep Study Documentation    Pre-Sleep Study       Sleep testing procedure explained to patient:YES    Patient napped prior to study:NO    [address] worker after 12PM.  Caffeine use:NO    Alcohol:Dayshift workers after 5PM: Alcohol use:NO    Typical day for patient:NO       Study Documentation    Sleep Study Indications: snoring daytime sleepiness, paralysis    Sleep Study: Diagnostic   Snore: Moderate  Supplemental O2: no        Minimum SaO2 87  Baseline SaO2 93        EKG abnormalities: No    EEG abnormalities: no    Were abnormal behaviors in sleep observed:NO    Is Total Sleep Study Recording Time < 2 hours: N/A    Is Total Sleep Study Recording Time > 2 hours but study is incomplete: N/A    Is Total Sleep Study Recording Time 6 hours or more but sleep was not obtained: NO          Post-Sleep Study    Medication used at bedtime or during sleep study:NO    Patient reports time it took to fall asleep:20 to 30 minutes    Patient reports waking up during study:3 or more times. Patient reports returning to sleep without difficulty. Patient reports sleeping 4 to 6 hours without dreaming. Does the Patient feel this is a typical night of sleep:better than usual    Patient rated sleepiness: Somewhat sleepy or tired    PAP treatment:no.

## 2023-12-04 DIAGNOSIS — G47.33 OSA (OBSTRUCTIVE SLEEP APNEA): Primary | ICD-10-CM

## 2023-12-06 DIAGNOSIS — F90.0 ADHD (ATTENTION DEFICIT HYPERACTIVITY DISORDER), INATTENTIVE TYPE: ICD-10-CM

## 2023-12-06 RX ORDER — LISDEXAMFETAMINE DIMESYLATE CAPSULES 60 MG/1
60 CAPSULE ORAL EVERY MORNING
Qty: 30 CAPSULE | Refills: 0
Start: 2023-12-06 | End: 2023-12-06 | Stop reason: SDUPTHER

## 2023-12-06 RX ORDER — LISDEXAMFETAMINE DIMESYLATE CAPSULES 60 MG/1
60 CAPSULE ORAL EVERY MORNING
Qty: 30 CAPSULE | Refills: 0 | Status: SHIPPED | OUTPATIENT
Start: 2023-12-06 | End: 2023-12-08 | Stop reason: SDUPTHER

## 2023-12-06 NOTE — TELEPHONE ENCOUNTER
Notified patient requesting the following refill:  Requested Prescriptions     Pending Prescriptions Disp Refills    lisdexamfetamine (VYVANSE) 60 MG capsule 30 capsule 0     Sig: Take 1 capsule (60 mg total) by mouth every morning Max Daily Amount: 60 mg       [facility] has been filling controlled prescriptions on time as prescribed according to [address] [facility] [address] Program    Refill request was sent to Dr. Rajani Rush, my indirect supervisor, for cosignature.

## 2023-12-08 ENCOUNTER — TELEPHONE (OUTPATIENT)
Dept: PSYCHIATRY | Facility: CLINIC | Age: 23
End: 2023-12-08

## 2023-12-08 DIAGNOSIS — F90.0 ADHD (ATTENTION DEFICIT HYPERACTIVITY DISORDER), INATTENTIVE TYPE: ICD-10-CM

## 2023-12-08 RX ORDER — LISDEXAMFETAMINE DIMESYLATE CAPSULES 60 MG/1
60 CAPSULE ORAL EVERY MORNING
Qty: 30 CAPSULE | Refills: 0 | Status: SHIPPED | OUTPATIENT
Start: 2023-12-08 | End: 2024-01-07

## 2023-12-08 NOTE — TELEPHONE ENCOUNTER
Received a VM regarding his prescription for Vyvanse. Tangelasuzette  said the prescription was sent to [facility] again and was frustrated by this. "It needs to be corrected."     Please review/resend to [facility], [place], [place]. Left a VM for Marko:  message was received; forwarding to Dr. Magi Espinal and the supervising doctor; apology offered: nursing number given to call as needed.

## 2023-12-14 ENCOUNTER — TELEPHONE (OUTPATIENT)
Dept: SLEEP CENTER | Facility: CLINIC | Age: 23
End: 2023-12-14

## 2023-12-14 LAB
DME PARACHUTE DELIVERY DATE REQUESTED: NORMAL
DME PARACHUTE DELIVERY NOTE: NORMAL
DME PARACHUTE ITEM DESCRIPTION: NORMAL
DME PARACHUTE ORDER STATUS: NORMAL
DME PARACHUTE SUPPLIER NAME: NORMAL
DME PARACHUTE SUPPLIER PHONE: NORMAL

## 2023-12-14 NOTE — TELEPHONE ENCOUNTER
Called patient and advised sleep study resulted and shows mild sleep apnea. APAP ordered. Scheduled DME set up 12/28/23 in [address] [place] [address]. Rx for CPAP and clinical information sent to [address] [place] [address] via [employer] .    Patient has compliance follow up 2/12/24.

## 2023-12-21 LAB
DME PARACHUTE DELIVERY DATE EXPECTED: NORMAL
DME PARACHUTE DELIVERY DATE REQUESTED: NORMAL
DME PARACHUTE DELIVERY NOTE: NORMAL
DME PARACHUTE ITEM DESCRIPTION: NORMAL
DME PARACHUTE ORDER STATUS: NORMAL
DME PARACHUTE SUPPLIER NAME: NORMAL
DME PARACHUTE SUPPLIER PHONE: NORMAL

## 2023-12-26 ENCOUNTER — TELEMEDICINE (OUTPATIENT)
Dept: PSYCHIATRY | Facility: CLINIC | Age: 23
End: 2023-12-26
Payer: COMMERCIAL

## 2023-12-26 DIAGNOSIS — F33.1 MAJOR DEPRESSIVE DISORDER, RECURRENT, MODERATE (HCC): Primary | ICD-10-CM

## 2023-12-26 DIAGNOSIS — E55.9 VITAMIN D DEFICIENCY: ICD-10-CM

## 2023-12-26 DIAGNOSIS — F90.0 ADHD (ATTENTION DEFICIT HYPERACTIVITY DISORDER), INATTENTIVE TYPE: ICD-10-CM

## 2023-12-26 DIAGNOSIS — F41.1 GAD (GENERALIZED ANXIETY DISORDER): ICD-10-CM

## 2023-12-26 PROCEDURE — 99213 OFFICE O/P EST LOW 20 MIN: CPT

## 2023-12-26 RX ORDER — MELATONIN
1000 DAILY
Qty: 90 TABLET | Refills: 1 | Status: SHIPPED | OUTPATIENT
Start: 2023-12-26

## 2023-12-26 NOTE — PATIENT INSTRUCTIONS
Please call the office nursing staff for medication issues including refills, problems getting medications, bothersome side effects, etc at [phone].     Please return for a follow up appointment as discussed and arrive approximately 15 minutes prior to your appointment time. If you are running late or are unable to attend your appointment, please call our [place] office at [phone], or if you were seen in the [place] office, please call [phone]    If you have thoughts of harming yourself or are otherwise in psychological crisis, do not hesitate to contact your County Crisis hotline, or 911 or go to the nearest emergency room.  [facility] Crisis: [phone]  [facility] Crisis: [phone]  [facility] Crisis: [phone]  [facility] Crisis: [phone]  [facility] Crisis: [phone]  [facility] Crisis: [phone]  [facility] Crisis: [phone]  National Suicide Prevention Hotline: 1-166.234.4528 or call 030

## 2023-12-26 NOTE — BH TREATMENT PLAN
TREATMENT PLAN - Medication Management        [facility]    Name and Date of Birth:  Marko J Weiler 23 y.o. 2000  Date of Treatment Plan: December 26, 2023  Diagnosis/Diagnoses:    1. Major depressive disorder, recurrent, moderate (HCC)    2. Vitamin D deficiency    3. ADHD (attention deficit hyperactivity disorder), inattentive type    4. KORY (generalized anxiety disorder)        Strengths/Personal Resources for Self-Care: taking medications as prescribed, ability to adapt to life changes, ability to communicate needs, ability to communicate well, ability to listen, ability to reason, ability to understand psychiatric illness, average or above intelligence    Area/Areas of need: routine, sleep    Long Term Goal: improve routine, get job, improve sleep  Target Date: 6 months - June 26, 2024  Person/Persons responsible for completion of goal: Marko and Tami Patel MD     Short Term Objective (s) - How will we reach this goal?:   Take medications as prescribed  Attend psychiatry appointments regularly  Continue psychotherapy regularly  Practice coping skills  Avoid alcohol   Avoid drugs   Eat a healthy diet   Exercise regularly   Get outside more  Look for employment  Sleep routine  Target Date: 6 months - June 26, 2024  Person/Persons Responsible for Completion of Goal: Marko     Progress Towards Goals: Continuing treatment    Treatment Modality: medication management every 1-3 months as needed and continue psychotherapy  Review due 180 days from date of this plan: June 23, 2024   Expected length of service: Ongoing treatment    My physician and I have developed this plan together, and I agree to work on the goals and objectives. I understand the treatment goals that were developed for my treatment.    The treatment plan was created between Tami Patel MD and Logan J Weiler on 12/26/23 at 2:22 PM but not signed at the time of the visit due to  COVID social distancing. The plan was reviewed, and verbal consent was given.

## 2023-12-26 NOTE — PSYCH
MEDICATION MANAGEMENT NOTE        [facility]    Virtual Visit Disclaimer & Required Documentation  TeleMed provider: Tami Patel MD and Dr. Armand Brown, located at [facility], [address] [place] [address] in [place], [place], [facility]. The patient is also located in [place] which is the state in which I hold an active license.    The patient was identified by name and date of birth. Logan J Weiler was informed that this is a telemedicine visit and that the visit is being conducted through [employer] and patient was informed this is a secure, HIPAA-complaint platform. He agrees to proceed. My office door was closed. No one else was in the room. He acknowledged consent and understanding of privacy and security of the video platform. The patient has agreed to participate and understands they can discontinue the visit at any time.    Logan J Weiler verbally agrees to participate in Virtual Care Services. Pt is aware that Virtual Care Services could be limited without vital signs or the ability to perform a full hands-on physical exam. The patient understands he or the provider may request at any time to terminate the video visit and request the patient to seek care or treatment in person. Patient is aware this is a billable service.        Name and Date of Birth:  Logan J Weiler 23 y.o. 2000 MRN: [id number]    Date of Visit: December 26, 2023    Reason for Visit: Follow-up visit regarding medication management     _____________________________    Assessment/Plan   Logan J Weiler is a 23 y.o.  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 Marko was personally seen and evaluated today at the [facility] outpatient clinic virtually for follow-up regarding medication management.     On assessment, Logan J Weiler reports that things have been relatively stable, though he is struggling with feelings of depression at times, as well as ongoing issues with sleep and energy levels.  He has been working with sleep medicine and is being fitted for a CPAP machine next week, and is looking forward to hopefully having higher quality sleep. He denies hopelessness, denies thoughts to harm himself, but is struggling with daily routines and is now looking for a job. We discussed that it appears that the majority of his symptoms of depression are due more to current psychosocial stressors, and work that he is doing in his individual psychotherapy.  At this time, there are no indications to make any medication changes, as his current medication regimen appears to be optimized.  He is in agreement with this assessment. We discussed positive life changes with diet, exercise, and he plans to incorporate more positive changes into his routine. We discussed initiating lower dose vitamin D for daily supplementation, and he is agreeable to obtaining vitamin D blood level. He is agreeable to call with any questions or concerns.    DSM-5 Diagnoses:     1. Major depressive disorder, recurrent, moderate (HCC)    2. Vitamin D deficiency    3. ADHD (attention deficit hyperactivity disorder), inattentive type    4. KORY (generalized anxiety disorder)        Treatment Recommendations/Precautions:  Continue psychopharmacological management as follows:  Vyvanse 60mg daily for ADHD  Lexapro 20mg daily for depression  Abilify 5mg daily for antidepressant augmentation  Completed Vitamin D 50,000 units for 8 weeks, was scheduled for repeat lab first week of November but this was not completed, reminder today. Recommend to start low dose supplementation daily, 1000 IU daily  Labs most recently obtained September 2023, reviewed.    Follow up in 8 weeks for medication management  Follow up with PCP for medical issues and ongoing care  Aware of 24 hour and weekend coverage for urgent situations accessed by calling [facility] main practice number    Individual psychotherapy provided: No     Treatment Plan:     Completed and signed during the session: Yes - Treatment Plan done but not signed at time of office visit due to:  Plan reviewed by video and verbal consent given due to virtual visit.    Medications Risks/Benefits:      Risks, Benefits And Possible Side Effects Of Medications:    Risks, benefits, and possible side effects of medications explained to Marko and he verbalizes understanding and agreement for treatment.     Controlled Medication Discussion:     Marko has been filling controlled prescriptions on time as prescribed according to [place] Prescription Drug Monitoring Program. 12/11 last fill.    Medical Decision Making / Counseling / Coordination of Care:  The following interventions are recommended: return in 2 months for follow up or sooner if needed.  Although patient's acute lethality risk is LOW, long-term/chronic lethality risk is mildly elevated given the risk factors listed above. However, at the current moment, Marko is future-oriented, forward-thinking, and demonstrates ability to act in a self-preserving manner as evidenced by volitionally seeking psychiatric evaluation and treatment today. To mitigate future risk, patient should adhere to treatment recommendations, avoid alcohol/illicit substance use, utilize community-based resources and familiar support, and prioritize mental health treatment. The diagnosis and treatment plan were reviewed with the patient. Risks, benefits, and alternatives to treatment were discussed. The importance of medication and treatment compliance was reviewed with the patient.     _____________________________________________    History of Present Illness  "    Chief Complaint: \"I'm still dealing with these sleep issues\"    SUBJECTIVE:    Logan J Weiler is a 23 y.o. male, possessing a past psychiatric history significant for depression, ADHD, who was personally seen and evaluated at the [facility] outpatient clinic virtually for follow-up regarding medication management. At their last visit, the plan was to continue psychopharmacological management.    Marko states that since their previous psychiatric appointment with this writer, he has been stable overall, though there are currently psychosocial stressors. He has been dealing with sleep issues and recent DEYSI diagnosis. His father recently had a stroke and seizure and was hospitalized, and subsequently has to make some health changes with diet and smoking and alcohol use. He is glad for these changes, but has been stressed about his father's health overall. Anxiety has been elevated given current circumstances. He is going to try cooking and making more structure to his day in terms of dietary habits, is planning to start getting more sunlight, and has been applying for jobs. He is hoping these changes will help with his current depressive symptoms and low energy, in addition to getting his CPAP machine soon.    Presently, patient denies suicidal/homicidal ideation in addition to thoughts of self-injury.  At conclusion of evaluation, patient is amenable to the recommendations of this writer including: continue psychotropic medications as prescribed.  Also, patient is amenable to calling/contacting the outpatient office including this writer if any acute adverse effects of their medication regimen arise in addition to any comments or concerns pertaining to their psychiatric management.  Patient is amenable to calling/contacting crisis and/or attending to the nearest emergency department if their clinical condition deteriorates to assure their safety and stability, stating that they are able to " appropriately confide in their parents regarding their psychiatric state.    Current Rating Scores:     Current PHQ-9   PHQ-2/9 Depression Screening    Little interest or pleasure in doing things: 3 - nearly every day  Feeling down, depressed, or hopeless: 2 - more than half the days  Trouble falling or staying asleep, or sleeping too much: 3 - nearly every day  Feeling tired or having little energy: 3 - nearly every day  Poor appetite or overeating: 3 - nearly every day  Feeling bad about yourself - or that you are a failure or have let yourself or your family down: 3 - nearly every day  Trouble concentrating on things, such as reading the newspaper or watching television: 2 - more than half the days  Moving or speaking so slowly that other people could have noticed. Or the opposite - being so fidgety or restless that you have been moving around a lot more than usual: 0 - not at all  Thoughts that you would be better off dead, or of hurting yourself in some way: 0 - not at all  PHQ-9 Score: 19   PHQ-9 Interpretation: Moderately severe depression          Current KORY-7 is   KORY-7 Flowsheet Screening      Flowsheet Row Most Recent Value   Over the last 2 weeks, how often have you been bothered by any of the following problems?    Feeling nervous, anxious, or on edge 1   Not being able to stop or control worrying 1   Worrying too much about different things 1   Trouble relaxing 2   Being so restless that it is hard to sit still 1   Becoming easily annoyed or irritable 2   Feeling afraid as if something awful might happen 1   KORY-7 Total Score 9        .    Psychiatric Review Of Systems:  Unchanged information from this writer's previous assessment is copied and italicized; information that has changed is bolded.    Appetite: no appetite in the beginning of the day and then over eats in the evening.  Adverse eating: no  Weight changes: no  Insomnia/sleeplessness: sleep still poor quality and tired some  days  Fatigue/anergy: improving slightly  Anhedonia/lack of interest: has perhaps been more bored and disinterested, but feels this is due to being home all the time  Attention/concentration: no, improved with vyvanse  Psychomotor agitation/retardation: no  Somatic symptoms: no  Anxiety/panic attack: more anxiety/worrying, but denies panic attacks  June/hypomania: no  Hopelessness/helplessness/worthlessness: no  Self-injurious behavior/high-risk behavior: no  Suicidal ideation: no  Homicidal ideation: no  Auditory hallucinations: no  Visual hallucinations: no  Other perceptual disturbances: no  Delusional thinking: no  Obsessive/compulsive symptoms: no    Review Of Systems:      Constitutional negative   ENT negative   Cardiovascular negative   Respiratory negative   Gastrointestinal negative   Genitourinary negative   Musculoskeletal negative   Integumentary negative   Neurological negative   Endocrine negative   Other Symptoms none, all other systems are negative     Objective    OBJECTIVE:     Visit Vitals  Smoking Status Never      Wt Readings from Last 6 Encounters:   10/30/23 (!) 140 kg (308 lb 6.4 oz)   07/25/23 (!) 141 kg (311 lb)   07/13/23 (!) 141 kg (311 lb 14.4 oz)   03/10/22 118 kg (261 lb)   08/11/21 113 kg (250 lb)   06/21/21 123 kg (272 lb)        Past Medical History:   Diagnosis Date    ADHD (attention deficit hyperactivity disorder)     Anxiety     Concussion     Depression     Kidney stones     Self-injurious behavior     Sleep difficulties     history of sleep apnea  and getting workup for narcolepsy as of 8/19      Past Surgical History:   Procedure Laterality Date    HERNIA REPAIR      TONSILLECTOMY      TOOTH EXTRACTION Right 09/21/2020    R lower molar extraction       Meds/Allergies    Allergies   Allergen Reactions    Scopolamine      Current Outpatient Medications   Medication Instructions    ARIPiprazole (ABILIFY) 5 mg, Oral, Daily    cholecalciferol (VITAMIN D3) 1,000 Units, Oral,  Daily    escitalopram (LEXAPRO) 20 mg tablet TAKE 1 TABLET DAILY    hydrochlorothiazide (HYDRODIURIL) 25 mg, Oral, Daily    lisdexamfetamine (VYVANSE) 60 mg, Oral, Every morning           Mental Status Exam:    Appearance age appropriate, casually dressed, good eye contact   Behavior cooperative, calm   Speech normal rate, normal volume, normal pitch   Mood euthymic   Affect normal range and intensity, appropriate   Thought Processes organized, logical, goal directed   Associations intact associations   Thought Content no overt delusions   Perceptual Disturbances: Denies auditory or visual hallucinations and Does not appear to be responding to internal stimuli   Abnormal Thoughts  Risk Potential Denies suicidal or homicidal ideation, plan, or intent   Orientation oriented to person, place, time/date, and situation   Memory recent and remote memory grossly intact   Consciousness alert and awake   Attention Span Concentration Span attention span and concentration are age appropriate   Intellect appears to be of average intelligence   Insight good   Judgement good   Muscle Strength and  Gait unable to assess today due to virtual visit   Motor Activity unable to assess today due to virtual visit   Language no difficulty naming common objects, no difficulty repeating a phrase   Fund of Knowledge adequate knowledge of current events  adequate fund of knowledge regarding past history  adequate fund of knowledge regarding vocabulary      Laboratory Results: I have personally reviewed all pertinent laboratory/tests results    Telephone on 12/14/2023   Component Date Value Ref Range Status    Supplier Name 12/14/2023 [employer] [facility]   In process    Supplier Phone Number 12/14/2023 [phone]   In process    Order Status 12/14/2023 Ready For Delivery   In process    Delivery Note 12/14/2023 Jerem[facility]   In process    Delivery Request Date 12/14/2023 12/28/2023   In process    Supplier Name 12/14/2023  12/28/2023   In process    Item Description 12/14/2023 CPAP Machine, Resmed Airsense 11   In process    Comment: Qty: 1  Auto Min Pressure: 5 cm  Auto Max Pressure: 15 cm  Oxygen Usage: None      Item Description 12/14/2023 PAP Mask, Nasal, Fit Upon Setup, N/A, 1 per 3 months   In process    Qty: 1    Item Description 12/14/2023 PAP Mask Interface Cushion, Nasal, 2 per 1 month   In process    Qty: 1    Item Description 12/14/2023 PAP Headgear, 1 per 6 months   In process    Qty: 1    Item Description 12/14/2023 PAP Humidifier, Heated   In process    Qty: 1    Item Description 12/14/2023 Disposable PAP Filter, 2 per 1 month   In process    Qty: 1    Item Description 12/14/2023 Non-Disposable PAP Filter, 1 per 6 months   In process    Qty: 1    Item Description 12/14/2023 PAP Machine Tubing, Heated, 1 per 3 months   In process    Qty: 1    Item Description 12/14/2023 PAP Monitoring Modem   In process    Qty: 1    Item Description 12/14/2023 Humidifier Water Chamber, 1 per 6 months   In process    Qty: 1   Appointment on 09/19/2023   Component Date Value Ref Range Status    TSH 3RD GENERATON 09/19/2023 3.192  0.450 - 4.500 uIU/mL Final    Adult TSH (3rd generation) reference range follows the recommended guidelines of the American Thyroid Association, January, 2020.    Vit D, 25-Hydroxy 09/19/2023 21.0 (L)  30.0 - 100.0 ng/mL Final    Vitamin D guidelines established by Clinical Guidelines Subcommittee  of the Endocrine Society Task Force, 2011    Deficiency <20ng/ml   Insufficiency 20-30ng/ml   Sufficient  ng/ml     WBC 09/19/2023 8.80  4.31 - 10.16 Thousand/uL Final    RBC 09/19/2023 5.16  3.88 - 5.62 Million/uL Final    Hemoglobin 09/19/2023 15.3  12.0 - 17.0 g/dL Final    Hematocrit 09/19/2023 44.3  36.5 - 49.3 % Final    MCV 09/19/2023 86  82 - 98 fL Final    MCH 09/19/2023 29.7  26.8 - 34.3 pg Final    MCHC 09/19/2023 34.5  31.4 - 37.4 g/dL Final    RDW 09/19/2023 13.3  11.6 - 15.1 % Final    MPV 09/19/2023  9.8  8.9 - 12.7 fL Final    Platelets 09/19/2023 260  149 - 390 Thousands/uL Final    nRBC 09/19/2023 0  /100 WBCs Final    Neutrophils Relative 09/19/2023 60  43 - 75 % Final    Immat GRANS % 09/19/2023 1  0 - 2 % Final    Lymphocytes Relative 09/19/2023 29  14 - 44 % Final    Monocytes Relative 09/19/2023 7  4 - 12 % Final    Eosinophils Relative 09/19/2023 2  0 - 6 % Final    Basophils Relative 09/19/2023 1  0 - 1 % Final    Neutrophils Absolute 09/19/2023 5.36  1.85 - 7.62 Thousands/µL Final    Immature Grans Absolute 09/19/2023 0.05  0.00 - 0.20 Thousand/uL Final    Lymphocytes Absolute 09/19/2023 2.56  0.60 - 4.47 Thousands/µL Final    Monocytes Absolute 09/19/2023 0.64  0.17 - 1.22 Thousand/µL Final    Eosinophils Absolute 09/19/2023 0.15  0.00 - 0.61 Thousand/µL Final    Basophils Absolute 09/19/2023 0.04  0.00 - 0.10 Thousands/µL Final    Sodium 09/19/2023 139  135 - 147 mmol/L Final    Potassium 09/19/2023 4.1  3.5 - 5.3 mmol/L Final    Chloride 09/19/2023 103  96 - 108 mmol/L Final    CO2 09/19/2023 31  21 - 32 mmol/L Final    ANION GAP 09/19/2023 5  mmol/L Final    BUN 09/19/2023 14  5 - 25 mg/dL Final    Creatinine 09/19/2023 0.91  0.60 - 1.30 mg/dL Final    Standardized to IDMS reference method    Glucose, Fasting 09/19/2023 89  65 - 99 mg/dL Final    Calcium 09/19/2023 10.0  8.4 - 10.2 mg/dL Final    AST 09/19/2023 19  13 - 39 U/L Final    ALT 09/19/2023 36  7 - 52 U/L Final    Specimen collection should occur prior to Sulfasalazine administration due to the potential for falsely depressed results.     Alkaline Phosphatase 09/19/2023 81  34 - 104 U/L Final    Total Protein 09/19/2023 6.7  6.4 - 8.4 g/dL Final    Albumin 09/19/2023 4.2  3.5 - 5.0 g/dL Final    Total Bilirubin 09/19/2023 0.64  0.20 - 1.00 mg/dL Final    Use of this assay is not recommended for patients undergoing treatment with eltrombopag due to the potential for falsely elevated results.  N-acetyl-p-benzoquinone imine (metabolite of  Acetaminophen) will generate erroneously low results in samples for patients that have taken an overdose of Acetaminophen.    eGFR 09/19/2023 116  ml/min/1.73sq m Final    Hemoglobin A1C 09/19/2023 5.1  Normal 4.0-5.6%; PreDiabetic 5.7-6.4%; Diabetic >=6.5%; Glycemic control for adults with diabetes <7.0% % Final    EAG 09/19/2023 100  mg/dl Final    Cholesterol 09/19/2023 193  See Comment mg/dL Final    Cholesterol:         Pediatric <18 Years        Desirable          <170 mg/dL      Borderline High    170-199 mg/dL      High               >=200 mg/dL        Adult >=18 Years            Desirable         <200 mg/dL      Borderline High   200-239 mg/dL      High              >239 mg/dL      Triglycerides 09/19/2023 168 (H)  See Comment mg/dL Final    Triglyceride:     0-9Y            <75mg/dL     10Y-17Y         <90 mg/dL       >=18Y     Normal          <150 mg/dL     Borderline High 150-199 mg/dL     High            200-499 mg/dL        Very High       >499 mg/dL    Specimen collection should occur prior to Metamizole administration due to the potential for falsely depressed results.    HDL, Direct 09/19/2023 34 (L)  >=40 mg/dL Final    LDL Calculated 09/19/2023 125 (H)  0 - 100 mg/dL Final    LDL Cholesterol:     Optimal           <100 mg/dl     Near Optimal      100-129 mg/dl     Above Optimal       Borderline High 130-159 mg/dl       High            160-189 mg/dl       Very High       >189 mg/dl         This screening LDL is a calculated result.   It does not have the accuracy of the Direct Measured LDL in the monitoring of patients with hyperlipidemia and/or statin therapy.   Direct Measure LDL (FAQ966) must be ordered separately in these patients.             ___________________________________    History Review: The following portions of the patient's history were reviewed and updated as appropriate: allergies, current medications, past family history, past medical history, past social history, past surgical  "history, and problem list.    Unchanged information from this writer's previous assessment is copied and italicized; information that has changed is bolded.    Past Psychiatric History:      Past psychiatric diagnoses:   ADHD depression and anxiety  Inpatient psychiatric admissions:   None  Prior outpatient psychiatric treatment:   Had been a patient of Dr. Angeles at [facility] since March 2022  Previously was seeing Dr. Peña and Gabi SAMAYOA at [facility]  Past/current psychotherapy:    Weekly therapy online at a private agency  History of suicidal attempts/gestures:   denies    History of non-suicidal self-injurious behavior:   None  History of violence/aggressive behaviors:   denies  Psychotropic medication trials:   Effexor XR (2018) (nausea)  Concerta ER 36mg (2017) - dilated pupils, chalenges with glares when driving  Armodafinil (somewhat helpful)  Ritalin 10mg, Vyvanse Strattera (vomiting)  Zoloft, abilify, buspar, gabapentin, trintellix,  lexapro  Pramipexole for RLS  Nuvigil for hypersomnia (several years ago)  Wellbutrin Xl 150mg - ineffective for augmentation purposes  Substance abuse inpatient/outpatient rehabilitation:   Denies  Eating disorder history:   no     Substance Abuse History:     Marijuana use 4-5 times per week   Formerly used psychedelics which \"bordered on problematic\"  Denies history of alcohol, illict substance, or tobacco abuse., Patient denies previous legal actions or arrests related to substance intoxication including prior DWIs/DUIs., Patient does not exhibit objective evidence of substance withdrawal during today's examination nor do they appear under the influence of any psychoactive substance.       Family Psychiatric History:      Psychiatric Family History: Father - depression, anxiety, alcohol use  Brother - anxiety, depression  Mother - Depression  Suicide Attempts: none  Patient otherwise denies known family history of psychiatric illness, substance use, or suicide attempts.   "   Social History:     Developmental: Patient denies a history of milestone/developmental delay., Patient denies any known in-utero exposure to toxins or illicit substances.,   Reports a history of IEP in 6th grade for 'mental health':  In 6th grade got a kidney stone, was anxious about getting stent removed, mother was convinced he and neurological d/o and absence seizures, he missed a lot of school. 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 Unloaded but not locked. Provided two gun locks.  Patient denies history of arrests or violence with a deadly weapon.    history: None     Traumatic History:      Abuse: Denies  Other Traumatic Events: Childhood issues as above  ___________________________________      Visit Time    Visit Start Time: 1:59  Visit Stop Time: 2:22  Total Visit Duration:  23 minutes    Tami Patel MD   12/26/23

## 2023-12-28 LAB
DME PARACHUTE DELIVERY DATE ACTUAL: NORMAL
DME PARACHUTE DELIVERY DATE EXPECTED: NORMAL
DME PARACHUTE DELIVERY DATE REQUESTED: NORMAL
DME PARACHUTE DELIVERY NOTE: NORMAL
DME PARACHUTE ITEM DESCRIPTION: NORMAL
DME PARACHUTE ORDER STATUS: NORMAL
DME PARACHUTE SUPPLIER NAME: NORMAL
DME PARACHUTE SUPPLIER PHONE: NORMAL

## 2024-01-03 PROBLEM — E83.52 HYPERCALCEMIA: Status: RESOLVED | Noted: 2021-01-05 | Resolved: 2024-01-03

## 2024-01-03 PROBLEM — M79.89 SOFT TISSUE MASS: Status: RESOLVED | Noted: 2019-05-24 | Resolved: 2024-01-03

## 2024-01-03 PROBLEM — R00.1 SINUS BRADYCARDIA BY ELECTROCARDIOGRAM: Status: RESOLVED | Noted: 2021-01-05 | Resolved: 2024-01-03

## 2024-01-03 PROBLEM — F90.0 ADHD (ATTENTION DEFICIT HYPERACTIVITY DISORDER), INATTENTIVE TYPE: Status: RESOLVED | Noted: 2022-03-03 | Resolved: 2024-01-03

## 2024-01-04 ENCOUNTER — OFFICE VISIT (OUTPATIENT)
Dept: FAMILY MEDICINE CLINIC | Facility: CLINIC | Age: 24
End: 2024-01-04
Payer: COMMERCIAL

## 2024-01-04 VITALS
WEIGHT: 308.6 LBS | DIASTOLIC BLOOD PRESSURE: 84 MMHG | OXYGEN SATURATION: 99 % | BODY MASS INDEX: 38.37 KG/M2 | TEMPERATURE: 97.8 F | HEIGHT: 75 IN | HEART RATE: 94 BPM | SYSTOLIC BLOOD PRESSURE: 126 MMHG

## 2024-01-04 DIAGNOSIS — G47.33 OSA (OBSTRUCTIVE SLEEP APNEA): ICD-10-CM

## 2024-01-04 DIAGNOSIS — F33.1 MAJOR DEPRESSIVE DISORDER, RECURRENT, MODERATE (HCC): ICD-10-CM

## 2024-01-04 DIAGNOSIS — Z23 ENCOUNTER FOR IMMUNIZATION: ICD-10-CM

## 2024-01-04 DIAGNOSIS — I10 ESSENTIAL HYPERTENSION: ICD-10-CM

## 2024-01-04 DIAGNOSIS — Z00.00 ANNUAL PHYSICAL EXAM: ICD-10-CM

## 2024-01-04 PROCEDURE — 90686 IIV4 VACC NO PRSV 0.5 ML IM: CPT | Performed by: NURSE PRACTITIONER

## 2024-01-04 PROCEDURE — 99395 PREV VISIT EST AGE 18-39: CPT | Performed by: NURSE PRACTITIONER

## 2024-01-04 PROCEDURE — 90471 IMMUNIZATION ADMIN: CPT | Performed by: NURSE PRACTITIONER

## 2024-01-04 RX ORDER — HYDROCHLOROTHIAZIDE 25 MG/1
25 TABLET ORAL DAILY
Qty: 90 TABLET | Refills: 3 | Status: SHIPPED | OUTPATIENT
Start: 2024-01-04

## 2024-01-04 NOTE — PROGRESS NOTES
ADULT ANNUAL PHYSICAL  [facility]    NAME: Logan J Weiler  AGE: 23 y.o. SEX: male  : 2000     DATE: 2024     Assessment and Plan:     Problem List Items Addressed This Visit        Respiratory    DEYSI (obstructive sleep apnea)     Using CPAP machine and is adjusting to the machine             Cardiovascular and Mediastinum    Essential hypertension     BP in range HCTZ 25 mg          Relevant Medications    hydrochlorothiazide (HYDRODIURIL) 25 mg tablet    Other Relevant Orders    Comprehensive metabolic panel    Lipid Panel with Direct LDL reflex    CBC and differential       Other    Major depressive disorder, recurrent, moderate (HCC)    Relevant Orders    Comprehensive metabolic panel    Lipid Panel with Direct LDL reflex    CBC and differential    Annual physical exam   Other Visit Diagnoses     Encounter for immunization        Relevant Orders    influenza vaccine, quadrivalent, 0.5 mL, preservative-free, for adult and pediatric patients 6 mos+ (AFLURIA, FLUARIX, FLULAVAL, FLUZONE)          Immunizations and preventive care screenings were discussed with patient today. Appropriate education was printed on patient's after visit summary.    Counseling:  Dental Health: discussed importance of regular tooth brushing, flossing, and dental visits.  Exercise: the importance of regular exercise/physical activity was discussed. Recommend exercise 3-5 times per week for at least 30 minutes.       Depression Screening and Follow-up Plan: Patient was screened for depression during today's encounter. They screened negative with a PHQ-9 score of 19.        Return in 6 months (on 2024).     Chief Complaint:     Chief Complaint   Patient presents with   • Annual Exam      History of Present Illness:     Adult Annual Physical   Patient here for a comprehensive physical exam. The patient reports problems - as listed  .  Patient here today for his check up  Patient was recently diagnosed with sleep apnea and having some troubles with adjusting to the machine and is having some depressive symptoms and following with every other month and therapist every week. Patient is trying to get more of a routine and low energy.        Diet and Physical Activity  Diet/Nutrition: well balanced diet.   Exercise: no formal exercise.      Depression Screening  PHQ-2/9 Depression Screening    Little interest or pleasure in doing things: 3 - nearly every day  Feeling down, depressed, or hopeless: 3 - nearly every day  Trouble falling or staying asleep, or sleeping too much: 2 - more than half the days  Feeling tired or having little energy: 3 - nearly every day  Poor appetite or overeating: 3 - nearly every day  Feeling bad about yourself - or that you are a failure or have let yourself or your family down: 3 - nearly every day  Trouble concentrating on things, such as reading the newspaper or watching television: 2 - more than half the days  Moving or speaking so slowly that other people could have noticed. Or the opposite - being so fidgety or restless that you have been moving around a lot more than usual: 0 - not at all  Thoughts that you would be better off dead, or of hurting yourself in some way: 0 - not at all  PHQ-9 Score: 19  PHQ-9 Interpretation: Moderately severe depression       General Health  Sleep: sleeps poorly.   Hearing: normal - bilateral.  Vision: most recent eye exam >1 year ago and wears glasses.   Dental: no dental visits for >1 year.        Health  History of STDs?: no.    Advanced Care Planning  Do you have an advanced directive? no  Do you have a durable medical power of ? no     Review of Systems:     Review of Systems   Constitutional:  Positive for fatigue. Negative for activity change, appetite change, chills, diaphoresis, fever and unexpected weight change.   HENT:  Negative for congestion, ear pain, hearing loss, postnasal drip, sinus pressure,  sinus pain, sneezing and sore throat.    Eyes:  Negative for pain, redness and visual disturbance.   Respiratory:  Negative for cough and shortness of breath.    Cardiovascular:  Negative for chest pain and leg swelling.   Gastrointestinal:  Negative for abdominal pain, diarrhea, nausea and vomiting.   Endocrine: Negative.    Genitourinary: Negative.    Musculoskeletal:  Negative for arthralgias.        Heel pain at times    Skin: Negative.    Allergic/Immunologic: Negative.    Neurological:  Negative for dizziness and light-headedness.   Psychiatric/Behavioral:  Positive for dysphoric mood. Negative for behavioral problems.       Past Medical History:     Past Medical History:   Diagnosis Date   • ADHD (attention deficit hyperactivity disorder)    • Anxiety    • Concussion    • Depression    • Kidney stones    • Self-injurious behavior    • Sleep difficulties     history of sleep apnea  and getting workup for narcolepsy as of 8/19      Past Surgical History:     Past Surgical History:   Procedure Laterality Date   • HERNIA REPAIR     • TONSILLECTOMY     • TOOTH EXTRACTION Right 09/21/2020    R lower molar extraction      Social History:     Social History     Socioeconomic History   • Marital status: Single     Spouse name: None   • Number of children: 0   • Years of education: None   • Highest education level: Some college, no degree   Occupational History   • 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   Tobacco Use   • Smoking status: Never   • Smokeless tobacco: Never   Vaping Use   • Vaping status: Never Used   Substance and Sexual Activity   • Alcohol use: Yes     Comment: socially   • Drug use: Yes     Types: Marijuana     Comment: ocasionally   • Sexual activity: None   Other Topics Concern   • None   Social History Narrative   • None     Social Determinants of Health     Financial Resource Strain: Not on file   Food Insecurity: Not on file   Transportation Needs: Not on file   Physical Activity: Not on file  "  Stress: Not on file   Social Connections: Not on file   Intimate Partner Violence: Not on file   Housing Stability: Not on file      Family History:     Family History   Problem Relation Age of Onset   • COPD Mother    • Hypertension Mother    • Depression Mother    • Hypertension Father    • Depression Father    • Anxiety disorder Father    • Alcohol abuse Father    • Anxiety disorder Brother    • Depression Brother    • Hypertension Brother    • Alcohol abuse Paternal Grandfather       Current Medications:     Current Outpatient Medications   Medication Sig Dispense Refill   • ARIPiprazole (ABILIFY) 5 mg tablet Take 1 tablet (5 mg total) by mouth daily 90 tablet 1   • cholecalciferol (VITAMIN D3) 1,000 units tablet Take 1 tablet (1,000 Units total) by mouth daily 90 tablet 1   • escitalopram (LEXAPRO) 20 mg tablet TAKE 1 TABLET DAILY 90 tablet 1   • hydrochlorothiazide (HYDRODIURIL) 25 mg tablet Take 1 tablet (25 mg total) by mouth daily 90 tablet 3   • lisdexamfetamine (VYVANSE) 60 MG capsule Take 1 capsule (60 mg total) by mouth every morning Max Daily Amount: 60 mg 30 capsule 0     No current facility-administered medications for this visit.      Allergies:     Allergies   Allergen Reactions   • Scopolamine       Physical Exam:     /84 (BP Location: Right arm, Patient Position: Sitting)   Pulse 94   Temp 97.8 °F (36.6 °C) (Temporal)   Ht 6' 3\" (1.905 m)   Wt (!) 140 kg (308 lb 9.6 oz)   SpO2 99%   BMI 38.57 kg/m²     Physical Exam  Vitals and nursing note reviewed.   Constitutional:       General: He is not in acute distress.     Appearance: He is well-developed.   HENT:      Head: Normocephalic and atraumatic.   Eyes:      Conjunctiva/sclera: Conjunctivae normal.   Cardiovascular:      Rate and Rhythm: Normal rate and regular rhythm.      Heart sounds: No murmur heard.  Pulmonary:      Effort: Pulmonary effort is normal. No respiratory distress.      Breath sounds: Normal breath sounds. "   Abdominal:      Palpations: Abdomen is soft.      Tenderness: There is no abdominal tenderness.   Musculoskeletal:         General: No swelling.      Cervical back: Neck supple.   Skin:     General: Skin is warm and dry.      Capillary Refill: Capillary refill takes less than 2 seconds.   Neurological:      Mental Status: He is alert.   Psychiatric:         Mood and Affect: Mood normal.        PHQ-2/9 Depression Screening    Little interest or pleasure in doing things: 3 - nearly every day  Feeling down, depressed, or hopeless: 3 - nearly every day  Trouble falling or staying asleep, or sleeping too much: 2 - more than half the days  Feeling tired or having little energy: 3 - nearly every day  Poor appetite or overeating: 3 - nearly every day  Feeling bad about yourself - or that you are a failure or have let yourself or your family down: 3 - nearly every day  Trouble concentrating on things, such as reading the newspaper or watching television: 2 - more than half the days  Moving or speaking so slowly that other people could have noticed. Or the opposite - being so fidgety or restless that you have been moving around a lot more than usual: 0 - not at all  Thoughts that you would be better off dead, or of hurting yourself in some way: 0 - not at all  PHQ-9 Score: 19  PHQ-9 Interpretation: Moderately severe depression        Depression Screening Follow-up Plan: Patient's depression screening was positive with a PHQ-2 score of . Their PHQ-9 score was . Patient assessed for underlying major depression. They have no active suicidal ideations. Brief counseling provided and recommend additional follow-up/re-evaluation next office visit.   YAO Chaudhari   [facility]

## 2024-01-08 DIAGNOSIS — F90.0 ADHD (ATTENTION DEFICIT HYPERACTIVITY DISORDER), INATTENTIVE TYPE: ICD-10-CM

## 2024-01-08 RX ORDER — LISDEXAMFETAMINE DIMESYLATE CAPSULES 60 MG/1
60 CAPSULE ORAL EVERY MORNING
Qty: 30 CAPSULE | Refills: 0 | Status: SHIPPED | OUTPATIENT
Start: 2024-01-08 | End: 2024-01-09 | Stop reason: SDUPTHER

## 2024-01-08 RX ORDER — LISDEXAMFETAMINE DIMESYLATE CAPSULES 60 MG/1
60 CAPSULE ORAL EVERY MORNING
Qty: 30 CAPSULE | Refills: 0
Start: 2024-01-08 | End: 2024-01-08 | Stop reason: SDUPTHER

## 2024-01-08 NOTE — TELEPHONE ENCOUNTER
Notified patient requesting the following refill:  Requested Prescriptions     Pending Prescriptions Disp Refills    lisdexamfetamine (VYVANSE) 60 MG capsule 30 capsule 0     Sig: Take 1 capsule (60 mg total) by mouth every morning Max Daily Amount: 60 mg       Marko has been filling controlled prescriptions on time as prescribed according to [place] Prescription Drug Monitoring Program    Refill request was sent to Dr. Casey Peña III, my indirect supervisor, for cosignature, to be sent to [facility] in [facility]

## 2024-01-09 RX ORDER — LISDEXAMFETAMINE DIMESYLATE CAPSULES 60 MG/1
60 CAPSULE ORAL EVERY MORNING
Qty: 30 CAPSULE | Refills: 0 | Status: SHIPPED | OUTPATIENT
Start: 2024-01-09 | End: 2024-02-08

## 2024-02-06 ENCOUNTER — TELEPHONE (OUTPATIENT)
Dept: NEUROLOGY | Facility: CLINIC | Age: 24
End: 2024-02-06

## 2024-02-06 NOTE — TELEPHONE ENCOUNTER
Called and LVM for patient regarding confirmation for upcoming appt w/ Dr. Calvin. Provided patient w/ appt time, date, and location.

## 2024-02-09 DIAGNOSIS — F90.0 ADHD (ATTENTION DEFICIT HYPERACTIVITY DISORDER), INATTENTIVE TYPE: ICD-10-CM

## 2024-02-09 RX ORDER — LISDEXAMFETAMINE DIMESYLATE CAPSULES 60 MG/1
60 CAPSULE ORAL EVERY MORNING
Qty: 30 CAPSULE | Refills: 0 | Status: SHIPPED | OUTPATIENT
Start: 2024-02-09 | End: 2024-03-10

## 2024-02-09 RX ORDER — LISDEXAMFETAMINE DIMESYLATE CAPSULES 60 MG/1
60 CAPSULE ORAL EVERY MORNING
Qty: 30 CAPSULE | Refills: 0
Start: 2024-02-09 | End: 2024-02-09 | Stop reason: SDUPTHER

## 2024-02-09 NOTE — TELEPHONE ENCOUNTER
Notified patient requesting the following refill:  Requested Prescriptions     Pending Prescriptions Disp Refills    lisdexamfetamine (VYVANSE) 60 MG capsule 30 capsule 0     Sig: Take 1 capsule (60 mg total) by mouth every morning Max Daily Amount: 60 mg       Marko has been filling controlled prescriptions on time as prescribed according to [place] Prescription Drug Monitoring Program    Refill request was sent to Dr. Casey Peña III, my indirect supervisor, for cosignature, to be sent to [facility] [place]

## 2024-02-12 ENCOUNTER — OFFICE VISIT (OUTPATIENT)
Dept: NEUROLOGY | Facility: CLINIC | Age: 24
End: 2024-02-12
Payer: COMMERCIAL

## 2024-02-12 VITALS
WEIGHT: 303 LBS | SYSTOLIC BLOOD PRESSURE: 118 MMHG | HEART RATE: 68 BPM | HEIGHT: 75 IN | BODY MASS INDEX: 37.67 KG/M2 | DIASTOLIC BLOOD PRESSURE: 80 MMHG

## 2024-02-12 DIAGNOSIS — G47.19 EXCESSIVE DAYTIME SLEEPINESS: ICD-10-CM

## 2024-02-12 DIAGNOSIS — E66.09 CLASS 2 OBESITY DUE TO EXCESS CALORIES WITHOUT SERIOUS COMORBIDITY WITH BODY MASS INDEX (BMI) OF 37.0 TO 37.9 IN ADULT: ICD-10-CM

## 2024-02-12 DIAGNOSIS — F12.90 MARIJUANA USE: ICD-10-CM

## 2024-02-12 DIAGNOSIS — G25.81 RESTLESS LEGS SYNDROME: Primary | ICD-10-CM

## 2024-02-12 DIAGNOSIS — R06.83 SNORING: ICD-10-CM

## 2024-02-12 DIAGNOSIS — G47.33 OSA (OBSTRUCTIVE SLEEP APNEA): ICD-10-CM

## 2024-02-12 PROCEDURE — 99214 OFFICE O/P EST MOD 30 MIN: CPT | Performed by: INTERNAL MEDICINE

## 2024-02-12 NOTE — PROGRESS NOTES
Progress Note - Sleep Medicine  Logan J Weiler 24 y.o. male MRN: [id number]       Impression & Plan:       1.  Mild obstructive sleep apnea  Diagnostic PSG 11/26/2023 at 308 pounds showed an AHI of 6.7, however there were frequent RERA's with an RDI of 15.8.    Compliance data from 1/6-2//24  33% more than 4 hours  5 hours and 1 minute  On APAP 5-15 cm H2O  95th percentile pressure 9.2  95 percentile leak 14.2  Residual AHI 1.2    Patient states that he did not derive benefit from using CPAP  He reports still feeling daytime sleepiness and frequent arousals while using CPAP    Current [place] score 11    Plan  Counseled patient on improving compliance until next visit in 2 months    2.  Excessive daytime sleepiness  Patient is smoking marijuana throughout the day which could also be leading to daytime sleepiness  Currently taking Vyvanse 60 mg which helps with wakefulness and ADHD  Taking Lexapro 20 mg and Abilify 5 mg in the morning as well    Plan  Counseled patient to decrease marijuana use    3.  Restless leg syndrome  Reports frequent bothersome symptoms that occur at rest in the morning as well  Ordered iron panel last visit but was not done    Plan  Reordered iron panel    4.  Obesity  Counseled patient on lifestyle modifications including diet and exercise  BMI 37.87    5.  Marijuana use  He smokes daily including in the morning  I explained this may be causing his daytime sleepiness    Plan  Counseled patient on smoking cessation  Recommended he decrease use at least  Discussed that different types of marijuana can lead to more sleepiness and marijuana that causes sleepiness should be avoided  ______________________________________________________________________    HPI:    Logan J Weiler 24-year-old male with a past medical history of hypertension, restless leg syndrome, ADHD, depression who presents for follow-up of excessive daytime sleepiness.  He goes to bed at 10 PM and falls asleep within 30 minutes.   He wakes up at 10 AM.  He is averaging 10 to 12 hours of sleep per night.  He was found to have mild DEYSI on last sleep study.  He is have been having difficulty tolerating CPAP.  He states that he wakes up frequently at night due to CPAP.  His usage has decreased since then.  He also states that he did not derive significant benefit from using CPAP.  His current [place] score is 11.  He continues to smoke marijuana daily.  He has attempted to decrease his use recently.  He continues to have frequent bothersome restless leg symptoms.  He states now his whole body feels restless.  It occurs in the morning as well as at night.  It generally occurs during periods of rest.  He did not have iron panel done after last visit.        Review of Systems:  Review of Systems   All other systems reviewed and are negative.        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   Tobacco Use    Smoking status: Never    Smokeless tobacco: Never   Vaping Use    Vaping status: Never Used   Substance and Sexual Activity    Alcohol use: Yes     Comment: socially    Drug use: Yes     Types: Marijuana     Comment: ocasionally    Sexual activity: Not on file   Other Topics Concern    Not on file   Social History Narrative    Not on file     Social Determinants of Health     Financial Resource Strain: Not on file   Food Insecurity: Not on file   Transportation Needs: Not on file   Physical Activity: Not on file   Stress: Not on file   Social Connections: Not on file   Intimate Partner Violence: Not on file   Housing Stability: Not on file       PhysicalExamination:  Vitals:   /80 (BP Location: Left arm,  "Patient Position: Sitting, Cuff Size: Large)   Pulse 68   Ht 6' 3\" (1.905 m)   Wt (!) 137 kg (303 lb)   BMI 37.87 kg/m²     Physical Exam  General:  Awake alert and oriented x 3, conversant without conversational dyspnea, NAD, normal affect  HEENT:  PERRL, Sclera noninjected, nonicteric OU, Nares patent,  no craniofacial abnormalities, Mucous membranes, moist, no oral lesions, normal dentition  NECK: Trachea midline, no accessory muscle use, no stridor, no cervical or supraclavicular adenopathy, JVP not elevated  CARDIAC: Reg, single s1/S2, no m/r/g  PULM: CTA bilaterally no wheezing, rhonchi or rales  EXT: No cyanosis, no clubbing, no edema, normal capillary refill  NEURO: no focal neurologic deficits, AAOx3, moving all extremities appropriately     Diagnostic Data:  Labs:  I personally reviewed the most recent laboratory data pertinent to today's visit  Telephone on 12/14/2023   Component Date Value    Supplier Name 12/14/2023 [employer] [facility]     Supplier Phone Number 12/14/2023 [phone]     Order Status 12/14/2023 Delivery Successful     Delivery Note 12/14/2023 [facility]     Delivery Request Date 12/14/2023 12/28/2023     Date Delivered  12/14/2023 12/28/2023     Supplier Name 12/14/2023 12/28/2023     Item Description 12/14/2023 CPAP Machine, Resmed Airsense 11     Item Description 12/14/2023 PAP Mask, Nasal, Fit Upon Setup, N/A, 1 per 3 months     Item Description 12/14/2023 PAP Mask Interface Cushion, Nasal, 2 per 1 month     Item Description 12/14/2023 PAP Headgear, 1 per 6 months     Item Description 12/14/2023 PAP Humidifier, Heated     Item Description 12/14/2023 Disposable PAP Filter, 2 per 1 month     Item Description 12/14/2023 Non-Disposable PAP Filter, 1 per 6 months     Item Description 12/14/2023 PAP Machine Tubing, Heated, 1 per 3 months     Item Description 12/14/2023 PAP Monitoring Modem     Item Description 12/14/2023 Humidifier Water Chamber, 1 per 6 months  "   Appointment on 09/19/2023   Component Date Value    TSH 3RD GENERATON 09/19/2023 3.192     Vit D, 25-Hydroxy 09/19/2023 21.0 (L)     WBC 09/19/2023 8.80     RBC 09/19/2023 5.16     Hemoglobin 09/19/2023 15.3     Hematocrit 09/19/2023 44.3     MCV 09/19/2023 86     MCH 09/19/2023 29.7     MCHC 09/19/2023 34.5     RDW 09/19/2023 13.3     MPV 09/19/2023 9.8     Platelets 09/19/2023 260     nRBC 09/19/2023 0     Neutrophils Relative 09/19/2023 60     Immat GRANS % 09/19/2023 1     Lymphocytes Relative 09/19/2023 29     Monocytes Relative 09/19/2023 7     Eosinophils Relative 09/19/2023 2     Basophils Relative 09/19/2023 1     Neutrophils Absolute 09/19/2023 5.36     Immature Grans Absolute 09/19/2023 0.05     Lymphocytes Absolute 09/19/2023 2.56     Monocytes Absolute 09/19/2023 0.64     Eosinophils Absolute 09/19/2023 0.15     Basophils Absolute 09/19/2023 0.04     Sodium 09/19/2023 139     Potassium 09/19/2023 4.1     Chloride 09/19/2023 103     CO2 09/19/2023 31     ANION GAP 09/19/2023 5     BUN 09/19/2023 14     Creatinine 09/19/2023 0.91     Glucose, Fasting 09/19/2023 89     Calcium 09/19/2023 10.0     AST 09/19/2023 19     ALT 09/19/2023 36     Alkaline Phosphatase 09/19/2023 81     Total Protein 09/19/2023 6.7     Albumin 09/19/2023 4.2     Total Bilirubin 09/19/2023 0.64     eGFR 09/19/2023 116     Hemoglobin A1C 09/19/2023 5.1     EAG 09/19/2023 100     Cholesterol 09/19/2023 193     Triglycerides 09/19/2023 168 (H)     HDL, Direct 09/19/2023 34 (L)     LDL Calculated 09/19/2023 125 (H)        I have personally reviewed pertinent lab results.  Lab Results   Component Value Date    WBC 8.80 09/19/2023    HGB 15.3 09/19/2023    HCT 44.3 09/19/2023    MCV 86 09/19/2023     09/19/2023     Lab Results   Component Value Date    GLUCOSE 86 12/30/2014    CALCIUM 10.0 09/19/2023     12/30/2014    K 4.1 09/19/2023    CO2 31 09/19/2023     09/19/2023    BUN 14 09/19/2023    CREATININE 0.91  "09/19/2023     No results found for: \"IGE\"  Lab Results   Component Value Date    ALT 36 09/19/2023    AST 19 09/19/2023    ALKPHOS 81 09/19/2023    BILITOT 0.40 12/30/2014     Lab Results   Component Value Date    IRON 80 06/01/2020    TIBC 343 06/01/2020    FERRITIN 166 06/01/2020     No results found for: \"UBCEJUIU67\"  No results found for: \"FOLATE\"      Arterial Blood Gas result:  PRISCILLA Calvin MD  [facility]    "

## 2024-02-12 NOTE — PATIENT INSTRUCTIONS
Ordered iron panel, please get done on an empty stomach  Please continue using CPAP  Follow-up in 2 months

## 2024-02-15 ENCOUNTER — TELEMEDICINE (OUTPATIENT)
Dept: PSYCHIATRY | Facility: CLINIC | Age: 24
End: 2024-02-15

## 2024-02-15 DIAGNOSIS — E55.9 VITAMIN D DEFICIENCY: ICD-10-CM

## 2024-02-15 DIAGNOSIS — F90.0 ADHD (ATTENTION DEFICIT HYPERACTIVITY DISORDER), INATTENTIVE TYPE: ICD-10-CM

## 2024-02-15 DIAGNOSIS — F33.0 MAJOR DEPRESSIVE DISORDER, RECURRENT EPISODE, MILD (HCC): Primary | ICD-10-CM

## 2024-02-15 DIAGNOSIS — F41.1 GAD (GENERALIZED ANXIETY DISORDER): ICD-10-CM

## 2024-02-15 RX ORDER — MELATONIN
1000 DAILY
Qty: 90 TABLET | Refills: 1 | Status: CANCELLED | OUTPATIENT
Start: 2024-02-15

## 2024-02-15 RX ORDER — ARIPIPRAZOLE 5 MG/1
5 TABLET ORAL DAILY
Qty: 90 TABLET | Refills: 1 | Status: SHIPPED | OUTPATIENT
Start: 2024-02-15 | End: 2024-08-13

## 2024-02-15 RX ORDER — ESCITALOPRAM OXALATE 20 MG/1
20 TABLET ORAL DAILY
Qty: 90 TABLET | Refills: 1 | Status: SHIPPED | OUTPATIENT
Start: 2024-02-15

## 2024-02-15 RX ORDER — MELATONIN
1000 DAILY
Qty: 90 TABLET | Refills: 1 | Status: SHIPPED | OUTPATIENT
Start: 2024-02-15

## 2024-02-15 NOTE — PATIENT INSTRUCTIONS
Please call the office nursing staff for medication issues including refills, problems getting medications, bothersome side effects, etc at [phone].     Please return for a follow up appointment as discussed and arrive approximately 15 minutes prior to your appointment time. If you are running late or are unable to attend your appointment, please call our [place] office at [phone], or if you were seen in the [place] office, please call [phone]    If you have thoughts of harming yourself or are otherwise in psychological crisis, do not hesitate to contact your County Crisis hotline, or 911 or go to the nearest emergency room.  [facility] Crisis: [phone]  [facility] Crisis: [phone]  [facility] Crisis: [phone]  [facility] Crisis: [phone]  [facility] Crisis: [phone]  [facility] Crisis: [phone]  [facility] Crisis: [phone]  National Suicide Prevention Hotline: 1-847.264.7619 or call 885

## 2024-02-15 NOTE — PSYCH
MEDICATION MANAGEMENT NOTE        [facility]    Virtual Visit Disclaimer & Required Documentation  TeleMed provider: Tami Patel MD and Dr. Rosalio Baker, located at [facility], [address] [place] [address] in [employer], [place], [facility]. The patient is also located in [place] which is the state in which I hold an active license.    The patient was identified by name and date of birth. Logan J Weiler was informed that this is a telemedicine visit and that the visit is being conducted through [employer] and patient was informed this is a secure, HIPAA-complaint platform. He agrees to proceed. My office door was closed. No one else was in the room. He acknowledged consent and understanding of privacy and security of the video platform. The patient has agreed to participate and understands they can discontinue the visit at any time.    Logan J Weiler verbally agrees to participate in Virtual Care Services. Pt is aware that Virtual Care Services could be limited without vital signs or the ability to perform a full hands-on physical exam. The patient understands he or the provider may request at any time to terminate the video visit and request the patient to seek care or treatment in person. Patient is aware this is a billable service.        Name and Date of Birth:  Logan J Weiler 24 y.o. 2000 MRN: [id number]    Date of Visit: February 15, 2024    Reason for Visit: Follow-up visit regarding medication management     _____________________________    Assessment/Plan   Logan J Weiler is a 24 y.o. 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 Marko was personally seen and evaluated today at the [facility] outpatient clinic virtually for follow-up regarding medication management.     On assessment, Logan J Weiler reports things have been relatively stable. The only change he has noticed is increased emotional sensitivity but this is not distressing and is likely a testament to the emotional work he is doing in his ongoing individual psychotherapy. He endorses ongoing difficulties with sleep, energy levels, motivation, but otherwise depression and anxiety are adequately managed. He is currently experiencing significant social stressors including difficulty finding employment, financial stress, and parents health. However he is coping as appropriately as he can, and not worrying excessively. His medications appear to be effective at current dosages in allowing him the capacity to manage these issues. He agrees that medication changes likely would not make any difference as his depression and anxiety appear to be as adequately managed as possible in the context of his sleep issues and current stressors. Current goals will be to continue working on finding appropriate treatment for sleep issues and find employment to improve routine. We will plan to follow up in 2 months or sooner if symptoms worsen, and Marko agrees to call with any issues between appointments.    DSM-5 Diagnoses/Visit Diagnoses:     1. Major depressive disorder, recurrent episode, mild (HCC)    2. ADHD (attention deficit hyperactivity disorder), inattentive type    3. KORY (generalized anxiety disorder)    4. Vitamin D deficiency        Treatment Recommendations/Precautions:  Continue psychopharmacological management as follows:  Vyvanse 60mg daily for ADHD  Lexapro 20mg daily for depression  Abilify 5mg daily for antidepressant augmentation  Start Vitamin D 1000 IU daily for vitamin D deficiency (insurance would not cover through express scripts recently)  Labs most recently obtained September 2023, reviewed.   Second reminder to obtain  vitamin D repeat level after high dose supplementation  Continue individual therapy with own therapist  Follow up in 8 weeks for medication management  Follow up with PCP for medical issues and ongoing care  Aware of 24 hour and weekend coverage for urgent situations accessed by calling [facility] main practice number    Individual psychotherapy provided: No     Treatment Plan:     Completed and signed during the session: Not applicable - Treatment Plan not due at this session    Medications Risks/Benefits:      Risks, Benefits And Possible Side Effects Of Medications:    Risks, benefits, and possible side effects of medications explained to Marko and he verbalizes understanding and agreement for treatment.     Controlled Medication Discussion:     Marko has been filling controlled prescriptions on time as prescribed according to [place] Prescription Drug Monitoring Program    Medical Decision Making / Counseling / Coordination of Care:  The following interventions are recommended: return in 2 months for follow up or sooner if needed.  Although patient's acute lethality risk is LOW, long-term/chronic lethality risk is mildly elevated given the risk factors listed above. However, at the current moment, Marko is future-oriented, forward-thinking, and demonstrates ability to act in a self-preserving manner as evidenced by volitionally seeking psychiatric evaluation and treatment today. To mitigate future risk, patient should adhere to treatment recommendations, avoid alcohol/illicit substance use, utilize community-based resources and familiar support, and prioritize mental health treatment. The diagnosis and treatment plan were reviewed with the patient. Risks, benefits, and alternatives to treatment were discussed. The importance of medication and treatment compliance was reviewed with the patient.     _____________________________________________    History of Present Illness     Chief  "Complaint: \"I've been a bit more emotionally sensitive\"    SUBJECTIVE:    Logan J Weiler is a 24 y.o. male, possessing a past psychiatric history significant for depression, ADHD, who was personally seen and evaluated at the [facility] outpatient clinic virtually for follow-up regarding medication management. At their last visit, the plan was to continue medications and start daily vitamin D supplementation.    Marko states that since their previous psychiatric appointment with this writer, mood has been \"more emotionally sensitive and maybe irritable\". Dad has been home from hospital. He started using CPAP, has not helped very much, but will try this more. This last week has been sleeping a lot. He has been seeing therapist regularly. He is trying to go on more hikes. Still having difficulty with energy and hypersomnia. Significant social stressors including difficulty finding employment, financial stress, and parents health. Anxiety manageable where it is and is not worrying excessively or unreasonably. He denies hopelessness or thoughts to harm himself and is future oriented with plans to find employment and eventually return to school once more financially stable.    Presently, patient denies suicidal/homicidal ideation in addition to thoughts of self-injury.  At conclusion of evaluation, patient is amenable to the recommendations of this writer including: continue psychotropic medications as prescribed, individual psychotherapy .  Also, patient is amenable to calling/contacting the outpatient office including this writer if any acute adverse effects of their medication regimen arise in addition to any comments or concerns pertaining to their psychiatric management.  Patient is amenable to calling/contacting crisis and/or attending to the nearest emergency department if their clinical condition deteriorates to assure their safety and stability, stating that they are able to appropriately " "confide in their parents regarding their psychiatric state.    Current Rating Scores:     None completed today.    Psychiatric Review Of Systems:  Unchanged information from this writer's previous assessment is copied and italicized; information that has changed is bolded.       Appetite: no appetite in the beginning of the day and then over eats in the evening.  Adverse eating: no  Weight changes: no  Insomnia/sleeplessness: still struggling  Fatigue/anergy: still difficult  Anhedonia/lack of interest: has perhaps been more bored and disinterested, but feels this is due to being home all the time  Attention/concentration: no, improved with vyvanse  Psychomotor agitation/retardation: no  Somatic symptoms: no  Anxiety/panic attack: \"okay\" does worry about family and finances, but not unreasonable and not uncontrollable  June/hypomania: no  Hopelessness/helplessness/worthlessness: no  Self-injurious behavior/high-risk behavior: no  Suicidal ideation: no  Homicidal ideation: no  Auditory hallucinations: no  Visual hallucinations: no  Other perceptual disturbances: no  Delusional thinking: no  Obsessive/compulsive symptoms: no    Review Of Systems:      Constitutional low energy   ENT negative   Cardiovascular negative   Respiratory negative   Gastrointestinal negative   Genitourinary negative   Musculoskeletal negative   Integumentary negative   Neurological negative   Endocrine negative   Other Symptoms none, all other systems are negative     Objective    OBJECTIVE:     Visit Vitals  Smoking Status Never      Wt Readings from Last 6 Encounters:   02/12/24 (!) 137 kg (303 lb)   01/04/24 (!) 140 kg (308 lb 9.6 oz)   10/30/23 (!) 140 kg (308 lb 6.4 oz)   07/25/23 (!) 141 kg (311 lb)   07/13/23 (!) 141 kg (311 lb 14.4 oz)   03/10/22 118 kg (261 lb)        Past Medical History:   Diagnosis Date    ADHD (attention deficit hyperactivity disorder)     Anxiety     Concussion     Depression     Kidney stones     " "Self-injurious behavior     Sleep difficulties     history of sleep apnea  and getting workup for narcolepsy as of 8/19      Past Surgical History:   Procedure Laterality Date    HERNIA REPAIR      TONSILLECTOMY      TOOTH EXTRACTION Right 09/21/2020    R lower molar extraction       Meds/Allergies    Allergies   Allergen Reactions    Scopolamine      Current Outpatient Medications   Medication Instructions    ARIPiprazole (ABILIFY) 5 mg, Oral, Daily    cholecalciferol (VITAMIN D3) 1,000 Units, Oral, Daily    escitalopram (LEXAPRO) 20 mg, Oral, Daily    hydroCHLOROthiazide 25 mg, Oral, Daily    lisdexamfetamine (VYVANSE) 60 mg, Oral, Every morning           Mental Status Exam:    Appearance age appropriate, casually dressed, wearing glasses, bearded, seated comfortably   Behavior cooperative, calm   Speech normal rate, normal volume, normal pitch   Mood \"More emotionally sensitive\"   Affect normal range and intensity, appropriate   Thought Processes organized, logical, goal directed   Associations intact associations   Thought Content no overt delusions   Perceptual Disturbances: Denies auditory or visual hallucinations and Does not appear to be responding to internal stimuli   Abnormal Thoughts  Risk Potential Denies suicidal or homicidal ideation, plan, or intent   Orientation oriented to person, place, time/date, and situation   Memory recent and remote memory grossly intact   Consciousness alert and awake   Attention Span Concentration Span attention span and concentration are age appropriate   Intellect appears to be of average intelligence   Insight good   Judgement good   Muscle Strength and  Gait unable to assess today due to virtual visit   Motor Activity unable to assess today due to virtual visit   Language no difficulty naming common objects, no difficulty repeating a phrase   Fund of Knowledge adequate knowledge of current events  adequate fund of knowledge regarding past history  adequate fund of " knowledge regarding vocabulary      Laboratory Results: I have personally reviewed all pertinent laboratory/tests results    Telephone on 12/14/2023   Component Date Value Ref Range Status    Supplier Name 12/14/2023 [employer]   Final-Edited    Supplier Phone Number 12/14/2023 [phone]   Final-Edited    Order Status 12/14/2023 Delivery Successful   Final-Edited    Delivery Note 12/14/2023 [employer]   Final-Edited    Delivery Request Date 12/14/2023 12/28/2023   Final-Edited    Date Delivered  12/14/2023 12/28/2023   Final-Edited    Supplier Name 12/14/2023 12/28/2023   Final-Edited    Item Description 12/14/2023 CPAP Machine, Resmed Airsense 11   Final-Edited    Comment: Qty: 1  Auto Min Pressure: 5 cm  Auto Max Pressure: 15 cm  Oxygen Usage: None      Item Description 12/14/2023 PAP Mask, Nasal, Fit Upon Setup, N/A, 1 per 3 months   Final-Edited    Qty: 1    Item Description 12/14/2023 PAP Mask Interface Cushion, Nasal, 2 per 1 month   Final-Edited    Qty: 1    Item Description 12/14/2023 PAP Headgear, 1 per 6 months   Final-Edited    Qty: 1    Item Description 12/14/2023 PAP Humidifier, Heated   Final-Edited    Qty: 1    Item Description 12/14/2023 Disposable PAP Filter, 2 per 1 month   Final-Edited    Qty: 1    Item Description 12/14/2023 Non-Disposable PAP Filter, 1 per 6 months   Final-Edited    Qty: 1    Item Description 12/14/2023 PAP Machine Tubing, Heated, 1 per 3 months   Final-Edited    Qty: 1    Item Description 12/14/2023 PAP Monitoring Modem   Final-Edited    Qty: 1    Item Description 12/14/2023 Humidifier Water Chamber, 1 per 6 months   Final-Edited    Qty: 1   Appointment on 09/19/2023   Component Date Value Ref Range Status    TSH 3RD GENERATON 09/19/2023 3.192  0.450 - 4.500 uIU/mL Final    Adult TSH (3rd generation) reference range follows the recommended guidelines of the American Thyroid Association, January, 2020.    Vit D, 25-Hydroxy 09/19/2023 21.0 (L)  30.0 -  100.0 ng/mL Final    Vitamin D guidelines established by Clinical Guidelines Subcommittee  of the Endocrine Society Task Force, 2011    Deficiency <20ng/ml   Insufficiency 20-30ng/ml   Sufficient  ng/ml     WBC 09/19/2023 8.80  4.31 - 10.16 Thousand/uL Final    RBC 09/19/2023 5.16  3.88 - 5.62 Million/uL Final    Hemoglobin 09/19/2023 15.3  12.0 - 17.0 g/dL Final    Hematocrit 09/19/2023 44.3  36.5 - 49.3 % Final    MCV 09/19/2023 86  82 - 98 fL Final    MCH 09/19/2023 29.7  26.8 - 34.3 pg Final    MCHC 09/19/2023 34.5  31.4 - 37.4 g/dL Final    RDW 09/19/2023 13.3  11.6 - 15.1 % Final    MPV 09/19/2023 9.8  8.9 - 12.7 fL Final    Platelets 09/19/2023 260  149 - 390 Thousands/uL Final    nRBC 09/19/2023 0  /100 WBCs Final    Neutrophils Relative 09/19/2023 60  43 - 75 % Final    Immat GRANS % 09/19/2023 1  0 - 2 % Final    Lymphocytes Relative 09/19/2023 29  14 - 44 % Final    Monocytes Relative 09/19/2023 7  4 - 12 % Final    Eosinophils Relative 09/19/2023 2  0 - 6 % Final    Basophils Relative 09/19/2023 1  0 - 1 % Final    Neutrophils Absolute 09/19/2023 5.36  1.85 - 7.62 Thousands/µL Final    Immature Grans Absolute 09/19/2023 0.05  0.00 - 0.20 Thousand/uL Final    Lymphocytes Absolute 09/19/2023 2.56  0.60 - 4.47 Thousands/µL Final    Monocytes Absolute 09/19/2023 0.64  0.17 - 1.22 Thousand/µL Final    Eosinophils Absolute 09/19/2023 0.15  0.00 - 0.61 Thousand/µL Final    Basophils Absolute 09/19/2023 0.04  0.00 - 0.10 Thousands/µL Final    Sodium 09/19/2023 139  135 - 147 mmol/L Final    Potassium 09/19/2023 4.1  3.5 - 5.3 mmol/L Final    Chloride 09/19/2023 103  96 - 108 mmol/L Final    CO2 09/19/2023 31  21 - 32 mmol/L Final    ANION GAP 09/19/2023 5  mmol/L Final    BUN 09/19/2023 14  5 - 25 mg/dL Final    Creatinine 09/19/2023 0.91  0.60 - 1.30 mg/dL Final    Standardized to IDMS reference method    Glucose, Fasting 09/19/2023 89  65 - 99 mg/dL Final    Calcium 09/19/2023 10.0  8.4 - 10.2 mg/dL  Final    AST 09/19/2023 19  13 - 39 U/L Final    ALT 09/19/2023 36  7 - 52 U/L Final    Specimen collection should occur prior to Sulfasalazine administration due to the potential for falsely depressed results.     Alkaline Phosphatase 09/19/2023 81  34 - 104 U/L Final    Total Protein 09/19/2023 6.7  6.4 - 8.4 g/dL Final    Albumin 09/19/2023 4.2  3.5 - 5.0 g/dL Final    Total Bilirubin 09/19/2023 0.64  0.20 - 1.00 mg/dL Final    Use of this assay is not recommended for patients undergoing treatment with eltrombopag due to the potential for falsely elevated results.  N-acetyl-p-benzoquinone imine (metabolite of Acetaminophen) will generate erroneously low results in samples for patients that have taken an overdose of Acetaminophen.    eGFR 09/19/2023 116  ml/min/1.73sq m Final    Hemoglobin A1C 09/19/2023 5.1  Normal 4.0-5.6%; PreDiabetic 5.7-6.4%; Diabetic >=6.5%; Glycemic control for adults with diabetes <7.0% % Final    EAG 09/19/2023 100  mg/dl Final    Cholesterol 09/19/2023 193  See Comment mg/dL Final    Cholesterol:         Pediatric <18 Years        Desirable          <170 mg/dL      Borderline High    170-199 mg/dL      High               >=200 mg/dL        Adult >=18 Years            Desirable         <200 mg/dL      Borderline High   200-239 mg/dL      High              >239 mg/dL      Triglycerides 09/19/2023 168 (H)  See Comment mg/dL Final    Triglyceride:     0-9Y            <75mg/dL     10Y-17Y         <90 mg/dL       >=18Y     Normal          <150 mg/dL     Borderline High 150-199 mg/dL     High            200-499 mg/dL        Very High       >499 mg/dL    Specimen collection should occur prior to Metamizole administration due to the potential for falsely depressed results.    HDL, Direct 09/19/2023 34 (L)  >=40 mg/dL Final    LDL Calculated 09/19/2023 125 (H)  0 - 100 mg/dL Final    LDL Cholesterol:     Optimal           <100 mg/dl     Near Optimal      100-129 mg/dl     Above Optimal        Borderline High 130-159 mg/dl       High            160-189 mg/dl       Very High       >189 mg/dl         This screening LDL is a calculated result.   It does not have the accuracy of the Direct Measured LDL in the monitoring of patients with hyperlipidemia and/or statin therapy.   Direct Measure LDL (HAB013) must be ordered separately in these patients.             ___________________________________    History Review: The following portions of the patient's history were reviewed and updated as appropriate: allergies, current medications, past family history, past medical history, past social history, past surgical history, and problem list.    Unchanged information from this writer's previous assessment is copied and italicized; information that has changed is bolded.    Past Psychiatric History:      Past psychiatric diagnoses:   ADHD depression and anxiety  Inpatient psychiatric admissions:   None  Prior outpatient psychiatric treatment:   Had been a patient of Dr. Angeles at [facility] since March 2022  Previously was seeing Dr. Peña and Gabi SAMAYOA at [facility]  Past/current psychotherapy:    Weekly therapy online at a private agency  History of suicidal attempts/gestures:   denies    History of non-suicidal self-injurious behavior:   None  History of violence/aggressive behaviors:   denies  Psychotropic medication trials:   Effexor XR (2018) (nausea)  Concerta ER 36mg (2017) - dilated pupils, chalenges with glares when driving  Armodafinil (somewhat helpful)  Ritalin 10mg, Vyvanse Strattera (vomiting)  Zoloft, abilify, buspar, gabapentin, trintellix,  lexapro  Pramipexole for RLS  Nuvigil for hypersomnia (several years ago)  Wellbutrin Xl 150mg - ineffective for augmentation purposes  Melatonin - instructed to stop by sleep medicine, not effective  Substance abuse inpatient/outpatient rehabilitation:   Denies  Eating disorder history:   no     Substance Abuse History:     Marijuana use 4-5 times per week  "  Formerly used psychedelics which \"bordered on problematic\"  Denies history of alcohol, illict substance, or tobacco abuse., Patient denies previous legal actions or arrests related to substance intoxication including prior DWIs/DUIs., Patient does not exhibit objective evidence of substance withdrawal during today's examination nor do they appear under the influence of any psychoactive substance.       Family Psychiatric History:      Psychiatric Family History: Father - depression, anxiety, alcohol use  Brother - anxiety, depression  Mother - Depression  Suicide Attempts: none  Patient otherwise denies known family history of psychiatric illness, substance use, or suicide attempts.     Social History:     Developmental: Patient denies a history of milestone/developmental delay., Patient denies any known in-utero exposure to toxins or illicit substances.,   Reports a history of IEP in 6th grade for 'mental health':  In 6th grade got a kidney stone, was anxious about getting stent removed, mother was convinced he and neurological d/o and absence seizures, he missed a lot of school. 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 Unloaded but not locked. Provided two gun locks.  Patient denies history of arrests or violence with a deadly weapon.    history: None     Traumatic History:      Abuse: Denies  Other Traumatic Events: Childhood issues as above  ___________________________________      Visit Time    Visit Start Time: 11:00  Visit Stop Time: 11:25  Total Visit Duration:  25 minutes    Tami Patel MD   02/15/24    "

## 2024-03-07 ENCOUNTER — APPOINTMENT (OUTPATIENT)
Dept: LAB | Facility: CLINIC | Age: 24
End: 2024-03-07
Payer: COMMERCIAL

## 2024-03-07 DIAGNOSIS — F33.1 MAJOR DEPRESSIVE DISORDER, RECURRENT, MODERATE (HCC): ICD-10-CM

## 2024-03-07 DIAGNOSIS — E55.9 VITAMIN D DEFICIENCY: ICD-10-CM

## 2024-03-07 DIAGNOSIS — I10 ESSENTIAL HYPERTENSION: ICD-10-CM

## 2024-03-07 DIAGNOSIS — G25.81 RESTLESS LEGS SYNDROME: ICD-10-CM

## 2024-03-07 LAB
25(OH)D3 SERPL-MCNC: 20.7 NG/ML (ref 30–100)
ALBUMIN SERPL BCP-MCNC: 4.4 G/DL (ref 3.5–5)
ALP SERPL-CCNC: 82 U/L (ref 34–104)
ALT SERPL W P-5'-P-CCNC: 38 U/L (ref 7–52)
ANION GAP SERPL CALCULATED.3IONS-SCNC: 11 MMOL/L
AST SERPL W P-5'-P-CCNC: 19 U/L (ref 13–39)
BASOPHILS # BLD AUTO: 0.05 THOUSANDS/ÂΜL (ref 0–0.1)
BASOPHILS NFR BLD AUTO: 1 % (ref 0–1)
BILIRUB SERPL-MCNC: 0.55 MG/DL (ref 0.2–1)
BUN SERPL-MCNC: 14 MG/DL (ref 5–25)
CALCIUM SERPL-MCNC: 9.7 MG/DL (ref 8.4–10.2)
CHLORIDE SERPL-SCNC: 101 MMOL/L (ref 96–108)
CHOLEST SERPL-MCNC: 184 MG/DL
CO2 SERPL-SCNC: 27 MMOL/L (ref 21–32)
CREAT SERPL-MCNC: 0.91 MG/DL (ref 0.6–1.3)
EOSINOPHIL # BLD AUTO: 0.11 THOUSAND/ÂΜL (ref 0–0.61)
EOSINOPHIL NFR BLD AUTO: 2 % (ref 0–6)
ERYTHROCYTE [DISTWIDTH] IN BLOOD BY AUTOMATED COUNT: 13.2 % (ref 11.6–15.1)
FERRITIN SERPL-MCNC: 120 NG/ML (ref 24–336)
GFR SERPL CREATININE-BSD FRML MDRD: 115 ML/MIN/1.73SQ M
GLUCOSE P FAST SERPL-MCNC: 88 MG/DL (ref 65–99)
HCT VFR BLD AUTO: 46.6 % (ref 36.5–49.3)
HDLC SERPL-MCNC: 26 MG/DL
HGB BLD-MCNC: 16.4 G/DL (ref 12–17)
IMM GRANULOCYTES # BLD AUTO: 0.03 THOUSAND/UL (ref 0–0.2)
IMM GRANULOCYTES NFR BLD AUTO: 0 % (ref 0–2)
IRON SATN MFR SERPL: 20 % (ref 15–50)
IRON SERPL-MCNC: 77 UG/DL (ref 50–212)
LDLC SERPL CALC-MCNC: 125 MG/DL (ref 0–100)
LYMPHOCYTES # BLD AUTO: 1.74 THOUSANDS/ÂΜL (ref 0.6–4.47)
LYMPHOCYTES NFR BLD AUTO: 24 % (ref 14–44)
MCH RBC QN AUTO: 29.4 PG (ref 26.8–34.3)
MCHC RBC AUTO-ENTMCNC: 35.2 G/DL (ref 31.4–37.4)
MCV RBC AUTO: 84 FL (ref 82–98)
MONOCYTES # BLD AUTO: 0.58 THOUSAND/ÂΜL (ref 0.17–1.22)
MONOCYTES NFR BLD AUTO: 8 % (ref 4–12)
NEUTROPHILS # BLD AUTO: 4.9 THOUSANDS/ÂΜL (ref 1.85–7.62)
NEUTS SEG NFR BLD AUTO: 65 % (ref 43–75)
NRBC BLD AUTO-RTO: 0 /100 WBCS
PLATELET # BLD AUTO: 257 THOUSANDS/UL (ref 149–390)
PMV BLD AUTO: 10.6 FL (ref 8.9–12.7)
POTASSIUM SERPL-SCNC: 4.6 MMOL/L (ref 3.5–5.3)
PROT SERPL-MCNC: 6.9 G/DL (ref 6.4–8.4)
RBC # BLD AUTO: 5.58 MILLION/UL (ref 3.88–5.62)
SODIUM SERPL-SCNC: 139 MMOL/L (ref 135–147)
TIBC SERPL-MCNC: 379 UG/DL (ref 250–450)
TRIGL SERPL-MCNC: 165 MG/DL
UIBC SERPL-MCNC: 302 UG/DL (ref 155–355)
WBC # BLD AUTO: 7.41 THOUSAND/UL (ref 4.31–10.16)

## 2024-03-07 PROCEDURE — 80061 LIPID PANEL: CPT

## 2024-03-07 PROCEDURE — 85025 COMPLETE CBC W/AUTO DIFF WBC: CPT

## 2024-03-07 PROCEDURE — 83550 IRON BINDING TEST: CPT

## 2024-03-07 PROCEDURE — 82728 ASSAY OF FERRITIN: CPT

## 2024-03-07 PROCEDURE — 36415 COLL VENOUS BLD VENIPUNCTURE: CPT

## 2024-03-07 PROCEDURE — 83540 ASSAY OF IRON: CPT

## 2024-03-07 PROCEDURE — 80053 COMPREHEN METABOLIC PANEL: CPT

## 2024-03-07 PROCEDURE — 82306 VITAMIN D 25 HYDROXY: CPT

## 2024-03-14 DIAGNOSIS — F90.0 ADHD (ATTENTION DEFICIT HYPERACTIVITY DISORDER), INATTENTIVE TYPE: ICD-10-CM

## 2024-03-14 RX ORDER — LISDEXAMFETAMINE DIMESYLATE CAPSULES 60 MG/1
60 CAPSULE ORAL EVERY MORNING
Qty: 30 CAPSULE | Refills: 0
Start: 2024-03-14 | End: 2024-03-14 | Stop reason: SDUPTHER

## 2024-03-14 RX ORDER — LISDEXAMFETAMINE DIMESYLATE CAPSULES 60 MG/1
60 CAPSULE ORAL EVERY MORNING
Qty: 30 CAPSULE | Refills: 0 | Status: SHIPPED | OUTPATIENT
Start: 2024-03-14 | End: 2024-04-13

## 2024-03-14 NOTE — TELEPHONE ENCOUNTER
Notified patient requesting the following refill:  Requested Prescriptions     Pending Prescriptions Disp Refills    lisdexamfetamine (VYVANSE) 60 MG capsule 30 capsule 0     Sig: Take 1 capsule (60 mg total) by mouth every morning Max Daily Amount: 60 mg       Marko has been filling controlled prescriptions on time as prescribed according to [place] Prescription Drug Monitoring Program    Refill request was sent to Dr. Casey Peña III, my indirect supervisor, for cosignature.

## 2024-04-16 DIAGNOSIS — F90.0 ADHD (ATTENTION DEFICIT HYPERACTIVITY DISORDER), INATTENTIVE TYPE: ICD-10-CM

## 2024-04-16 RX ORDER — LISDEXAMFETAMINE DIMESYLATE CAPSULES 60 MG/1
60 CAPSULE ORAL EVERY MORNING
Qty: 30 CAPSULE | Refills: 0
Start: 2024-04-16 | End: 2024-04-17 | Stop reason: SDUPTHER

## 2024-04-16 RX ORDER — LISDEXAMFETAMINE DIMESYLATE CAPSULES 60 MG/1
60 CAPSULE ORAL EVERY MORNING
Qty: 30 CAPSULE | Refills: 0 | Status: CANCELLED | OUTPATIENT
Start: 2024-04-16 | End: 2024-05-16

## 2024-04-17 DIAGNOSIS — F90.0 ADHD (ATTENTION DEFICIT HYPERACTIVITY DISORDER), INATTENTIVE TYPE: ICD-10-CM

## 2024-04-17 RX ORDER — LISDEXAMFETAMINE DIMESYLATE CAPSULES 60 MG/1
60 CAPSULE ORAL EVERY MORNING
Qty: 30 CAPSULE | Refills: 0 | Status: SHIPPED | OUTPATIENT
Start: 2024-04-17 | End: 2024-04-18

## 2024-04-17 RX ORDER — LISDEXAMFETAMINE DIMESYLATE CAPSULES 60 MG/1
60 CAPSULE ORAL EVERY MORNING
Qty: 30 CAPSULE | Refills: 0 | Status: SHIPPED | OUTPATIENT
Start: 2024-04-17 | End: 2024-04-17 | Stop reason: SDUPTHER

## 2024-04-17 NOTE — PSYCH
MEDICATION MANAGEMENT NOTE        [facility]    Virtual Visit Disclaimer & Required Documentation  TeleMed provider: Tami Patel MD and Dr. Rosalio Baker, located at [facility], [address] [place] [address] in [place], [place], [facility]. The patient is also located in [place] which is the state in which I hold an active license.    The patient was identified by name and date of birth. Logan J Weiler was informed that this is a telemedicine visit and that the visit is being conducted through [employer] and patient was informed this is a secure, HIPAA-complaint platform. He agrees to proceed. My office door was closed. No one else was in the room. He acknowledged consent and understanding of privacy and security of the video platform. The patient has agreed to participate and understands they can discontinue the visit at any time.    Logan J Weiler verbally agrees to participate in Virtual Care Services. Pt is aware that Virtual Care Services could be limited without vital signs or the ability to perform a full hands-on physical exam. The patient understands he or the provider may request at any time to terminate the video visit and request the patient to seek care or treatment in person. Patient is aware this is a billable service.        Name and Date of Birth:  Logan J Weiler 24 y.o. 2000 MRN: [id number]    Date of Visit: April 18, 2024    Reason for Visit: Follow-up visit regarding medication management     _____________________________    Assessment/Plan   Logan J Weiler is a 24 y.o. 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 Marko was personally seen and evaluated today at the [facility] outpatient clinic virtually for follow-up regarding medication management.      On assessment, Logan J Weiler reports things have been stable overall. He reports chronic fatigue symptoms, excessive sleepiness, which is ongoing and contributing to depressive symptoms. He denies thoughts to harm himself. He has been adherent with medications. His anxiety has been slightly worse recently, however this appears to be more in the form of automatic negative thoughts and negative core beliefs. Encouraged him to discuss this with his therapist at future visits and work on starting to identify and challenge these thoughts. He is struggling with adherence with CPAP. We discussed how his vyvanse appears to be wearing off and will try to supplement with afternoon dosage of instant release adderall to hopefully provide better spectrum of coverage. We also discussed restarting vitamin D high dose supplementation as it appears his level was untouched by 8 weeks of high dose previously. We also discussed that b complex vitamins may help with energy levels in some individuals so we can try this as well for his fatigue. Because he just picked up vyvanse yesterday, his insurance will not let him  another 30 days of vyvanse at lower dosage, therefore we will plan to start new dosing schedule of vyvanse next month. We will plan to follow up in 8 weeks to allow for time to note any changes with that new dosage schedule. Patient is in agreement with the treatment plan as detailed below, and agrees to call the office with any concerns or side effects between appointments.     DSM-5 Diagnoses/Visit Diagnoses:     1. ADHD (attention deficit hyperactivity disorder), inattentive type    2. Major depressive disorder, recurrent episode, mild (HCC)    3. Chronic fatigue    4. KORY (generalized anxiety disorder)    5. Vitamin D deficiency        Treatment Recommendations/Precautions:  Continue psychopharmacological management as  follows:  Adjust Vyvanse to 50mg daily for ADHD and add adderall IR 5mg in the afternoon to provide extended coverage  Monitor blood pressures regularly after initiating extra dosage of adderall to ensure not spiking blood pressure   Lexapro 20mg daily for depression  Abilify 5mg daily for antidepressant augmentation  Discussed upcoming transfer of care in resident clinic  Start Vitamin D 50,000 units weekly x 12 weeks for continued vitamin D deficiency despite high dose supplementation  Start vitamin B complex for fatigue  Labs most recently obtained September 2023, reviewed.   Follow up in 8 week for medication management  Follow up with PCP for medical issues and ongoing care  Continue psychotherapy with own therapist  Aware of 24 hour and weekend coverage for urgent situations accessed by calling [facility] main practice number    Individual psychotherapy provided: No     Treatment Plan:     Completed and signed during the session: Not applicable - Treatment Plan not due at this session    Medications Risks/Benefits:      Risks, Benefits And Possible Side Effects Of Medications:    Risks, benefits, and possible side effects of medications explained to Marko and he verbalizes understanding and agreement for treatment.   Vyvanse/Adderall:PARQ completed including elevated heart rate, elevated bp, seizures, anxiety/irritability, activation/induction of christina, abuse potential, interactions with other medications, risk of sudden death, appetite suppression/weight loss and other risks. For MALES- rare priapism.    Controlled Medication Discussion:     Marko has been filling controlled prescriptions on time as prescribed according to [place] Prescription Drug Monitoring Program    Medical Decision Making / Counseling / Coordination of Care:  The following interventions are recommended: return in 2 months for follow up or sooner if needed.  Although patient's acute lethality risk is LOW,  "long-term/chronic lethality risk is mildly elevated given the risk factors listed above. However, at the current moment, Marko is future-oriented, forward-thinking, and demonstrates ability to act in a self-preserving manner as evidenced by volitionally seeking psychiatric evaluation and treatment today. To mitigate future risk, patient should adhere to treatment recommendations, avoid alcohol/illicit substance use, utilize community-based resources and familiar support, and prioritize mental health treatment. The diagnosis and treatment plan were reviewed with the patient. Risks, benefits, and alternatives to treatment were discussed. The importance of medication and treatment compliance was reviewed with the patient.     _____________________________________________    History of Present Illness     Chief Complaint: \"I'm tired all of the time\"    SUBJECTIVE:    Logan J Weiler is a 24 y.o. male, possessing a past psychiatric history significant for ADHD, KORY, MDD, who was personally seen and evaluated at the [facility] outpatient clinic virtually for follow-up regarding medication management. At their last visit, the plan was to start daily vitamin D supplementation and continue his other medications.    Marko states that since their previous psychiatric appointment with this writer, he continues to deal with ongoing fatigue, but is trying to make positive changes in his life. Trying to wake up between 7 and 9 AM. He has been connecting with friends. He is still dealing with low energy levels, feeling exhausted. He feels he is worrying more than usual, feeling like a failure but using sleep to avoid. Not using his cpap due to not remembering to clean it, 'self-sabotaging.' Vyvanse not as helpful as it could be and wears off in the early afternoon. He ends up feeling like he has to nap between 2-4 PM. He is currently consuming 2 energy drinks per day , 1-2 cans of soda per day. His current " depressive symptoms are largely related to sleep habits and ongoing fatigue. He feels his mood medications are helpful. Abilify does not appear to be contributing to fatigue based on examining the temporal relationship between dosage changes and worsening of fatigue symptoms. He denies hopelessness or thoughts to harm himself.     Presently, patient denies suicidal/homicidal ideation in addition to thoughts of self-injury.  At conclusion of evaluation, patient is amenable to the recommendations of this writer including: continue psychotropic medications as prescribed, continue individual psychotherapy with own therapist.  Also, patient is amenable to calling/contacting the outpatient office including this writer if any acute adverse effects of their medication regimen arise in addition to any comments or concerns pertaining to their psychiatric management.  Patient is amenable to calling/contacting crisis and/or attending to the nearest emergency department if their clinical condition deteriorates to assure their safety and stability, stating that they are able to appropriately confide in their family regarding their psychiatric state.    Current Rating Scores:     Current PHQ-9   PHQ-2/9 Depression Screening    Little interest or pleasure in doing things: 3 - nearly every day  Feeling down, depressed, or hopeless: 2 - more than half the days  Trouble falling or staying asleep, or sleeping too much: 3 - nearly every day  Feeling tired or having little energy: 3 - nearly every day  Poor appetite or overeatin - several days  Feeling bad about yourself - or that you are a failure or have let yourself or your family down: 3 - nearly every day  Trouble concentrating on things, such as reading the newspaper or watching television: 1 - several days  Moving or speaking so slowly that other people could have noticed. Or the opposite - being so fidgety or restless that you have been moving around a lot more than usual: 0  - not at all  Thoughts that you would be better off dead, or of hurting yourself in some way: 0 - not at all  PHQ-9 Score: 16  PHQ-9 Interpretation: Moderately severe depression         Sl Amb Kory    Question 4/17/2024  5:13 PM EDT - Filed by Patient   Feeling nervous, anxious, or on edge More than half the days   Not being able to stop or control worrying Several days   Worrying too much about different things More than half the days   Trouble relaxing Several days   Being so restless that it's hard to sit still Not at all   Becoming easily annoyed or irritable More than half the days   Feeling afraid as if something awful might happen Several days   How difficult have these problems made it for you to do your work, take care of things at home, or get along with other people? Very difficult   KORY Score (range: 0 - 21) 9       Psychiatric Review Of Systems:  Unchanged information from this writer's previous assessment is copied and italicized; information that has changed is bolded.    Appetite: eating enough, cooking more, but sometimes one meal per day  Adverse eating: no  Weight changes: no  Insomnia/sleeplessness: see hpi  Fatigue/anergy: low  Anhedonia/lack of interest: has perhaps been more bored and disinterested, but feels this is due to being home all the time  Attention/concentration: no, improved with vyvanse  Psychomotor agitation/retardation: no  Somatic symptoms: no  Anxiety/panic attack: more recently, excessively thinking about being a failure  June/hypomania: no  Hopelessness/helplessness/worthlessness: no  Self-injurious behavior/high-risk behavior: no  Suicidal ideation: no  Homicidal ideation: no  Auditory hallucinations: no  Visual hallucinations: no  Other perceptual disturbances: no  Delusional thinking: no  Obsessive/compulsive symptoms: no    Review Of Systems:      Constitutional feeling tired and low energy   ENT negative   Cardiovascular negative   Respiratory negative   Gastrointestinal  negative   Genitourinary negative   Musculoskeletal negative   Integumentary negative   Neurological negative   Endocrine negative   Other Symptoms none, all other systems are negative     Objective    OBJECTIVE:     Visit Vitals  Smoking Status Never      Wt Readings from Last 6 Encounters:   02/12/24 (!) 137 kg (303 lb)   01/04/24 (!) 140 kg (308 lb 9.6 oz)   10/30/23 (!) 140 kg (308 lb 6.4 oz)   07/25/23 (!) 141 kg (311 lb)   07/13/23 (!) 141 kg (311 lb 14.4 oz)   03/10/22 118 kg (261 lb)        Past Medical History:   Diagnosis Date    ADHD (attention deficit hyperactivity disorder)     Anxiety     Concussion     Depression     Kidney stones     Self-injurious behavior     Sleep difficulties     history of sleep apnea  and getting workup for narcolepsy as of 8/19      Past Surgical History:   Procedure Laterality Date    HERNIA REPAIR      TONSILLECTOMY      TOOTH EXTRACTION Right 09/21/2020    R lower molar extraction       Meds/Allergies    Allergies   Allergen Reactions    Scopolamine      Current Outpatient Medications   Medication Instructions    [START ON 5/15/2024] amphetamine-dextroamphetamine (ADDERALL, 5MG,) 5 MG tablet 5 mg, Oral, Daily after lunch    ARIPiprazole (ABILIFY) 5 mg, Oral, Daily    b complex vitamins capsule 1 capsule, Oral, Daily    ergocalciferol (VITAMIN D2) 50,000 Units, Oral, Weekly    escitalopram (LEXAPRO) 20 mg, Oral, Daily    hydroCHLOROthiazide 25 mg, Oral, Daily    [START ON 5/15/2024] lisdexamfetamine (VYVANSE) 50 mg, Oral, Every morning           Mental Status Exam:    Appearance age appropriate, casually dressed, wearing glasses, long hair, seated comfortably   Behavior cooperative, calm   Speech normal rate, normal volume, normal pitch   Mood euthymic   Affect normal range and intensity, appropriate   Thought Processes organized, logical, goal directed   Associations intact associations   Thought Content no overt delusions   Perceptual Disturbances: Denies auditory or  visual hallucinations and Does not appear to be responding to internal stimuli   Abnormal Thoughts  Risk Potential Denies suicidal or homicidal ideation, plan, or intent   Orientation oriented to person, place, time/date, and situation   Memory recent and remote memory grossly intact   Consciousness alert and awake   Attention Span Concentration Span attention span and concentration are age appropriate   Intellect appears to be of average intelligence   Insight good   Judgement good   Muscle Strength and  Gait unable to assess today due to virtual visit   Motor Activity unable to assess today due to virtual visit   Language no difficulty naming common objects, no difficulty repeating a phrase, no difficulty writing a sentence   Fund of Knowledge adequate knowledge of current events  adequate fund of knowledge regarding past history  adequate fund of knowledge regarding vocabulary      Laboratory Results: I have personally reviewed all pertinent laboratory/tests results    Appointment on 03/07/2024   Component Date Value Ref Range Status    Vit D, 25-Hydroxy 03/07/2024 20.7 (L)  30.0 - 100.0 ng/mL Final    Vitamin D guidelines established by Clinical Guidelines Subcommittee  of the Endocrine Society Task Force, 2011    Deficiency <20ng/ml   Insufficiency 20-30ng/ml   Sufficient  ng/ml     Sodium 03/07/2024 139  135 - 147 mmol/L Final    Potassium 03/07/2024 4.6  3.5 - 5.3 mmol/L Final    Chloride 03/07/2024 101  96 - 108 mmol/L Final    CO2 03/07/2024 27  21 - 32 mmol/L Final    ANION GAP 03/07/2024 11  mmol/L Final    BUN 03/07/2024 14  5 - 25 mg/dL Final    Creatinine 03/07/2024 0.91  0.60 - 1.30 mg/dL Final    Standardized to IDMS reference method    Glucose, Fasting 03/07/2024 88  65 - 99 mg/dL Final    Calcium 03/07/2024 9.7  8.4 - 10.2 mg/dL Final    AST 03/07/2024 19  13 - 39 U/L Final    ALT 03/07/2024 38  7 - 52 U/L Final    Specimen collection should occur prior to Sulfasalazine administration due to  the potential for falsely depressed results.     Alkaline Phosphatase 03/07/2024 82  34 - 104 U/L Final    Total Protein 03/07/2024 6.9  6.4 - 8.4 g/dL Final    Albumin 03/07/2024 4.4  3.5 - 5.0 g/dL Final    Total Bilirubin 03/07/2024 0.55  0.20 - 1.00 mg/dL Final    Use of this assay is not recommended for patients undergoing treatment with eltrombopag due to the potential for falsely elevated results.  N-acetyl-p-benzoquinone imine (metabolite of Acetaminophen) will generate erroneously low results in samples for patients that have taken an overdose of Acetaminophen.    eGFR 03/07/2024 115  ml/min/1.73sq m Final    Cholesterol 03/07/2024 184  See Comment mg/dL Final    Cholesterol:         Pediatric <18 Years        Desirable          <170 mg/dL      Borderline High    170-199 mg/dL      High               >=200 mg/dL        Adult >=18 Years            Desirable         <200 mg/dL      Borderline High   200-239 mg/dL      High              >239 mg/dL      Triglycerides 03/07/2024 165 (H)  See Comment mg/dL Final    Triglyceride:     0-9Y            <75mg/dL     10Y-17Y         <90 mg/dL       >=18Y     Normal          <150 mg/dL     Borderline High 150-199 mg/dL     High            200-499 mg/dL        Very High       >499 mg/dL    Specimen collection should occur prior to Metamizole administration due to the potential for falsely depressed results.    HDL, Direct 03/07/2024 26 (L)  >=40 mg/dL Final    LDL Calculated 03/07/2024 125 (H)  0 - 100 mg/dL Final    LDL Cholesterol:     Optimal           <100 mg/dl     Near Optimal      100-129 mg/dl     Above Optimal       Borderline High 130-159 mg/dl       High            160-189 mg/dl       Very High       >189 mg/dl         This screening LDL is a calculated result.   It does not have the accuracy of the Direct Measured LDL in the monitoring of patients with hyperlipidemia and/or statin therapy.   Direct Measure LDL (RHQ000) must be ordered separately in these  patients.    WBC 03/07/2024 7.41  4.31 - 10.16 Thousand/uL Final    RBC 03/07/2024 5.58  3.88 - 5.62 Million/uL Final    Hemoglobin 03/07/2024 16.4  12.0 - 17.0 g/dL Final    Hematocrit 03/07/2024 46.6  36.5 - 49.3 % Final    MCV 03/07/2024 84  82 - 98 fL Final    MCH 03/07/2024 29.4  26.8 - 34.3 pg Final    MCHC 03/07/2024 35.2  31.4 - 37.4 g/dL Final    RDW 03/07/2024 13.2  11.6 - 15.1 % Final    MPV 03/07/2024 10.6  8.9 - 12.7 fL Final    Platelets 03/07/2024 257  149 - 390 Thousands/uL Final    nRBC 03/07/2024 0  /100 WBCs Final    Segmented % 03/07/2024 65  43 - 75 % Final    Immature Grans % 03/07/2024 0  0 - 2 % Final    Lymphocytes % 03/07/2024 24  14 - 44 % Final    Monocytes % 03/07/2024 8  4 - 12 % Final    Eosinophils Relative 03/07/2024 2  0 - 6 % Final    Basophils Relative 03/07/2024 1  0 - 1 % Final    Absolute Neutrophils 03/07/2024 4.90  1.85 - 7.62 Thousands/µL Final    Absolute Immature Grans 03/07/2024 0.03  0.00 - 0.20 Thousand/uL Final    Absolute Lymphocytes 03/07/2024 1.74  0.60 - 4.47 Thousands/µL Final    Absolute Monocytes 03/07/2024 0.58  0.17 - 1.22 Thousand/µL Final    Eosinophils Absolute 03/07/2024 0.11  0.00 - 0.61 Thousand/µL Final    Basophils Absolute 03/07/2024 0.05  0.00 - 0.10 Thousands/µL Final    Iron Saturation 03/07/2024 20  15 - 50 % Final    TIBC 03/07/2024 379  250 - 450 ug/dL Final    Iron 03/07/2024 77  50 - 212 ug/dL Final    Patients treated with metal-binding drugs (ie. Deferoxamine) may have depressed iron values.    UIBC 03/07/2024 302  155 - 355 ug/dL Final    Ferritin 03/07/2024 120  24 - 336 ng/mL Final   Telephone on 12/14/2023   Component Date Value Ref Range Status    Supplier Name 12/14/2023 [employer] [facility] [employer]   Final-Edited    Supplier Phone Number 12/14/2023 [phone]   Final-Edited    Order Status 12/14/2023 Delivery Successful   Final-Edited    Delivery Note 12/14/2023 [facility]   Final-Edited    Delivery Request Date 12/14/2023  12/28/2023   Final-Edited    Date Delivered  12/14/2023 12/28/2023   Final-Edited    Supplier Name 12/14/2023 12/28/2023   Final-Edited    Item Description 12/14/2023 CPAP Machine, Resmed Airsense 11   Final-Edited    Comment: Qty: 1  Auto Min Pressure: 5 cm  Auto Max Pressure: 15 cm  Oxygen Usage: None      Item Description 12/14/2023 PAP Mask, Nasal, Fit Upon Setup, N/A, 1 per 3 months   Final-Edited    Qty: 1    Item Description 12/14/2023 PAP Mask Interface Cushion, Nasal, 2 per 1 month   Final-Edited    Qty: 1    Item Description 12/14/2023 PAP Headgear, 1 per 6 months   Final-Edited    Qty: 1    Item Description 12/14/2023 PAP Humidifier, Heated   Final-Edited    Qty: 1    Item Description 12/14/2023 Disposable PAP Filter, 2 per 1 month   Final-Edited    Qty: 1    Item Description 12/14/2023 Non-Disposable PAP Filter, 1 per 6 months   Final-Edited    Qty: 1    Item Description 12/14/2023 PAP Machine Tubing, Heated, 1 per 3 months   Final-Edited    Qty: 1    Item Description 12/14/2023 PAP Monitoring Modem   Final-Edited    Qty: 1    Item Description 12/14/2023 Humidifier Water Chamber, 1 per 6 months   Final-Edited    Qty: 1             ___________________________________    History Review: The following portions of the patient's history were reviewed and updated as appropriate: allergies, current medications, past family history, past medical history, past social history, past surgical history, and problem list.    Unchanged information from this writer's previous assessment is copied and italicized; information that has changed is bolded.    Past Psychiatric History:      Past psychiatric diagnoses:   ADHD depression and anxiety  Inpatient psychiatric admissions:   None  Prior outpatient psychiatric treatment:   Had been a patient of Dr. Angeles at [facility] since March 2022  Previously was seeing Dr. Peña and Gabi SAMAYOA at [facility]  Past/current psychotherapy:    Weekly therapy online at a private  "agency  History of suicidal attempts/gestures:   denies    History of non-suicidal self-injurious behavior:   None  History of violence/aggressive behaviors:   denies  Psychotropic medication trials:   Effexor XR (2018) (nausea)  Concerta ER 36mg (2017) - dilated pupils, chalenges with glares when driving  Armodafinil (somewhat helpful)  Ritalin 10mg, Vyvanse Strattera (vomiting)  Zoloft, abilify, buspar, gabapentin, trintellix,  lexapro  Pramipexole for RLS  Nuvigil for hypersomnia (several years ago)  Wellbutrin Xl 150mg - ineffective for augmentation purposes  Melatonin - instructed to stop by sleep medicine, not effective  Substance abuse inpatient/outpatient rehabilitation:   Denies  Eating disorder history:   no     Substance Abuse History:     Marijuana use 4-5 times per week   Formerly used psychedelics which \"bordered on problematic\"  Denies history of alcohol, illict substance, or tobacco abuse., Patient denies previous legal actions or arrests related to substance intoxication including prior DWIs/DUIs., Patient does not exhibit objective evidence of substance withdrawal during today's examination nor do they appear under the influence of any psychoactive substance.       Family Psychiatric History:      Psychiatric Family History: Father - depression, anxiety, alcohol use  Brother - anxiety, depression  Mother - Depression  Suicide Attempts: none  Patient otherwise denies known family history of psychiatric illness, substance use, or suicide attempts.     Social History:     Developmental: Patient denies a history of milestone/developmental delay., Patient denies any known in-utero exposure to toxins or illicit substances.,   Reports a history of IEP in 6th grade for 'mental health':  In 6th grade got a kidney stone, was anxious about getting stent removed, mother was convinced he and neurological d/o and absence seizures, he missed a lot of school. 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 Unloaded but not locked. Provided two gun locks at evaluation appointment.  Patient denies history of arrests or violence with a deadly weapon.    history: None     Traumatic History:      Abuse: Denies  Other Traumatic Events: Childhood issues as above  ___________________________________      Visit Time    Visit Start Time: 11:00  Visit Stop Time: 11:40  Total Visit Duration:  40 minutes    Tami Patel MD   04/18/24

## 2024-04-18 ENCOUNTER — TELEMEDICINE (OUTPATIENT)
Dept: PSYCHIATRY | Facility: CLINIC | Age: 24
End: 2024-04-18

## 2024-04-18 DIAGNOSIS — E55.9 VITAMIN D DEFICIENCY: ICD-10-CM

## 2024-04-18 DIAGNOSIS — R53.82 CHRONIC FATIGUE: ICD-10-CM

## 2024-04-18 DIAGNOSIS — F90.0 ADHD (ATTENTION DEFICIT HYPERACTIVITY DISORDER), INATTENTIVE TYPE: Primary | ICD-10-CM

## 2024-04-18 DIAGNOSIS — F41.1 GAD (GENERALIZED ANXIETY DISORDER): ICD-10-CM

## 2024-04-18 DIAGNOSIS — F33.0 MAJOR DEPRESSIVE DISORDER, RECURRENT EPISODE, MILD (HCC): ICD-10-CM

## 2024-04-18 RX ORDER — DEXTROAMPHETAMINE SACCHARATE, AMPHETAMINE ASPARTATE, DEXTROAMPHETAMINE SULFATE AND AMPHETAMINE SULFATE 1.25; 1.25; 1.25; 1.25 MG/1; MG/1; MG/1; MG/1
5 TABLET ORAL
Start: 2024-05-15 | End: 2024-04-18 | Stop reason: SDUPTHER

## 2024-04-18 RX ORDER — ERGOCALCIFEROL 1.25 MG/1
50000 CAPSULE ORAL WEEKLY
Qty: 12 CAPSULE | Refills: 0 | Status: SHIPPED | OUTPATIENT
Start: 2024-04-18 | End: 2024-07-05

## 2024-04-18 RX ORDER — LISDEXAMFETAMINE DIMESYLATE CAPSULES 50 MG/1
50 CAPSULE ORAL EVERY MORNING
Qty: 30 CAPSULE | Refills: 0 | Status: SHIPPED | OUTPATIENT
Start: 2024-05-15 | End: 2024-06-14

## 2024-04-18 RX ORDER — DEXTROAMPHETAMINE SACCHARATE, AMPHETAMINE ASPARTATE, DEXTROAMPHETAMINE SULFATE AND AMPHETAMINE SULFATE 1.25; 1.25; 1.25; 1.25 MG/1; MG/1; MG/1; MG/1
5 TABLET ORAL
Qty: 30 TABLET | Refills: 0 | Status: SHIPPED | OUTPATIENT
Start: 2024-05-15

## 2024-04-18 RX ORDER — VITAMIN B COMPLEX
1 CAPSULE ORAL DAILY
Qty: 30 CAPSULE | Refills: 2 | Status: SHIPPED | OUTPATIENT
Start: 2024-04-18

## 2024-04-18 RX ORDER — LISDEXAMFETAMINE DIMESYLATE CAPSULES 50 MG/1
50 CAPSULE ORAL EVERY MORNING
Start: 2024-05-15 | End: 2024-04-18 | Stop reason: SDUPTHER

## 2024-04-18 NOTE — PATIENT INSTRUCTIONS
Changes:  Please start taking vitamin D 50,000 units weekly for 12 weeks, do not take the daily 1000IU supplementation with this  Please start taking the vitamin B complex. Your urine may turn bright yellow/green colored, this is completely normal  At your next  of vyvanse, you will be taking 50mg daily and will be starting the afternoon dosage of instant release adderall 5mg in the afternoon. Please try to check your blood pressure the first several days of taking this to monitor your response, try taking it 30-60min after taking the adderall.      Please call the office nursing staff for medication issues including refills, problems getting medications, bothersome side effects, etc at [phone].     Please return for a follow up appointment as discussed and arrive approximately 15 minutes prior to your appointment time. If you are running late or are unable to attend your appointment, please call our [place] office at [phone]    If you have thoughts of harming yourself or are otherwise in psychological crisis, do not hesitate to contact your County Crisis hotline, or 911 or go to the nearest emergency room.    [facility] Crisis: [phone]  [facility] Crisis: [phone]  [facility] Crisis: [phone]  [facility] Crisis: [phone]  [facility] Crisis: [phone]  [facility] Crisis: [phone]  [facility] Crisis: [phone]  National Suicide Prevention Hotline: 1-173.280.1500 or call 8    [facility] [place] [address]  [phone].

## 2024-04-22 ENCOUNTER — TELEPHONE (OUTPATIENT)
Age: 24
End: 2024-04-22

## 2024-04-22 ENCOUNTER — OFFICE VISIT (OUTPATIENT)
Age: 24
End: 2024-04-22
Payer: COMMERCIAL

## 2024-04-22 VITALS
BODY MASS INDEX: 36.83 KG/M2 | SYSTOLIC BLOOD PRESSURE: 118 MMHG | HEART RATE: 62 BPM | WEIGHT: 296.2 LBS | HEIGHT: 75 IN | DIASTOLIC BLOOD PRESSURE: 70 MMHG

## 2024-04-22 DIAGNOSIS — G47.33 OSA (OBSTRUCTIVE SLEEP APNEA): Primary | ICD-10-CM

## 2024-04-22 DIAGNOSIS — G25.81 RESTLESS LEG SYNDROME: ICD-10-CM

## 2024-04-22 DIAGNOSIS — G47.19 EXCESSIVE DAYTIME SLEEPINESS: ICD-10-CM

## 2024-04-22 DIAGNOSIS — E66.09 CLASS 2 OBESITY DUE TO EXCESS CALORIES WITHOUT SERIOUS COMORBIDITY WITH BODY MASS INDEX (BMI) OF 37.0 TO 37.9 IN ADULT: ICD-10-CM

## 2024-04-22 PROCEDURE — 99214 OFFICE O/P EST MOD 30 MIN: CPT | Performed by: INTERNAL MEDICINE

## 2024-04-22 NOTE — PROGRESS NOTES
Progress Note - Sleep Medicine  Logan J Weiler 24 y.o. male MRN: [id number]       Impression & Plan:     1.  Mild obstructive sleep apnea  Diagnostic PSG 11/26/2023 at a weight of 308 pounds showed AHI 6.7 with frequent arousals hourly  Ordered APAP 5-15    Compliance from 1/17 - 4/15  More than 4 hours 6%, on days used 4 hours and 49 minutes  95 percentile pressure 9.8  Median leaks 0.5  Residual AHI 1.8     Patient has poor compliance with CPAP    He has used it only 5 days in the past 3 months  He states that when he did use it he did not feel that there was any improvement    Previous sleep studies in 2019 showed elevated PLM index of 32.9, previous MSLT showed mean sleep latency of 18 minutes with no SOREM's    Plan  Ordered mask fitting  Counseled patient on improving compliance  Follow-up in 6 weeks    2.  Restless leg syndrome  He has frequent bothersome symptoms when he gets into bed  Repeat iron panel in March showed a ferritin of 120  He notes having RLS symptoms prior to starting Lexapro many years ago  He states that his dad has RLS as well  He notes RLS symptoms are worse with alcohol  He may have periodic limb movement disorder as well as PLM index has been elevated at 32.9 and previous sleep studies, he has also been told by his friends that he kicks in his sleep    Plan  I will consider starting patient on gabapentin at next visit in 6 weeks    3.  Marijuana use  He is vaping in the mornings  Explained that this can cause significant daytime sleepiness  Counseled patient on importance of marijuana use cessation    4.  Excessive daytime sleepiness  Patient has a diagnosis of ADHD as well  Currently on Vyvanse  Next month he will do Vyvanse 50 mg in the morning and Adderall IR 5 mg in the afternoon  I explained that his daytime sleepiness likely is related to his untreated sleep apnea  Current [place] score of 18  Counseled patient on importance of CPAP compliance    5.  Obesity  BMI 37.02  Explained  that weight loss can decrease severity of sleep apnea  If he loses 10% of his body weight we can do a repeat sleep study   Counseled patient on lifestyle modifications including diet and exercise  ______________________________________________________________________    HPI:    Logan J Weiler 24-year-old male with past medical history of hypertension, RLS, ADHD, depression who presents for follow-up of excessive daytime sleepiness.  Recent diagnostic PSG showed an AHI of 6.7.  CPAP was ordered but he has not been compliant.  He has had difficulty tolerating it.  He feels tired and sleepy throughout the day with an [place] score of 18.  He goes to bed at 9 PM and falls asleep within 10 minutes.  He wakes up at 8 AM.  He is sleeping 11 hours at night.  He reports bothersome restless leg symptoms when he gets to bed.  He generally worsens with alcohol.  His friends have told him that he kicks in his sleep.  Last ferritin 120.  His father has RLS symptoms as well.  He states that RLS symptoms started many years ago prior to starting Lexapro.    He smokes marijuana daily generally in the morning.  He occasionally drinks beer.  He consumes up to 350 mg of caffeine daily.    He has a prior history of hypnagogic hallucinations but has not had them recently.  He has remote history of sleep paralysis as well.    Multiple studies in the past have not shown evidence of narcolepsy.        Review of Systems:  Review of Systems   All other systems reviewed and are negative.        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   Tobacco Use    Smoking status: Never    Smokeless tobacco: Never  "  Vaping Use    Vaping status: Never Used   Substance and Sexual Activity    Alcohol use: Yes     Comment: socially    Drug use: Yes     Types: Marijuana     Comment: ocasionally    Sexual activity: Not on file   Other Topics Concern    Not on file   Social History Narrative    Not on file     Social Determinants of Health     Financial Resource Strain: Not on file   Food Insecurity: Not on file   Transportation Needs: Not on file   Physical Activity: Not on file   Stress: Not on file   Social Connections: Not on file   Intimate Partner Violence: Not on file   Housing Stability: Not on file       PhysicalExamination:  Vitals:   /70 (BP Location: Left arm, Patient Position: Sitting, Cuff Size: Large)   Pulse 62   Ht 6' 3\" (1.905 m)   Wt 134 kg (296 lb 3.2 oz)   BMI 37.02 kg/m²     Physical Exam  General:  Awake alert and oriented x 3, conversant without conversational dyspnea, NAD, normal affect  HEENT:  PERRL, Sclera noninjected, nonicteric OU, Nares patent,  no craniofacial abnormalities, Mucous membranes, moist, no oral lesions, normal dentition  NECK: Trachea midline, no accessory muscle use, no stridor, no cervical or supraclavicular adenopathy, JVP not elevated  CARDIAC: Reg, single s1/S2, no m/r/g  PULM: CTA bilaterally no wheezing, rhonchi or rales  EXT: No cyanosis, no clubbing, no edema, normal capillary refill  NEURO: no focal neurologic deficits, AAOx3, moving all extremities appropriately     Diagnostic Data:  Labs:  I personally reviewed the most recent laboratory data pertinent to today's visit  Appointment on 03/07/2024   Component Date Value    Vit D, 25-Hydroxy 03/07/2024 20.7 (L)     Sodium 03/07/2024 139     Potassium 03/07/2024 4.6     Chloride 03/07/2024 101     CO2 03/07/2024 27     ANION GAP 03/07/2024 11     BUN 03/07/2024 14     Creatinine 03/07/2024 0.91     Glucose, Fasting 03/07/2024 88     Calcium 03/07/2024 9.7     AST 03/07/2024 19     ALT 03/07/2024 38     Alkaline " Phosphatase 03/07/2024 82     Total Protein 03/07/2024 6.9     Albumin 03/07/2024 4.4     Total Bilirubin 03/07/2024 0.55     eGFR 03/07/2024 115     Cholesterol 03/07/2024 184     Triglycerides 03/07/2024 165 (H)     HDL, Direct 03/07/2024 26 (L)     LDL Calculated 03/07/2024 125 (H)     WBC 03/07/2024 7.41     RBC 03/07/2024 5.58     Hemoglobin 03/07/2024 16.4     Hematocrit 03/07/2024 46.6     MCV 03/07/2024 84     MCH 03/07/2024 29.4     MCHC 03/07/2024 35.2     RDW 03/07/2024 13.2     MPV 03/07/2024 10.6     Platelets 03/07/2024 257     nRBC 03/07/2024 0     Segmented % 03/07/2024 65     Immature Grans % 03/07/2024 0     Lymphocytes % 03/07/2024 24     Monocytes % 03/07/2024 8     Eosinophils Relative 03/07/2024 2     Basophils Relative 03/07/2024 1     Absolute Neutrophils 03/07/2024 4.90     Absolute Immature Grans 03/07/2024 0.03     Absolute Lymphocytes 03/07/2024 1.74     Absolute Monocytes 03/07/2024 0.58     Eosinophils Absolute 03/07/2024 0.11     Basophils Absolute 03/07/2024 0.05     Iron Saturation 03/07/2024 20     TIBC 03/07/2024 379     Iron 03/07/2024 77     UIBC 03/07/2024 302     Ferritin 03/07/2024 120    Telephone on 12/14/2023   Component Date Value    Supplier Name 12/14/2023 [employer]     Supplier Phone Number 12/14/2023 [phone]     Order Status 12/14/2023 Delivery Successful     Delivery Note 12/14/2023 [facility]     Delivery Request Date 12/14/2023 12/28/2023     Date Delivered  12/14/2023 12/28/2023     Supplier Name 12/14/2023 12/28/2023     Item Description 12/14/2023 CPAP Machine, Resmed Airsense 11     Item Description 12/14/2023 PAP Mask, Nasal, Fit Upon Setup, N/A, 1 per 3 months     Item Description 12/14/2023 PAP Mask Interface Cushion, Nasal, 2 per 1 month     Item Description 12/14/2023 PAP Headgear, 1 per 6 months     Item Description 12/14/2023 PAP Humidifier, Heated     Item Description 12/14/2023 Disposable PAP Filter, 2 per 1 month     Item  "Description 12/14/2023 Non-Disposable PAP Filter, 1 per 6 months     Item Description 12/14/2023 PAP Machine Tubing, Heated, 1 per 3 months     Item Description 12/14/2023 PAP Monitoring Modem     Item Description 12/14/2023 Humidifier Water Chamber, 1 per 6 months        I have personally reviewed pertinent lab results.  Lab Results   Component Value Date    WBC 7.41 03/07/2024    HGB 16.4 03/07/2024    HCT 46.6 03/07/2024    MCV 84 03/07/2024     03/07/2024     Lab Results   Component Value Date    GLUCOSE 86 12/30/2014    CALCIUM 9.7 03/07/2024     12/30/2014    K 4.6 03/07/2024    CO2 27 03/07/2024     03/07/2024    BUN 14 03/07/2024    CREATININE 0.91 03/07/2024     No results found for: \"IGE\"  Lab Results   Component Value Date    ALT 38 03/07/2024    AST 19 03/07/2024    ALKPHOS 82 03/07/2024    BILITOT 0.40 12/30/2014     Lab Results   Component Value Date    IRON 77 03/07/2024    TIBC 379 03/07/2024    FERRITIN 120 03/07/2024     No results found for: \"RTHVEXWV82\"  No results found for: \"FOLATE\"      Arterial Blood Gas result:  PRISCILLA Calvin MD  [facility] Sleep [facility]    "

## 2024-04-22 NOTE — TELEPHONE ENCOUNTER
Patient is here today for a mask fitting and he wanted a full face mask. I fitted him with the F30 I (Med)    Can I get a new script for a full face mask.

## 2024-04-24 ENCOUNTER — TELEPHONE (OUTPATIENT)
Dept: SLEEP CENTER | Facility: CLINIC | Age: 24
End: 2024-04-24

## 2024-04-25 LAB
DME PARACHUTE DELIVERY DATE ACTUAL: NORMAL
DME PARACHUTE DELIVERY DATE REQUESTED: NORMAL
DME PARACHUTE ITEM DESCRIPTION: NORMAL
DME PARACHUTE ORDER STATUS: NORMAL
DME PARACHUTE SUPPLIER NAME: NORMAL
DME PARACHUTE SUPPLIER PHONE: NORMAL

## 2024-05-08 DIAGNOSIS — Z00.6 ENCOUNTER FOR EXAMINATION FOR NORMAL COMPARISON OR CONTROL IN CLINICAL RESEARCH PROGRAM: ICD-10-CM

## 2024-05-20 DIAGNOSIS — F90.0 ADHD (ATTENTION DEFICIT HYPERACTIVITY DISORDER), INATTENTIVE TYPE: ICD-10-CM

## 2024-05-20 RX ORDER — DEXTROAMPHETAMINE SACCHARATE, AMPHETAMINE ASPARTATE, DEXTROAMPHETAMINE SULFATE AND AMPHETAMINE SULFATE 1.25; 1.25; 1.25; 1.25 MG/1; MG/1; MG/1; MG/1
5 TABLET ORAL
Qty: 30 TABLET | Refills: 0
Start: 2024-05-20 | End: 2024-05-21 | Stop reason: SDUPTHER

## 2024-05-20 NOTE — TELEPHONE ENCOUNTER
Notified patient requesting the following refill:  Requested Prescriptions     Pending Prescriptions Disp Refills    amphetamine-dextroamphetamine (ADDERALL, 5MG,) 5 MG tablet 30 tablet 0     Sig: Take 1 tablet (5 mg total) by mouth daily after lunch Max Daily Amount: 5 mg       Marko has been filling controlled prescriptions on time as prescribed according to [place] Prescription Drug Monitoring Program    Refill request was sent to Dr. Casey Peña III, my indirect supervisor, for cosignature.

## 2024-05-21 DIAGNOSIS — F90.0 ADHD (ATTENTION DEFICIT HYPERACTIVITY DISORDER), INATTENTIVE TYPE: ICD-10-CM

## 2024-05-21 RX ORDER — DEXTROAMPHETAMINE SACCHARATE, AMPHETAMINE ASPARTATE, DEXTROAMPHETAMINE SULFATE AND AMPHETAMINE SULFATE 1.25; 1.25; 1.25; 1.25 MG/1; MG/1; MG/1; MG/1
5 TABLET ORAL
Qty: 30 TABLET | Refills: 0 | Status: SHIPPED | OUTPATIENT
Start: 2024-05-21

## 2024-06-13 ENCOUNTER — TELEMEDICINE (OUTPATIENT)
Dept: PSYCHIATRY | Facility: CLINIC | Age: 24
End: 2024-06-13
Payer: COMMERCIAL

## 2024-06-13 DIAGNOSIS — F90.0 ADHD (ATTENTION DEFICIT HYPERACTIVITY DISORDER), INATTENTIVE TYPE: Primary | ICD-10-CM

## 2024-06-13 DIAGNOSIS — I10 ESSENTIAL HYPERTENSION: ICD-10-CM

## 2024-06-13 DIAGNOSIS — F33.0 MAJOR DEPRESSIVE DISORDER, RECURRENT EPISODE, MILD (HCC): ICD-10-CM

## 2024-06-13 DIAGNOSIS — F41.1 GAD (GENERALIZED ANXIETY DISORDER): ICD-10-CM

## 2024-06-13 DIAGNOSIS — E55.9 VITAMIN D DEFICIENCY: ICD-10-CM

## 2024-06-13 PROCEDURE — 99214 OFFICE O/P EST MOD 30 MIN: CPT | Performed by: STUDENT IN AN ORGANIZED HEALTH CARE EDUCATION/TRAINING PROGRAM

## 2024-06-13 RX ORDER — HYDROCHLOROTHIAZIDE 25 MG/1
25 TABLET ORAL DAILY
Qty: 90 TABLET | Refills: 1 | Status: SHIPPED | OUTPATIENT
Start: 2024-06-13

## 2024-06-13 RX ORDER — LISDEXAMFETAMINE DIMESYLATE 50 MG/1
50 CAPSULE ORAL EVERY MORNING
Qty: 30 CAPSULE | Refills: 0 | Status: SHIPPED | OUTPATIENT
Start: 2024-06-19 | End: 2024-07-19

## 2024-06-13 RX ORDER — DEXTROAMPHETAMINE SACCHARATE, AMPHETAMINE ASPARTATE, DEXTROAMPHETAMINE SULFATE AND AMPHETAMINE SULFATE 1.25; 1.25; 1.25; 1.25 MG/1; MG/1; MG/1; MG/1
5 TABLET ORAL
Qty: 30 TABLET | Refills: 0 | Status: SHIPPED | OUTPATIENT
Start: 2024-06-19

## 2024-06-13 NOTE — PSYCH
MEDICATION MANAGEMENT NOTE        [facility]    Virtual Visit Disclaimer & Required Documentation  TeleMed provider: Tami Patel MD and Dr. Rosalio Baker, located at [facility], [address] [place] [address] in [place], [place], [facility]. The patient is also located in [place] which is the state in which I hold an active license.    The patient was identified by name and date of birth. Logan J Weiler was informed that this is a telemedicine visit and that the visit is being conducted through [employer] and patient was informed this is a secure, HIPAA-complaint platform. He agrees to proceed. My office door was closed. No one else was in the room. He acknowledged consent and understanding of privacy and security of the video platform. The patient has agreed to participate and understands they can discontinue the visit at any time.    Logan J Weiler verbally agrees to participate in Virtual Care Services. Pt is aware that Virtual Care Services could be limited without vital signs or the ability to perform a full hands-on physical exam. The patient understands he or the provider may request at any time to terminate the video visit and request the patient to seek care or treatment in person. Patient is aware this is a billable service.        Name and Date of Birth:  Logan J Weiler 24 y.o. 2000 MRN: [id number]    Date of Visit: June 13, 2024    Reason for Visit: Follow-up visit regarding medication management     _____________________________    Assessment & Plan   Logan J Weiler is a 24 y.o. 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 Marko was personally seen and evaluated today at the [facility] outpatient clinic virtually for follow-up regarding medication management.      On assessment, Logan J Weiler reports things have been going relatively well overall, and he has been stable in terms of his mood and anxiety.  He has been adherent with his current medication regimen without side effects, feels the medications are beneficial.  He feels his anxiety has been manageable, ADHD is under adequate control with current regimen, though has not been able to take the supplemental Adderall very often due to waking up too late in the day.  He has been working on restructuring his daily routine and incorporating more physical activity and time outside to boost his mental health.  He endorses struggling with adhering to the CPAP, though during our conversation realized that CPAP has been beneficial in allowing him to wake up earlier in the day, therefore is planning to clean it and restart using it tonight.  At this time, there is no indication for any medication changes.  Discussed upcoming transfer of care in resident clinic.  Patient is in agreement with the treatment plan as detailed below, and agrees to call the office with any concerns or side effects between appointments.     DSM-5 Diagnoses/Visit Diagnoses:     1. ADHD (attention deficit hyperactivity disorder), inattentive type    2. Major depressive disorder, recurrent episode, mild (HCC)    3. KORY (generalized anxiety disorder)    4. Vitamin D deficiency      Treatment Recommendations/Precautions:  Continue psychopharmacological management as follows:  Vyvanse 50mg daily for ADHD and adderall IR 5mg in the afternoon to provide extended coverage  Lexapro 20mg daily for depression  Abilify 5mg daily for antidepressant augmentation  Vitamin D 1000 IU daily for deficiency  Vitamin B complex for fatigue  Labs most recently obtained March 2024, reviewed.   Discussed upcoming transfer of care in resident clinic  Continue psychotherapy with own  therapist  Follow up in 6 weeks for medication management  Follow up with PCP for medical issues and ongoing care  Aware of 24 hour and weekend coverage for urgent situations accessed by calling [facility] main practice number    Individual psychotherapy provided: No     Treatment Plan:     Completed and signed during the session: Yes - Treatment Plan done but not signed at time of office visit due to:  Plan reviewed by video and verbal consent given due to virtual visit. Treatment Plan sent to patient via [employer] for signature.    Medications Risks/Benefits:      Risks, Benefits And Possible Side Effects Of Medications:    Risks, benefits, and possible side effects of medications explained to Marko and he verbalizes understanding and agreement for treatment.     Controlled Medication Discussion:     Marko has been filling controlled prescriptions on time as prescribed according to [place] Prescription Drug Monitoring Program    Medical Decision Making / Counseling / Coordination of Care:  The following interventions are recommended: return in 6 weeks for follow up.  Although patient's acute lethality risk is LOW, long-term/chronic lethality risk is mildly elevated given the risk factors listed above. However, at the current moment, Marko is future-oriented, forward-thinking, and demonstrates ability to act in a self-preserving manner as evidenced by volitionally seeking psychiatric evaluation and treatment today. To mitigate future risk, patient should adhere to treatment recommendations, avoid alcohol/illicit substance use, utilize community-based resources and familiar support, and prioritize mental health treatment. The diagnosis and treatment plan were reviewed with the patient. Risks, benefits, and alternatives to treatment were discussed. The importance of medication and treatment compliance was reviewed with the patient.     _____________________________________________    History  "of Present Illness     Chief Complaint: \"I started gardening\"    SUBJECTIVE:    Logan J Weiler is a 24 y.o. male, possessing a past psychiatric history significant for ADHD, anxiety, MDD, who was personally seen and evaluated at the [facility] outpatient clinic virtually for follow-up regarding medication management. At their last visit, the plan was to continue his current medications and adjust vyvanse, initiate supplementation.    Marko states that since their previous psychiatric appointment with this writer, things have been stable. He has been working on projects at home, started gardening - growing some vegetables. He is spending time with friends and working with his therapist, has been cooking more. Still feeling less focused and less motivated. Anxiety has been more manageable but feeling perhaps more depressed and down. With vyvanse/adderall changes, he has been having difficulty waking early enough to take the afternoon adderall so has only taken 7 or 8 times, and it helps when he is able to do that. Has reduced energy drink consumption in general and even more on days when he took adderall.    Presently, patient denies suicidal/homicidal ideation in addition to thoughts of self-injury.  At conclusion of evaluation, patient is amenable to the recommendations of this writer including: continue psychotropic medications as prescribed.  Also, patient is amenable to calling/contacting the outpatient office including this writer if any acute adverse effects of their medication regimen arise in addition to any comments or concerns pertaining to their psychiatric management.  Patient is amenable to calling/contacting crisis and/or attending to the nearest emergency department if their clinical condition deteriorates to assure their safety and stability, stating that they are able to appropriately confide in their family regarding their psychiatric state.    Current Rating Scores: "     Current PHQ-9   PHQ-2/9 Depression Screening    Little interest or pleasure in doing things: 3 - nearly every day  Feeling down, depressed, or hopeless: 3 - nearly every day  Trouble falling or staying asleep, or sleeping too much: 2 - more than half the days  Feeling tired or having little energy: 3 - nearly every day  Poor appetite or overeatin - several days  Feeling bad about yourself - or that you are a failure or have let yourself or your family down: 3 - nearly every day  Trouble concentrating on things, such as reading the newspaper or watching television: 3 - nearly every day  Moving or speaking so slowly that other people could have noticed. Or the opposite - being so fidgety or restless that you have been moving around a lot more than usual: 0 - not at all  Thoughts that you would be better off dead, or of hurting yourself in some way: 0 - not at all  PHQ-9 Score: 18  PHQ-9 Interpretation: Moderately severe depression       Current KORY-7 is .    Question 2024  2:16 PM EDT - Filed by Patient   Feeling nervous, anxious, or on edge More than half the days   Not being able to stop or control worrying Not at all   Worrying too much about different things Several days   Trouble relaxing More than half the days   Being so restless that it's hard to sit still Not at all   Becoming easily annoyed or irritable More than half the days   Feeling afraid as if something awful might happen Not at all   How difficult have these problems made it for you to do your work, take care of things at home, or get along with other people? Somewhat difficult   KORY Score (range: 0 - 21) 7       Psychiatric Review Of Systems:  Unchanged information from this writer's previous assessment is copied and italicized; information that has changed is bolded.      Appetite: cooking more  Adverse eating: no  Weight changes: no  Insomnia/sleeplessness: has not been using CPAP for 2.5 weeks keeps forgetting to clean it, over two  weeks ago he was waking up earlier but otherwise not sure if it was helpful to use CPAP  Fatigue/anergy: low  Anhedonia/lack of interest: spending more time outside  Attention/concentration: no, improved with vyvanse  Psychomotor agitation/retardation: no  Somatic symptoms: no  Anxiety/panic attack: anxiety is more manageable  June/hypomania: no  Hopelessness/helplessness/worthlessness: no  Self-injurious behavior/high-risk behavior: no  Suicidal ideation: no  Homicidal ideation: no  Auditory hallucinations: no  Visual hallucinations: no  Other perceptual disturbances: no  Delusional thinking: no    Review Of Systems:      Constitutional negative   ENT negative   Cardiovascular negative   Respiratory negative   Gastrointestinal negative   Genitourinary negative   Musculoskeletal negative   Integumentary negative   Neurological negative   Endocrine negative   Other Symptoms none, all other systems are negative     Objective    OBJECTIVE:     Visit Vitals  Smoking Status Never      Wt Readings from Last 6 Encounters:   04/22/24 134 kg (296 lb 3.2 oz)   02/12/24 (!) 137 kg (303 lb)   01/04/24 (!) 140 kg (308 lb 9.6 oz)   10/30/23 (!) 140 kg (308 lb 6.4 oz)   07/25/23 (!) 141 kg (311 lb)   07/13/23 (!) 141 kg (311 lb 14.4 oz)        Past Medical History:   Diagnosis Date    ADHD (attention deficit hyperactivity disorder)     Anxiety     Concussion     Depression     Kidney stones     Self-injurious behavior     Sleep difficulties     history of sleep apnea  and getting workup for narcolepsy as of 8/19      Past Surgical History:   Procedure Laterality Date    HERNIA REPAIR      TONSILLECTOMY      TOOTH EXTRACTION Right 09/21/2020    R lower molar extraction       Meds/Allergies    Allergies   Allergen Reactions    Scopolamine      Current Outpatient Medications   Medication Instructions    [START ON 6/19/2024] amphetamine-dextroamphetamine (ADDERALL, 5MG,) 5 MG tablet 5 mg, Oral, Daily after lunch    ARIPiprazole  (ABILIFY) 5 mg, Oral, Daily    b complex vitamins capsule 1 capsule, Oral, Daily    Cholecalciferol (VITAMIN D3) 1,000 Units, Oral, Daily    ergocalciferol (VITAMIN D2) 50,000 Units, Oral, Weekly    escitalopram (LEXAPRO) 20 mg, Oral, Daily    hydroCHLOROthiazide 25 mg, Oral, Daily    [START ON 6/19/2024] lisdexamfetamine (VYVANSE) 50 mg, Oral, Every morning           Mental Status Exam:    Appearance age appropriate, casually dressed, seated comfortably at home, wearing glasses   Behavior cooperative, calm   Speech normal rate, normal volume, normal pitch   Mood euthymic   Affect normal range and intensity, appropriate   Thought Processes organized, logical, goal directed   Associations intact associations   Thought Content no overt delusions   Perceptual Disturbances: Denies auditory or visual hallucinations and Does not appear to be responding to internal stimuli   Abnormal Thoughts  Risk Potential Denies suicidal or homicidal ideation, plan, or intent   Orientation oriented to person, place, time/date, and situation   Memory recent and remote memory grossly intact   Consciousness alert and awake   Attention Span Concentration Span attention span and concentration are age appropriate   Intellect appears to be of average intelligence   Insight good   Judgement good   Muscle Strength and  Gait unable to assess today due to virtual visit   Motor Activity unable to assess today due to virtual visit   Language no difficulty naming common objects, no difficulty repeating a phrase   Fund of Knowledge adequate knowledge of current events  adequate fund of knowledge regarding past history  adequate fund of knowledge regarding vocabulary      Laboratory Results: I have personally reviewed all pertinent laboratory/tests results    Telephone on 04/24/2024   Component Date Value Ref Range Status    Supplier Name 04/24/2024 [employer]   Final-Edited    Supplier Phone Number 04/24/2024 [phone]    Final-Edited    Order Status 04/24/2024 Completed   Final-Edited    Delivery Request Date 04/24/2024 04/24/2024   Final-Edited    Date Delivered  04/24/2024 04/24/2024   Final-Edited    Item Description 04/24/2024 PAP Accessory   Final-Edited    Qty: 1    Item Description 04/24/2024 PAP Mask, Full Face, Fit Upon Setup, N/A, 1 per 3 months   Final-Edited    Qty: 1    Item Description 04/24/2024 Humidifier Water Chamber, 1 per 6 months   Final-Edited    Qty: 1   Appointment on 03/07/2024   Component Date Value Ref Range Status    Vit D, 25-Hydroxy 03/07/2024 20.7 (L)  30.0 - 100.0 ng/mL Final    Vitamin D guidelines established by Clinical Guidelines Subcommittee  of the Endocrine Society Task Force, 2011    Deficiency <20ng/ml   Insufficiency 20-30ng/ml   Sufficient  ng/ml     Sodium 03/07/2024 139  135 - 147 mmol/L Final    Potassium 03/07/2024 4.6  3.5 - 5.3 mmol/L Final    Chloride 03/07/2024 101  96 - 108 mmol/L Final    CO2 03/07/2024 27  21 - 32 mmol/L Final    ANION GAP 03/07/2024 11  mmol/L Final    BUN 03/07/2024 14  5 - 25 mg/dL Final    Creatinine 03/07/2024 0.91  0.60 - 1.30 mg/dL Final    Standardized to IDMS reference method    Glucose, Fasting 03/07/2024 88  65 - 99 mg/dL Final    Calcium 03/07/2024 9.7  8.4 - 10.2 mg/dL Final    AST 03/07/2024 19  13 - 39 U/L Final    ALT 03/07/2024 38  7 - 52 U/L Final    Specimen collection should occur prior to Sulfasalazine administration due to the potential for falsely depressed results.     Alkaline Phosphatase 03/07/2024 82  34 - 104 U/L Final    Total Protein 03/07/2024 6.9  6.4 - 8.4 g/dL Final    Albumin 03/07/2024 4.4  3.5 - 5.0 g/dL Final    Total Bilirubin 03/07/2024 0.55  0.20 - 1.00 mg/dL Final    Use of this assay is not recommended for patients undergoing treatment with eltrombopag due to the potential for falsely elevated results.  N-acetyl-p-benzoquinone imine (metabolite of Acetaminophen) will generate erroneously low results in samples for  patients that have taken an overdose of Acetaminophen.    eGFR 03/07/2024 115  ml/min/1.73sq m Final    Cholesterol 03/07/2024 184  See Comment mg/dL Final    Cholesterol:         Pediatric <18 Years        Desirable          <170 mg/dL      Borderline High    170-199 mg/dL      High               >=200 mg/dL        Adult >=18 Years            Desirable         <200 mg/dL      Borderline High   200-239 mg/dL      High              >239 mg/dL      Triglycerides 03/07/2024 165 (H)  See Comment mg/dL Final    Triglyceride:     0-9Y            <75mg/dL     10Y-17Y         <90 mg/dL       >=18Y     Normal          <150 mg/dL     Borderline High 150-199 mg/dL     High            200-499 mg/dL        Very High       >499 mg/dL    Specimen collection should occur prior to Metamizole administration due to the potential for falsely depressed results.    HDL, Direct 03/07/2024 26 (L)  >=40 mg/dL Final    LDL Calculated 03/07/2024 125 (H)  0 - 100 mg/dL Final    LDL Cholesterol:     Optimal           <100 mg/dl     Near Optimal      100-129 mg/dl     Above Optimal       Borderline High 130-159 mg/dl       High            160-189 mg/dl       Very High       >189 mg/dl         This screening LDL is a calculated result.   It does not have the accuracy of the Direct Measured LDL in the monitoring of patients with hyperlipidemia and/or statin therapy.   Direct Measure LDL (VIS543) must be ordered separately in these patients.    WBC 03/07/2024 7.41  4.31 - 10.16 Thousand/uL Final    RBC 03/07/2024 5.58  3.88 - 5.62 Million/uL Final    Hemoglobin 03/07/2024 16.4  12.0 - 17.0 g/dL Final    Hematocrit 03/07/2024 46.6  36.5 - 49.3 % Final    MCV 03/07/2024 84  82 - 98 fL Final    MCH 03/07/2024 29.4  26.8 - 34.3 pg Final    MCHC 03/07/2024 35.2  31.4 - 37.4 g/dL Final    RDW 03/07/2024 13.2  11.6 - 15.1 % Final    MPV 03/07/2024 10.6  8.9 - 12.7 fL Final    Platelets 03/07/2024 257  149 - 390 Thousands/uL Final    nRBC 03/07/2024 0   /100 WBCs Final    Segmented % 03/07/2024 65  43 - 75 % Final    Immature Grans % 03/07/2024 0  0 - 2 % Final    Lymphocytes % 03/07/2024 24  14 - 44 % Final    Monocytes % 03/07/2024 8  4 - 12 % Final    Eosinophils Relative 03/07/2024 2  0 - 6 % Final    Basophils Relative 03/07/2024 1  0 - 1 % Final    Absolute Neutrophils 03/07/2024 4.90  1.85 - 7.62 Thousands/µL Final    Absolute Immature Grans 03/07/2024 0.03  0.00 - 0.20 Thousand/uL Final    Absolute Lymphocytes 03/07/2024 1.74  0.60 - 4.47 Thousands/µL Final    Absolute Monocytes 03/07/2024 0.58  0.17 - 1.22 Thousand/µL Final    Eosinophils Absolute 03/07/2024 0.11  0.00 - 0.61 Thousand/µL Final    Basophils Absolute 03/07/2024 0.05  0.00 - 0.10 Thousands/µL Final    Iron Saturation 03/07/2024 20  15 - 50 % Final    TIBC 03/07/2024 379  250 - 450 ug/dL Final    Iron 03/07/2024 77  50 - 212 ug/dL Final    Patients treated with metal-binding drugs (ie. Deferoxamine) may have depressed iron values.    UIBC 03/07/2024 302  155 - 355 ug/dL Final    Ferritin 03/07/2024 120  24 - 336 ng/mL Final   Telephone on 12/14/2023   Component Date Value Ref Range Status    Supplier Name 12/14/2023 [employer]   Final-Edited    Supplier Phone Number 12/14/2023 [phone]   Final-Edited    Order Status 12/14/2023 Delivery Successful   Final-Edited    Delivery Note 12/14/2023 [facility]   Final-Edited    Delivery Request Date 12/14/2023 12/28/2023   Final-Edited    Date Delivered  12/14/2023 12/28/2023   Final-Edited    Supplier Name 12/14/2023 12/28/2023   Final-Edited    Item Description 12/14/2023 CPAP Machine, Resmed Airsense 11   Final-Edited    Comment: Qty: 1  Auto Min Pressure: 5 cm  Auto Max Pressure: 15 cm  Oxygen Usage: None      Item Description 12/14/2023 PAP Mask, Nasal, Fit Upon Setup, N/A, 1 per 3 months   Final-Edited    Qty: 1    Item Description 12/14/2023 PAP Mask Interface Cushion, Nasal, 2 per 1 month   Final-Edited    Qty: 1    Item  Description 12/14/2023 PAP Headgear, 1 per 6 months   Final-Edited    Qty: 1    Item Description 12/14/2023 PAP Humidifier, Heated   Final-Edited    Qty: 1    Item Description 12/14/2023 Disposable PAP Filter, 2 per 1 month   Final-Edited    Qty: 1    Item Description 12/14/2023 Non-Disposable PAP Filter, 1 per 6 months   Final-Edited    Qty: 1    Item Description 12/14/2023 PAP Machine Tubing, Heated, 1 per 3 months   Final-Edited    Qty: 1    Item Description 12/14/2023 PAP Monitoring Modem   Final-Edited    Qty: 1    Item Description 12/14/2023 Humidifier Water Chamber, 1 per 6 months   Final-Edited    Qty: 1             ___________________________________    History Review: The following portions of the patient's history were reviewed and updated as appropriate: allergies, current medications, past family history, past medical history, past social history, past surgical history, and problem list.    Unchanged information from this writer's previous assessment is copied and italicized; information that has changed is bolded.    Past Psychiatric History:      Past psychiatric diagnoses:   ADHD depression and anxiety  Inpatient psychiatric admissions:   None  Prior outpatient psychiatric treatment:   Had been a patient of Dr. Angeles at [facility] since March 2022  Previously was seeing Dr. Peña and Gabi SAMAYOA at [facility]  Past/current psychotherapy:    Weekly therapy online at a private agency  History of suicidal attempts/gestures:   denies    History of non-suicidal self-injurious behavior:   None  History of violence/aggressive behaviors:   denies  Psychotropic medication trials:   Effexor XR (2018) (nausea)  Concerta ER 36mg (2017) - dilated pupils, chalenges with glares when driving  Armodafinil (somewhat helpful)  Ritalin 10mg, Vyvanse Strattera (vomiting)  Zoloft, abilify, buspar, gabapentin, trintellix,  lexapro  Pramipexole for RLS  Nuvigil for hypersomnia (several years ago)  Wellbutrin Xl 150mg -  "ineffective for augmentation purposes  Melatonin - instructed to stop by sleep medicine, not effective  Substance abuse inpatient/outpatient rehabilitation:   Denies  Eating disorder history:   no     Substance Abuse History:     Marijuana use 4-5 times per week   Formerly used psychedelics which \"bordered on problematic\"  Denies history of alcohol, illict substance, or tobacco abuse., Patient denies previous legal actions or arrests related to substance intoxication including prior DWIs/DUIs., Patient does not exhibit objective evidence of substance withdrawal during today's examination nor do they appear under the influence of any psychoactive substance.       Family Psychiatric History:      Psychiatric Family History: Father - depression, anxiety, alcohol use  Brother - anxiety, depression  Mother - Depression  Suicide Attempts: none  Patient otherwise denies known family history of psychiatric illness, substance use, or suicide attempts.     Social History:     Developmental: Patient denies a history of milestone/developmental delay., Patient denies any known in-utero exposure to toxins or illicit substances.,   Reports a history of IEP in 6th grade for 'mental health':  In 6th grade got a kidney stone, was anxious about getting stent removed, mother was convinced he and neurological d/o and absence seizures, he missed a lot of school. 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 Unloaded but not locked. Provided two gun locks at evaluation appointment.  Patient denies history of arrests or violence with a deadly weapon.    history: None     Traumatic History: "      Abuse: Denies  Other Traumatic Events: Childhood issues as above  ___________________________________      Visit Time    Visit Start Time: 10:56  Visit Stop Time: 11:23  Total Visit Duration:  26 minutes    Tami Patel MD   06/13/24

## 2024-06-13 NOTE — TELEPHONE ENCOUNTER
Reason for call:   [x] Refill   [] Prior Auth  [] Other:     Office:   [x] PCP/Provider - YAO Chaudhari   [] Specialty/Provider -     Medication: hydrochlorothiazide (HYDRODIURIL) 25 mg tablet     Dose/Frequency: austin 1 tablet (25 mg total) by mouth daily     Quantity: 90    Pharmacy: [id number] - [place] - [address]      Does the patient have enough for 3 days?   [] Yes   [x] No - Send as HP to POD

## 2024-06-13 NOTE — PATIENT INSTRUCTIONS
Please call the office nursing staff for medication issues including refills, problems getting medications, bothersome side effects, etc at [phone].     Please return for a follow up appointment as discussed and arrive approximately 15 minutes prior to your appointment time. If you are running late or are unable to attend your appointment, please call our [place] office at [phone]    If you have thoughts of harming yourself or are otherwise in psychological crisis, do not hesitate to contact your [facility] Crisis hotline, or 1 or go to the nearest emergency room.    [facility] Crisis: [phone]  [facility] Crisis: [phone]  [facility] Crisis: [phone]  [facility] Crisis: [phone]  [facility] Crisis: [phone]  [facility] Crisis: [phone]  [facility] Crisis: [phone]  National Suicide Prevention Hotline: 1-669.450.8341 or call 988    [facility]  [address] [address] [phone].

## 2024-06-13 NOTE — BH TREATMENT PLAN
TREATMENT PLAN - Medication Management        [facility]    Name and Date of Birth:  Logan J Weiler 24 y.o. 2000  Date of Treatment Plan: June 13, 2024  Diagnosis/Diagnoses:    1. ADHD (attention deficit hyperactivity disorder), inattentive type    2. Major depressive disorder, recurrent episode, mild (HCC)    3. KORY (generalized anxiety disorder)    4. Vitamin D deficiency        Strengths/Personal Resources for Self-Care: supportive family, supportive friends, taking medications as prescribed, ability to adapt to life changes, ability to communicate needs, ability to communicate well, ability to listen, ability to reason, ability to understand psychiatric illness, average or above intelligence    Area/Areas of need: anxiety symptoms, sleep problems, attention and concentration problems    Long Term Goal: continue improvement insleep and building routine, push self to socialize more  Target Date: 6 months - December 13, 2024  Person/Persons responsible for completion of goal: Marko and Tami Patel MD     Short Term Objective (s) - How will we reach this goal?:   Take medications as prescribed  Attend psychiatry appointments regularly  Continue psychotherapy regularly  Practice coping skills  Eat a healthy diet   Exercise regularly   Spend more time with friends and family  Target Date: 6 months - December 13, 2024  Person/Persons Responsible for Completion of Goal: Marko     Progress Towards Goals: Continuing treatment    Treatment Modality: medication management every 1-3 months as needed and continue psychotherapy  Review due 180 days from date of this plan: December 10, 2024   Expected length of service: Ongoing treatment    My physician and I have developed this plan together, and I agree to work on the goals and objectives. I understand the treatment goals that were developed for my treatment.    The treatment plan was created between Tami  Milly Patel MD and Logan J Weiler on 06/13/24 at 11:12 AM.

## 2024-06-21 ENCOUNTER — APPOINTMENT (OUTPATIENT)
Dept: LAB | Facility: HOSPITAL | Age: 24
End: 2024-06-21
Payer: COMMERCIAL

## 2024-06-21 DIAGNOSIS — Z00.6 ENCOUNTER FOR EXAMINATION FOR NORMAL COMPARISON OR CONTROL IN CLINICAL RESEARCH PROGRAM: ICD-10-CM

## 2024-06-21 DIAGNOSIS — E55.9 VITAMIN D DEFICIENCY: ICD-10-CM

## 2024-06-21 PROCEDURE — 82306 VITAMIN D 25 HYDROXY: CPT

## 2024-06-21 PROCEDURE — 36415 COLL VENOUS BLD VENIPUNCTURE: CPT

## 2024-06-22 LAB — 25(OH)D3 SERPL-MCNC: 27.8 NG/ML (ref 30–100)

## 2024-06-27 ENCOUNTER — OFFICE VISIT (OUTPATIENT)
Age: 24
End: 2024-06-27
Payer: COMMERCIAL

## 2024-06-27 VITALS
HEIGHT: 75 IN | SYSTOLIC BLOOD PRESSURE: 112 MMHG | DIASTOLIC BLOOD PRESSURE: 80 MMHG | WEIGHT: 288 LBS | HEART RATE: 85 BPM | BODY MASS INDEX: 35.81 KG/M2 | OXYGEN SATURATION: 97 %

## 2024-06-27 DIAGNOSIS — G47.19 EXCESSIVE DAYTIME SLEEPINESS: ICD-10-CM

## 2024-06-27 DIAGNOSIS — E66.09 CLASS 2 OBESITY DUE TO EXCESS CALORIES WITHOUT SERIOUS COMORBIDITY WITH BODY MASS INDEX (BMI) OF 36.0 TO 36.9 IN ADULT: ICD-10-CM

## 2024-06-27 DIAGNOSIS — G47.33 OSA (OBSTRUCTIVE SLEEP APNEA): Primary | ICD-10-CM

## 2024-06-27 DIAGNOSIS — G25.81 RESTLESS LEG SYNDROME: ICD-10-CM

## 2024-06-27 DIAGNOSIS — F12.90 MARIJUANA USE: ICD-10-CM

## 2024-06-27 PROCEDURE — 99214 OFFICE O/P EST MOD 30 MIN: CPT | Performed by: INTERNAL MEDICINE

## 2024-06-27 NOTE — PROGRESS NOTES
Progress Note - Sleep Medicine  Logan J Weiler 24 y.o. male MRN: [id number]       Impression & Plan:     1.  Mild obstructive sleep apnea  Diagnostic PSG 11/26/2023 at a weight of 308 pounds showed AHI 6.7 with frequent arousals hourly    Compliance from 3/26 - 6/23  More than 4 hours 20%, 6 hours and 10 minutes  On APAP 5-15  95 percentile pressure 8.1  Median leaks 0.1  Residual AHI 3.4    Patient has poor compliance with CPAP    He has used it only 5 days in the past 3 months  He states that when he did use it he did not feel that there was any improvement    Previous sleep studies in 2019 showed elevated PLM index of 32.9, previous MSLT showed mean sleep latency of 18 minutes with no SOREM's    Plan  Counseled patient on improving compliance  Follow-up in 4 weeks    2.  Restless leg syndrome  He has frequent bothersome symptoms when he gets into bed  Repeat iron panel in March showed a ferritin of 120  He notes having RLS symptoms prior to starting Lexapro many years ago  He states that his dad has RLS as well  He notes RLS symptoms are worse with alcohol  He may have periodic limb movement disorder as well as PLM index has been elevated at 32.9 and previous sleep studies, he has also been told by his friends that he kicks in his sleep    Plan  I will consider starting patient on gabapentin at next visit in 4 weeks    3.  Marijuana use  He is vaping in the mornings  Explained that this can cause significant daytime sleepiness  Counseled patient on importance of marijuana use cessation  I recommended he stop using marijuana for 1 week and see if his excessive daytime sleepiness improved    4.  Excessive daytime sleepiness  Patient has a diagnosis of ADHD as well  Currently on Vyvanse  Next month he will do Vyvanse 50 mg in the morning and Adderall IR 5 mg in the afternoon  I explained that his daytime sleepiness likely is related to his untreated sleep apnea  Current [place] score of 18  Counseled patient on  importance of CPAP compliance, although excessive daytime sleepiness is somewhat disproportionate to the degree of sleep apnea  Recommended he stop using marijuana for 1 week  Patient also does not go outside much and frequently has curtains closed in the dark during the day  I explained the importance of sunlight and preventing daytime sleepiness      5.  Obesity  BMI 36.0  Explained that weight loss can decrease severity of sleep apnea  If he loses 10% of his body weight we can do a repeat sleep study   Counseled patient on lifestyle modifications including diet and exercise    Patient instructions  Try to use CPAP every night for 4 weeks  Avoid using marijuana for 7 days straight  Go outside and get sunlight  Open curtains when indoors  Avoid daytime naps  Continue taking vitamin D  Provided referral to weight management    ______________________________________________________________________    HPI:    Logan J Weiler 24-year-old male with past medical history of hypertension, RLS, ADHD, depression who presents for follow-up of obstructive sleep apnea.  Recent diagnostic PSG showed an AHI of 6.7.  CPAP was ordered but he has not been compliant.  He has had difficulty tolerating it.  He feels tired and sleepy throughout the day with an [place] score of 18.  He goes to bed at 9 PM and falls asleep within 10 minutes.  He wakes up at 8 AM.  He is sleeping 11 hours at night.  He reports bothersome restless leg symptoms when he gets to bed.  He generally worsens with alcohol.  His friends have told him that he kicks in his sleep.  Last ferritin 120.  His father has RLS symptoms as well.  He states that RLS symptoms started many years ago prior to starting Lexapro.    He smokes marijuana daily generally in the morning.  He occasionally drinks beer.  He consumes up to 350 mg of caffeine daily.    He has a prior history of hypnagogic hallucinations but has not had them recently.  He has remote history of sleep paralysis  "as well.    Multiple studies in the past have not shown evidence of narcolepsy.        Review of Systems:  Review of Systems   All other systems reviewed and are negative.        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   Tobacco Use    Smoking status: Never    Smokeless tobacco: Never   Vaping Use    Vaping status: Never Used   Substance and Sexual Activity    Alcohol use: Yes     Comment: socially    Drug use: Yes     Types: Marijuana     Comment: ocasionally    Sexual activity: Not on file   Other Topics Concern    Not on file   Social History Narrative    Not on file     Social Determinants of Health     Financial Resource Strain: Not on file   Food Insecurity: Not on file   Transportation Needs: Not on file   Physical Activity: Not on file   Stress: Not on file   Social Connections: Not on file   Intimate Partner Violence: Not on file   Housing Stability: Not on file       PhysicalExamination:  Vitals:   /80 (BP Location: Left arm, Patient Position: Sitting, Cuff Size: Large)   Pulse 85   Ht 6' 3\" (1.905 m)   Wt 131 kg (288 lb)   SpO2 97%   BMI 36.00 kg/m²     Physical Exam  General:  Awake alert and oriented x 3, conversant without conversational dyspnea, NAD, normal affect  HEENT:  PERRL, Sclera noninjected, nonicteric OU, Nares patent,  no craniofacial abnormalities, Mucous membranes, moist, no oral lesions, normal dentition  NECK: Trachea midline, no accessory muscle use, no stridor, no cervical or supraclavicular adenopathy, JVP not elevated  CARDIAC: Reg, single s1/S2, no m/r/g  PULM: CTA bilaterally no wheezing, rhonchi or rales  EXT: No cyanosis, no " clubbing, no edema, normal capillary refill  NEURO: no focal neurologic deficits, AAOx3, moving all extremities appropriately     Diagnostic Data:  Labs:  I personally reviewed the most recent laboratory data pertinent to today's visit  Appointment on 06/21/2024   Component Date Value    Vit D, 25-Hydroxy 06/21/2024 27.8 (L)    Telephone on 04/24/2024   Component Date Value    Supplier Name 04/24/2024 [employer]     Supplier Phone Number 04/24/2024 [phone]     Order Status 04/24/2024 Completed     Delivery Request Date 04/24/2024 04/24/2024     Date Delivered  04/24/2024 04/24/2024     Item Description 04/24/2024 PAP Accessory     Item Description 04/24/2024 PAP Mask, Full Face, Fit Upon Setup, N/A, 1 per 3 months     Item Description 04/24/2024 Humidifier Water Chamber, 1 per 6 months    Appointment on 03/07/2024   Component Date Value    Vit D, 25-Hydroxy 03/07/2024 20.7 (L)     Sodium 03/07/2024 139     Potassium 03/07/2024 4.6     Chloride 03/07/2024 101     CO2 03/07/2024 27     ANION GAP 03/07/2024 11     BUN 03/07/2024 14     Creatinine 03/07/2024 0.91     Glucose, Fasting 03/07/2024 88     Calcium 03/07/2024 9.7     AST 03/07/2024 19     ALT 03/07/2024 38     Alkaline Phosphatase 03/07/2024 82     Total Protein 03/07/2024 6.9     Albumin 03/07/2024 4.4     Total Bilirubin 03/07/2024 0.55     eGFR 03/07/2024 115     Cholesterol 03/07/2024 184     Triglycerides 03/07/2024 165 (H)     HDL, Direct 03/07/2024 26 (L)     LDL Calculated 03/07/2024 125 (H)     WBC 03/07/2024 7.41     RBC 03/07/2024 5.58     Hemoglobin 03/07/2024 16.4     Hematocrit 03/07/2024 46.6     MCV 03/07/2024 84     MCH 03/07/2024 29.4     MCHC 03/07/2024 35.2     RDW 03/07/2024 13.2     MPV 03/07/2024 10.6     Platelets 03/07/2024 257     nRBC 03/07/2024 0     Segmented % 03/07/2024 65     Immature Grans % 03/07/2024 0     Lymphocytes % 03/07/2024 24     Monocytes % 03/07/2024 8     Eosinophils Relative  "03/07/2024 2     Basophils Relative 03/07/2024 1     Absolute Neutrophils 03/07/2024 4.90     Absolute Immature Grans 03/07/2024 0.03     Absolute Lymphocytes 03/07/2024 1.74     Absolute Monocytes 03/07/2024 0.58     Eosinophils Absolute 03/07/2024 0.11     Basophils Absolute 03/07/2024 0.05     Iron Saturation 03/07/2024 20     TIBC 03/07/2024 379     Iron 03/07/2024 77     UIBC 03/07/2024 302     Ferritin 03/07/2024 120        I have personally reviewed pertinent lab results.  Lab Results   Component Value Date    WBC 7.41 03/07/2024    HGB 16.4 03/07/2024    HCT 46.6 03/07/2024    MCV 84 03/07/2024     03/07/2024     Lab Results   Component Value Date    GLUCOSE 86 12/30/2014    CALCIUM 9.7 03/07/2024     12/30/2014    K 4.6 03/07/2024    CO2 27 03/07/2024     03/07/2024    BUN 14 03/07/2024    CREATININE 0.91 03/07/2024     No results found for: \"IGE\"  Lab Results   Component Value Date    ALT 38 03/07/2024    AST 19 03/07/2024    ALKPHOS 82 03/07/2024    BILITOT 0.40 12/30/2014     Lab Results   Component Value Date    IRON 77 03/07/2024    TIBC 379 03/07/2024    FERRITIN 120 03/07/2024     No results found for: \"BSKPBOLG43\"  No results found for: \"FOLATE\"      Arterial Blood Gas result:  PRISCILLA Calvin MD  [facility]    "

## 2024-06-27 NOTE — PATIENT INSTRUCTIONS
Try to use CPAP every night for 4 weeks  Avoid using marijuana for 7 days straight  Go outside and get sunlight  Open curtains when indoors  Avoid daytime naps  Continue taking vitamin D  Provided referral to weight management

## 2024-07-03 ENCOUNTER — TELEPHONE (OUTPATIENT)
Dept: PSYCHIATRY | Facility: CLINIC | Age: 24
End: 2024-07-03

## 2024-07-03 NOTE — TELEPHONE ENCOUNTER
Contacted patient off of [facility]  to verify needs of services in attempts to offer patient an appointment at [facility] and [facility] . spoke with patient whom stated they were interested.     Pt was scheduled on 7/24 at 1pm with Dr. Barcenas for [facility].

## 2024-07-05 LAB
APOB+LDLR+PCSK9 GENE MUT ANL BLD/T: NOT DETECTED
BRCA1+BRCA2 DEL+DUP + FULL MUT ANL BLD/T: NOT DETECTED
MLH1+MSH2+MSH6+PMS2 GN DEL+DUP+FUL M: NOT DETECTED

## 2024-07-11 ENCOUNTER — OFFICE VISIT (OUTPATIENT)
Dept: FAMILY MEDICINE CLINIC | Facility: CLINIC | Age: 24
End: 2024-07-11
Payer: COMMERCIAL

## 2024-07-11 VITALS
BODY MASS INDEX: 35.76 KG/M2 | HEART RATE: 94 BPM | WEIGHT: 287.6 LBS | TEMPERATURE: 97.9 F | SYSTOLIC BLOOD PRESSURE: 124 MMHG | OXYGEN SATURATION: 99 % | HEIGHT: 75 IN | DIASTOLIC BLOOD PRESSURE: 76 MMHG

## 2024-07-11 DIAGNOSIS — I10 ESSENTIAL HYPERTENSION: Primary | ICD-10-CM

## 2024-07-11 DIAGNOSIS — G47.33 OSA (OBSTRUCTIVE SLEEP APNEA): ICD-10-CM

## 2024-07-11 DIAGNOSIS — E55.9 VITAMIN D DEFICIENCY: ICD-10-CM

## 2024-07-11 PROCEDURE — 99213 OFFICE O/P EST LOW 20 MIN: CPT | Performed by: NURSE PRACTITIONER

## 2024-07-11 NOTE — PROGRESS NOTES
Ambulatory Visit  Name: Logan J Weiler      : 2000      MRN: [id number]  Encounter Provider: YAO Chaudhari  Encounter Date: 2024   Encounter department: [facility]    Assessment & Plan   1. Essential hypertension  Assessment & Plan:  Patient BP well controlled on the HCTZ 25 mg   2. DEYSI (obstructive sleep apnea)  Assessment & Plan:  Following with sleep medicine and trying to tolerate the CPAP   3. Vitamin D deficiency  Assessment & Plan:  Taking Vitamin D supplement       Depression Screening and Follow-up Plan: Patient's depression screening was positive with a PHQ-9 score of 8. Patient assessed for underlying major depression. Brief counseling provided and recommend additional follow-up/re-evaluation next office visit.         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 He has been following with his sleep physician and is trying to get back on his sleep medicine. He is also following with weight management and is scheduled for 2024.   He has no new complaints his main issue is his chronic fatigue       Review of Systems   Constitutional:  Positive for fatigue. Negative for activity change, appetite change, chills, diaphoresis, fever and unexpected weight change.   HENT: Negative.  Negative for congestion, ear pain, hearing loss, postnasal drip, sinus pressure, sinus pain, sneezing and sore throat.    Eyes:  Positive for visual disturbance. Negative for pain and redness.        Has some floaters    Respiratory: Negative.  Negative for cough and shortness of breath.    Cardiovascular:  Negative for chest pain and leg swelling.   Gastrointestinal:  Negative for abdominal pain, diarrhea, nausea and vomiting.   Endocrine: Negative.    Genitourinary: Negative.    Musculoskeletal:  Negative  for arthralgias.   Skin: Negative.    Allergic/Immunologic: Negative.    Neurological:  Negative for dizziness and light-headedness.   Hematological: Negative.    Psychiatric/Behavioral:  Negative for behavioral problems and dysphoric mood.      Past Medical History:   Diagnosis Date   • ADHD (attention deficit hyperactivity disorder)    • Anxiety    • Concussion    • Depression    • Kidney stones    • Self-injurious behavior    • Sleep difficulties     history of sleep apnea  and getting workup for narcolepsy as of 8/19     Past Surgical History:   Procedure Laterality Date   • HERNIA REPAIR     • TONSILLECTOMY     • TOOTH EXTRACTION Right 09/21/2020    R lower molar extraction     Family History   Problem Relation Age of Onset   • COPD Mother    • Hypertension Mother    • Depression Mother    • Hypertension Father    • Depression Father    • Anxiety disorder Father    • Alcohol abuse Father    • Anxiety disorder Brother    • Depression Brother    • Hypertension Brother    • Alcohol abuse Paternal Grandfather      Social History     Tobacco Use   • Smoking status: Never   • Smokeless tobacco: Never   Vaping Use   • Vaping status: Never Used   Substance and Sexual Activity   • Alcohol use: Yes     Comment: socially   • Drug use: Yes     Types: Marijuana     Comment: ocasionally   • Sexual activity: Not on file     Current Outpatient Medications on File Prior to Visit   Medication Sig   • amphetamine-dextroamphetamine (ADDERALL, 5MG,) 5 MG tablet Take 1 tablet (5 mg total) by mouth daily after lunch Max Daily Amount: 5 mg Do not start before June 19, 2024.   • ARIPiprazole (ABILIFY) 5 mg tablet Take 1 tablet (5 mg total) by mouth daily   • b complex vitamins capsule Take 1 capsule by mouth daily   • Cholecalciferol (VITAMIN D3) 1,000 units tablet Take 1,000 Units by mouth daily   • escitalopram (LEXAPRO) 20 mg tablet Take 1 tablet (20 mg total) by mouth daily   • hydroCHLOROthiazide 25 mg tablet Take 1 tablet (25 mg  "total) by mouth daily   • lisdexamfetamine (VYVANSE) 50 MG capsule Take 1 capsule (50 mg total) by mouth every morning Max Daily Amount: 50 mg Do not start before June 19, 2024.   • ergocalciferol (VITAMIN D2) 50,000 units Take 1 capsule (50,000 Units total) by mouth once a week for 12 doses     Allergies   Allergen Reactions   • Scopolamine      Immunization History   Administered Date(s) Administered   • COVID-19 PFIZER VACCINE 0.3 ML IM 03/16/2021, 04/07/2021, 12/14/2021   • DTaP 2000   • DTaP 5 2000, 2000, 2000, 10/23/1999, 03/20/2003   • HPV Quadrivalent 09/14/2015, 09/14/2015, 01/22/2016, 01/22/2016   • HPV9 09/14/2015, 09/14/2015   • Hep B, adult 2000, 2000, 01/30/1999   • Hib (PRP-OMP) 2000, 2000, 2000   • INFLUENZA 09/14/2015, 12/20/2018   • IPV 2000, 2000, 01/29/2000, 03/20/2003   • Influenza Quadrivalent Preservative Free 3 years and older IM 10/09/2014, 09/14/2015   • Influenza, injectable, quadrivalent, preservative free 0.5 mL 12/20/2018, 01/14/2020, 12/07/2020, 01/04/2024   • Influenza, seasonal, injectable 09/14/2015   • MMR 04/23/1999, 03/20/2003   • Meningococcal, Unknown Serogroups 05/14/2009, 07/25/2014   • Tdap 05/14/2009, 07/25/2014   • Varicella 07/24/1999, 05/14/2009     Objective     /76 (BP Location: Right arm, Patient Position: Sitting)   Pulse 94   Temp 97.9 °F (36.6 °C) (Temporal)   Ht 6' 3\" (1.905 m)   Wt 130 kg (287 lb 9.6 oz)   SpO2 99%   BMI 35.95 kg/m²     Physical Exam  Vitals and nursing note reviewed.   Constitutional:       General: He is not in acute distress.     Appearance: He is well-developed.   HENT:      Head: Normocephalic and atraumatic.      Right Ear: Tympanic membrane normal.      Left Ear: Tympanic membrane normal.      Nose: Nose normal.      Mouth/Throat:      Mouth: Mucous membranes are moist.   Eyes:      Pupils: Pupils are equal, round, and reactive to light.   Neck:      Thyroid: No " thyromegaly.   Cardiovascular:      Rate and Rhythm: Normal rate and regular rhythm.      Heart sounds: Normal heart sounds. No murmur heard.  Pulmonary:      Effort: Pulmonary effort is normal. No respiratory distress.      Breath sounds: Normal breath sounds. No wheezing.   Abdominal:      General: Bowel sounds are normal.      Palpations: Abdomen is soft.   Musculoskeletal:         General: Normal range of motion.      Cervical back: Normal range of motion.   Skin:     General: Skin is warm and dry.   Neurological:      General: No focal deficit present.      Mental Status: He is alert and oriented to person, place, and time.   Psychiatric:         Mood and Affect: Mood normal.     Depression Screening Follow-up Plan: Patient's depression screening was positive with a PHQ-2 score of . Their PHQ-9 score was 8. Patient assessed for underlying major depression. They have no active suicidal ideations. Brief counseling provided and recommend additional follow-up/re-evaluation next office visit.   Administrative Statements

## 2024-07-23 DIAGNOSIS — F90.0 ADHD (ATTENTION DEFICIT HYPERACTIVITY DISORDER), INATTENTIVE TYPE: ICD-10-CM

## 2024-07-23 RX ORDER — LISDEXAMFETAMINE DIMESYLATE 50 MG/1
50 CAPSULE ORAL EVERY MORNING
Qty: 30 CAPSULE | Refills: 0 | Status: CANCELLED | OUTPATIENT
Start: 2024-07-23 | End: 2024-08-22

## 2024-07-23 NOTE — TELEPHONE ENCOUNTER
Reason for call:   [x] Refill   [] Prior Auth  [] Other:     Office:   [] PCP/Provider -   [x] Specialty/Provider - psych/Rosalio Baker MD     Medication:     lisdexamfetamine (VYVANSE) 50 MG capsul       Dose/Frequency:     Take 1 capsule (50 mg total) by mouth every morning Max Daily Amount: 50 mg Do not start before June 19, 2024.       Quantity: 30    Ph[facility]: [id number] [place] [facility] [address] [phone]     Does the patient have enough for 3 days?   [x] Yes   [] No - Send as HP to POD

## 2024-07-24 ENCOUNTER — TELEPHONE (OUTPATIENT)
Age: 24
End: 2024-07-24

## 2024-07-24 NOTE — TELEPHONE ENCOUNTER
Pt called in in regards to rescheduling NP appt today at 1PM due to running 25 mins late. Writer was able to schedule pt on 8/5 with same provider at 8AM.    Pt asked about his medication being filled, writer advised it was sent over for review as of yesterday.

## 2024-07-26 DIAGNOSIS — F90.0 ADHD (ATTENTION DEFICIT HYPERACTIVITY DISORDER), INATTENTIVE TYPE: ICD-10-CM

## 2024-07-26 RX ORDER — LISDEXAMFETAMINE DIMESYLATE 50 MG/1
50 CAPSULE ORAL EVERY MORNING
Qty: 30 CAPSULE | Refills: 0 | Status: SHIPPED | OUTPATIENT
Start: 2024-07-26 | End: 2024-08-25

## 2024-07-26 NOTE — TELEPHONE ENCOUNTER
Notified patient requesting the following refill:  Requested Prescriptions     Pending Prescriptions Disp Refills    lisdexamfetamine (VYVANSE) 50 MG capsule 30 capsule 0     Sig: Take 1 capsule (50 mg total) by mouth every morning Max Daily Amount: 50 mg       Marko has been filling controlled prescriptions on time as prescribed according to [place] Prescription Drug Monitoring Program

## 2024-07-26 NOTE — TELEPHONE ENCOUNTER
Medication Refill Request     Name of Medication Vyvanse   Dose/Frequency 50 mg  Quantity 30  Verified pharmacy   [x]  Verified ordering Provider   [x]  Does patient have enough for the next 3 days? Yes [] No [x]  Does patient have a follow-up appointment scheduled? Yes [] No [x]

## 2024-08-01 ENCOUNTER — OFFICE VISIT (OUTPATIENT)
Age: 24
End: 2024-08-01
Payer: COMMERCIAL

## 2024-08-01 VITALS
BODY MASS INDEX: 35.36 KG/M2 | HEART RATE: 80 BPM | DIASTOLIC BLOOD PRESSURE: 80 MMHG | SYSTOLIC BLOOD PRESSURE: 112 MMHG | OXYGEN SATURATION: 99 % | HEIGHT: 75 IN | WEIGHT: 284.4 LBS

## 2024-08-01 DIAGNOSIS — G47.33 OSA (OBSTRUCTIVE SLEEP APNEA): Primary | ICD-10-CM

## 2024-08-01 DIAGNOSIS — F12.90 MARIJUANA USE: ICD-10-CM

## 2024-08-01 DIAGNOSIS — G47.19 EXCESSIVE DAYTIME SLEEPINESS: ICD-10-CM

## 2024-08-01 DIAGNOSIS — G25.81 RESTLESS LEG SYNDROME: ICD-10-CM

## 2024-08-01 DIAGNOSIS — E66.09 CLASS 2 OBESITY DUE TO EXCESS CALORIES WITHOUT SERIOUS COMORBIDITY WITH BODY MASS INDEX (BMI) OF 35.0 TO 35.9 IN ADULT: ICD-10-CM

## 2024-08-01 PROCEDURE — 3079F DIAST BP 80-89 MM HG: CPT | Performed by: INTERNAL MEDICINE

## 2024-08-01 PROCEDURE — 99214 OFFICE O/P EST MOD 30 MIN: CPT | Performed by: INTERNAL MEDICINE

## 2024-08-01 PROCEDURE — 3074F SYST BP LT 130 MM HG: CPT | Performed by: INTERNAL MEDICINE

## 2024-08-01 NOTE — PATIENT INSTRUCTIONS
Recommend light box that is at least 10,000 lm, use it for 30 minutes in the late morning or early afternoon  Speak with psychiatry about adding Wellbutrin to your medication regiment  Adjustment in ADHD medications may be needed with addition of Wellbutrin

## 2024-08-01 NOTE — PROGRESS NOTES
Progress Note - Sleep Medicine  Logan J Weiler 24 y.o. male MRN: [id number]       Impression & Plan:         1.  Mild obstructive sleep apnea  Diagnostic PSG 11/26/2023 at a weight of 308 pounds showed AHI 6.7 with frequent arousals hourly    Compliance from 6/27 - 7/26  More than 4 hours 53%, 5 hours 35 minutes  On APAP 5-15  95 percentile pressure 7.7  Median leaks 0.0  Residual AHI 4.2    Patient has improved compliance with CPAP    Previous sleep studies in 2019 showed elevated PLM index of 32.9, previous MSLT showed mean sleep latency of 18 minutes with no SOREM's    Plan  Counseled patient on improving compliance  Follow-up in 4 weeks    2.  Restless leg syndrome  He has frequent bothersome symptoms when he gets into bed  Repeat iron panel in March showed a ferritin of 120  He notes having RLS symptoms prior to starting Lexapro many years ago  He states that his dad has RLS as well  He notes RLS symptoms are worse with alcohol  He may have periodic limb movement disorder as well as PLM index has been elevated at 32.9 and previous sleep studies, he has also been told by his friends that he kicks in his sleep    Plan  He notes improvement with RLS symptoms with increased activity and decreased marijuana and alcohol     3.  Marijuana use  He is vaping in the mornings  Explained that this can cause significant daytime sleepiness  Counseled patient on importance of marijuana use cessation  I recommended he stop using marijuana for 1 week and see if his excessive daytime sleepiness improved at last office visit  Patient states that he was having withdrawal symptoms when he stopped using marijuana    Plan   He will try to stop using marijuana for one month    4.  Excessive daytime sleepiness  Patient has a diagnosis of ADHD as well  Currently on Vyvanse  Next month he will do Vyvanse 50 mg in the morning and Adderall IR 5 mg in the afternoon  I explained that his daytime sleepiness likely is related to his untreated  sleep apnea  Current [place] score of 12, improved from 18  Counseled patient on importance of CPAP compliance, although excessive daytime sleepiness is somewhat disproportionate to the degree of sleep apnea  Recommended he stop using marijuana for 1 week at last office visit  Patient also does not go outside much and frequently has curtains closed in the dark during the day  I explained the importance of sunlight and preventing daytime sleepiness    Plan  Recommended patient discuss with Psych about starting welbutrin   Recommended light box for added light therapy        5.  Obesity  BMI 35.5  Explained that weight loss can decrease severity of sleep apnea  If he loses 10% of his body weight we can do a repeat sleep study   Counseled patient on lifestyle modifications including diet and exercise            ______________________________________________________________________    HPI:    Marko BAIRON Weiler  24-year-old male with past medical history of hypertension, RLS, ADHD, depression who presents for follow-up of obstructive sleep apnea.  Recent diagnostic PSG showed an AHI of 6.7.  CPAP was ordered but he has not been compliant.  He has had difficulty tolerating it.   He goes to bed at 9 PM and falls asleep within 10 minutes.  He wakes up at 8 AM.  He is sleeping 11 hours at night.  He reports bothersome restless leg symptoms when he gets to bed.  He generally worsens with alcohol.  His friends have told him that he kicks in his sleep.  Last ferritin 120.  His father has RLS symptoms as well.  He states that RLS symptoms started many years ago prior to starting Lexapro.    He smokes marijuana daily generally in the morning.  He occasionally drinks beer.  He consumes up to 350 mg of caffeine daily.    He has a prior history of hypnagogic hallucinations but has not had them recently.  He has remote history of sleep paralysis as well.    Multiple studies in the past have not shown evidence of narcolepsy.    Since  "last office visit patient has been using CPAP more with improved compliance.  He is going out in the sun more and staying away from his bedroom.  He is still having daytime sleepiness with an [place] score of 12.  His restless leg symptoms have improved.  He did go off of marijuana for 1 week but states that he did not see improvement in daytime sleepiness but was dealing with withdrawal symptoms of irritability and cravings.  He states that he still is dealing with depression and will be seeing psychiatry next week.    Review of Systems:  Review of Systems   All other systems reviewed and are negative.        Social history updates:  Social History     Tobacco Use   Smoking Status Never   Smokeless Tobacco Never     Social History     Socioeconomic History    Marital status: Single     Spouse name: Not on file    Number of children: 0    Years of education: Not on file    Highest education level: Some college, no degree   Occupational History    Occupation: [employer]     Comment: [employer]   Tobacco Use    Smoking status: Never    Smokeless tobacco: Never   Vaping Use    Vaping status: Never Used   Substance and Sexual Activity    Alcohol use: Yes     Comment: socially    Drug use: Yes     Types: Marijuana     Comment: ocasionally    Sexual activity: Not on file   Other Topics Concern    Not on file   Social History Narrative    Not on file     Social Determinants of Health     Financial Resource Strain: Not on file   Food Insecurity: Not on file   Transportation Needs: Not on file   Physical Activity: Not on file   Stress: Not on file   Social Connections: Not on file   Intimate Partner Violence: Not on file   Housing Stability: Not on file       PhysicalExamination:  Vitals:   /80 (BP Location: Left arm, Patient Position: Sitting, Cuff Size: Large)   Pulse 80   Ht 6' 3\" (1.905 m)   Wt 129 kg (284 lb 6.4 oz)   SpO2 99%   BMI 35.55 kg/m²     Physical Exam  General:  Awake alert and oriented x 3, conversant " without conversational dyspnea, NAD, normal affect  HEENT:  PERRL, Sclera noninjected, nonicteric OU, Nares patent,  no craniofacial abnormalities, Mucous membranes, moist, no oral lesions, normal dentition  NECK: Trachea midline, no accessory muscle use, no stridor, no cervical or supraclavicular adenopathy, JVP not elevated  CARDIAC: Reg, single s1/S2, no m/r/g  PULM: CTA bilaterally no wheezing, rhonchi or rales  EXT: No cyanosis, no clubbing, no edema, normal capillary refill  NEURO: no focal neurologic deficits, AAOx3, moving all extremities appropriately     Diagnostic Data:  Labs:  I personally reviewed the most recent laboratory data pertinent to today's visit  Appointment on 06/21/2024   Component Date Value    FRANCIS SYNDROME DNA DOLLY* 06/21/2024 Not Detected     HEREDITARY BREAST & OVAR* 06/21/2024 Not Detected     FAMILIAL HYPERCHOLESTERO* 06/21/2024 Not Detected     Vit D, 25-Hydroxy 06/21/2024 27.8 (L)    Telephone on 04/24/2024   Component Date Value    Supplier Name 04/24/2024 [employer] [facility]     Supplier Phone Number 04/24/2024 [phone]     Order Status 04/24/2024 Completed     Delivery Request Date 04/24/2024 04/24/2024     Date Delivered  04/24/2024 04/24/2024     Item Description 04/24/2024 PAP Accessory     Item Description 04/24/2024 PAP Mask, Full Face, Fit Upon Setup, N/A, 1 per 3 months     Item Description 04/24/2024 Humidifier Water Chamber, 1 per 6 months    Appointment on 03/07/2024   Component Date Value    Vit D, 25-Hydroxy 03/07/2024 20.7 (L)     Sodium 03/07/2024 139     Potassium 03/07/2024 4.6     Chloride 03/07/2024 101     CO2 03/07/2024 27     ANION GAP 03/07/2024 11     BUN 03/07/2024 14     Creatinine 03/07/2024 0.91     Glucose, Fasting 03/07/2024 88     Calcium 03/07/2024 9.7     AST 03/07/2024 19     ALT 03/07/2024 38     Alkaline Phosphatase 03/07/2024 82     Total Protein 03/07/2024 6.9     Albumin 03/07/2024 4.4     Total Bilirubin 03/07/2024  "0.55     eGFR 03/07/2024 115     Cholesterol 03/07/2024 184     Triglycerides 03/07/2024 165 (H)     HDL, Direct 03/07/2024 26 (L)     LDL Calculated 03/07/2024 125 (H)     WBC 03/07/2024 7.41     RBC 03/07/2024 5.58     Hemoglobin 03/07/2024 16.4     Hematocrit 03/07/2024 46.6     MCV 03/07/2024 84     MCH 03/07/2024 29.4     MCHC 03/07/2024 35.2     RDW 03/07/2024 13.2     MPV 03/07/2024 10.6     Platelets 03/07/2024 257     nRBC 03/07/2024 0     Segmented % 03/07/2024 65     Immature Grans % 03/07/2024 0     Lymphocytes % 03/07/2024 24     Monocytes % 03/07/2024 8     Eosinophils Relative 03/07/2024 2     Basophils Relative 03/07/2024 1     Absolute Neutrophils 03/07/2024 4.90     Absolute Immature Grans 03/07/2024 0.03     Absolute Lymphocytes 03/07/2024 1.74     Absolute Monocytes 03/07/2024 0.58     Eosinophils Absolute 03/07/2024 0.11     Basophils Absolute 03/07/2024 0.05     Iron Saturation 03/07/2024 20     TIBC 03/07/2024 379     Iron 03/07/2024 77     UIBC 03/07/2024 302     Ferritin 03/07/2024 120        I have personally reviewed pertinent lab results.  Lab Results   Component Value Date    WBC 7.41 03/07/2024    HGB 16.4 03/07/2024    HCT 46.6 03/07/2024    MCV 84 03/07/2024     03/07/2024     Lab Results   Component Value Date    GLUCOSE 86 12/30/2014    CALCIUM 9.7 03/07/2024     12/30/2014    K 4.6 03/07/2024    CO2 27 03/07/2024     03/07/2024    BUN 14 03/07/2024    CREATININE 0.91 03/07/2024     No results found for: \"IGE\"  Lab Results   Component Value Date    ALT 38 03/07/2024    AST 19 03/07/2024    ALKPHOS 82 03/07/2024    BILITOT 0.40 12/30/2014     Lab Results   Component Value Date    IRON 77 03/07/2024    TIBC 379 03/07/2024    FERRITIN 120 03/07/2024     No results found for: \"KUHHTGLZ08\"  No results found for: \"FOLATE\"      Arterial Blood Gas result:  PRISCILLA Calvin MD  [facility]    "

## 2024-08-05 ENCOUNTER — OFFICE VISIT (OUTPATIENT)
Dept: PSYCHIATRY | Facility: CLINIC | Age: 24
End: 2024-08-05

## 2024-08-05 DIAGNOSIS — F12.20 CANNABIS USE DISORDER, SEVERE, DEPENDENCE (HCC): ICD-10-CM

## 2024-08-05 DIAGNOSIS — F33.1 MAJOR DEPRESSIVE DISORDER, RECURRENT, MODERATE (HCC): ICD-10-CM

## 2024-08-05 DIAGNOSIS — F98.8 ATTENTION DEFICIT DISORDER (ADD) IN ADULT: Primary | ICD-10-CM

## 2024-08-06 NOTE — PSYCH
" PSYCHIATRIC EVALUATION     [facility]    Name and Date of Birth:  Logan J Weiler 24 y.o. 2000 MRN: [id number]    Date of Visit: August 6, 2024    TIME STARTED: I have spent 0915 minutes with Patient  today, starting time 0915, ending time 1054    Reason for visit: Initial psychiatric intake assessment    Chief complaint: \"Depression makes it difficult to wake up\".    History of Present Illness (HPI):      Logan J Weiler is a 24 y.o., male, possessing a previous psychiatric history significant for major depressive disorder, generalized anxiety disorder, ADHD, medically complicated by obstructed sleep apnea, obesity, presenting to the [facility] outpatient clinic for intake assessment. Marko is a historical patient of the resident outpatient psychiatric clinic, and was previously a patient of Dr Angeles (2/3/22 - 3/27/23) and Dr Patel (7/13/23 - 6/13/24). Review of relevant records completed by this provider.    Symptoms prior to presentation include greater >2 years of symptoms of major depressive disorder including hypersomnia, anhedonia, feelings of excess guilt and hopelessness, low energy, poor concentration, and periods of suicidal ideation without plan (passive death wish). He denies a history of suicide attempts and non-suicidal self injurious behavior. Marko endorses a history of inattentive symptoms of ADHD with marked difficulty sustaining concentration, completing tasks, avoiding tasks that require sustained attention, and difficulty organizing complex tasks that have interfered with the patient's occupational and social functioning. He endorses symptoms of cannabis use disorder with marked chronic cannabis use for the past seven years and smokes daily, throughout the day, high potency THC concentrates with difficulties curtailing his use despite previous attempts and recurrent cannabis use preventing him from completing " previous occupational and social responsibilities.    He denies a history of symptoms of episodic christina including periods of excess energy and increased goal directed behavior with marked pressured speech, racing thoughts, and increased impulsive behavior lasting a period of >4 days. He denies a history of psychotic symptoms including auditory and visual hallucinations. He denies abusing alcohol, nicotine, and other non-cannabis related recreational substances at the present time.     Marko states that he has been making progress with his depressive symptoms (most notably anhedonia and hypersomnia) since he has started treatment with his previous psychiatric providers. Patient recalls long-term difficulty with anhedonia and hypersomnia (which he endorses as a means of escaping reality) that has been ongoing since childhood. Marko recalls a difficult childhood with parents whom frequently encouraged him to fulfill sick role and enable isolative behavior to avoid social and academic responsibilities, and often found his parents emotionally neglectful. He states that this behavior continued into college and he eventually dropped out during his gregg year. Since dropping out of college, he moved home with his parents and has been taking care of his parents as his mother and father both have significant health problems in their late 60s that prevent them from taking care of themselves without his assistance. He notes that that he was previously employed at [employer] until within the past year, and was fired due to chronic lateness. He endorses that he often sleeps from 11:30pm to 9:30am, but will end up sleeping until 12:30pm. He states that this is due to in part his sleep apnea decreasing the quality of his sleep, his ADHD symptoms, depressive tendencies, and chronic cannabis use.     Marko endorses wanting to quit his cannabis use as recommended by his sleep medicine doctor. He is planning on stopping his use this  coming Friday when he goes on a weeklong camping trip with his college friends. He endorses motivation for quitting being to further improve depressive symptoms, return to college and complete degree in physics, and for his general physical health. He is agreeable to follow up in two weeks time to follow up on his sobriety and assist patient with additional recovery resources should he require them.     Presently, patient denies suicidal/homicidal ideation in addition to thoughts of self-injury; contracts for safety, see below for risk assessment. At conclusion of evaluation, patient is amenable to the recommendations of this writer including: continuning medication, ceasing cannabis, and follow up in two weeks.  Also, patient is amenable to calling/contacting the outpatient office including this writer if any acute adverse effects of their medication regimen arise in addition to any comments or concerns pertaining to their psychiatric management.  Patient is amenable to calling/contacting crisis and/or attending to the nearest emergency department if their clinical condition deteriorates to assure their safety and stability, stating that they are able to appropriately confide in their psychiatrist regarding their psychiatric state.    Current Rating Scores:     None completed today.    Psychiatric Review Of Systems:    Appetite: no change  Adverse eating: no  Weight changes: no  Insomnia/sleeplessness: yes  Fatigue/anergy: yes  Anhedonia/lack of interest: yes  Attention/concentration: yes  Psychomotor agitation/retardation: no  Somatic symptoms: no  Anxiety/panic attack: yes, worrying  June/hypomania: no  Hopelessness/helplessness/worthlessness: yes  Self-injurious behavior/high-risk behavior: no  Suicidal ideation: no  Homicidal ideation: no  Auditory hallucinations: no  Visual hallucinations: no  Other perceptual disturbances: no  Delusional thinking: no  Obsessive/compulsive symptoms: no    Review Of  Systems:    Constitutional negative   ENT negative   Cardiovascular negative   Respiratory Sleep apnea   Gastrointestinal negative   Genitourinary negative   Musculoskeletal negative   Integumentary negative   Neurological negative   Endocrine negative   Pain Denies   Pain Level       Other Symptoms none, all other systems are negative       Family Psychiatric History:   Brother with depressive symptoms, parents with mental health problems undiagnosed per patient    Past Psychiatric History:   From review of records    Inpatient psychiatric admissions:   None  Prior outpatient psychiatric treatment:   Had been a patient of Dr. Angeles at [facility] since March 2022  Previously was seeing Dr. Peña and Gabi SAMAYOA at [facility]  Past/current psychotherapy:    Weekly therapy online at a private agency  History of suicidal attempts/gestures:   denies    History of non-suicidal self-injurious behavior:   None  History of violence/aggressive behaviors:   denies  Psychotropic medication trials:   Effexor XR (2018) (nausea)  Concerta ER 36mg (2017) - dilated pupils, chalenges with glares when driving  Armodafinil (somewhat helpful)  Ritalin 10mg, Vyvanse Strattera (vomiting)  Zoloft, abilify, buspar, gabapentin, trintellix,  lexapro  Pramipexole for RLS  Nuvigil for hypersomnia (several years ago)  Wellbutrin Xl 150mg - ineffective for augmentation purposes  Melatonin - instructed to stop by sleep medicine, not effective  Substance abuse inpatient/outpatient rehabilitation:   Denies  Eating disorder history:   No      Substance Abuse History:      Daily cannabis use, throughout the day, ~800mg THC consumed daily per patient  Previous use of psychodelic substances in remission >1 year    Marko does not apear under the influence or withdrawal of any psychoactive substance throughout today's examination.     Social History:    Educational History:  Academic history: some college  Marital history: single  Social support system:  parents and friend(s)  Residential history: Resides in home; lives with parents  Vocational History: unemployed  Access to guns/weapons: none  Legal History: Denies    Traumatic History:     Abuse:emotional  Other Traumatic Events:  Denies    Past Medical History:    Past Medical History:   Diagnosis Date    ADHD (attention deficit hyperactivity disorder)     Anxiety     Concussion     Depression     Kidney stones     Self-injurious behavior     Sleep difficulties     history of sleep apnea  and getting workup for narcolepsy as of 8/19        Past Surgical History:   Procedure Laterality Date    HERNIA REPAIR      TONSILLECTOMY      TOOTH EXTRACTION Right 09/21/2020    R lower molar extraction     Allergies   Allergen Reactions    Scopolamine        History Review:    The following portions of the patient's history were reviewed and updated as appropriate: allergies, current medications, past family history, past medical history, past social history, past surgical history, and problem list.    OBJECTIVE:    Vital signs in last 24 hours:    There were no vitals filed for this visit.    Mental Status Evaluation:    Appearance age appropriate, casually dressed, looks older than stated age, poor hygiene, overweight   Behavior pleasant, cooperative, mildly anxious   Speech normal rate, normal volume, normal pitch   Mood depressed, anxious   Affect mood-congruent   Thought Processes organized, goal directed   Associations intact associations   Thought Content no overt delusions   Perceptual Disturbances: no auditory hallucinations, no visual hallucinations   Abnormal Thoughts  Risk Potential Suicidal ideation - None  Homicidal ideation - None  Potential for aggression - No   Orientation oriented to person, place, time/date, and situation   Memory recent and remote memory grossly intact   Consciousness alert and awake   Attention Span Concentration Span attention span and concentration are age appropriate   Intellect  appears to be of average intelligence   Insight intact   Judgement intact   Muscle Strength and  Gait normal muscle strength and normal muscle tone, normal gait and normal balance   Motor Activity no abnormal movements   Language no difficulty naming common objects, no difficulty repeating a phrase, no difficulty writing a sentence   Fund of Knowledge adequate knowledge of current events  adequate fund of knowledge regarding past history  adequate fund of knowledge regarding vocabulary        Laboratory Results: I have personally reviewed all pertinent laboratory/tests results    Recent Labs (last 6 months):   Appointment on 06/21/2024   Component Date Value    FRANCIS SYNDROME DNA DOLLY* 06/21/2024 Not Detected     HEREDITARY BREAST & OVAR* 06/21/2024 Not Detected     FAMILIAL HYPERCHOLESTERO* 06/21/2024 Not Detected     Vit D, 25-Hydroxy 06/21/2024 27.8 (L)    Telephone on 04/24/2024   Component Date Value    Supplier Name 04/24/2024 [employer]     Supplier Phone Number 04/24/2024 [phone]     Order Status 04/24/2024 Completed     Delivery Request Date 04/24/2024 04/24/2024     Date Delivered  04/24/2024 04/24/2024     Item Description 04/24/2024 PAP Accessory     Item Description 04/24/2024 PAP Mask, Full Face, Fit Upon Setup, N/A, 1 per 3 months     Item Description 04/24/2024 Humidifier Water Chamber, 1 per 6 months    Appointment on 03/07/2024   Component Date Value    Vit D, 25-Hydroxy 03/07/2024 20.7 (L)     Sodium 03/07/2024 139     Potassium 03/07/2024 4.6     Chloride 03/07/2024 101     CO2 03/07/2024 27     ANION GAP 03/07/2024 11     BUN 03/07/2024 14     Creatinine 03/07/2024 0.91     Glucose, Fasting 03/07/2024 88     Calcium 03/07/2024 9.7     AST 03/07/2024 19     ALT 03/07/2024 38     Alkaline Phosphatase 03/07/2024 82     Total Protein 03/07/2024 6.9     Albumin 03/07/2024 4.4     Total Bilirubin 03/07/2024 0.55     eGFR 03/07/2024 115     Cholesterol 03/07/2024 184      Triglycerides 03/07/2024 165 (H)     HDL, Direct 03/07/2024 26 (L)     LDL Calculated 03/07/2024 125 (H)     WBC 03/07/2024 7.41     RBC 03/07/2024 5.58     Hemoglobin 03/07/2024 16.4     Hematocrit 03/07/2024 46.6     MCV 03/07/2024 84     MCH 03/07/2024 29.4     MCHC 03/07/2024 35.2     RDW 03/07/2024 13.2     MPV 03/07/2024 10.6     Platelets 03/07/2024 257     nRBC 03/07/2024 0     Segmented % 03/07/2024 65     Immature Grans % 03/07/2024 0     Lymphocytes % 03/07/2024 24     Monocytes % 03/07/2024 8     Eosinophils Relative 03/07/2024 2     Basophils Relative 03/07/2024 1     Absolute Neutrophils 03/07/2024 4.90     Absolute Immature Grans 03/07/2024 0.03     Absolute Lymphocytes 03/07/2024 1.74     Absolute Monocytes 03/07/2024 0.58     Eosinophils Absolute 03/07/2024 0.11     Basophils Absolute 03/07/2024 0.05     Iron Saturation 03/07/2024 20     TIBC 03/07/2024 379     Iron 03/07/2024 77     UIBC 03/07/2024 302     Ferritin 03/07/2024 120        Suicide/Homicide Risk Assessment:      The following ratings are based on my review of records    Suicide/Homicide Risk Assessment:  Risk of Harm to Self:  The following ratings are based on assessment at the time of the interview  Demographic risk factors include: , never   Historical Risk Factors include: history of depression, history of anxiety, substance use  Recent Specific Risk Factors include: diagnosis of depression  Protective Factors: no current suicidal ideation, able to manage anger well, access to mental health treatment, compliant with medications, compliant with mental health treatment, having a desire to be alive, having a sense of purpose or meaning in life, responsibilities and duties to others, stable living environment, supportive family  Weapons: several guns. The following steps have been taken to ensure weapons are properly secured: Provided two gun locks, encouraged to purchase more gun locks  Based on today's assessment,  Marko presents the following risk of harm to self: low     Risk of Harm to Others:  The following ratings are based on assessment at the time of the interview  Demographic Risk Factors include: male, unemployed, under age 40.  Historical Risk Factors include: drug abuse.  Recent Specific Risk Factors include: weapons or other means available.  Protective Factors: no current homicidal ideation, able to manage anger well, access to mental health treatment, compliant with medications, compliant with mental health treatment, resilience, responsibilities and duties to others, safe and stable living environment, support system, supportive family  Weapons: several guns. The following steps have been taken to ensure weapons are properly secured: provided two gun locks, encouraged to purchase more  Based on today's assessment, Marko presents the following risk of harm to others: low    The following interventions are recommended: no intervention changes needed. Although patient's acute lethality risk is low, long-term/chronic lethality risk is mildly elevated in the presence of mood disorder. At the current moment, Marko is future-oriented, forward-thinking, and demonstrates ability to act in a self-preserving manner as evidenced by volitionally presenting to the clinic today, seeking treatment.To mitigate future risk, patient should adhere to the recommendations of this writer, avoid alcohol/illicit substance use, utilize community-based resources and familiar support and prioritize mental health treatment.     Assessment/Plan:   Diagnoses and all orders for this visit:    Attention deficit disorder (ADD) in adult    Major depressive disorder, recurrent, moderate (HCC)    Cannabis use disorder, severe, dependence (HCC)        Treatment Recommendations/Precautions:    Patient declines medication refills at this time. No medication changes indicated at this time. Suspect depressive symptoms to improve with cannabis d/c and  will reevaluate.  Patient plans to cease cannabis use on this coming Friday.  Follow up in two weeks to provide assistance with early sobriety and additional resources if needed.      Controlled Medication Discussion:     Marko has been filling controlled prescriptions on time as prescribed according to [place] Prescription Drug Monitoring Program    Treatment Plan:    Completed and signed during the session: Not applicable - Treatment Plan not due at this session    This note was not shared with the patient due to reasonable likelihood of causing patient harm    Reece Barcenas MD 08/06/24

## 2024-08-12 NOTE — TELEPHONE ENCOUNTER
phosphorus level 6.1, 8/12    Plan:  Vitamin D level due 8/8 with Growth labs (Ca/Phos/AlkPhos) - all labs venous draw. 8/8: Vit D 25OH: 30 (29)-->Endocrine aware, 8/8 and 8/12, will get back to us today if any changes needed  7/25: PTH: QNS.  6/27: PTH: 38.5  Continue to follow with Endocrinology  Continue Vitamin D 400 units/day   vyvanse 30 mg po daily last filled 2/10 per pdmp same sent to pharmacy

## 2024-08-13 DIAGNOSIS — F41.1 GAD (GENERALIZED ANXIETY DISORDER): ICD-10-CM

## 2024-08-13 DIAGNOSIS — F33.0 MAJOR DEPRESSIVE DISORDER, RECURRENT EPISODE, MILD (HCC): ICD-10-CM

## 2024-08-13 RX ORDER — ESCITALOPRAM OXALATE 20 MG/1
20 TABLET ORAL DAILY
Qty: 90 TABLET | Refills: 1 | Status: SHIPPED | OUTPATIENT
Start: 2024-08-13

## 2024-08-27 DIAGNOSIS — F90.0 ADHD (ATTENTION DEFICIT HYPERACTIVITY DISORDER), INATTENTIVE TYPE: ICD-10-CM

## 2024-08-27 NOTE — TELEPHONE ENCOUNTER
Reason for call:   [x] Refill   [] Prior Auth  [] Other:     Office:   [] PCP/Provider -   [x] Specialty/Provider - PSYC    Medication: VYVANSE    Dose/Frequency: 50 MG    Quantity: 30    Pharmacy:   [id number] - [place] - [address] [place] [id number]  Phone: [phone]  Fax: [phone]       Does the patient have enough for 3 days?   [] Yes   [] No - Send as HP to POD

## 2024-08-28 NOTE — TELEPHONE ENCOUNTER
Refill must be reviewed and completed by the office or provider. The refill is unable to be approved or denied by the medication management team.    Refill cannot be delegated    Medication:Lisdexamfetamine   PDMP 1 [id number] 07/26/2024 07/26/2024 Lisdexamfetamine Dimesylate (Capsule) 30.0 30 50 MG NA ABILIO POTTER [facility] Commercial [place] 0 / 0 [place]   1 [id number] 06/19/2024 06/13/2024 Amphetamine Salt Combo (Tablet) 30.0 30 5 MG NA IMELDA ARENAS [facility] [employer] [place] / 0 [place]   1 [id number] 06/19/2024 06/13/2024 Lisdexamfetamine Dimesylate (Capsule) 30.0 30 50 MG NA IMELDA ARENAS [facility] [employer] [place] / 0 [place]

## 2024-09-03 DIAGNOSIS — F90.0 ADHD (ATTENTION DEFICIT HYPERACTIVITY DISORDER), INATTENTIVE TYPE: ICD-10-CM

## 2024-09-03 RX ORDER — LISDEXAMFETAMINE DIMESYLATE 50 MG/1
50 CAPSULE ORAL EVERY MORNING
Qty: 30 CAPSULE | Refills: 0 | Status: SHIPPED | OUTPATIENT
Start: 2024-09-03 | End: 2024-09-09 | Stop reason: SDDI

## 2024-09-03 NOTE — TELEPHONE ENCOUNTER
Patient requesting a refill for :    Reason for call:   [x] Refill   [] Prior Auth  [] Other:     Office:   [] PCP/Provider -   [x] Specialty/Provider - Psychiatry     Medication: (ADDERALL, 5MG,)     Dose/Frequency: Take 1 tablet (5 mg total) by mouth daily after lunch     Quantity: 30 tablet    Pharmacy: [id number] [place] [facility] [address] [facility] [phone]    Does the patient have enough for 3 days?   [] Yes   [x] No - Send as HP to POD

## 2024-09-03 NOTE — TELEPHONE ENCOUNTER
Patient called to check the status of his refill request for Vyvanse 50 mg. Patient made aware it's pending approval. Patient states he is completely out of medication and would like to have this refill as soon as possible.

## 2024-09-06 DIAGNOSIS — F90.0 ADHD (ATTENTION DEFICIT HYPERACTIVITY DISORDER), INATTENTIVE TYPE: ICD-10-CM

## 2024-09-06 DIAGNOSIS — F33.0 MAJOR DEPRESSIVE DISORDER, RECURRENT EPISODE, MILD (HCC): ICD-10-CM

## 2024-09-06 RX ORDER — ARIPIPRAZOLE 5 MG/1
5 TABLET ORAL DAILY
Qty: 90 TABLET | Refills: 1 | Status: SHIPPED | OUTPATIENT
Start: 2024-09-06 | End: 2024-09-09 | Stop reason: SDDI

## 2024-09-09 ENCOUNTER — OFFICE VISIT (OUTPATIENT)
Dept: PSYCHIATRY | Facility: CLINIC | Age: 24
End: 2024-09-09
Payer: COMMERCIAL

## 2024-09-09 DIAGNOSIS — F33.0 MAJOR DEPRESSIVE DISORDER, RECURRENT EPISODE, MILD (HCC): ICD-10-CM

## 2024-09-09 DIAGNOSIS — F90.0 ADHD (ATTENTION DEFICIT HYPERACTIVITY DISORDER), INATTENTIVE TYPE: ICD-10-CM

## 2024-09-09 PROCEDURE — 99404 PREV MED CNSL INDIV APPRX 60: CPT

## 2024-09-09 RX ORDER — VENLAFAXINE HYDROCHLORIDE 37.5 MG/1
75 CAPSULE, EXTENDED RELEASE ORAL EVERY MORNING
Qty: 60 CAPSULE | Refills: 1 | Status: SHIPPED | OUTPATIENT
Start: 2024-09-09 | End: 2024-11-08

## 2024-09-09 RX ORDER — DEXTROAMPHETAMINE SACCHARATE, AMPHETAMINE ASPARTATE, DEXTROAMPHETAMINE SULFATE AND AMPHETAMINE SULFATE 1.25; 1.25; 1.25; 1.25 MG/1; MG/1; MG/1; MG/1
5 TABLET ORAL 2 TIMES DAILY
Qty: 60 TABLET | Refills: 0 | Status: SHIPPED | OUTPATIENT
Start: 2024-09-09

## 2024-09-10 NOTE — TELEPHONE ENCOUNTER
Called the pharmacy and clarified the script. Dr. Barcenas, can you please refuse this request as it is now a duplicate? Thank you!

## 2024-09-10 NOTE — PSYCH
"MEDICATION MANAGEMENT NOTE        [facility]      Name and Date of Birth:  Logan J Weiler 24 y.o. 2000 MRN: [id number]    Date of Visit: September 9, 2024    TIME STARTED: I have spent 64 minutes with Patient  today, starting time 1451, ending time 1555       Reason for Visit: Follow-up visit regarding medication management     Chief Complaint: \"I feel like I'm never going to get better\"    SUBJECTIVE:    Logan J Weiler is a 24 y.o., male, possessing a past psychiatric history significant for major depressive disorder, ADHD, KORY, medically complicated by obstructive sleep apnea, HTN, obesity, who was personally seen and evaluated at the [facility] outpatient clinic for follow-up regarding medication management.    Marko states that since their previous outpatient psychiatric appointment, endorses feeling hopeless as if he \"will never get better.\" Since his previous evaluation, he endorses having felt lost and without a positive, structured way forward. Marko wishes that he had a job, but feels overwhelmed with the prospect of finding a job in the future, cleaning his family's home, and working out a payment plan for his student loans. To escape these prospects, he has found himself not wearing his CPAP at night so that he does not get a good night's sleep and instead can spend more time sleeping throughout the day. He endorses not taking his medication in the past week as a means of self sabotaging. However, he does endorse that he has managed to decrease the frequency of his cannabis use and notes that he will now go seven days without using followed by a 1-3 day relapse. He endorses that his last use was 2 days ago and that the relapse lasted less than one day. Motivational interviewing appears successful for the patient during this encounter. He sets 4 goals for himself: to find a job, to stop smoking, to clean out his parents house " and maintain it more frequently, and sign up for a payment plan for his student loans so that he may go back to university in the forward. He is agreeable to a medication change given his lack of compliance and improving sobriety, and he consents for treatment with Adderall SR 5mg BID and Effexor 37.5mg QAM for 7 days followed by Effexor 75mg QAM.     Current Rating Scores:   None completed today.    Psychiatric Review Of Systems:    Appetite: no change  Adverse eating: no  Weight changes: no change  Insomnia/sleeplessness: no  Fatigue/anergy: yes  Anhedonia/lack of interest: yes  Attention/concentration: yes  Psychomotor agitation/retardation: no  Somatic symptoms: no  Anxiety/panic attack: worrying  June/hypomania: no  Hopelessness/helplessness/worthlessness: yes  Self-injurious behavior/high-risk behavior: no  Suicidal ideation: no  Homicidal ideation: no  Auditory hallucinations: no  Visual hallucinations: no  Other perceptual disturbances: no  Delusional thinking: no  Obsessive/compulsive symptoms: no    Review Of Systems:      Constitutional negative   ENT negative   Cardiovascular elevated blood pressure   Respiratory as noted in HPI   Gastrointestinal negative   Genitourinary negative   Musculoskeletal negative   Integumentary negative   Neurological negative   Endocrine negative   Other Symptoms none, all other systems are negative     History Review:   The following portions of the patient's history were reviewed and updated as appropriate: allergies, current medications, past family history, past medical history, past social history, past surgical history, and problem list.         OBJECTIVE:     Vital signs in last 24 hours:    There were no vitals filed for this visit.    Mental Status Evaluation:    Appearance age appropriate, casually dressed, poor hygiene, overweight   Behavior pleasant, cooperative, mildly anxious   Speech normal rate, normal volume, normal pitch   Mood dysphoric, anxious   Affect  constricted, mood-congruent   Thought Processes organized, goal directed   Associations intact associations   Thought Content no overt delusions   Perceptual Disturbances: no auditory hallucinations, no visual hallucinations   Abnormal Thoughts  Risk Potential Suicidal ideation - None  Homicidal ideation - None  Potential for aggression - No   Orientation oriented to person, place, time/date, and situation   Memory recent and remote memory grossly intact   Consciousness alert and awake   Attention Span Concentration Span attention span and concentration are age appropriate   Intellect appears to be above average intelligence   Insight intact   Judgement intact   Muscle Strength and  Gait normal muscle strength and normal muscle tone, normal gait and normal balance   Motor activity no abnormal movements   Fund of Knowledge adequate knowledge of current events  adequate fund of knowledge regarding past history  adequate fund of knowledge regarding vocabulary    Pain none   Pain Scale 0       Laboratory Results: I have personally reviewed all pertinent laboratory/tests results    Recent Labs (last 6 months):   Appointment on 06/21/2024   Component Date Value    FRANCIS SYNDROME DNA DOLLY* 06/21/2024 Not Detected     HEREDITARY BREAST & OVAR* 06/21/2024 Not Detected     FAMILIAL HYPERCHOLESTERO* 06/21/2024 Not Detected     Vit D, 25-Hydroxy 06/21/2024 27.8 (L)    Telephone on 04/24/2024   Component Date Value    Supplier Name 04/24/2024 [employer]     Supplier Phone Number 04/24/2024 [phone]     Order Status 04/24/2024 Completed     Delivery Request Date 04/24/2024 04/24/2024     Date Delivered  04/24/2024 04/24/2024     Item Description 04/24/2024 PAP Accessory     Item Description 04/24/2024 PAP Mask, Full Face, Fit Upon Setup, N/A, 1 per 3 months     Item Description 04/24/2024 Humidifier Water Chamber, 1 per 6 months        Suicide/Homicide Risk Assessment:    The following interventions  are recommended: no intervention changes needed. Although patient's acute lethality risk is low, long-term/chronic lethality risk is mildly elevated in the presence of MDD, ADHD. At the current moment, Marko is future-oriented, forward-thinking, and demonstrates ability to act in a self-preserving manner as evidenced by volitionally presenting to the clinic today, seeking treatment. To mitigate future risk, patient should adhere to the recommendations below, avoid alcohol/illicit substance use, utilize community-based resources and familiar support and prioritize mental health treatment. Presently, patient denies active suicidal/homicidal ideation in addition to thoughts of self-injury; contracts for safety.  At conclusion of evaluation, patient is amenable to the recommendations below. Patient is amenable to calling/contacting the outpatient office including this writer if any acute adverse effects of their medication regimen arise in addition to any comments or concerns pertaining to their psychiatric management.  Patient is amenable to calling/contacting crisis and/or attending to the nearest emergency department if their clinical condition deteriorates to assure their safety and stability, stating that they are able to appropriately confide in their psychiatrist regarding their psychiatric state.    Assessment/Plan:     Patient experiencing depressive symptoms, including hypersomnia, hopelessness, and anhedonia. He is, however, managing to cut back on his cannabis use significantly for the first time in 7 years. Suspect he is experiencing some dysphoria as he is no longer relying on a cannabis to manage his mood, and beginning to become more cognizant of his social and occupational deficits. Has not taken medication in 1 week. Not suicidal, homicidal, experiencing psychotic symptoms warranting a higher level of care. He could benefit from partial program admission for greater structure. However, will prescribe  adderall 5mg BID and effexor to manage both ADHD, depression, and combat hypersomnia with stimulating effects of both medication. Discontinue Abilify, Lexapro, and Vyvanse. Follow up in 3 weeks.    Diagnoses and all orders for this visit:    Major depressive disorder, recurrent episode, mild (HCC)  -     venlafaxine (EFFEXOR-XR) 37.5 mg 24 hr capsule; Take 2 capsules (75 mg total) by mouth every morning For the first seven days, take 1 capsule (37.5mg) by mouth every morning.    ADHD (attention deficit hyperactivity disorder), inattentive type  -     amphetamine-dextroamphetamine (ADDERALL, 5MG,) 5 MG tablet; Take 1 tablet (5 mg total) by mouth 2 (two) times a day Max Daily Amount: 10 mg         Treatment Recommendations/Precautions:    Start new psychotropic regimen: Adderall 5mg BID and Effexor XR 75mg QAM (37.5mg QAM for 7 days to avoid worsening of anxiety symptoms).  Continue psychotherapy.  Follow up in 3 weeks.  Aware of 24 hour and weekend coverage for urgent situations accessed by calling [facility] main practice number  Patient advised to call 911 if feeling suicidal or homicidal before acting out on their thoughts and they expressed understanding.  Medications Risks/Benefits      Risks, Benefits And Possible Side Effects Of Medications:    Risks, benefits, and possible side effects of medications explained to Marko and he verbalizes understanding and agreement for treatment. including: risk of substance abuse disorder with Adderall use, worsening anxiety with Effexor in the beginnings stages of treatment, and insomnia risk with both medication.    Controlled Medication Discussion:     Marko has been filling controlled prescriptions on time as prescribed according to [place] Prescription Drug Monitoring Program    Psychotherapy Provided:     Individual psychotherapy provided: Yes     Treatment Plan:    Completed and signed during the session: Not applicable - Treatment Plan not  due at this session    This note was not shared with the patient due to reasonable likelihood of causing patient harm      Reece Barcenas MD 09/09/24

## 2024-09-11 RX ORDER — VENLAFAXINE HYDROCHLORIDE 37.5 MG/1
CAPSULE, EXTENDED RELEASE ORAL
Qty: 60 CAPSULE | Refills: 1 | OUTPATIENT
Start: 2024-09-11

## 2024-09-16 DIAGNOSIS — E55.9 VITAMIN D DEFICIENCY: ICD-10-CM

## 2024-09-16 RX ORDER — ERGOCALCIFEROL 1.25 MG/1
50000 CAPSULE, LIQUID FILLED ORAL WEEKLY
Qty: 12 CAPSULE | Refills: 0 | Status: CANCELLED | OUTPATIENT
Start: 2024-09-16 | End: 2024-12-03

## 2024-09-16 NOTE — TELEPHONE ENCOUNTER
Reason for call:   [x] Refill   [] Prior Auth  [] Other:     Office:   [] PCP/Provider -   [x] Specialty/Provider - psych    Medication: ergocalciferol (VITAMIN D2) 50,000 units ()     Dose/Frequency: 50,000 Units, Oral, Weekly     Quantity: 12    Pharmacy: [id number] - [address]     Does the patient have enough for 3 days?   [] Yes   [x] No - Send as HP to POD

## 2024-09-30 ENCOUNTER — OFFICE VISIT (OUTPATIENT)
Dept: PSYCHIATRY | Facility: CLINIC | Age: 24
End: 2024-09-30
Payer: COMMERCIAL

## 2024-09-30 DIAGNOSIS — F12.20 CANNABIS USE DISORDER, SEVERE, DEPENDENCE (HCC): ICD-10-CM

## 2024-09-30 DIAGNOSIS — F90.1 ADHD, PREDOMINANTLY HYPERACTIVE TYPE: Primary | ICD-10-CM

## 2024-09-30 DIAGNOSIS — F33.1 MAJOR DEPRESSIVE DISORDER, RECURRENT, MODERATE (HCC): ICD-10-CM

## 2024-09-30 PROCEDURE — 99245 OFF/OP CONSLTJ NEW/EST HI 55: CPT

## 2024-09-30 RX ORDER — VENLAFAXINE HYDROCHLORIDE 150 MG/1
150 CAPSULE, EXTENDED RELEASE ORAL DAILY
Qty: 30 CAPSULE | Refills: 1 | Status: SHIPPED | OUTPATIENT
Start: 2024-09-30 | End: 2024-11-29

## 2024-09-30 RX ORDER — DEXTROAMPHETAMINE SACCHARATE, AMPHETAMINE ASPARTATE, DEXTROAMPHETAMINE SULFATE AND AMPHETAMINE SULFATE 2.5; 2.5; 2.5; 2.5 MG/1; MG/1; MG/1; MG/1
10 TABLET ORAL
Qty: 60 TABLET | Refills: 0 | Status: SHIPPED | OUTPATIENT
Start: 2024-09-30

## 2024-09-30 NOTE — PSYCH
MEDICATION MANAGEMENT NOTE        [facility] - PSYCHIATRIC ASSOCIATES      Name and Date of Birth:  Logan J Weiler 24 y.o. 2000 MRN: [id number]    Insurance: Payor: [employer] [facility] / Plan: [employer] / Product Type: [facility] /     Date of Visit: September 30, 2024    Reason for Visit:   Chief Complaint   Patient presents with    Medication Management    Depression    Anxiety    ADHD    Addiction Problem       Increase Adderall to 10mg BID and Effexor to 150mg QD to treat depression and concentration deficits (likely worsened by both underlying ADHD and MDD). He is maintaining decreased use of cannabis, using only once per week despite having used consistently throughout day for multiple years. Discuss risks of abusing stimulants. Difficulty with hypersomnia and anhedonia with depressive symptoms. Discuss plans to apply for more jobs within next two weeks and follow up virtually. While he is endorsing feeling somewhat more depressed, he actually appears to be less depressed than he has in previous encounters. No SI, HI, AVH nor indication for higher level of care. Could consider partial program at next visit if stagnating in regards to improvement in mood. Continue weekly psychotherapy.    Assessment & Plan  ADHD, predominantly hyperactive type    Orders:    amphetamine-dextroamphetamine (ADDERALL, 10MG,) 10 mg tablet; Take 1 tablet (10 mg total) by mouth 2 (two) times a day Max Daily Amount: 20 mg    venlafaxine (EFFEXOR-XR) 150 mg 24 hr capsule; Take 1 capsule (150 mg total) by mouth daily    Major depressive disorder, recurrent, moderate (HCC)    Orders:    venlafaxine (EFFEXOR-XR) 150 mg 24 hr capsule; Take 1 capsule (150 mg total) by mouth daily    Cannabis use disorder, severe, dependence (HCC)    Orders:    POCT urine drug screen         Treatment Recommendations/Precautions:    Increase Adderall and Effexor  Aware of 24 hour and weekend coverage  for urgent situations accessed by calling [facility] main practice number  I am scheduling this patient out for greater than 3 months: No    Medications Risks/Benefits:      Risks, Benefits And Possible Side Effects Of Medications:    Risks, benefits, and possible side effects of medications explained to Marko and he verbalizes understanding and agreement for treatment.    Controlled Medication Discussion:     Not applicable    SUBJECTIVE:    Marko is seen today for a follow up for Major Depressive Disorder, ADHD, and Cannabis Use Disorder . He endorses that he is feeling slightly more depressed (more intense low mood, feelings of hopelessness, and poor concentration) in the context of curtailing his cannabis use (now uses once per week for a brief period of time at night, no longer using highly potent concentrates). He notes that he is more aware of his psychosocial situation with his current limited use of cannabis, including living at home with parents whom, despite having the means to take care of themselves, rely on him to manage the household affairs. He also notes having more energy but because of his depressive symptoms finds himself trying to sleep more frequently in order to avoid his own negative thoughts. He endorses feelings of self loathing due to him, despite feeling more energized, has not made as many strides towards a better life for himself than he would hope to be doing at this point. 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     In regards to medication, he does not find that the Adderall 5mg BID enough to prevent him from falling back asleep after he wakes up and takes his morning medication, and that it has not helped with his concentration in the context of active MDD and ADHD. He has not noticed a positive effect with the Effexor at 75mg QD.    Supportive  therapy and motivational interviewing is provided and effective for Marko. He is agreeable to virtual follow up in two weeks. In the interim, he will be seeing his brother later today and applying for a job at his brother's company. He is also discussing potentially moving in with his brother for a brief period of time until he can pay off debt and secure himself better financially. He agrees to continue to decrease his weekly use of cannabis. In regards to medication, he is agreeable to an increase in Adderall to 10mg BID and Effexor XR to 150mg QD. He is agreeable to a UDS before his next visit to ensure compliance with medication.    He denies any suicidal ideation, intent or plan at present; denies any homicidal ideation, intent or plan at present.    He denies any auditory hallucinations, denies any visual hallucinations, has no overt delusions.    He denies any side effects from medications.    HPI ROS Appetite Changes and Sleep:     He reports hypersomnia, normal appetite, increased energy    Current Rating Scores:     None completed today.    Review Of Systems:      Constitutional negative   ENT negative   Cardiovascular as noted in HPI   Respiratory negative   Gastrointestinal negative   Genitourinary negative   Musculoskeletal negative   Integumentary negative   Neurological negative   Endocrine negative   Other Symptoms none, all other systems are negative       Past Psychiatric History: (unchanged information from previous note copied and updated)    Inpatient psychiatric admissions:   None  Prior outpatient psychiatric treatment:   Had been a patient of Dr. Angeles at [facility] since March 2022  Previously was seeing Dr. Peña and Gabi SAMAYOA at [facility]  Past/current psychotherapy:    Weekly therapy online at a private agency  History of suicidal attempts/gestures:   denies    History of non-suicidal self-injurious behavior:   None  History of violence/aggressive behaviors:   denies  Psychotropic  medication trials:   Effexor XR (2018) (nausea)  Concerta ER 36mg (2017) - dilated pupils, chalenges with glares when driving  Armodafinil (somewhat helpful)  Ritalin 10mg, Vyvanse Strattera (vomiting)  Zoloft, abilify, buspar, gabapentin, trintellix,  lexapro  Pramipexole for RLS  Nuvigil for hypersomnia (several years ago)  Wellbutrin Xl 150mg - ineffective for augmentation purposes  Melatonin - instructed to stop by sleep medicine, not effective  Substance abuse inpatient/outpatient rehabilitation:   Denies  Eating disorder history:   No    Traumatic History: (unchanged information from previous note copied and updated)    Abuse:emotional  Other Traumatic Events:  Denies    Past Medical History:    Past Medical History:   Diagnosis Date    ADHD (attention deficit hyperactivity disorder)     Anxiety     Concussion     Depression     Kidney stones     Self-injurious behavior     Sleep difficulties     history of sleep apnea  and getting workup for narcolepsy as of 8/19        Past Surgical History:   Procedure Laterality Date    HERNIA REPAIR      TONSILLECTOMY      TOOTH EXTRACTION Right 09/21/2020    R lower molar extraction     Allergies   Allergen Reactions    Scopolamine        Current Outpatient Medications:    Current Outpatient Medications   Medication Sig Dispense Refill    amphetamine-dextroamphetamine (ADDERALL, 10MG,) 10 mg tablet Take 1 tablet (10 mg total) by mouth 2 (two) times a day Max Daily Amount: 20 mg 60 tablet 0    venlafaxine (EFFEXOR-XR) 150 mg 24 hr capsule Take 1 capsule (150 mg total) by mouth daily 30 capsule 1    Cholecalciferol (VITAMIN D3) 1,000 units tablet Take 1,000 Units by mouth daily      hydroCHLOROthiazide 25 mg tablet Take 1 tablet (25 mg total) by mouth daily 90 tablet 1     No current facility-administered medications for this visit.       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   Tobacco Use    Smoking status: Never    Smokeless tobacco: Never   Vaping Use    Vaping status: Never Used   Substance and Sexual Activity    Alcohol use: Yes     Comment: socially    Drug use: Yes     Types: Marijuana     Comment: ocasionally    Sexual activity: Not on file   Other Topics Concern    Not on file   Social History Narrative    Not on file     Social Determinants of Health     Financial Resource Strain: Not on file   Food Insecurity: Not on file   Transportation Needs: Not on file   Physical Activity: Not on file   Stress: Not on file   Social Connections: Not on file   Intimate Partner Violence: Not on file   Housing Stability: Not on file       Family Psychiatric History:     Family History   Problem Relation Age of Onset    COPD Mother     Hypertension Mother     Depression Mother     Hypertension Father     Depression Father     Anxiety disorder Father     Alcohol abuse Father     Anxiety disorder Brother     Depression Brother     Hypertension Brother     Alcohol abuse Paternal Grandfather        History Review: The following portions of the patient's history were reviewed and updated as appropriate: allergies, current medications, past family history, past medical history, past social history, past surgical history, and problem list.         OBJECTIVE:     Vital signs in last 24 hours:    There were no vitals filed for this visit.    Mental Status Evaluation:    Appearance age appropriate, casually dressed, overweight   Behavior cooperative, mildly anxious   Speech normal rate, normal volume, normal pitch    Mood dysphoric, anxious   Affect constricted   Thought Processes organized, goal directed   Associations intact associations   Thought Content no overt delusions   Perceptual Disturbances: no auditory hallucinations, no visual hallucinations   Abnormal Thoughts  Risk Potential Suicidal ideation - None  Homicidal ideation - None  Potential for aggression - No   Orientation oriented to person, place, time/date, and situation   Memory recent and remote memory grossly intact   Consciousness alert and awake   Attention Span Concentration Span attention span and concentration are age appropriate   Intellect appears to be above average intelligence   Insight intact   Judgement intact   Muscle Strength and  Gait normal muscle strength and normal muscle tone, normal gait and normal balance   Motor activity no abnormal movements   Language no difficulty naming common objects, no difficulty repeating a phrase, no difficulty writing a sentence   Fund of Knowledge adequate knowledge of current events  adequate fund of knowledge regarding past history  adequate fund of knowledge regarding vocabulary    Pain none   Pain Scale 0       Laboratory Results: I have personally reviewed all pertinent laboratory/tests results    Recent Labs (last 6 months):   Appointment on 06/21/2024   Component Date Value    FRANCIS SYNDROME DNA DOLLY* 06/21/2024 Not Detected     HEREDITARY BREAST & OVAR* 06/21/2024 Not Detected     FAMILIAL HYPERCHOLESTERO* 06/21/2024 Not Detected     Vit D, 25-Hydroxy 06/21/2024 27.8 (L)    Telephone on 04/24/2024   Component Date Value    Supplier Name 04/24/2024 [employer] [facility] [employer]     Supplier Phone Number 04/24/2024 [phone]     Order Status 04/24/2024 Completed     Delivery Request Date 04/24/2024 04/24/2024     Date Delivered  04/24/2024 04/24/2024     Item Description 04/24/2024 PAP Accessory     Item Description 04/24/2024 PAP Mask, Full Face, Fit Upon Setup, N/A, 1 per 3 months     Item  Description 04/24/2024 Humidifier Water Chamber, 1 per 6 months        Suicide/Homicide Risk Assessment:    The following interventions are recommended: no intervention changes needed. Although patient's acute lethality risk is low, long-term/chronic lethality risk is mildly elevated in the presence of MDD, ADHD. At the current moment, Marko is future-oriented, forward-thinking, and demonstrates ability to act in a self-preserving manner as evidenced by volitionally presenting to the clinic today, seeking treatment. To mitigate future risk, patient should adhere to the recommendations below, avoid alcohol/illicit substance use, utilize community-based resources and familiar support and prioritize mental health treatment. Presently, patient denies active suicidal/homicidal ideation in addition to thoughts of self-injury; contracts for safety.  At conclusion of evaluation, patient is amenable to the recommendations below. Patient is amenable to calling/contacting the outpatient office including this writer if any acute adverse effects of their medication regimen arise in addition to any comments or concerns pertaining to their psychiatric management.  Patient is amenable to calling/contacting crisis and/or attending to the nearest emergency department if their clinical condition deteriorates to assure their safety and stability, stating that they are able to appropriately confide in their psychiatrist regarding their psychiatric state.    Psychotherapy Provided:     Individual psychotherapy provided: Yes     Treatment Plan:    Completed and signed during the session: Not applicable - Treatment Plan not due at this session    Note Share:    This note was not shared with the patient due to reasonable likelihood of causing patient harm    Visit Time    Visit Start Time: 955 AM  Visit Stop Time: 1058 AM  Total Visit Duration:  63 minutes    Reece Barcenas MD 09/30/24

## 2024-10-10 ENCOUNTER — OFFICE VISIT (OUTPATIENT)
Age: 24
End: 2024-10-10
Payer: COMMERCIAL

## 2024-10-10 VITALS
WEIGHT: 287.4 LBS | OXYGEN SATURATION: 98 % | BODY MASS INDEX: 35.73 KG/M2 | DIASTOLIC BLOOD PRESSURE: 90 MMHG | SYSTOLIC BLOOD PRESSURE: 126 MMHG | RESPIRATION RATE: 14 BRPM | HEART RATE: 86 BPM | HEIGHT: 75 IN

## 2024-10-10 DIAGNOSIS — G47.33 OSA (OBSTRUCTIVE SLEEP APNEA): Primary | ICD-10-CM

## 2024-10-10 DIAGNOSIS — G47.19 EXCESSIVE DAYTIME SLEEPINESS: ICD-10-CM

## 2024-10-10 DIAGNOSIS — E66.09 CLASS 1 OBESITY DUE TO EXCESS CALORIES WITHOUT SERIOUS COMORBIDITY WITH BODY MASS INDEX (BMI) OF 34.0 TO 34.9 IN ADULT: ICD-10-CM

## 2024-10-10 DIAGNOSIS — G25.81 RESTLESS LEG SYNDROME: ICD-10-CM

## 2024-10-10 DIAGNOSIS — F12.90 MARIJUANA USE: ICD-10-CM

## 2024-10-10 DIAGNOSIS — E66.811 CLASS 1 OBESITY DUE TO EXCESS CALORIES WITHOUT SERIOUS COMORBIDITY WITH BODY MASS INDEX (BMI) OF 34.0 TO 34.9 IN ADULT: ICD-10-CM

## 2024-10-10 PROCEDURE — 99214 OFFICE O/P EST MOD 30 MIN: CPT | Performed by: INTERNAL MEDICINE

## 2024-10-10 RX ORDER — AMOXICILLIN 875 MG/1
875 TABLET, COATED ORAL EVERY 12 HOURS
COMMUNITY
Start: 2024-10-04 | End: 2024-10-17

## 2024-10-10 RX ORDER — ACETAMINOPHEN 500 MG
500 TABLET ORAL EVERY 6 HOURS PRN
COMMUNITY
End: 2024-10-17

## 2024-10-10 NOTE — PROGRESS NOTES
Progress Note - Sleep Medicine  Logan J Weiler 24 y.o. male MRN: [id number]       Impression & Plan:         1.  Mild obstructive sleep apnea  Diagnostic PSG 11/26/2023 at a weight of 308 pounds showed AHI 6.7 with frequent arousals hourly    Compliance from 9 9-10/8/2024  More than 4 hours 77%  6 hours and 59 minutes  On APAP 5-15  95 percentile pressure 8  Median leak 0.1  Residual AHI 1.7    Patient has improved compliance with CPAP    Previous sleep studies in 2019 showed elevated PLM index of 32.9, previous MSLT showed mean sleep latency of 18 minutes with no SOREM's    Plan  Follow-up in 6 weeks    2.  Restless leg syndrome  He has frequent bothersome symptoms when he gets into bed  Repeat iron panel in March showed a ferritin of 120  He notes having RLS symptoms prior to starting Lexapro many years ago  He states that his dad has RLS as well  He notes RLS symptoms are worse with alcohol  He may have periodic limb movement disorder as well as PLM index has been elevated at 32.9 and previous sleep studies, he has also been told by his friends that he kicks in his sleep    Plan  He notes improvement with RLS symptoms with increased activity and decreased marijuana and alcohol     3.  Marijuana use  He is vaping in the mornings  Explained that this can cause significant daytime sleepiness  Counseled patient on importance of marijuana use cessation  I recommended he stop using marijuana for 1 week and see if his excessive daytime sleepiness improved at last office visit  Patient states that he was having withdrawal symptoms when he stopped using marijuana    Plan   He will try to stop using marijuana for one month    4.  Excessive daytime sleepiness  Patient has a diagnosis of ADHD as well  Currently on Vyvanse  Next month he will do Vyvanse 50 mg in the morning and Adderall IR 5 mg in the afternoon  I explained that his daytime sleepiness likely is related to his untreated sleep apnea  Current [place] score of  12, improved from 18  Counseled patient on importance of CPAP compliance, although excessive daytime sleepiness is somewhat disproportionate to the degree of sleep apnea  Recommended he stop using marijuana for 1 week at last office visit  Patient also does not go outside much and frequently has curtains closed in the dark during the day  I explained the importance of sunlight and preventing daytime sleepiness    Plan  Recommended patient discuss with Psych about starting welbutrin   Recommended light box for added light therapy        5.  Obesity  Explained that weight loss can decrease severity of sleep apnea  If he loses 10% of his body weight we can do a repeat sleep study   Counseled patient on lifestyle modifications including diet and exercise            ______________________________________________________________________    HPI:    Logan J Weiler 24-year-old male with past medical history of hypertension, RLS, ADHD, depression who presents for follow-up of obstructive sleep apnea.  Recent diagnostic PSG showed an AHI of 6.7.  CPAP was ordered but he has not been compliant.  He has had difficulty tolerating it.   He goes to bed at 9 PM and falls asleep within 10 minutes.  He wakes up at 8 AM.  He is sleeping 11 hours at night.  He reports bothersome restless leg symptoms when he gets to bed.  He generally worsens with alcohol.  His friends have told him that he kicks in his sleep.  Last ferritin 120.  His father has RLS symptoms as well.  He states that RLS symptoms started many years ago prior to starting Lexapro.    He smokes marijuana daily generally in the morning.  He occasionally drinks beer.  He consumes up to 350 mg of caffeine daily.    He has a prior history of hypnagogic hallucinations but has not had them recently.  He has remote history of sleep paralysis as well.    Multiple studies in the past have not shown evidence of narcolepsy.    Since last office visit patient has been using CPAP more  "with improved compliance.  He is going out in the sun more and staying away from his bedroom.  He is still having daytime sleepiness with an [place] score of 12.  His restless leg symptoms have improved.  He did go off of marijuana for 1 week but states that he did not see improvement in daytime sleepiness but was dealing with withdrawal symptoms of irritability and cravings.  He states that he still is dealing with depression and will be seeing psychiatry next week.        Review of Systems:  Review of Systems      Social history updates:  Social History     Tobacco Use   Smoking Status Never   Smokeless Tobacco Never     Social History     Socioeconomic History    Marital status: Single     Spouse name: Not on file    Number of children: 0    Years of education: Not on file    Highest education level: Some college, no degree   Occupational History    Occupation: [employer]     Comment: [employer]   Tobacco Use    Smoking status: Never    Smokeless tobacco: Never   Vaping Use    Vaping status: Never Used   Substance and Sexual Activity    Alcohol use: Yes     Alcohol/week: 3.0 standard drinks of alcohol     Types: 3 Cans of beer per week     Comment: Often will go weeks with none    Drug use: Yes     Frequency: 3.0 times per week     Types: Marijuana     Comment: Medical Marijuana    Sexual activity: Not Currently     Partners: Female   Other Topics Concern    Not on file   Social History Narrative    Not on file     Social Determinants of Health     Financial Resource Strain: Not on file   Food Insecurity: Not on file   Transportation Needs: Not on file   Physical Activity: Not on file   Stress: Not on file   Social Connections: Not on file   Intimate Partner Violence: Not on file   Housing Stability: Not on file       PhysicalExamination:  Vitals:   /90   Pulse 86   Resp 14   Ht 6' 3\" (1.905 m)   Wt 130 kg (287 lb 6.4 oz)   SpO2 98%   BMI 35.92 kg/m²     Physical Exam  General:  Awake alert and oriented x " "3, conversant without conversational dyspnea, NAD, normal affect  HEENT:  PERRL, Sclera noninjected, nonicteric OU, Nares patent,  no craniofacial abnormalities, Mucous membranes, moist, no oral lesions, normal dentition  NECK: Trachea midline, no accessory muscle use, no stridor, no cervical or supraclavicular adenopathy, JVP not elevated  CARDIAC: Reg, single s1/S2, no m/r/g  PULM: CTA bilaterally no wheezing, rhonchi or rales  EXT: No cyanosis, no clubbing, no edema, normal capillary refill  NEURO: no focal neurologic deficits, AAOx3, moving all extremities appropriately     Diagnostic Data:  Labs:  I personally reviewed the most recent laboratory data pertinent to today's visit  Appointment on 06/21/2024   Component Date Value    FRANCIS SYNDROME DNA DOLLY* 06/21/2024 Not Detected     HEREDITARY BREAST & OVAR* 06/21/2024 Not Detected     FAMILIAL HYPERCHOLESTERO* 06/21/2024 Not Detected     Vit D, 25-Hydroxy 06/21/2024 27.8 (L)    Telephone on 04/24/2024   Component Date Value    Supplier Name 04/24/2024 [employer] [employer]     Supplier Phone Number 04/24/2024 [phone]     Order Status 04/24/2024 Completed     Delivery Request Date 04/24/2024 04/24/2024     Date Delivered  04/24/2024 04/24/2024     Item Description 04/24/2024 PAP Accessory     Item Description 04/24/2024 PAP Mask, Full Face, Fit Upon Setup, N/A, 1 per 3 months     Item Description 04/24/2024 Humidifier Water Chamber, 1 per 6 months        I have personally reviewed pertinent lab results.  Lab Results   Component Value Date    WBC 7.41 03/07/2024    HGB 16.4 03/07/2024    HCT 46.6 03/07/2024    MCV 84 03/07/2024     03/07/2024     Lab Results   Component Value Date    GLUCOSE 86 12/30/2014    CALCIUM 9.7 03/07/2024     12/30/2014    K 4.6 03/07/2024    CO2 27 03/07/2024     03/07/2024    BUN 14 03/07/2024    CREATININE 0.91 03/07/2024     No results found for: \"IGE\"  Lab Results   Component Value Date    ALT " "38 03/07/2024    AST 19 03/07/2024    ALKPHOS 82 03/07/2024    BILITOT 0.40 12/30/2014     Lab Results   Component Value Date    IRON 77 03/07/2024    TIBC 379 03/07/2024    FERRITIN 120 03/07/2024     No results found for: \"PTZRJBJB35\"  No results found for: \"FOLATE\"      Arterial Blood Gas result:  PRISCILLA Calvin MD  [facility] Sleep Medicine    "

## 2024-10-14 ENCOUNTER — TELEMEDICINE (OUTPATIENT)
Dept: PSYCHIATRY | Facility: CLINIC | Age: 24
End: 2024-10-14
Payer: COMMERCIAL

## 2024-10-14 DIAGNOSIS — F12.20 CANNABIS USE DISORDER, SEVERE, DEPENDENCE (HCC): ICD-10-CM

## 2024-10-14 DIAGNOSIS — F33.1 MAJOR DEPRESSIVE DISORDER, RECURRENT, MODERATE (HCC): Primary | ICD-10-CM

## 2024-10-14 DIAGNOSIS — F90.0 ADHD (ATTENTION DEFICIT HYPERACTIVITY DISORDER), INATTENTIVE TYPE: ICD-10-CM

## 2024-10-14 PROCEDURE — 99215 OFFICE O/P EST HI 40 MIN: CPT

## 2024-10-16 NOTE — PSYCH
Telemedicine consent    Patient: Logan J Weiler  Provider: Reece Barcenas MD  Provider located at  [facility] [place] [address] [facility] [address] [phone]    The patient was identified by name and date of birth. Logan J Weiler was informed that this is a telemedicine visit and that the visit is being conducted through the Epic Embedded platform. He agrees to proceed..  My office door was closed. No one else was in the room.  He acknowledged consent and understanding of privacy and security of the video platform. The patient has agreed to participate and understands they can discontinue the visit at any time.    Patient is aware this is a billable service.     I spent 37 minutes with the patient during this visit.      MEDICATION MANAGEMENT NOTE        [facility]      Name and Date of Birth:  Logan J Weiler 24 y.o. 2000 MRN: [id number]    Insurance: Payor: [employer] [facility] / Plan: [employer] [facility] / Product Type: [facility] /     Date of Visit: October 14, 2024    Reason for Visit:   Chief Complaint   Patient presents with    Medication Management     Follow up on October 28th after patient has set quit date for cannabis on Oct 27th. Suspect he is using more than he is confiding to writer given sleep medicine note stating daily consumption. Suspect the psychotropic medication and psychotherapy will be of little benefit to patient should he continue to consume significant amounts of cannabis daily. If unable to manage cannabis use as outpatient, may require inpatient admission for drug and alcohol treatment. Otherwise, continue Effexor XR 150mg QAM and Adderall 10mg BID, and psychotherapy. No indication for a higher level of psychiatric care currently.    Assessment & Plan  Major depressive disorder, recurrent, moderate (HCC)  Effexor XR 150mg QAM        Cannabis use disorder, severe, dependence (HCC)  Set quit date for Oct 27 2024       ADHD (attention deficit hyperactivity disorder), inattentive type  Adderall 10mg BID            Treatment Recommendations/Precautions:    Continue Effexor XR 150mg QAM and Adderall 10mg BID  Psychotherapy weekly with outside of network therapist  Cannabis use quit date set for Oct 27, follow up on Oct 28 virtually  Aware of 24 hour and weekend coverage for urgent situations accessed by calling [facility] main practice number  I am scheduling this patient out for greater than 3 months: No    Medications Risks/Benefits:      Risks, Benefits And Possible Side Effects Of Medications:    Risks, benefits, and possible side effects of medications explained to Marko and he verbalizes understanding and agreement for treatment.    Controlled Medication Discussion:     Not applicable    SUBJECTIVE:    Marko is seen today for a follow up for Major Depressive Disorder, ADHD, and substance use.     Marko endorses some improvement in his depressive symptoms. He notes that half of the days for the past few weeks have been productive, and that he had spent his time cleaning around the house and looking for a job. The other half of the time, he notes that he spends most of his time in bed, doesn't take his stimulant medication, and will sleep as much as he can. Per records with sleep medicine, he appears to consume cannabis most every day in the morning. However, during examination, he denies that he consumes cannabis more than every 10 days for a brief relapse though appears to change the conversation when cannabis is discussed. We discuss the importance of setting a formal quit date for himself, and discuss how his psychiatric symptoms will not likely improve if he continues to consume cannabis frequently. He denies withdrawal that lasts more than one day and is marked with irritability. We discuss setting a quit date for  October 27, the day that he has orientation for his new job at a [place] [employer], and following up that Monday. We discuss the possibility of attending outpatient or inpatient substance abuse treatment should he not be able to curtail his cannabis use on his own.     He denies suicidal ideation, intent or plan at present; denies homicidal ideation, intent or plan at present.    He denies auditory hallucinations, denies visual hallucinations, has no overt delusions noted.    He denies any side effects from medications.    HPI ROS Appetite Changes and Sleep:     He reports hypersomnia, normal appetite, low energy    Current Rating Scores:     None completed today.    Review Of Systems:      Constitutional negative   ENT negative   Cardiovascular negative   Respiratory negative   Gastrointestinal negative   Genitourinary negative   Musculoskeletal negative   Integumentary negative   Neurological negative   Endocrine negative   Other Symptoms none, all other systems are negative       Past Psychiatric History: (unchanged information from previous note copied and updated)    Inpatient psychiatric admissions:   None  Prior outpatient psychiatric treatment:   Had been a patient of Dr. Angeles at [facility] since March 2022  Previously was seeing Dr. Peña and Gabi SAMAYOA at [facility]  Past/current psychotherapy:    Weekly therapy online at a private agency  History of suicidal attempts/gestures:   denies    History of non-suicidal self-injurious behavior:   None  History of violence/aggressive behaviors:   denies  Psychotropic medication trials:   Effexor XR (2018) (nausea)  Concerta ER 36mg (2017) - dilated pupils, chalenges with glares when driving  Armodafinil (somewhat helpful)  Ritalin 10mg, Vyvanse Strattera (vomiting)  Zoloft, abilify, buspar, gabapentin, trintellix,  lexapro  Pramipexole for RLS  Nuvigil for hypersomnia (several years ago)  Wellbutrin Xl 150mg - ineffective for augmentation purposes  Melatonin -  instructed to stop by sleep medicine, not effective  Substance abuse inpatient/outpatient rehabilitation:   Denies  Eating disorder history:   No       Traumatic History: (unchanged information from previous note copied and updated)    Abuse:emotional  Other Traumatic Events:  Denies    Past Medical History:    Past Medical History:   Diagnosis Date    ADHD (attention deficit hyperactivity disorder)     Anxiety     Concussion     Depression     Hypertension 2018    Medication related, resolved 2020    Kidney stones     Self-injurious behavior     Sleep difficulties     history of sleep apnea  and getting workup for narcolepsy as of 8/19        Past Surgical History:   Procedure Laterality Date    HERNIA REPAIR      TONSILLECTOMY      TOOTH EXTRACTION Right 09/21/2020    R lower molar extraction     Allergies   Allergen Reactions    Scopolamine        Current Outpatient Medications:    Current Outpatient Medications   Medication Sig Dispense Refill    acetaminophen (TYLENOL) 500 mg tablet Take 500 mg by mouth every 6 (six) hours as needed for mild pain As needed      amoxicillin (AMOXIL) 875 mg tablet Take 875 mg by mouth every 12 (twelve) hours      amphetamine-dextroamphetamine (ADDERALL, 10MG,) 10 mg tablet Take 1 tablet (10 mg total) by mouth 2 (two) times a day Max Daily Amount: 20 mg 60 tablet 0    Cholecalciferol (VITAMIN D3) 1,000 units tablet Take 1,000 Units by mouth daily      hydroCHLOROthiazide 25 mg tablet Take 1 tablet (25 mg total) by mouth daily 90 tablet 1    venlafaxine (EFFEXOR-XR) 150 mg 24 hr capsule Take 1 capsule (150 mg total) by mouth daily 30 capsule 1     No current facility-administered medications for this visit.       Substance Abuse History:    Daily cannabis use, throughout the day, ~800mg THC consumed daily per patient  Previous use of psychodelic substances in remission >1 year     Marko does not apear under the influence or withdrawal of any psychoactive substance throughout  today's examination.     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   Tobacco Use    Smoking status: Never    Smokeless tobacco: Never   Vaping Use    Vaping status: Never Used   Substance and Sexual Activity    Alcohol use: Yes     Alcohol/week: 3.0 standard drinks of alcohol     Types: 3 Cans of beer per week     Comment: Often will go weeks with none    Drug use: Yes     Frequency: 3.0 times per week     Types: Marijuana     Comment: Medical Marijuana    Sexual activity: Not Currently     Partners: Female   Other Topics Concern    Not on file   Social History Narrative    Not on file     Social Determinants of Health     Financial Resource Strain: Not on file   Food Insecurity: Not on file   Transportation Needs: Not on file   Physical Activity: Not on file   Stress: Not on file   Social Connections: Not on file   Intimate Partner Violence: Not on file   Housing Stability: Not on file       Family Psychiatric History:     Brother with depressive symptoms, parents with mental health problems undiagnosed per patient     Family History   Problem Relation Age of Onset    COPD Mother     Hypertension Mother     Depression Mother     Chronic fatigue Mother     Hypertension Father     Depression Father     Anxiety disorder Father     Alcohol abuse Father     Sleep apnea Father     Snoring Father     Restless legs syndrome Father     Anxiety disorder Brother     Depression Brother     Hypertension Brother      Alcohol abuse Paternal Grandfather     Snoring Brother     Anxiety disorder Brother     Depression Brother        History Review:        OBJECTIVE:     Vital signs in last 24 hours:    There were no vitals filed for this visit.    Mental Status Evaluation:    Appearance age appropriate, casually dressed   Behavior cooperative, appears anxious   Speech normal rate, normal volume, normal pitch   Mood depressed, anxious   Affect constricted, mood-congruent   Thought Processes organized, goal directed   Associations intact associations   Thought Content no overt delusions   Perceptual Disturbances: no auditory hallucinations, no visual hallucinations   Abnormal Thoughts  Risk Potential Suicidal ideation - None  Homicidal ideation - None  Potential for aggression - No   Orientation oriented to person, place, time/date, and situation   Memory recent and remote memory grossly intact   Consciousness alert and awake   Attention Span Concentration Span attention span and concentration are age appropriate   Intellect appears to be of average intelligence   Insight intact   Judgement intact   Muscle Strength and  Gait normal muscle strength and normal muscle tone, normal gait and normal balance   Motor activity no abnormal movements   Language no difficulty naming common objects, no difficulty repeating a phrase, no difficulty writing a sentence   Fund of Knowledge adequate knowledge of current events  adequate fund of knowledge regarding past history  adequate fund of knowledge regarding vocabulary    Pain none   Pain Scale 0       Laboratory Results: I have personally reviewed all pertinent laboratory/tests results    Recent Labs (last 6 months):   Appointment on 06/21/2024   Component Date Value    FRANCIS SYNDROME DNA DOLLY* 06/21/2024 Not Detected     HEREDITARY BREAST & OVAR* 06/21/2024 Not Detected     FAMILIAL HYPERCHOLESTERO* 06/21/2024 Not Detected     Vit D, 25-Hydroxy 06/21/2024 27.8 (L)    Telephone on 04/24/2024    Component Date Value    Supplier Name 04/24/2024 [employer]     Supplier Phone Number 04/24/2024 [phone]     Order Status 04/24/2024 Completed     Delivery Request Date 04/24/2024 04/24/2024     Date Delivered  04/24/2024 04/24/2024     Item Description 04/24/2024 PAP Accessory     Item Description 04/24/2024 PAP Mask, Full Face, Fit Upon Setup, N/A, 1 per 3 months     Item Description 04/24/2024 Humidifier Water Chamber, 1 per 6 months        Suicide/Homicide Risk Assessment:    The following interventions are recommended: no intervention changes needed. Although patient's acute lethality risk is low, long-term/chronic lethality risk is mildly elevated in the presence of MDD, ADHD, cannabis use disorder. At the current moment, Marko is future-oriented, forward-thinking, and demonstrates ability to act in a self-preserving manner as evidenced by volitionally presenting to the clinic today, seeking treatment.To mitigate future risk, patient should adhere to the recommendations of this writer, avoid alcohol/illicit substance use, utilize community-based resources and familiar support and prioritize mental health treatment.     Presently, patient denies suicidal/homicidal ideation in addition to thoughts of self-injury; contracts for safety, see below for risk assessment. At conclusion of evaluation, patient is amenable to the recommendations of this writer including: medication management.  Also, patient is amenable to calling/contacting the outpatient office including this writer if any acute adverse effects of their medication regimen arise in addition to any comments or concerns pertaining to their psychiatric management.  Patient is amenable to calling/contacting crisis and/or attending to the nearest emergency department if their clinical condition deteriorates to assure their safety and stability, stating that they are able to appropriately confide in their psychiatrist regarding  their psychiatric state.     At this juncture, inpatient hospitalization is not currently warranted. The following interventions are recommended:   no intervention changes    To mitigate future risk, patient should adhere to the recommendations of this writer, avoid alcohol/illicit substance use, utilize community-based resources and familiar support and prioritize mental health treatment.     Psychotherapy Provided:     Individual psychotherapy provided: Yes     Treatment Plan:    Completed and signed during the session: Not applicable - Treatment Plan not due at this session    Note Share:    This note was not shared with the patient due to reasonable likelihood of causing patient harm    Visit Time    Visit Start Time: 11:00 AM  Visit Stop Time: 11:37 AM  Total Visit Duration:  37 minutes    Reece Barcenas MD 10/14/24

## 2024-10-17 ENCOUNTER — OFFICE VISIT (OUTPATIENT)
Dept: BARIATRICS | Facility: CLINIC | Age: 24
End: 2024-10-17
Payer: COMMERCIAL

## 2024-10-17 VITALS
WEIGHT: 280.4 LBS | TEMPERATURE: 97.2 F | HEIGHT: 76 IN | OXYGEN SATURATION: 99 % | BODY MASS INDEX: 34.15 KG/M2 | SYSTOLIC BLOOD PRESSURE: 130 MMHG | HEART RATE: 85 BPM | RESPIRATION RATE: 18 BRPM | DIASTOLIC BLOOD PRESSURE: 88 MMHG

## 2024-10-17 DIAGNOSIS — E55.9 VITAMIN D DEFICIENCY: ICD-10-CM

## 2024-10-17 DIAGNOSIS — G47.33 OSA (OBSTRUCTIVE SLEEP APNEA): ICD-10-CM

## 2024-10-17 DIAGNOSIS — F33.1 MAJOR DEPRESSIVE DISORDER, RECURRENT, MODERATE (HCC): ICD-10-CM

## 2024-10-17 DIAGNOSIS — E66.811 CLASS 1 OBESITY: Primary | ICD-10-CM

## 2024-10-17 PROCEDURE — 99204 OFFICE O/P NEW MOD 45 MIN: CPT | Performed by: PHYSICIAN ASSISTANT

## 2024-10-17 NOTE — ASSESSMENT & PLAN NOTE
-Discussed options of [facility]-Intensive Lifestyle Intervention Program, Very Low Calorie Diet-VLCD, and Conservative Program and the role of weight loss medications.  -Not a candidate for sympathomimetics  -Initial weight loss goal of 5-10% weight loss for improved health  -Screening labs: reviewed CMP, Lipid panel, vitamin D, TSH (9/2023)  -Patient is interested in pursuing [facility] program. Patient will be scheduled at [place]  -sample meal replacements provided

## 2024-10-17 NOTE — PROGRESS NOTES
Assessment/Plan:    Class 1 obesity  -Discussed options of [facility]-Intensive Lifestyle Intervention Program, Very Low Calorie Diet-VLCD, and Conservative Program and the role of weight loss medications.  -Not a candidate for sympathomimetics  -Initial weight loss goal of 5-10% weight loss for improved health  -Screening labs: reviewed CMP, Lipid panel, vitamin D, TSH (9/2023)  -Patient is interested in pursuing [facility] program. Patient will be scheduled at [place]  -sample meal replacements provided    DEYSI (obstructive sleep apnea)  -Followed by sleep medicine  -On CPAP  -can improve with weight loss    Major depressive disorder, recurrent, moderate (HCC)  -Hx ADHD  -Followed by psych  -On Effexor and Adderall     Vitamin D deficiency  -reports he takes 50,000 IU weekly. Advised continued follow up with managing provider    Goals:    Food log (ie.) [email or web address],[email or web address],[email or web address],etc.   No sugary beverages. At least 64oz of water daily.  Increase physical activity by 10 minutes daily. Gradually increase physical activity to a goal of 5 days per week for 30 minutes of MODERATE intensity PLUS 2 days per week of FULL BODY resistance training      Follow up in approximately 2 weeks with Non-Surgical Dietician and 4 months with PA-C    Diagnoses and all orders for this visit:    Class 1 obesity    BMI 34.0-34.9,adult  -     Ambulatory Referral to Weight Management    DEYSI (obstructive sleep apnea)    Major depressive disorder, recurrent, moderate (HCC)    Vitamin D deficiency          Subjective:   Chief Complaint   Patient presents with    Consult       Patient ID: Logan J Weiler  is a 24 y.o. male with excess weight/obesity here to pursue weight managment.    Past Medical History:   Diagnosis Date    ADHD (attention deficit hyperactivity disorder)     Anxiety     Concussion     Depression     Hypertension 2018    Medication related, resolved 2020    Kidney stones     Self-injurious  behavior     Sleep difficulties     history of sleep apnea  and getting workup for narcolepsy as of 8/19         HPI:  Obesity/Excess Weight:  Severity: Mild  Onset:  since early childhood, weighed 400lbs at age 12 and lost 100 lbs in 6 months on his own through diet changes and increasing activity  Modifiers: smaller portions, increasing activity  Contributing factors: Depression, emotional eating, mindless eating, sedentary, lack of routine  Associated symptoms: increased joint pain, decreased exercise capacity, decreased mobility, and back pain, increased SOB, HTN, always exhausted    Goals: Healthy BMI  Highest: 400 lbs (age 12)    Hydration: water less than 1 liter, 24-30oz regular soda, Monster iced tea 1 per day  ETOH: 1-2 beers a few days per month  Exercise: denies, reports has total gym at home, occasional hiking  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    Review of Systems   Constitutional:  Positive for fatigue.   HENT:  Negative for sore throat.    Respiratory:  Negative for cough and shortness of breath.    Cardiovascular:  Positive for palpitations (when his blood pressure elevates, more so since his adderall dose was increased).   Gastrointestinal:  Positive for nausea (occasionally when has an empty stomach). Negative for abdominal pain, constipation, diarrhea and vomiting.   Genitourinary:  Negative for dysuria.   Musculoskeletal:   "Positive for arthralgias.   Skin:  Negative for rash.   Neurological:  Negative for dizziness and headaches.   Psychiatric/Behavioral:  Positive for dysphoric mood (stable, but poor).        Objective:    /88 (BP Location: Right arm, Patient Position: Sitting, Cuff Size: Large)   Pulse 85   Temp (!) 97.2 °F (36.2 °C) (Temporal)   Resp 18   Ht 6' 3.5\" (1.918 m)   Wt 127 kg (280 lb 6.4 oz)   SpO2 99%   BMI 34.58 kg/m²     Physical Exam  Vitals and nursing note reviewed.      Constitutional   General appearance: Abnormal.  well developed and obese.   Eyes No conjunctival pallor.   Ears, Nose, Mouth, and Throat Oral mucosa moist.   Pulmonary   Respiratory effort: No increased work of breathing or signs of respiratory distress.    Auscultation of lungs: Clear to auscultation, equal breath sounds bilaterally, no wheezes, no rales, no rhonci.    Cardiovascular   Auscultation of heart: Normal rate and rhythm, normal S1 and S2, without murmurs.    Examination of extremities for edema and/or varicosities: Normal.  no edema.   Abdomen   Abdomen: Abnormal.  The abdomen was obese. Bowel sounds were normal. The abdomen was soft and nontender.   Musculoskeletal   Gait and station: Normal.    Psychiatric   Orientation to person, place and time: Normal.    Affect: appropriate   "

## 2024-10-28 ENCOUNTER — TELEMEDICINE (OUTPATIENT)
Dept: PSYCHIATRY | Facility: CLINIC | Age: 24
End: 2024-10-28

## 2024-10-28 ENCOUNTER — TELEPHONE (OUTPATIENT)
Dept: PSYCHIATRY | Facility: CLINIC | Age: 24
End: 2024-10-28

## 2024-10-28 DIAGNOSIS — F33.1 MAJOR DEPRESSIVE DISORDER, RECURRENT, MODERATE (HCC): ICD-10-CM

## 2024-10-28 DIAGNOSIS — F90.1 ADHD, PREDOMINANTLY HYPERACTIVE TYPE: ICD-10-CM

## 2024-10-28 RX ORDER — VENLAFAXINE HYDROCHLORIDE 150 MG/1
300 CAPSULE, EXTENDED RELEASE ORAL DAILY
Qty: 60 CAPSULE | Refills: 2 | Status: SHIPPED | OUTPATIENT
Start: 2024-10-28 | End: 2025-01-26

## 2024-10-28 RX ORDER — DEXTROAMPHETAMINE SACCHARATE, AMPHETAMINE ASPARTATE, DEXTROAMPHETAMINE SULFATE AND AMPHETAMINE SULFATE 2.5; 2.5; 2.5; 2.5 MG/1; MG/1; MG/1; MG/1
10 TABLET ORAL
Qty: 60 TABLET | Refills: 0 | Status: SHIPPED | OUTPATIENT
Start: 2024-10-30

## 2024-10-28 NOTE — ASSESSMENT & PLAN NOTE
Orders:    venlafaxine (EFFEXOR-XR) 150 mg 24 hr capsule; Take 2 capsules (300 mg total) by mouth daily

## 2024-11-12 ENCOUNTER — CLINICAL SUPPORT (OUTPATIENT)
Dept: BARIATRICS | Facility: CLINIC | Age: 24
End: 2024-11-12

## 2024-11-12 VITALS — WEIGHT: 288.4 LBS | BODY MASS INDEX: 35.86 KG/M2 | HEIGHT: 75 IN

## 2024-11-12 DIAGNOSIS — R63.5 ABNORMAL WEIGHT GAIN: Primary | ICD-10-CM

## 2024-11-12 PROCEDURE — WMPRO12

## 2024-11-12 PROCEDURE — RECHECK

## 2024-11-12 NOTE — PROGRESS NOTES
Weight Management Medical Nutrition Assessment  Marko was here today as a new start in the [facility] program.  Today he weighs 288.4 lbs and has a goal to lose 40 lbs to start. He has a physical job but is not currently exercising.  He has a history of sleep issues.  He has sleep apnea and regularly uses a c-pap.  He also has idiopathic hyper insomnia and is being followed for that.  He will be having another sleep study related to that as well and may be prescribed corticosteroids for that.  He has had some issues with binge eating that is stress related and is followed by a therapist and is interested in the  visit as well. He does have some stress at home - he takes care of his disabled parents.  Reviewed his calorie carb and protein needs, and carb manager.  Discussed the importance of accurate food logging.  Reiterated the importance of regular exercise.  Discussed the [facility] program through [facility].  Reviewed protein sources and discussed its importance to satiety and building lean tissue.  Provided various nutritional education material.  Questions asked and answered.  Will give him body comp at second visit.     Patient seen by Medical Provider in past 6 months:  yes  Requested to schedule appointment with Medical Provider: No      Anthropometric Measurements  Start Weight (#) & Date: 288.4  Current Weight (#): 288.4  TBW % Change from start weight:0%  Ideal Body Weight (#):185  Goal Weight (#):40 lbs  Highest:over 400  Lowest:200    Weight Loss History  Previous weight loss attempts: Self Created Diets (Portion Control, Healthy Food Choices, etc.)    Food and Nutrition Related History  Wake up: 9 am   Bed Time:11 pm    Food Recall  Breakfast:skips or will have granola bar    Snack:  Lunch:sandwich or will have chinese  Snack:none  Dinner:chicken and veg and potatoes or beans and beef or ziti and veg (larger portions)  Snack:nuts or candy      Beverages: water or electrolyte drink or will have 2 sodas or  alcholic drinks not often  Volume of beverage intake: 60 oz    Weekends: Worse, because he's home  Cravings: sweets (candy) sometimes savory  Trouble area of day:night time    Frequency of Eating out: daily  Food restrictions:none  Cooking: self   Food Shopping: self    Physical Activity Intake  Activity:Walking  Frequency: not much  Physical limitations/barriers to exercise: none    Estimated Needs  Energy    Kenisha Mclaughlin Energy Needs: BMR : 2382   1-2# loss weekly sedentary:  3283-3699             1-2# loss weekly lightly active:9268 - 3830  Maintenance calories for sedentary activity level: 2585  Protein:106 - 133      (1.2-1.5g/kg IBW)  Fluid: 104     (35mL/kg IBW)    Nutrition Diagnosis  Yes;    Overweight/obesity  related to Excess energy intake as evidenced by  BMI more than normative standard for age and sex (obesity-grade I 30-34.9)       Nutrition Intervention    Nutrition Prescription  Calories:1800 - 2000  Protein:106 - 133  Fluid:104    Meal Plan (Archie/Pro/Carb)  Breakfast: 400/20/30  Snack:150/10/15  Lunch:400/28/30  Snack:150/10/15  Dinner:600/42/30-45  Snack:150/10/15    Nutrition Education:    Healthy Core Manual  Calorie controlled menu  Lean protein food choices  Healthy snack options  Food journaling tips      Nutrition Counseling:  Strategies: meal planning, portion sizes, healthy snack choices, hydration, fiber intake, protein intake, exercise, food journal      Monitoring and Evaluation:  Evaluation criteria:  Energy Intake  Meet protein needs  Maintain adequate hydration  Monitor weekly weight  Meal planning/preparation  Food journal   Decreased portions at mealtimes and snacks  Physical activity     Barriers to learning:none  Readiness to change: Action:  (Changing behavior)  Comprehension: good  Expected Compliance: good

## 2024-11-20 ENCOUNTER — CLINICAL SUPPORT (OUTPATIENT)
Dept: BARIATRICS | Facility: CLINIC | Age: 24
End: 2024-11-20

## 2024-11-20 VITALS — WEIGHT: 294.2 LBS | HEIGHT: 75 IN | BODY MASS INDEX: 36.58 KG/M2

## 2024-11-20 DIAGNOSIS — R63.5 ABNORMAL WEIGHT GAIN: Primary | ICD-10-CM

## 2024-11-20 PROCEDURE — RECHECK

## 2024-11-26 ENCOUNTER — CLINICAL SUPPORT (OUTPATIENT)
Dept: BARIATRICS | Facility: CLINIC | Age: 24
End: 2024-11-26

## 2024-11-26 VITALS — HEIGHT: 75 IN | WEIGHT: 289.3 LBS | BODY MASS INDEX: 35.97 KG/M2

## 2024-11-26 DIAGNOSIS — R63.5 ABNORMAL WEIGHT GAIN: Primary | ICD-10-CM

## 2024-11-26 PROCEDURE — RECHECK

## 2024-11-26 NOTE — PROGRESS NOTES
Weight Management Medical Nutrition Assessment  Marko was here today for his 2 week follow-up and body comp in the [facility] program.  Today he weighs 289.3 lbs which means he maintained his weight the past 2 weeks.  He has started log his food, but has not gotten a routine down on how to do it.  He has been trying to get more sleep.  Admits that he still sometimes skips meal but is trying not to.  He does still have some stress at home and does not always plan his food ahead of time.  When does plan, he admits he seems to do better with eating on schedule.  His goal is to start taking lunch and planning meals everyday.  He also wants to log more frequently.    Completed a body composition using SECA scale and reviewed results with patient.    Patient seen by Medical Provider in past 6 months:  yes  Requested to schedule appointment with Medical Provider: No        Anthropometric Measurements  Start Weight (#) & Date: 288.4  Current Weight (#): 288.4  TBW % Change from start weight:0%  Ideal Body Weight (#):185  Goal Weight (#):40 lbs  Highest:over 400  Lowest:200     Weight Loss History  Previous weight loss attempts: Self Created Diets (Portion Control, Healthy Food Choices, etc.)     Food and Nutrition Related History  Wake up: 9 am              Bed Time:11 pm     Food Recall  Breakfast: protein bar and fruit                     Snack: cheese stick   Lunch:sandwich with roast beef   Snack:diced peaches  Dinner:chicken and veg and potatoes or beans and beef or ziti and veg (larger portions)  Snack:nuts or candy        Beverages: water or electrolyte drink or will have 2 sodas or alcholic drinks not often  Volume of beverage intake: 60 oz     Weekends: Worse, because he's home  Cravings: sweets (candy) sometimes savory  Trouble area of day:night time     Frequency of Eating out: daily  Food restrictions:none  Cooking: self   Food Shopping: self     Physical Activity Intake  Activity:Walking  Frequency: not  much  Physical limitations/barriers to exercise: none     Estimated Needs  Energy     Kenisha Mclaughlin Energy Needs: BMR : 2382   1-2# loss weekly sedentary:  8369-7428             1-2# loss weekly lightly active:8604 - 2772  Maintenance calories for sedentary activity level: 2585  Protein:106 - 133      (1.2-1.5g/kg IBW)  Fluid: 104     (35mL/kg IBW)     Nutrition Diagnosis  Yes;   Nutrition Diagnosis[]Expand by Default  Overweight/obesity  related to Excess energy intake as evidenced by  BMI more than normative standard for age and sex (obesity-grade I 30-34.9)     Nutrition Intervention     Nutrition Prescription  Calories:1800 - 2000  Protein:106 - 133  Fluid:104     Meal Plan (Archie/Pro/Carb)  Breakfast: 400/20/30  Snack:150/10/15  Lunch:400/28/30  Snack:150/10/15  Dinner:600/42/30-45  Snack:150/10/15     Nutrition Education:    Healthy Core Manual  Calorie controlled menu  Lean protein food choices  Healthy snack options  Food journaling tips        Nutrition Counseling:  Strategies: meal planning, portion sizes, healthy snack choices, hydration, fiber intake, protein intake, exercise, food journal        Monitoring and Evaluation:  Evaluation criteria:  Energy Intake  Meet protein needs  Maintain adequate hydration  Monitor weekly weight  Meal planning/preparation  Food journal   Decreased portions at mealtimes and snacks  Physical activity      Barriers to learning:none  Readiness to change: Action:  (Changing behavior)  Comprehension: good  Expected Compliance: good

## 2024-12-17 ENCOUNTER — TELEPHONE (OUTPATIENT)
Age: 24
End: 2024-12-17

## 2024-12-17 NOTE — TELEPHONE ENCOUNTER
Patient called in because he missed a few class appointments. His work schedule changes and right now would only be able to make morning classes. Patient is asking for a call back to discuss his schedule and what would work best for classes. Patient can be reached at [phone]

## 2024-12-23 ENCOUNTER — TELEPHONE (OUTPATIENT)
Dept: BARIATRICS | Facility: CLINIC | Age: 24
End: 2024-12-23

## 2024-12-23 NOTE — TELEPHONE ENCOUNTER
Called patient to asked if he was coming to the 6pm class tonight since he missed the 7am class.  Also asked if he would be coming to the 7am class next Monday (because of the holiday it is Monday not Wednesday.)  He is the only person scheduled at that time.  I asked that he let me know if he would be attending at that time.  I asked that he call me back or call the office.

## 2025-01-05 DIAGNOSIS — I10 ESSENTIAL HYPERTENSION: ICD-10-CM

## 2025-01-06 RX ORDER — HYDROCHLOROTHIAZIDE 25 MG/1
25 TABLET ORAL DAILY
Qty: 90 TABLET | Refills: 1 | Status: SHIPPED | OUTPATIENT
Start: 2025-01-06

## 2025-01-27 DIAGNOSIS — F90.1 ADHD, PREDOMINANTLY HYPERACTIVE TYPE: ICD-10-CM

## 2025-01-27 DIAGNOSIS — I10 ESSENTIAL HYPERTENSION: ICD-10-CM

## 2025-01-27 RX ORDER — DEXTROAMPHETAMINE SACCHARATE, AMPHETAMINE ASPARTATE, DEXTROAMPHETAMINE SULFATE AND AMPHETAMINE SULFATE 2.5; 2.5; 2.5; 2.5 MG/1; MG/1; MG/1; MG/1
10 TABLET ORAL
Qty: 60 TABLET | Refills: 0 | Status: SHIPPED | OUTPATIENT
Start: 2025-01-27

## 2025-01-27 RX ORDER — HYDROCHLOROTHIAZIDE 25 MG/1
25 TABLET ORAL DAILY
Qty: 90 TABLET | Refills: 3 | Status: SHIPPED | OUTPATIENT
Start: 2025-01-27

## 2025-04-24 ENCOUNTER — OFFICE VISIT (OUTPATIENT)
Dept: FAMILY MEDICINE CLINIC | Facility: CLINIC | Age: 25
End: 2025-04-24

## 2025-04-24 VITALS
HEIGHT: 75 IN | HEART RATE: 73 BPM | WEIGHT: 304 LBS | SYSTOLIC BLOOD PRESSURE: 142 MMHG | BODY MASS INDEX: 37.8 KG/M2 | DIASTOLIC BLOOD PRESSURE: 88 MMHG | OXYGEN SATURATION: 98 % | TEMPERATURE: 97.8 F

## 2025-04-24 DIAGNOSIS — Z00.00 ANNUAL PHYSICAL EXAM: ICD-10-CM

## 2025-04-24 DIAGNOSIS — F41.1 GAD (GENERALIZED ANXIETY DISORDER): ICD-10-CM

## 2025-04-24 DIAGNOSIS — E66.01 CLASS 2 SEVERE OBESITY DUE TO EXCESS CALORIES WITH SERIOUS COMORBIDITY AND BODY MASS INDEX (BMI) OF 38.0 TO 38.9 IN ADULT (HCC): Primary | ICD-10-CM

## 2025-04-24 DIAGNOSIS — I10 ESSENTIAL HYPERTENSION: ICD-10-CM

## 2025-04-24 DIAGNOSIS — Z13.220 SCREENING FOR LIPID DISORDERS: ICD-10-CM

## 2025-04-24 DIAGNOSIS — F33.1 MAJOR DEPRESSIVE DISORDER, RECURRENT, MODERATE (HCC): ICD-10-CM

## 2025-04-24 DIAGNOSIS — E66.812 CLASS 2 SEVERE OBESITY DUE TO EXCESS CALORIES WITH SERIOUS COMORBIDITY AND BODY MASS INDEX (BMI) OF 38.0 TO 38.9 IN ADULT (HCC): Primary | ICD-10-CM

## 2025-04-24 PROCEDURE — 99213 OFFICE O/P EST LOW 20 MIN: CPT | Performed by: PHYSICIAN ASSISTANT

## 2025-04-24 NOTE — ASSESSMENT & PLAN NOTE
Depression Screening Follow-up Plan: Patient's depression screening was positive with a PHQ-9 score of 17. Patient assessed for underlying major depression. They have no active suicidal ideations. Brief counseling provided and recommend additional follow-up/re-evaluation next office visit. Continue regular follow-up with their psychologist/therapist/psychiatrist who is managing their mental health condition(s).

## 2025-04-24 NOTE — ASSESSMENT & PLAN NOTE
Continue hctz  Lifestyle modifications/weight loss discussed as above  Orders:  •  Comprehensive metabolic panel; Future  •  CBC and differential; Future

## 2025-04-24 NOTE — PROGRESS NOTES
Adult Annual Physical  Name: Logan J Weiler      : 2000      MRN: [id number]  Encounter Provider: Chuy Akins PA-C  Encounter Date: 2025   Encounter department: [facility]    :  Assessment & Plan  Class 2 severe obesity due to excess calories with serious comorbidity and body mass index (BMI) of 38.0 to 38.9 in adult (HCC)    BMI Counseling: Body mass index is 38 kg/m². The BMI is above normal. Nutrition recommendations include reducing portion sizes, decreasing overall calorie intake, moderation in carbohydrate intake, increasing intake of lean protein, reducing intake of saturated fat and trans fat, and reducing intake of cholesterol. Exercise recommendations include moderate aerobic physical activity for 150 minutes/week.           Annual physical exam  Hx reviewed and updated. Screenings/labs/guidance as below       Major depressive disorder, recurrent, moderate (HCC)  Depression Screening Follow-up Plan: Patient's depression screening was positive with a PHQ-9 score of 17. Patient assessed for underlying major depression. They have no active suicidal ideations. Brief counseling provided and recommend additional follow-up/re-evaluation next office visit. Continue regular follow-up with their psychologist/therapist/psychiatrist who is managing their mental health condition(s).         KORY (generalized anxiety disorder)  As above       Essential hypertension  Continue hctz  Lifestyle modifications/weight loss discussed as above  Orders:  •  Comprehensive metabolic panel; Future  •  CBC and differential; Future    Screening for lipid disorders    Orders:  •  Lipid Panel with Direct LDL reflex; Future        Preventive Screenings:  - Diabetes Screening: orders placed  - Cholesterol Screening: orders placed   - Hepatitis C screening: screening up-to-date   - HIV screening: screening up-to-date   - Colon cancer screening: screening not indicated   - Lung cancer screening:  "screening not indicated   - Prostate cancer screening: screening not indicated     Immunizations:  - Immunizations due: Influenza    Counseling/Anticipatory Guidance:  - Alcohol: discussed moderation in alcohol intake and recommendations for healthy alcohol use.   - Drug use: discussed harms of illicit drug use and how it can negatively impact mental/physical health.   - Tobacco use: discussed harms of tobacco use and management options for quitting.   - Dental health: discussed importance of regular tooth brushing, flossing, and dental visits.   - Diet: discussed recommendations for a healthy/well-balanced diet.   - Exercise: the importance of regular exercise/physical activity was discussed. Recommend exercise 3-5 times per week for at least 30 minutes.          History of Present Illness     Adult Annual Physical:  Patient presents for annual physical. Pt presents for follow up/physical  Hx of DMM, KORY, mostly lifestyle related and the fact he is caring for his parents, was not able to finish education and feels \"stuck\" per pt. No longer on any pharmacotherapy as this did not help. He does follow with a therapist. He turned 25 and lost insurance  HTN: on hctz with borderline control  Notes weight gain and excess hunger/eating, believes may be mood related  No tobacco use  Uses marijuana  No alcohol abuse/misuse  Due for labs  Meds/allergies reviewed.     Diet and Physical Activity:  - Diet/Nutrition: poor diet and frequent junk food. Overeating  - Exercise: no formal exercise.    Depression Screening:    - PHQ-9 Score: 17    General Health:  - Sleep: > 8 hours of sleep on average and unrefreshing sleep.  - Vision: vision problems and wears glasses. New eye floater/spot in left eye near center of vision lasting more than one month  - Dental: brushes teeth once daily and does not floss.    /GYN Health:  - Follows with GYN: no.   - History of STDs: no     Health:  - History of STDs: no.   - Urinary symptoms: " "none.     Advanced Care Planning:  - Has an advanced directive?: no    - Has a durable medical POA?: no      Review of Systems   Constitutional:  Negative for chills, fatigue and fever.   HENT:  Negative for congestion, ear pain, hearing loss, nosebleeds, postnasal drip, rhinorrhea, sinus pressure, sinus pain, sneezing and sore throat.    Eyes:  Negative for pain, discharge, itching and visual disturbance.   Respiratory:  Negative for cough, chest tightness, shortness of breath and wheezing.    Cardiovascular:  Negative for chest pain, palpitations and leg swelling.   Gastrointestinal:  Negative for abdominal pain, blood in stool, constipation, diarrhea, nausea and vomiting.   Genitourinary:  Negative for frequency and urgency.   Neurological:  Negative for dizziness, light-headedness and numbness.         Objective   /88   Pulse 73   Temp 97.8 °F (36.6 °C)   Ht 6' 3\" (1.905 m)   Wt (!) 138 kg (304 lb)   SpO2 98%   BMI 38.00 kg/m²     Physical Exam  Vitals and nursing note reviewed.   Constitutional:       General: He is not in acute distress.     Appearance: He is well-developed.   HENT:      Head: Normocephalic and atraumatic.   Eyes:      Conjunctiva/sclera: Conjunctivae normal.   Cardiovascular:      Rate and Rhythm: Normal rate and regular rhythm.      Heart sounds: No murmur heard.  Pulmonary:      Effort: Pulmonary effort is normal. No respiratory distress.      Breath sounds: Normal breath sounds. No wheezing, rhonchi or rales.   Abdominal:      Palpations: Abdomen is soft.      Tenderness: There is no abdominal tenderness.   Musculoskeletal:         General: No swelling.      Cervical back: Neck supple.      Right lower leg: No edema.      Left lower leg: No edema.   Skin:     General: Skin is warm and dry.      Capillary Refill: Capillary refill takes less than 2 seconds.   Neurological:      Mental Status: He is alert.   Psychiatric:         Mood and Affect: Mood normal.         "

## 2025-05-01 RX ORDER — DEXTROAMPHETAMINE SACCHARATE, AMPHETAMINE ASPARTATE, DEXTROAMPHETAMINE SULFATE AND AMPHETAMINE SULFATE 1.25; 1.25; 1.25; 1.25 MG/1; MG/1; MG/1; MG/1
5 TABLET ORAL
Qty: 30 TABLET | Refills: 0 | OUTPATIENT
Start: 2025-05-01